# Patient Record
Sex: FEMALE | Race: WHITE | NOT HISPANIC OR LATINO | Employment: OTHER | URBAN - METROPOLITAN AREA
[De-identification: names, ages, dates, MRNs, and addresses within clinical notes are randomized per-mention and may not be internally consistent; named-entity substitution may affect disease eponyms.]

---

## 2017-01-23 ENCOUNTER — APPOINTMENT (OUTPATIENT)
Dept: BONE DENSITY | Facility: HOSPITAL | Age: 82
End: 2017-01-23
Attending: INTERNAL MEDICINE

## 2017-02-22 ENCOUNTER — APPOINTMENT (OUTPATIENT)
Dept: BONE DENSITY | Facility: HOSPITAL | Age: 82
End: 2017-02-22
Attending: INTERNAL MEDICINE

## 2017-03-09 ENCOUNTER — HOSPITAL ENCOUNTER (OUTPATIENT)
Dept: BONE DENSITY | Facility: HOSPITAL | Age: 82
Discharge: HOME | End: 2017-03-09
Attending: INTERNAL MEDICINE | Admitting: INTERNAL MEDICINE

## 2017-03-09 DIAGNOSIS — M81.0 OSTEOPOROSIS: ICD-10-CM

## 2017-03-09 PROCEDURE — 77080 DXA BONE DENSITY AXIAL: CPT | Performed by: RADIOLOGY

## 2017-03-09 PROCEDURE — 77080 DXA BONE DENSITY AXIAL: CPT

## 2017-04-19 ENCOUNTER — TRANSCRIBE ORDERS (OUTPATIENT)
Dept: ADMINISTRATIVE | Facility: HOSPITAL | Age: 82
End: 2017-04-19

## 2017-04-19 DIAGNOSIS — R10.9 ABDOMINAL PAIN, UNSPECIFIED LOCATION: Primary | ICD-10-CM

## 2017-04-20 ENCOUNTER — HOSPITAL ENCOUNTER (OUTPATIENT)
Dept: CT IMAGING | Facility: HOSPITAL | Age: 82
Discharge: HOME | End: 2017-04-20
Admitting: PHYSICIAN ASSISTANT

## 2017-04-20 DIAGNOSIS — R10.9 ABDOMINAL PAIN, UNSPECIFIED LOCATION: ICD-10-CM

## 2017-04-20 PROCEDURE — 74178 CT ABD&PLV WO CNTR FLWD CNTR: CPT

## 2017-04-20 PROCEDURE — 82565 ASSAY OF CREATININE: CPT

## 2017-04-20 PROCEDURE — 0 IOPAMIDOL 61 % SOLUTION: Performed by: PHYSICIAN ASSISTANT

## 2017-04-20 RX ADMIN — IOPAMIDOL 98 ML: 612 INJECTION, SOLUTION INTRAVENOUS at 11:25

## 2017-04-21 LAB — CREAT BLDA-MCNC: 0.7 MG/DL (ref 0.6–1.3)

## 2019-02-28 ENCOUNTER — TELEPHONE (OUTPATIENT)
Dept: INTERNAL MEDICINE | Facility: CLINIC | Age: 84
End: 2019-02-28

## 2019-02-28 DIAGNOSIS — R53.1 GENERAL WEAKNESS: Primary | ICD-10-CM

## 2019-02-28 DIAGNOSIS — R26.89 BALANCE DISORDER: ICD-10-CM

## 2019-03-04 ENCOUNTER — TELEPHONE (OUTPATIENT)
Dept: INTERNAL MEDICINE | Facility: CLINIC | Age: 84
End: 2019-03-04

## 2019-03-08 PROBLEM — I10 BENIGN ESSENTIAL HYPERTENSION: Status: ACTIVE | Noted: 2019-03-08

## 2019-03-08 PROBLEM — E78.2 MIXED HYPERLIPIDEMIA: Status: ACTIVE | Noted: 2019-03-08

## 2019-03-08 PROBLEM — F41.1 GENERALIZED ANXIETY DISORDER: Status: ACTIVE | Noted: 2019-03-08

## 2019-03-08 PROBLEM — M81.0 SENILE OSTEOPOROSIS: Status: ACTIVE | Noted: 2019-03-08

## 2019-03-08 PROBLEM — I38 VALVULAR HEART DISEASE: Status: ACTIVE | Noted: 2019-03-08

## 2019-03-08 PROBLEM — K64.9 BLEEDING HEMORRHOID: Status: ACTIVE | Noted: 2019-03-08

## 2019-03-08 PROBLEM — R73.09 ABNORMAL GLUCOSE: Status: ACTIVE | Noted: 2019-03-08

## 2019-03-08 PROBLEM — Q21.10: Status: ACTIVE | Noted: 2019-03-08

## 2019-03-08 PROBLEM — I49.3 PVC'S (PREMATURE VENTRICULAR CONTRACTIONS): Status: ACTIVE | Noted: 2019-03-08

## 2019-03-08 PROBLEM — I27.20 PULMONARY HYPERTENSION (HCC): Status: ACTIVE | Noted: 2019-03-08

## 2019-03-13 ENCOUNTER — APPOINTMENT (OUTPATIENT)
Dept: PHYSICAL THERAPY | Facility: HOSPITAL | Age: 84
End: 2019-03-13

## 2019-03-14 ENCOUNTER — TELEPHONE (OUTPATIENT)
Dept: INTERNAL MEDICINE | Facility: CLINIC | Age: 84
End: 2019-03-14

## 2019-03-14 NOTE — TELEPHONE ENCOUNTER
PT CALLED TO LET YOU KNOW THAT  DR. ZAMARRIPA PUT HER ON THIS MEDICATION VASCULERA . BUT SHE IS NOT GOING TO TAKE IT UNTIL SOMEONE CALLS HER BACK TO LET HER KNOW WHAT IT IS. HER NUMBER [phone]

## 2019-06-10 ENCOUNTER — LAB (OUTPATIENT)
Dept: LAB | Facility: HOSPITAL | Age: 84
End: 2019-06-10

## 2019-06-10 DIAGNOSIS — I10 BENIGN ESSENTIAL HYPERTENSION: ICD-10-CM

## 2019-06-10 DIAGNOSIS — E78.2 MIXED HYPERLIPIDEMIA: ICD-10-CM

## 2019-06-10 DIAGNOSIS — I10 BENIGN ESSENTIAL HYPERTENSION: Primary | ICD-10-CM

## 2019-06-10 DIAGNOSIS — R73.09 ABNORMAL GLUCOSE: ICD-10-CM

## 2019-06-10 DIAGNOSIS — M81.0 SENILE OSTEOPOROSIS: ICD-10-CM

## 2019-06-10 LAB
25(OH)D3 SERPL-MCNC: 47.1 NG/ML (ref 30–100)
ALBUMIN SERPL-MCNC: 4.2 G/DL (ref 3.5–5.2)
ALBUMIN/GLOB SERPL: 1.5 G/DL
ALP SERPL-CCNC: 115 U/L (ref 39–117)
ALT SERPL W P-5'-P-CCNC: 11 U/L (ref 1–33)
ANION GAP SERPL CALCULATED.3IONS-SCNC: 12.3 MMOL/L
AST SERPL-CCNC: 16 U/L (ref 1–32)
BACTERIA UR QL AUTO: NORMAL /HPF
BASOPHILS # BLD AUTO: 0.05 10*3/MM3 (ref 0–0.2)
BASOPHILS NFR BLD AUTO: 0.8 % (ref 0–1.5)
BILIRUB SERPL-MCNC: 0.4 MG/DL (ref 0.2–1.2)
BILIRUB UR QL STRIP: NEGATIVE
BUN BLD-MCNC: 9 MG/DL (ref 8–23)
BUN/CREAT SERPL: 13.4 (ref 7–25)
CALCIUM SPEC-SCNC: 9.2 MG/DL (ref 8.6–10.5)
CHLORIDE SERPL-SCNC: 94 MMOL/L (ref 98–107)
CHOLEST SERPL-MCNC: 164 MG/DL (ref 0–200)
CLARITY UR: CLEAR
CO2 SERPL-SCNC: 25.7 MMOL/L (ref 22–29)
COLOR UR: YELLOW
CREAT BLD-MCNC: 0.67 MG/DL (ref 0.57–1)
DEPRECATED RDW RBC AUTO: 42.8 FL (ref 37–54)
EOSINOPHIL # BLD AUTO: 0.05 10*3/MM3 (ref 0–0.4)
EOSINOPHIL NFR BLD AUTO: 0.8 % (ref 0.3–6.2)
ERYTHROCYTE [DISTWIDTH] IN BLOOD BY AUTOMATED COUNT: 12.5 % (ref 12.3–15.4)
GFR SERPL CREATININE-BSD FRML MDRD: 84 ML/MIN/1.73
GLOBULIN UR ELPH-MCNC: 2.8 GM/DL
GLUCOSE BLD-MCNC: 100 MG/DL (ref 65–99)
GLUCOSE UR STRIP-MCNC: NEGATIVE MG/DL
HBA1C MFR BLD: 5.5 % (ref 4.8–5.6)
HCT VFR BLD AUTO: 42 % (ref 34–46.6)
HCYS SERPL-MCNC: 12.8 UMOL/L (ref 0–15)
HDLC SERPL-MCNC: 44 MG/DL (ref 40–60)
HGB BLD-MCNC: 13.4 G/DL (ref 12–15.9)
HGB UR QL STRIP.AUTO: NEGATIVE
HYALINE CASTS UR QL AUTO: NORMAL /LPF
IMM GRANULOCYTES # BLD AUTO: 0.05 10*3/MM3 (ref 0–0.05)
IMM GRANULOCYTES NFR BLD AUTO: 0.8 % (ref 0–0.5)
KETONES UR QL STRIP: NEGATIVE
LDLC SERPL CALC-MCNC: 110 MG/DL (ref 0–100)
LDLC/HDLC SERPL: 2.5 {RATIO}
LEUKOCYTE ESTERASE UR QL STRIP.AUTO: NEGATIVE
LYMPHOCYTES # BLD AUTO: 1.34 10*3/MM3 (ref 0.7–3.1)
LYMPHOCYTES NFR BLD AUTO: 21.8 % (ref 19.6–45.3)
MCH RBC QN AUTO: 29.8 PG (ref 26.6–33)
MCHC RBC AUTO-ENTMCNC: 31.9 G/DL (ref 31.5–35.7)
MCV RBC AUTO: 93.3 FL (ref 79–97)
MONOCYTES # BLD AUTO: 0.47 10*3/MM3 (ref 0.1–0.9)
MONOCYTES NFR BLD AUTO: 7.7 % (ref 5–12)
NEUTROPHILS # BLD AUTO: 4.18 10*3/MM3 (ref 1.7–7)
NEUTROPHILS NFR BLD AUTO: 68.1 % (ref 42.7–76)
NITRITE UR QL STRIP: NEGATIVE
NRBC BLD AUTO-RTO: 0 /100 WBC (ref 0–0.2)
PH UR STRIP.AUTO: 7.5 [PH] (ref 5–8)
PLATELET # BLD AUTO: 403 10*3/MM3 (ref 140–450)
PMV BLD AUTO: 9.6 FL (ref 6–12)
POTASSIUM BLD-SCNC: 4.4 MMOL/L (ref 3.5–5.2)
PROT SERPL-MCNC: 7 G/DL (ref 6–8.5)
PROT UR QL STRIP: NEGATIVE
RBC # BLD AUTO: 4.5 10*6/MM3 (ref 3.77–5.28)
RBC # UR: NORMAL /HPF
REF LAB TEST METHOD: NORMAL
SODIUM BLD-SCNC: 132 MMOL/L (ref 136–145)
SP GR UR STRIP: 1.01 (ref 1–1.03)
SQUAMOUS #/AREA URNS HPF: NORMAL /HPF
TRIGL SERPL-MCNC: 50 MG/DL (ref 0–150)
TSH SERPL DL<=0.05 MIU/L-ACNC: 1.45 MIU/ML (ref 0.27–4.2)
UROBILINOGEN UR QL STRIP: NORMAL
VLDLC SERPL-MCNC: 10 MG/DL (ref 5–40)
WBC NRBC COR # BLD: 6.14 10*3/MM3 (ref 3.4–10.8)
WBC UR QL AUTO: NORMAL /HPF

## 2019-06-10 PROCEDURE — 85025 COMPLETE CBC W/AUTO DIFF WBC: CPT

## 2019-06-10 PROCEDURE — 83036 HEMOGLOBIN GLYCOSYLATED A1C: CPT

## 2019-06-10 PROCEDURE — 82306 VITAMIN D 25 HYDROXY: CPT

## 2019-06-10 PROCEDURE — 80061 LIPID PANEL: CPT

## 2019-06-10 PROCEDURE — 84443 ASSAY THYROID STIM HORMONE: CPT

## 2019-06-10 PROCEDURE — 81001 URINALYSIS AUTO W/SCOPE: CPT

## 2019-06-10 PROCEDURE — 83090 ASSAY OF HOMOCYSTEINE: CPT

## 2019-06-10 PROCEDURE — 80053 COMPREHEN METABOLIC PANEL: CPT

## 2019-06-11 ENCOUNTER — OFFICE VISIT (OUTPATIENT)
Dept: INTERNAL MEDICINE | Facility: CLINIC | Age: 84
End: 2019-06-11

## 2019-06-11 VITALS
BODY MASS INDEX: 27 KG/M2 | DIASTOLIC BLOOD PRESSURE: 54 MMHG | HEART RATE: 62 BPM | WEIGHT: 143 LBS | HEIGHT: 61 IN | SYSTOLIC BLOOD PRESSURE: 112 MMHG

## 2019-06-11 DIAGNOSIS — M81.0 SENILE OSTEOPOROSIS: ICD-10-CM

## 2019-06-11 DIAGNOSIS — I27.20 PULMONARY HYPERTENSION (HCC): ICD-10-CM

## 2019-06-11 DIAGNOSIS — R07.9 ACUTE CHEST PAIN: ICD-10-CM

## 2019-06-11 DIAGNOSIS — E78.2 MIXED HYPERLIPIDEMIA: ICD-10-CM

## 2019-06-11 DIAGNOSIS — K59.01 CONSTIPATION, SLOW TRANSIT: ICD-10-CM

## 2019-06-11 DIAGNOSIS — K64.4 EXTERNAL HEMORRHOID: ICD-10-CM

## 2019-06-11 DIAGNOSIS — I49.3 PVC'S (PREMATURE VENTRICULAR CONTRACTIONS): ICD-10-CM

## 2019-06-11 DIAGNOSIS — R73.09 ABNORMAL GLUCOSE: ICD-10-CM

## 2019-06-11 DIAGNOSIS — I10 BENIGN ESSENTIAL HYPERTENSION: Primary | ICD-10-CM

## 2019-06-11 PROBLEM — M62.81 QUADRICEPS WEAKNESS: Status: ACTIVE | Noted: 2019-06-11

## 2019-06-11 PROBLEM — R33.9 URINARY RETENTION: Status: ACTIVE | Noted: 2019-06-11

## 2019-06-11 PROBLEM — M19.90 OSTEOARTHRITIS: Status: ACTIVE | Noted: 2019-06-11

## 2019-06-11 PROBLEM — M25.561 RIGHT KNEE PAIN: Status: ACTIVE | Noted: 2019-06-11

## 2019-06-11 PROBLEM — E66.3 OVERWEIGHT (BMI 25.0-29.9): Status: ACTIVE | Noted: 2019-06-11

## 2019-06-11 PROBLEM — K64.9 BLEEDING HEMORRHOID: Status: RESOLVED | Noted: 2019-03-08 | Resolved: 2019-06-11

## 2019-06-11 PROBLEM — K80.20 CALCULUS OF GALLBLADDER: Status: ACTIVE | Noted: 2019-06-11

## 2019-06-11 PROCEDURE — 99214 OFFICE O/P EST MOD 30 MIN: CPT | Performed by: INTERNAL MEDICINE

## 2019-06-11 RX ORDER — POLYETHYLENE GLYCOL 3350 17 G/17G
POWDER, FOR SOLUTION ORAL
COMMUNITY
Start: 2018-06-26 | End: 2019-12-02

## 2019-06-11 RX ORDER — BISOPROLOL FUMARATE 5 MG/1
5 TABLET, FILM COATED ORAL DAILY
Refills: 3 | Status: ON HOLD | COMMUNITY
Start: 2019-05-07 | End: 2019-11-03 | Stop reason: SDUPTHER

## 2019-06-11 RX ORDER — VITAMIN E 200 UNIT
CAPSULE ORAL
COMMUNITY
Start: 2017-06-28 | End: 2020-12-18 | Stop reason: HOSPADM

## 2019-06-11 RX ORDER — TURMERIC 95 %
750 POWDER (GRAM) MISCELLANEOUS DAILY
COMMUNITY
End: 2019-11-03 | Stop reason: HOSPADM

## 2019-06-11 RX ORDER — GABAPENTIN 100 MG/1
CAPSULE ORAL
Status: ON HOLD | COMMUNITY
Start: 2017-12-15 | End: 2019-11-02

## 2019-06-11 RX ORDER — DIMENHYDRINATE 50 MG
1 TABLET ORAL DAILY
COMMUNITY
Start: 2017-12-07 | End: 2020-12-18 | Stop reason: HOSPADM

## 2019-06-11 RX ORDER — LISINOPRIL 20 MG/1
TABLET ORAL
COMMUNITY
Start: 2018-06-26 | End: 2019-06-24 | Stop reason: SDUPTHER

## 2019-06-11 RX ORDER — LANOLIN ALCOHOL/MO/W.PET/CERES
1 CREAM (GRAM) TOPICAL DAILY PRN
Status: ON HOLD | COMMUNITY
Start: 2017-12-07 | End: 2019-11-02

## 2019-06-11 RX ORDER — DIOSMIN COMPLEX NO.1 630 MG
TABLET ORAL
Refills: 11 | COMMUNITY
Start: 2019-06-03 | End: 2019-12-06 | Stop reason: SDUPTHER

## 2019-06-11 NOTE — PATIENT INSTRUCTIONS
For hypertension, continue taking lisinopril and bisoprolol.  Continue to avoid salt in the diet.  Stay physically active.    For hyperlipidemia, continue to eat a low-fat diet.  Eat less sugars.  Continue getting some exercise or walking every day.    For PVCs and palpitations, continue taking bisoprolol.  Decrease caffeine.  Also decrease chocolate.    Hemoglobin A1c shows that the average sugars is good.  Decreasing sugars in the diet will help.  Stay active.    For osteoporosis, doing some weightbearing exercise every day including walking and using hand weights helps.  Continue taking vitamin D and calcium.    Let us know if you have any further episodes of chest pain.    For hemorrhoids and constipation, drink lots of fluids and take a stool softener every day.  Add MiraLAX as needed.  Continue taking vascularity twice a day.  Exercises To Do While Sitting  Exercises that you do while sitting (chair exercises) can give you many of the same benefits as full exercise. Benefits include strengthening your heart, burning calories, and keeping muscles and joints healthy. Exercise can also improve your mood and help with depression and anxiety.  You may benefit from chair exercises if you are unable to do standing exercises because of:  · Diabetic foot pain.  · Obesity.  · Illness.  · Arthritis.  · Recovery from surgery or injury.  · Breathing problems.  · Balance problems.  · Another type of disability.    Before starting chair exercises, check with your health care provider or a physical therapist to find out how much exercise you can tolerate and which exercises are safe for you. If your health care provider approves:  · Start out slowly and build up over time. Aim to work up to about 10-20 minutes for each exercise session.  · Make exercise part of your daily routine.  · Drink water when you exercise. Do not wait until you are thirsty. Drink every 10-15 minutes.  · Stop exercising right away if you have pain,  "nausea, shortness of breath, or dizziness.  · If you are exercising in a wheelchair, make sure to lock the wheels.  · Ask your health care provider whether you can do last chi or yoga. Many positions in these mind-body exercises can be modified to do while seated.    Warm-up  Before starting other exercises:  1. Sit up as straight as you can. Have your knees bent at 90 degrees, which is the shape of the capital letter \"L.\" Keep your feet flat on the floor.  2. Sit at the front edge of your chair, if you can.  3. Pull in (tighten) the muscles in your abdomen and stretch your spine and neck as straight as you can. Hold this position for a few minutes.  4. Breathe in and out evenly. Try to concentrate on your breathing, and relax your mind.  Stretching  Exercise A: Arm stretch  1. Hold your arms out straight in front of your body.  2. Bend your hands at the wrist with your fingers pointing up, as if signaling someone to stop. Notice the slight tension in your forearms as you hold the position.  3. Keeping your arms out and your hands bent, rotate your hands outward as far as you can and hold this stretch. Aim to have your thumbs pointing up and your pinkie fingers pointing down.  Slowly repeat arm stretches for one minute as tolerated.  Exercise B: Leg stretch  1. If you can move your legs, try to \"draw\" letters on the floor with the toes of your foot. Write your name with one foot.  2. Write your name with the toes of your other foot.  Slowly repeat the movements for one minute as tolerated.  Exercise C: Reach for the sara  1. Reach your hands as far over your head as you can to stretch your spine.  2. Move your hands and arms as if you are climbing a rope.  Slowly repeat the movements for one minute as tolerated.  Range of motion exercises  Exercise A: Shoulder roll  1. Let your arms hang loosely at your sides.  2. Lift just your shoulders up toward your ears, then let them relax back down.  3. When your shoulders feel " loose, rotate your shoulders in backward and forward circles.  Do shoulder rolls slowly for one minute as tolerated.  Exercise B: March in place  1. As if you are marching, pump your arms and lift your legs up and down. Lift your knees as high as you can.  ? If you are unable to lift your knees, just pump your arms and move your ankles and feet up and down.  March in place for one minute as tolerated.  Exercise C: Seated jumping jacks  1. Let your arms hang down straight.  2. Keeping your arms straight, lift them up over your head. Aim to point your fingers to the ceiling.  3. While you lift your arms, straighten your legs and slide your heels along the floor to your sides, as wide as you can.  4. As you bring your arms back down to your sides, slide your legs back together.  ? If you are unable to use your legs, just move your arms.  Slowly repeat seated jumping jacks for one minute as tolerated.  Strengthening exercises  Exercise A: Shoulder squeeze  1. Hold your arms straight out from your body to your sides, with your elbows bent and your fists pointed at the ceiling.  2. Keeping your arms in the bent position, move them forward so your elbows and forearms meet in front of your face.  3. Open your arms back out as wide as you can with your elbows still bent, until you feel your shoulder blades squeezing together. Hold for 5 seconds.  Slowly repeat the movements forward and backward for one minute as tolerated.  Contact a health care provider if you:  · Had to stop exercising due to any of the following:  ? Pain.  ? Nausea.  ? Shortness of breath.  ? Dizziness.  ? Fatigue.  · Have significant pain or soreness after exercising.  Get help right away if you have:  · Chest pain.  · Difficulty breathing.  These symptoms may represent a serious problem that is an emergency. Do not wait to see if the symptoms will go away. Get medical help right away. Call your local emergency services (911 in the [place] Do not drive  yourself to the hospital.  This information is not intended to replace advice given to you by your health care provider. Make sure you discuss any questions you have with your health care provider.  Document Released: 10/31/2018 Document Revised: 10/31/2018 Document Reviewed: 10/31/2018  [employer] Interactive Patient Education © 2019 [employer]    Constipation, Adult  Constipation is when a person has fewer bowel movements in a week than normal, has difficulty having a bowel movement, or has stools that are dry, hard, or larger than normal. Constipation may be caused by an underlying condition. It may become worse with age if a person takes certain medicines and does not take in enough fluids.  Follow these instructions at home:  Eating and drinking    · Eat foods that have a lot of fiber, such as fresh fruits and vegetables, whole grains, and beans.  · Limit foods that are high in fat, low in fiber, or overly processed, such as french fries, hamburgers, cookies, candies, and soda.  · Drink enough fluid to keep your urine clear or pale yellow.  General instructions  · Exercise regularly or as told by your health care provider.  · Go to the restroom when you have the urge to go. Do not hold it in.  · Take over-the-counter and prescription medicines only as told by your health care provider. These include any fiber supplements.  · Practice pelvic floor retraining exercises, such as deep breathing while relaxing the lower abdomen and pelvic floor relaxation during bowel movements.  · Watch your condition for any changes.  · Keep all follow-up visits as told by your health care provider. This is important.  Contact a health care provider if:  · You have pain that gets worse.  · You have a fever.  · You do not have a bowel movement after 4 days.  · You vomit.  · You are not hungry.  · You lose weight.  · You are bleeding from the anus.  · You have thin, pencil-like stools.  Get help right away if:  · You have a fever  and your symptoms suddenly get worse.  · You leak stool or have blood in your stool.  · Your abdomen is bloated.  · You have severe pain in your abdomen.  · You feel dizzy or you faint.  This information is not intended to replace advice given to you by your health care provider. Make sure you discuss any questions you have with your health care provider.  Document Released: 09/15/2005 Document Revised: 07/07/2017 Document Reviewed: 06/07/2017  [employer] Interactive Patient Education © 2019 [employer]    Fall Prevention in the Home, Adult  Falls can cause injuries and can affect people from all age groups. There are many simple things that you can do to make your home safe and to help prevent falls. Ask for help when making these changes, if needed.  What actions can I take to prevent falls?  General instructions  · Use good lighting in all rooms. Replace any light bulbs that burn out.  · Turn on lights if it is dark. Use night-lights.  · Place frequently used items in easy-to-reach places. Lower the shelves around your home if necessary.  · Set up furniture so that there are clear paths around it. Avoid moving your furniture around.  · Remove throw rugs and other tripping hazards from the floor.  · Avoid walking on wet floors.  · Fix any uneven floor surfaces.  · Add color or contrast paint or tape to grab bars and handrails in your home. Place contrasting color strips on the first and last steps of stairways.  · When you use a stepladder, make sure that it is completely opened and that the sides are firmly locked. Have someone hold the ladder while you are using it. Do not climb a closed stepladder.  · Be aware of any and all pets.  What can I do in the bathroom?  · Keep the floor dry. Immediately clean up any water that spills onto the floor.  · Remove soap buildup in the tub or shower on a regular basis.  · Use non-skid mats or decals on the floor of the tub or shower.  · Attach bath mats securely with  double-sided, non-slip rug tape.  · If you need to sit down while you are in the shower, use a plastic, non-slip stool.  · Install grab bars by the toilet and in the tub and shower. Do not use towel bars as grab bars.  What can I do in the bedroom?  · Make sure that a bedside light is easy to reach.  · Do not use oversized bedding that drapes onto the floor.  · Have a firm chair that has side arms to use for getting dressed.  What can I do in the kitchen?  · Clean up any spills right away.  · If you need to reach for something above you, use a sturdy step stool that has a grab bar.  · Keep electrical cables out of the way.  · Do not use floor polish or wax that makes floors slippery. If you must use wax, make sure that it is non-skid floor wax.  What can I do in the stairways?  · Do not leave any items on the stairs.  · Make sure that you have a light switch at the top of the stairs and the bottom of the stairs. Have them installed if you do not have them.  · Make sure that there are handrails on both sides of the stairs. Fix handrails that are broken or loose. Make sure that handrails are as long as the stairways.  · Install non-slip stair treads on all stairs in your home.  · Avoid having throw rugs at the top or bottom of stairways, or secure the rugs with carpet tape to prevent them from moving.  · Choose a carpet design that does not hide the edge of steps on the stairway.  · Check any carpeting to make sure that it is firmly attached to the stairs. Fix any carpet that is loose or worn.  What can I do on the outside of my home?  · Use bright outdoor lighting.  · Regularly repair the edges of walkways and driveways and fix any cracks.  · Remove high doorway thresholds.  · Trim any shrubbery on the main path into your home.  · Regularly check that handrails are securely fastened and in good repair. Both sides of any steps should have handrails.  · Install guardrails along the edges of any raised decks or  porches.  · Clear walkways of debris and clutter, including tools and rocks.  · Have leaves, snow, and ice cleared regularly.  · Use sand or salt on walkways during winter months.  · In the garage, clean up any spills right away, including grease or oil spills.  What other actions can I take?  · Wear closed-toe shoes that fit well and support your feet. Wear shoes that have rubber soles or low heels.  · Use mobility aids as needed, such as canes, walkers, scooters, and crutches.  · Review your medicines with your health care provider. Some medicines can cause dizziness or changes in blood pressure, which increase your risk of falling.  Talk with your health care provider about other ways that you can decrease your risk of falls. This may include working with a physical therapist or  to improve your strength, balance, and endurance.  Where to find more information  · Centers for Disease Control and Prevention, [place]: https://www.cdc.gov  · [employer] [place] [employer]: https://zv9nwcq.yesenia.nih.gov  Contact a health care provider if:  · You are afraid of falling at home.  · You feel weak, drowsy, or dizzy at home.  · You fall at home.  Summary  · There are many simple things that you can do to make your home safe and to help prevent falls.  · Ways to make your home safe include removing tripping hazards and installing grab bars in the bathroom.  · Ask for help when making these changes in your home.  This information is not intended to replace advice given to you by your health care provider. Make sure you discuss any questions you have with your health care provider.  Document Released: 12/08/2003 Document Revised: 08/02/2018 Document Reviewed: 08/02/2018  [employer] Interactive Patient Education © 2019 [employer]

## 2019-06-11 NOTE — PROGRESS NOTES
[facility]     Thuy Clark  1935   [id number]      Patient Care Team:  Isidra Eisenberg MD as PCP - General  Isidra Eisenberg MD as PCP - Family Medicine  Isidra Eisenberg MD as PCP - Claims Attributed    Chief Complaint;:   Chief Complaint   Patient presents with   • Medicine     doesn't know why she was put on Meclizine and she doesn't want is   • ER f/u     went to [facility], we have records            HPI  Patient is a 83 y.o. female presents with went to ER with left lower anterior chest pain that made it difficult to get a good deep breath. It gradually decreased over about 24 hours.  Never recurred. No indigestion or heartburn. She does belch a lot since cholecystectomy.     CHRONIC CONDITIONS  Palpitations, worse at night, are mild and unchanged. Drinks about 4 glasses of caffeine per day. Takes beta blocker.     BP at [facility] one day was 143/56.  She had trouble parking.Takes lisinopril and bisoprolol.     Exercises, walks, rides bike, gardens.     She has an old bottle of meclizine with her that was from 2016. She does not remember what it was for. She will dispose of it.    She  denies any shortness of breath with exercise or gardening.  No cough or wheeze.  Her energy is good and she feels well.    She does have constipation and hemorrhoids that flareup on and off.  She has tried MiraLAX for the constipation and she says that is not the answer.  She has some stool softeners at home but has not tried taking them on a regular basis.  She does use preparation H for the hemorrhoids and it does help some.  She does not want a prescription for the hemorrhoids because she has been given one in the past and it was too expensive.  She does take vascularity twice a day now as prescribed by Dr. Gomez and she feels it has decreased her hemorrhoid flares.    Past Medical History:   Diagnosis Date   • Bleeding hemorrhoid 3/8/2019   • Diverticulosis    • Hx of colonic polyps 2003   •  Hypertension    • Intraductal papilloma     R intraductal patheloma   • Left upper extremity numbness     L upper extremity numbness-ER visit; neuropathy       Past Surgical History:   Procedure Laterality Date   • BREAST LUMPECTOMY  2006   • CHOLECYSTECTOMY     • HYSTERECTOMY  1984   • VAGINECTOMY  2009       Family History   Problem Relation Age of Onset   • Hyperlipidemia Other    • Hypertension Other    • Coronary artery disease Other        Social History     Socioeconomic History   • Marital status:      Spouse name: Not on file   • Number of children: Not on file   • Years of education: Not on file   • Highest education level: Not on file   Tobacco Use   • Smoking status: Never Smoker       Allergies   Allergen Reactions   • Levofloxacin Other (See Comments)     OTHER    • Nitrofuran Derivatives Rash     OTHER    • Nitrofurantoin Rash       Review of Systems:     Review of Systems   Constitutional: Negative for chills, fatigue, fever, unexpected weight gain and unexpected weight loss.   HENT: Negative for congestion, ear pain, sinus pressure, sore throat and trouble swallowing.    Eyes: Negative for visual disturbance.   Respiratory: Negative for cough, chest tightness, shortness of breath and wheezing.    Cardiovascular: Positive for chest pain and palpitations. Negative for leg swelling.   Gastrointestinal: Positive for constipation. Negative for abdominal pain, blood in stool, diarrhea and GERD.        Hemorrhoids   Endocrine: Negative for cold intolerance, heat intolerance and polydipsia.   Genitourinary: Negative for urinary incontinence, dysuria, frequency, hematuria, breast lump and breast pain.   Musculoskeletal: Negative for back pain, gait problem and joint swelling.   Skin: Negative for color change, rash and skin lesions.   Allergic/Immunologic: Negative for environmental allergies, food allergies and immunocompromised state.   Neurological: Negative for dizziness, speech difficulty,  "headache and memory problem.   Hematological: Negative for adenopathy. Does not bruise/bleed easily.   Psychiatric/Behavioral: Negative for decreased concentration, sleep disturbance and depressed mood. The patient is not nervous/anxious.        Vital Signs  Vitals:    06/11/19 1408   BP: 112/54   BP Location: Left arm   Patient Position: Sitting   Cuff Size: Adult   Pulse: 62   Weight: 64.9 kg (143 lb)   Height: 154.3 cm (60.75\")     Body mass index is 27.24 kg/m².      Current Outpatient Medications:   •  bisoprolol (ZEBeta) 5 MG tablet, , Disp: , Rfl: 3  •  Cholecalciferol (VITAMIN D3) 1000 units capsule, Take 1 capsule by mouth Daily., Disp: , Rfl:   •  Coenzyme Q10 (CO Q-10) 100 MG capsule, Take 1 capsule by mouth Daily., Disp: , Rfl:   •  Dietary Management Product (VASCULERA) tablet, TAKE TWO TABLETS BY MOUTH DAILY FOR 14 DAYS, THEN ONE TABLET BY MOUTH DAILY, Disp: , Rfl: 11  •  gabapentin (NEURONTIN) 100 MG capsule, take 1 capsule by oral route in the morning and  3 capsules in the evening if needed, Disp: , Rfl:   •  lisinopril (PRINIVIL,ZESTRIL) 20 MG tablet, Take 1 tablet every morning and 1/2 tablet every evening, Disp: , Rfl:   •  MEGARED OMEGA-3 KRILL OIL PO, 1 daily, Disp: , Rfl:   •  polyethylene glycol (MIRALAX) powder, take 1tsp.  by oral route  every day powder mixed with 8 oz. water, juice, soda, coffee, or tea, Disp: , Rfl:   •  Probiotic Product (PROBIOTIC PEARLS ADVANTAGE PO), 1  PRN, Disp: , Rfl:   •  Turmeric, Curcuma Longa, (CURCUMIN EXTRACT) powder, 750 mg Daily., Disp: , Rfl:   •  vitamin B-12 (CYANOCOBALAMIN) 1000 MCG tablet, Take 1 tablet by mouth Daily As Needed., Disp: , Rfl:   •  vitamin E 100 UNIT capsule, vitamin E, Disp: , Rfl:     Physical Exam:    Physical Exam     ACE III MINI        Results Review:    I reviewed the patient's new clinical results.  We went over her labs in detail.    CMP:  Lab Results   Component Value Date    BUN 9 06/10/2019    CREATININE 0.67 06/10/2019    " EGFRIFNONA 84 06/10/2019    BCR 13.4 06/10/2019     (L) 06/10/2019    K 4.4 06/10/2019    CO2 25.7 06/10/2019    CALCIUM 9.2 06/10/2019    ALBUMIN 4.20 06/10/2019    BILITOT 0.4 06/10/2019    ALKPHOS 115 06/10/2019    AST 16 06/10/2019    ALT 11 06/10/2019     HbA1c:  Lab Results   Component Value Date    HGBA1C 5.50 06/10/2019     Microalbumin:  No results found for: MICROALBUR, POCMALB, POCCREAT  Lipid Panel  Lab Results   Component Value Date    CHOL 164 06/10/2019    TRIG 50 06/10/2019    HDL 44 06/10/2019     (H) 06/10/2019    AST 16 06/10/2019    ALT 11 06/10/2019       Medication Review: Medications reviewed and noted    Problem List Items Addressed This Visit        Cardiovascular and Mediastinum    Pulmonary hypertension (CMS/HCC)    Benign essential hypertension - Primary    Relevant Medications    lisinopril (PRINIVIL,ZESTRIL) 20 MG tablet    bisoprolol (ZEBeta) 5 MG tablet    Mixed hyperlipidemia    PVC's (premature ventricular contractions)    Relevant Medications    bisoprolol (ZEBeta) 5 MG tablet    External hemorrhoid       Digestive    Constipation, slow transit       Musculoskeletal and Integument    Senile osteoporosis       Other    Abnormal glucose      Other Visit Diagnoses     Acute chest pain               Patient Instructions   For hypertension, continue taking lisinopril and bisoprolol.  Continue to avoid salt in the diet.  Stay physically active.    For hyperlipidemia, continue to eat a low-fat diet.  Eat less sugars.  Continue getting some exercise or walking every day.    For PVCs and palpitations, continue taking bisoprolol.  Decrease caffeine.  Also decrease chocolate.    Hemoglobin A1c shows that the average sugars is good.  Decreasing sugars in the diet will help.  Stay active.    For osteoporosis, doing some weightbearing exercise every day including walking and using hand weights helps.  Continue taking vitamin D and calcium.    Let us know if you have any further  "episodes of chest pain.    For hemorrhoids and constipation, drink lots of fluids and take a stool softener every day.  Add MiraLAX as needed.  Continue taking vascularity twice a day.  Exercises To Do While Sitting  Exercises that you do while sitting (chair exercises) can give you many of the same benefits as full exercise. Benefits include strengthening your heart, burning calories, and keeping muscles and joints healthy. Exercise can also improve your mood and help with depression and anxiety.  You may benefit from chair exercises if you are unable to do standing exercises because of:  · Diabetic foot pain.  · Obesity.  · Illness.  · Arthritis.  · Recovery from surgery or injury.  · Breathing problems.  · Balance problems.  · Another type of disability.    Before starting chair exercises, check with your health care provider or a physical therapist to find out how much exercise you can tolerate and which exercises are safe for you. If your health care provider approves:  · Start out slowly and build up over time. Aim to work up to about 10-20 minutes for each exercise session.  · Make exercise part of your daily routine.  · Drink water when you exercise. Do not wait until you are thirsty. Drink every 10-15 minutes.  · Stop exercising right away if you have pain, nausea, shortness of breath, or dizziness.  · If you are exercising in a wheelchair, make sure to lock the wheels.  · Ask your health care provider whether you can do last chi or yoga. Many positions in these mind-body exercises can be modified to do while seated.    Warm-up  Before starting other exercises:  1. Sit up as straight as you can. Have your knees bent at 90 degrees, which is the shape of the capital letter \"L.\" Keep your feet flat on the floor.  2. Sit at the front edge of your chair, if you can.  3. Pull in (tighten) the muscles in your abdomen and stretch your spine and neck as straight as you can. Hold this position for a few " "minutes.  4. Breathe in and out evenly. Try to concentrate on your breathing, and relax your mind.  Stretching  Exercise A: Arm stretch  1. Hold your arms out straight in front of your body.  2. Bend your hands at the wrist with your fingers pointing up, as if signaling someone to stop. Notice the slight tension in your forearms as you hold the position.  3. Keeping your arms out and your hands bent, rotate your hands outward as far as you can and hold this stretch. Aim to have your thumbs pointing up and your pinkie fingers pointing down.  Slowly repeat arm stretches for one minute as tolerated.  Exercise B: Leg stretch  1. If you can move your legs, try to \"draw\" letters on the floor with the toes of your foot. Write your name with one foot.  2. Write your name with the toes of your other foot.  Slowly repeat the movements for one minute as tolerated.  Exercise C: Reach for the sara  1. Reach your hands as far over your head as you can to stretch your spine.  2. Move your hands and arms as if you are climbing a rope.  Slowly repeat the movements for one minute as tolerated.  Range of motion exercises  Exercise A: Shoulder roll  1. Let your arms hang loosely at your sides.  2. Lift just your shoulders up toward your ears, then let them relax back down.  3. When your shoulders feel loose, rotate your shoulders in backward and forward circles.  Do shoulder rolls slowly for one minute as tolerated.  Exercise B: March in place  1. As if you are marching, pump your arms and lift your legs up and down. Lift your knees as high as you can.  ? If you are unable to lift your knees, just pump your arms and move your ankles and feet up and down.  March in place for one minute as tolerated.  Exercise C: Seated jumping jacks  1. Let your arms hang down straight.  2. Keeping your arms straight, lift them up over your head. Aim to point your fingers to the ceiling.  3. While you lift your arms, straighten your legs and slide your " heels along the floor to your sides, as wide as you can.  4. As you bring your arms back down to your sides, slide your legs back together.  ? If you are unable to use your legs, just move your arms.  Slowly repeat seated jumping jacks for one minute as tolerated.  Strengthening exercises  Exercise A: Shoulder squeeze  1. Hold your arms straight out from your body to your sides, with your elbows bent and your fists pointed at the ceiling.  2. Keeping your arms in the bent position, move them forward so your elbows and forearms meet in front of your face.  3. Open your arms back out as wide as you can with your elbows still bent, until you feel your shoulder blades squeezing together. Hold for 5 seconds.  Slowly repeat the movements forward and backward for one minute as tolerated.  Contact a health care provider if you:  · Had to stop exercising due to any of the following:  ? Pain.  ? Nausea.  ? Shortness of breath.  ? Dizziness.  ? Fatigue.  · Have significant pain or soreness after exercising.  Get help right away if you have:  · Chest pain.  · Difficulty breathing.  These symptoms may represent a serious problem that is an emergency. Do not wait to see if the symptoms will go away. Get medical help right away. Call your local emergency services (911 in the [place] Do not drive yourself to the hospital.  This information is not intended to replace advice given to you by your health care provider. Make sure you discuss any questions you have with your health care provider.  Document Released: 10/31/2018 Document Revised: 10/31/2018 Document Reviewed: 10/31/2018  [employer] Interactive Patient Education © 2019 [employer]    Constipation, Adult  Constipation is when a person has fewer bowel movements in a week than normal, has difficulty having a bowel movement, or has stools that are dry, hard, or larger than normal. Constipation may be caused by an underlying condition. It may become worse with age if a person  takes certain medicines and does not take in enough fluids.  Follow these instructions at home:  Eating and drinking    · Eat foods that have a lot of fiber, such as fresh fruits and vegetables, whole grains, and beans.  · Limit foods that are high in fat, low in fiber, or overly processed, such as french fries, hamburgers, cookies, candies, and soda.  · Drink enough fluid to keep your urine clear or pale yellow.  General instructions  · Exercise regularly or as told by your health care provider.  · Go to the restroom when you have the urge to go. Do not hold it in.  · Take over-the-counter and prescription medicines only as told by your health care provider. These include any fiber supplements.  · Practice pelvic floor retraining exercises, such as deep breathing while relaxing the lower abdomen and pelvic floor relaxation during bowel movements.  · Watch your condition for any changes.  · Keep all follow-up visits as told by your health care provider. This is important.  Contact a health care provider if:  · You have pain that gets worse.  · You have a fever.  · You do not have a bowel movement after 4 days.  · You vomit.  · You are not hungry.  · You lose weight.  · You are bleeding from the anus.  · You have thin, pencil-like stools.  Get help right away if:  · You have a fever and your symptoms suddenly get worse.  · You leak stool or have blood in your stool.  · Your abdomen is bloated.  · You have severe pain in your abdomen.  · You feel dizzy or you faint.  This information is not intended to replace advice given to you by your health care provider. Make sure you discuss any questions you have with your health care provider.  Document Released: 09/15/2005 Document Revised: 07/07/2017 Document Reviewed: 06/07/2017  [employer] Interactive Patient Education © 2019 [employer]    Fall Prevention in the Home, Adult  Falls can cause injuries and can affect people from all age groups. There are many simple things  that you can do to make your home safe and to help prevent falls. Ask for help when making these changes, if needed.  What actions can I take to prevent falls?  General instructions  · Use good lighting in all rooms. Replace any light bulbs that burn out.  · Turn on lights if it is dark. Use night-lights.  · Place frequently used items in easy-to-reach places. Lower the shelves around your home if necessary.  · Set up furniture so that there are clear paths around it. Avoid moving your furniture around.  · Remove throw rugs and other tripping hazards from the floor.  · Avoid walking on wet floors.  · Fix any uneven floor surfaces.  · Add color or contrast paint or tape to grab bars and handrails in your home. Place contrasting color strips on the first and last steps of stairways.  · When you use a stepladder, make sure that it is completely opened and that the sides are firmly locked. Have someone hold the ladder while you are using it. Do not climb a closed stepladder.  · Be aware of any and all pets.  What can I do in the bathroom?  · Keep the floor dry. Immediately clean up any water that spills onto the floor.  · Remove soap buildup in the tub or shower on a regular basis.  · Use non-skid mats or decals on the floor of the tub or shower.  · Attach bath mats securely with double-sided, non-slip rug tape.  · If you need to sit down while you are in the shower, use a plastic, non-slip stool.  · Install grab bars by the toilet and in the tub and shower. Do not use towel bars as grab bars.  What can I do in the bedroom?  · Make sure that a bedside light is easy to reach.  · Do not use oversized bedding that drapes onto the floor.  · Have a firm chair that has side arms to use for getting dressed.  What can I do in the kitchen?  · Clean up any spills right away.  · If you need to reach for something above you, use a sturdy step stool that has a grab bar.  · Keep electrical cables out of the way.  · Do not use floor  polish or wax that makes floors slippery. If you must use wax, make sure that it is non-skid floor wax.  What can I do in the stairways?  · Do not leave any items on the stairs.  · Make sure that you have a light switch at the top of the stairs and the bottom of the stairs. Have them installed if you do not have them.  · Make sure that there are handrails on both sides of the stairs. Fix handrails that are broken or loose. Make sure that handrails are as long as the stairways.  · Install non-slip stair treads on all stairs in your home.  · Avoid having throw rugs at the top or bottom of stairways, or secure the rugs with carpet tape to prevent them from moving.  · Choose a carpet design that does not hide the edge of steps on the stairway.  · Check any carpeting to make sure that it is firmly attached to the stairs. Fix any carpet that is loose or worn.  What can I do on the outside of my home?  · Use bright outdoor lighting.  · Regularly repair the edges of walkways and driveways and fix any cracks.  · Remove high doorway thresholds.  · Trim any shrubbery on the main path into your home.  · Regularly check that handrails are securely fastened and in good repair. Both sides of any steps should have handrails.  · Install guardrails along the edges of any raised decks or porches.  · Clear walkways of debris and clutter, including tools and rocks.  · Have leaves, snow, and ice cleared regularly.  · Use sand or salt on walkways during winter months.  · In the garage, clean up any spills right away, including grease or oil spills.  What other actions can I take?  · Wear closed-toe shoes that fit well and support your feet. Wear shoes that have rubber soles or low heels.  · Use mobility aids as needed, such as canes, walkers, scooters, and crutches.  · Review your medicines with your health care provider. Some medicines can cause dizziness or changes in blood pressure, which increase your risk of falling.  Talk with your  health care provider about other ways that you can decrease your risk of falls. This may include working with a physical therapist or  to improve your strength, balance, and endurance.  Where to find more information  · Centers for Disease Control and Prevention, STEAD[employer]: https://www.cdc.gov  · National [place] on Aging: https://vo5lzuc.yesenia.nih.gov  Contact a health care provider if:  · You are afraid of falling at home.  · You feel weak, drowsy, or dizzy at home.  · You fall at home.  Summary  · There are many simple things that you can do to make your home safe and to help prevent falls.  · Ways to make your home safe include removing tripping hazards and installing grab bars in the bathroom.  · Ask for help when making these changes in your home.  This information is not intended to replace advice given to you by your health care provider. Make sure you discuss any questions you have with your health care provider.  Document Released: 12/08/2003 Document Revised: 08/02/2018 Document Reviewed: 08/02/2018  [email or web address] Interactive Patient Education © 2019 [email or web address]        Plan of care reviewed with patient at the conclusion of today's visit. Education was provided regarding diagnosis, management, and any prescribed or recommended OTC medications.Patient verbalizes understanding of and agreement with management plan.         Isidra Eisenberg MD

## 2019-06-25 RX ORDER — LISINOPRIL 20 MG/1
TABLET ORAL
Qty: 135 TABLET | Refills: 4 | Status: SHIPPED | OUTPATIENT
Start: 2019-06-25 | End: 2019-09-24 | Stop reason: SDUPTHER

## 2019-06-25 NOTE — TELEPHONE ENCOUNTER
PHARMACY REQUESTED REFILL ON PT'S LISINOPRIL 20 MG       [facility] HAS IT AS OF 6/26/18  AS 1 EVERY MORNING AND 1/2 EVERY EVENING    [facility] HAS IT AS 2 SEPARATE TABLETS   LISINOPRIL 10 MG 1 EVERY EVENING  LISINOPRIL 20 MG 1 EVERY A.M.   PLEASE ADVISE

## 2019-09-24 RX ORDER — LISINOPRIL 20 MG/1
TABLET ORAL
Qty: 90 TABLET | Refills: 4 | Status: SHIPPED | OUTPATIENT
Start: 2019-09-24 | End: 2019-11-03 | Stop reason: HOSPADM

## 2019-09-24 RX ORDER — LISINOPRIL 10 MG/1
10 TABLET ORAL NIGHTLY
Qty: 90 TABLET | Refills: 4 | Status: SHIPPED | OUTPATIENT
Start: 2019-09-24 | End: 2019-11-03 | Stop reason: HOSPADM

## 2019-09-24 NOTE — TELEPHONE ENCOUNTER
FORD FROM [facility]  CALLED STATING THAT PT WANTS HER LISINOPRIL FILLED THERE AND SHE DOESN'T WANT IT AS IT IS NOW 1 TABLET IN AM AND 1/2 TABLET IN PM  SHE WANTS A 20 MG TABLET AND A 10 MG TABLET     CALLED PT TO VERIFY THIS SHE SAID YES BUT SHE WANTS IT SENT TO [facility] NOT [facility]   IT WAS SENT IN SEPARATELY IN [facility] ON 6/26/18 FOR Q- 90 W/ 4 REFILLS     [facility]  THE OLD LISINOPRIL 20 MG FOR 1 IN AM AND 1/2 IN PM NEEDS TO BE D/C'D

## 2019-09-25 ENCOUNTER — TELEPHONE (OUTPATIENT)
Dept: INTERNAL MEDICINE | Facility: CLINIC | Age: 84
End: 2019-09-25

## 2019-09-25 ENCOUNTER — FLU SHOT (OUTPATIENT)
Dept: INTERNAL MEDICINE | Facility: CLINIC | Age: 84
End: 2019-09-25

## 2019-09-25 DIAGNOSIS — Z23 IMMUNIZATION, PNEUMOCOCCUS AND INFLUENZA: ICD-10-CM

## 2019-09-25 PROCEDURE — 90653 IIV ADJUVANT VACCINE IM: CPT | Performed by: INTERNAL MEDICINE

## 2019-09-25 PROCEDURE — G0008 ADMIN INFLUENZA VIRUS VAC: HCPCS | Performed by: INTERNAL MEDICINE

## 2019-09-25 NOTE — TELEPHONE ENCOUNTER
Spoke with pt pharmacy and they just wanted to make sure that the pt was taking both. I advised she takes the 20mg in the morning and 10mg in the evening. They said they would get it ready for her. LM advising pt that she should be able to  today.

## 2019-09-25 NOTE — TELEPHONE ENCOUNTER
MED REFILL      LISINOPRIL  10 MG AND 20 MG      PT SAID THERE WAS A PROBLEM AT HER PHARMACY ON THE REFILLS. PLEASE CALL PHARMACY.

## 2019-11-02 ENCOUNTER — APPOINTMENT (OUTPATIENT)
Dept: GENERAL RADIOLOGY | Facility: HOSPITAL | Age: 84
End: 2019-11-02

## 2019-11-02 ENCOUNTER — HOSPITAL ENCOUNTER (INPATIENT)
Facility: HOSPITAL | Age: 84
LOS: 1 days | Discharge: HOME | End: 2019-11-03
Attending: EMERGENCY MEDICINE | Admitting: INTERNAL MEDICINE

## 2019-11-02 DIAGNOSIS — R42 DIZZINESS: ICD-10-CM

## 2019-11-02 DIAGNOSIS — I48.92 ATRIAL FLUTTER WITH RAPID VENTRICULAR RESPONSE (HCC): Primary | ICD-10-CM

## 2019-11-02 LAB
ALBUMIN SERPL-MCNC: 4.6 G/DL (ref 3.5–5.2)
ALBUMIN/GLOB SERPL: 1.4 G/DL
ALP SERPL-CCNC: 120 U/L (ref 39–117)
ALT SERPL W P-5'-P-CCNC: 15 U/L (ref 1–33)
ANION GAP SERPL CALCULATED.3IONS-SCNC: 15 MMOL/L (ref 5–15)
AST SERPL-CCNC: 21 U/L (ref 1–32)
BACTERIA UR QL AUTO: NORMAL /HPF
BASOPHILS # BLD AUTO: 0.04 10*3/MM3 (ref 0–0.2)
BASOPHILS NFR BLD AUTO: 0.5 % (ref 0–1.5)
BILIRUB SERPL-MCNC: 0.5 MG/DL (ref 0.2–1.2)
BILIRUB UR QL STRIP: NEGATIVE
BUN BLD-MCNC: 12 MG/DL (ref 8–23)
BUN/CREAT SERPL: 20.7 (ref 7–25)
CALCIUM SPEC-SCNC: 9.4 MG/DL (ref 8.6–10.5)
CHLORIDE SERPL-SCNC: 97 MMOL/L (ref 98–107)
CLARITY UR: CLEAR
CO2 SERPL-SCNC: 22 MMOL/L (ref 22–29)
COLOR UR: YELLOW
CREAT BLD-MCNC: 0.58 MG/DL (ref 0.57–1)
DEPRECATED RDW RBC AUTO: 42 FL (ref 37–54)
EOSINOPHIL # BLD AUTO: 0.04 10*3/MM3 (ref 0–0.4)
EOSINOPHIL NFR BLD AUTO: 0.5 % (ref 0.3–6.2)
ERYTHROCYTE [DISTWIDTH] IN BLOOD BY AUTOMATED COUNT: 12.7 % (ref 12.3–15.4)
GFR SERPL CREATININE-BSD FRML MDRD: 99 ML/MIN/1.73
GLOBULIN UR ELPH-MCNC: 3.3 GM/DL
GLUCOSE BLD-MCNC: 114 MG/DL (ref 65–99)
GLUCOSE UR STRIP-MCNC: NEGATIVE MG/DL
HCT VFR BLD AUTO: 44.4 % (ref 34–46.6)
HGB BLD-MCNC: 14.6 G/DL (ref 12–15.9)
HGB UR QL STRIP.AUTO: NEGATIVE
HOLD SPECIMEN: NORMAL
HOLD SPECIMEN: NORMAL
HYALINE CASTS UR QL AUTO: NORMAL /LPF
IMM GRANULOCYTES # BLD AUTO: 0.04 10*3/MM3 (ref 0–0.05)
IMM GRANULOCYTES NFR BLD AUTO: 0.5 % (ref 0–0.5)
KETONES UR QL STRIP: NEGATIVE
LEUKOCYTE ESTERASE UR QL STRIP.AUTO: NEGATIVE
LYMPHOCYTES # BLD AUTO: 1.46 10*3/MM3 (ref 0.7–3.1)
LYMPHOCYTES NFR BLD AUTO: 19.9 % (ref 19.6–45.3)
MAGNESIUM SERPL-MCNC: 1.9 MG/DL (ref 1.6–2.4)
MCH RBC QN AUTO: 29.5 PG (ref 26.6–33)
MCHC RBC AUTO-ENTMCNC: 32.9 G/DL (ref 31.5–35.7)
MCV RBC AUTO: 89.7 FL (ref 79–97)
MONOCYTES # BLD AUTO: 0.64 10*3/MM3 (ref 0.1–0.9)
MONOCYTES NFR BLD AUTO: 8.7 % (ref 5–12)
NEUTROPHILS # BLD AUTO: 5.1 10*3/MM3 (ref 1.7–7)
NEUTROPHILS NFR BLD AUTO: 69.9 % (ref 42.7–76)
NITRITE UR QL STRIP: NEGATIVE
NRBC BLD AUTO-RTO: 0 /100 WBC (ref 0–0.2)
PH UR STRIP.AUTO: 7 [PH] (ref 5–8)
PLATELET # BLD AUTO: 354 10*3/MM3 (ref 140–450)
PMV BLD AUTO: 8.9 FL (ref 6–12)
POTASSIUM BLD-SCNC: 4.2 MMOL/L (ref 3.5–5.2)
PROT SERPL-MCNC: 7.9 G/DL (ref 6–8.5)
PROT UR QL STRIP: ABNORMAL
RBC # BLD AUTO: 4.95 10*6/MM3 (ref 3.77–5.28)
RBC # UR: NORMAL /HPF
REF LAB TEST METHOD: NORMAL
SODIUM BLD-SCNC: 134 MMOL/L (ref 136–145)
SP GR UR STRIP: 1.01 (ref 1–1.03)
SQUAMOUS #/AREA URNS HPF: NORMAL /HPF
T4 FREE SERPL-MCNC: 1.37 NG/DL (ref 0.93–1.7)
TROPONIN T SERPL-MCNC: <0.01 NG/ML (ref 0–0.03)
TSH SERPL DL<=0.05 MIU/L-ACNC: 1.84 UIU/ML (ref 0.27–4.2)
UROBILINOGEN UR QL STRIP: ABNORMAL
WBC NRBC COR # BLD: 7.32 10*3/MM3 (ref 3.4–10.8)
WBC UR QL AUTO: NORMAL /HPF
WHOLE BLOOD HOLD SPECIMEN: NORMAL
WHOLE BLOOD HOLD SPECIMEN: NORMAL

## 2019-11-02 PROCEDURE — 84484 ASSAY OF TROPONIN QUANT: CPT | Performed by: NURSE PRACTITIONER

## 2019-11-02 PROCEDURE — 83735 ASSAY OF MAGNESIUM: CPT | Performed by: EMERGENCY MEDICINE

## 2019-11-02 PROCEDURE — 84443 ASSAY THYROID STIM HORMONE: CPT | Performed by: EMERGENCY MEDICINE

## 2019-11-02 PROCEDURE — 81001 URINALYSIS AUTO W/SCOPE: CPT | Performed by: EMERGENCY MEDICINE

## 2019-11-02 PROCEDURE — 84439 ASSAY OF FREE THYROXINE: CPT | Performed by: EMERGENCY MEDICINE

## 2019-11-02 PROCEDURE — 93005 ELECTROCARDIOGRAM TRACING: CPT | Performed by: EMERGENCY MEDICINE

## 2019-11-02 PROCEDURE — 71045 X-RAY EXAM CHEST 1 VIEW: CPT

## 2019-11-02 PROCEDURE — 99285 EMERGENCY DEPT VISIT HI MDM: CPT

## 2019-11-02 PROCEDURE — 99222 1ST HOSP IP/OBS MODERATE 55: CPT | Performed by: FAMILY MEDICINE

## 2019-11-02 PROCEDURE — 84484 ASSAY OF TROPONIN QUANT: CPT | Performed by: EMERGENCY MEDICINE

## 2019-11-02 PROCEDURE — 80053 COMPREHEN METABOLIC PANEL: CPT | Performed by: EMERGENCY MEDICINE

## 2019-11-02 PROCEDURE — 85025 COMPLETE CBC W/AUTO DIFF WBC: CPT | Performed by: EMERGENCY MEDICINE

## 2019-11-02 RX ORDER — LISINOPRIL 20 MG/1
20 TABLET ORAL
Status: DISCONTINUED | OUTPATIENT
Start: 2019-11-02 | End: 2019-11-03

## 2019-11-02 RX ORDER — ONDANSETRON 4 MG/1
4 TABLET, FILM COATED ORAL EVERY 6 HOURS PRN
Status: DISCONTINUED | OUTPATIENT
Start: 2019-11-02 | End: 2019-11-03 | Stop reason: HOSPADM

## 2019-11-02 RX ORDER — MAGNESIUM SULFATE HEPTAHYDRATE 40 MG/ML
4 INJECTION, SOLUTION INTRAVENOUS AS NEEDED
Status: DISCONTINUED | OUTPATIENT
Start: 2019-11-02 | End: 2019-11-03 | Stop reason: HOSPADM

## 2019-11-02 RX ORDER — METOPROLOL TARTRATE 5 MG/5ML
5 INJECTION INTRAVENOUS EVERY 6 HOURS PRN
Status: DISCONTINUED | OUTPATIENT
Start: 2019-11-02 | End: 2019-11-03 | Stop reason: HOSPADM

## 2019-11-02 RX ORDER — BISACODYL 10 MG
10 SUPPOSITORY, RECTAL RECTAL DAILY PRN
Status: DISCONTINUED | OUTPATIENT
Start: 2019-11-02 | End: 2019-11-03 | Stop reason: HOSPADM

## 2019-11-02 RX ORDER — BISOPROLOL FUMARATE 5 MG/1
5 TABLET, FILM COATED ORAL
Status: DISCONTINUED | OUTPATIENT
Start: 2019-11-02 | End: 2019-11-03

## 2019-11-02 RX ORDER — METOPROLOL TARTRATE 5 MG/5ML
5 INJECTION INTRAVENOUS ONCE
Status: COMPLETED | OUTPATIENT
Start: 2019-11-02 | End: 2019-11-02

## 2019-11-02 RX ORDER — POTASSIUM CHLORIDE 750 MG/1
40 CAPSULE, EXTENDED RELEASE ORAL AS NEEDED
Status: DISCONTINUED | OUTPATIENT
Start: 2019-11-02 | End: 2019-11-03 | Stop reason: HOSPADM

## 2019-11-02 RX ORDER — ACETAMINOPHEN 650 MG/1
650 SUPPOSITORY RECTAL EVERY 4 HOURS PRN
Status: DISCONTINUED | OUTPATIENT
Start: 2019-11-02 | End: 2019-11-03 | Stop reason: HOSPADM

## 2019-11-02 RX ORDER — POTASSIUM CHLORIDE 1.5 G/1.77G
40 POWDER, FOR SOLUTION ORAL AS NEEDED
Status: DISCONTINUED | OUTPATIENT
Start: 2019-11-02 | End: 2019-11-03 | Stop reason: HOSPADM

## 2019-11-02 RX ORDER — SODIUM CHLORIDE 0.9 % (FLUSH) 0.9 %
10 SYRINGE (ML) INJECTION AS NEEDED
Status: DISCONTINUED | OUTPATIENT
Start: 2019-11-02 | End: 2019-11-03 | Stop reason: HOSPADM

## 2019-11-02 RX ORDER — SODIUM CHLORIDE 0.9 % (FLUSH) 0.9 %
10 SYRINGE (ML) INJECTION AS NEEDED
Status: DISCONTINUED | OUTPATIENT
Start: 2019-11-02 | End: 2019-11-02

## 2019-11-02 RX ORDER — LISINOPRIL 10 MG/1
10 TABLET ORAL NIGHTLY
Status: DISCONTINUED | OUTPATIENT
Start: 2019-11-02 | End: 2019-11-03

## 2019-11-02 RX ORDER — MAGNESIUM SULFATE HEPTAHYDRATE 40 MG/ML
2 INJECTION, SOLUTION INTRAVENOUS AS NEEDED
Status: DISCONTINUED | OUTPATIENT
Start: 2019-11-02 | End: 2019-11-03 | Stop reason: HOSPADM

## 2019-11-02 RX ORDER — ONDANSETRON 2 MG/ML
4 INJECTION INTRAMUSCULAR; INTRAVENOUS EVERY 6 HOURS PRN
Status: DISCONTINUED | OUTPATIENT
Start: 2019-11-02 | End: 2019-11-03 | Stop reason: HOSPADM

## 2019-11-02 RX ORDER — SODIUM CHLORIDE 0.9 % (FLUSH) 0.9 %
10 SYRINGE (ML) INJECTION EVERY 12 HOURS SCHEDULED
Status: DISCONTINUED | OUTPATIENT
Start: 2019-11-02 | End: 2019-11-03 | Stop reason: HOSPADM

## 2019-11-02 RX ORDER — AMOXICILLIN 250 MG
2 CAPSULE ORAL 2 TIMES DAILY
Status: DISCONTINUED | OUTPATIENT
Start: 2019-11-02 | End: 2019-11-03 | Stop reason: HOSPADM

## 2019-11-02 RX ORDER — ACETAMINOPHEN 160 MG/5ML
650 SOLUTION ORAL EVERY 4 HOURS PRN
Status: DISCONTINUED | OUTPATIENT
Start: 2019-11-02 | End: 2019-11-03 | Stop reason: HOSPADM

## 2019-11-02 RX ORDER — ESMOLOL HYDROCHLORIDE 10 MG/ML
50-300 INJECTION, SOLUTION INTRAVENOUS
Status: DISCONTINUED | OUTPATIENT
Start: 2019-11-02 | End: 2019-11-02

## 2019-11-02 RX ORDER — SENNA AND DOCUSATE SODIUM 50; 8.6 MG/1; MG/1
2 TABLET, FILM COATED ORAL 2 TIMES DAILY
Status: DISCONTINUED | OUTPATIENT
Start: 2019-11-02 | End: 2019-11-02

## 2019-11-02 RX ORDER — CALCIUM CARBONATE 200(500)MG
2 TABLET,CHEWABLE ORAL 2 TIMES DAILY PRN
Status: DISCONTINUED | OUTPATIENT
Start: 2019-11-02 | End: 2019-11-03 | Stop reason: HOSPADM

## 2019-11-02 RX ORDER — POTASSIUM CHLORIDE 7.45 MG/ML
10 INJECTION INTRAVENOUS
Status: DISCONTINUED | OUTPATIENT
Start: 2019-11-02 | End: 2019-11-03 | Stop reason: HOSPADM

## 2019-11-02 RX ORDER — ACETAMINOPHEN 325 MG/1
650 TABLET ORAL EVERY 4 HOURS PRN
Status: DISCONTINUED | OUTPATIENT
Start: 2019-11-02 | End: 2019-11-03 | Stop reason: HOSPADM

## 2019-11-02 RX ADMIN — LISINOPRIL 10 MG: 10 TABLET ORAL at 20:34

## 2019-11-02 RX ADMIN — METOPROLOL TARTRATE 5 MG: 5 INJECTION INTRAVENOUS at 16:49

## 2019-11-02 RX ADMIN — APIXABAN 5 MG: 5 TABLET, FILM COATED ORAL at 16:49

## 2019-11-02 RX ADMIN — SODIUM CHLORIDE, PRESERVATIVE FREE 10 ML: 5 INJECTION INTRAVENOUS at 20:34

## 2019-11-02 RX ADMIN — METOPROLOL TARTRATE 5 MG: 5 INJECTION INTRAVENOUS at 14:58

## 2019-11-02 RX ADMIN — ESMOLOL HYDROCHLORIDE 50 MCG/KG/MIN: 10 INJECTION INTRAVENOUS at 12:50

## 2019-11-02 RX ADMIN — METOPROLOL TARTRATE 25 MG: 25 TABLET ORAL at 14:56

## 2019-11-02 NOTE — PLAN OF CARE
Problem: Patient Care Overview  Goal: Plan of Care Review  Outcome: Ongoing (interventions implemented as appropriate)   11/02/19 7176   Coping/Psychosocial   Plan of Care Reviewed With patient;son   Plan of Care Review   Progress no change   OTHER   Outcome Summary Pt came to floor from ED, Afib on tele. RA. VSS. Pleasant woman with son at bedside. Esmolol gtt stopped in ER per MD recs. Cardiology consult to see pt in AM. Pt self caths and has for years, has own supplies at bedside. Will continue to monitor.

## 2019-11-02 NOTE — H&P
"    [facility] Medicine Services  HISTORY AND PHYSICAL    Patient Name: Thuy Clark  : 1935  MRN: [id number]  Primary Care Physician: Kaye Aleman MD  Date of admission: 2019    Subjective   Subjective   Chief Complaint: Lightheadedness and rapid heart rate    HPI:  Thuy Clark is a 83 y.o. female with a history of CAD, COPD, HTN, HLD, and intermittent heart palpitations who presents to [facility] ED today with complaints of rapid heart rate and dizziness. She states that she is on a beta blocker and lisinopril for her abnormal heart rhythm and has been on them for 4 years. She notes that she has been noted to have an \"irregular heart rhythm\" but has never been diagnosed with atrial fibrillation/flutter or has she ever been placed on an anticoagulant. She states her symptoms started yesterday (2019) around 1500 and got worse this AM which prompted her to come to the ED. She will be admitted to hospital medicine due to the irregularity of her heart rhythm not being able to control it with metoprolol. Ed spoke with Dr Chow who will see patien in the morning.  Patient was admitted to Hospital Medicine Service for further evaluation and treatment.    Review of Systems   Gen- No fevers, chills  CV- No chest pain, +palpitations  Resp- No cough, dyspnea  GI- No N/V/D, abd pain  Neuro- +lightheadedness  All other systems reviewed and are negative other than what is stated in the HPI/ROS.   Personal History     Past Medical History:   Diagnosis Date   • Bleeding hemorrhoid 3/8/2019   • Diverticulosis    • Hx of colonic polyps    • Hypertension    • Intraductal papilloma     R intraductal patheloma   • Left upper extremity numbness     L upper extremity numbness-ER visit; neuropathy   • Self-catheterizes urinary bladder      Past Surgical History:   Procedure Laterality Date   • BREAST LUMPECTOMY     • CHOLECYSTECTOMY     • HYSTERECTOMY     • VAGINECTOMY  " 2009     Family History: family history includes Coronary artery disease in an other family member; Hyperlipidemia in an other family member; Hypertension in an other family member. Otherwise pertinent FHx was reviewed and unremarkable.     Social History:  reports that she has never smoked. She does not have any smokeless tobacco history on file. She reports that she does not use drugs.  Social History     Social History Narrative   • Not on file     Medications:  Available home medication information reviewed.  Medications Prior to Admission   Medication Sig Dispense Refill Last Dose   • bisoprolol (ZEBeta) 5 MG tablet   3 Taking   • Cholecalciferol (VITAMIN D3) 1000 units capsule Take 1 capsule by mouth Daily.   Taking   • Coenzyme Q10 (CO Q-10) 100 MG capsule Take 1 capsule by mouth Daily.   Taking   • Dietary Management Product (VASCULERA) tablet TAKE TWO TABLETS BY MOUTH DAILY FOR 14 DAYS, THEN ONE TABLET BY MOUTH DAILY  11 Taking   • gabapentin (NEURONTIN) 100 MG capsule take 1 capsule by oral route in the morning and  3 capsules in the evening if needed   Taking   • lisinopril (PRINIVIL,ZESTRIL) 10 MG tablet Take 1 tablet by mouth Every Night. 90 tablet 4    • lisinopril (PRINIVIL,ZESTRIL) 20 MG tablet TAKE 1 TABLET BY ORAL ROUTE EVERY A.M. 90 tablet 4    • MEGARED OMEGA-3 KRILL OIL PO 1 daily   Taking   • polyethylene glycol (MIRALAX) powder take 1tsp.  by oral route  every day powder mixed with 8 oz. water, juice, soda, coffee, or tea   Taking   • Probiotic Product (PROBIOTIC PEARLS ADVANTAGE PO) 1  PRN   Taking   • Turmeric, Curcuma Longa, (CURCUMIN EXTRACT) powder 750 mg Daily.   Taking   • vitamin E 100 UNIT capsule vitamin E   Taking     Allergies   Allergen Reactions   • Levofloxacin Other (See Comments)     OTHER    • Nitrofuran Derivatives Rash     OTHER    • Nitrofurantoin Rash     Objective   Objective   Vital Signs:   Temp:  [97.5 °F (36.4 °C)-97.8 °F (36.6 °C)] 97.8 °F (36.6 °C)  Heart Rate:   [] 84  Resp:  [8-20] 18  BP: ()/(56-98) 135/81   Physical Exam   Constitutional: Alert, WD, WN female in NAD  Eyes: EOMI, sclerae anicteric, no conjunctival injection  Head: NCAT  ENT: [place], moist mucous membranes   Respiratory: Non-labored, symmetrical chest expansion, CTAB  Cardiovascular: IRR IRR, HR 80, no M/R/G, +DP pulses bilaterally  Gastrointestinal: Soft, NT, ND +BS  Musculoskeletal: JENNINGS; no LE edema bilaterally  Neurologic: Oriented x4, strength symmetric in all extremities, follows all commands, speech clear  Skin: No rashes on exposed skin  Psychiatric: Pleasant and cooperative; normal affect    Results Reviewed:  I have personally reviewed current lab and radiology data.  Results from last 7 days   Lab Units 11/02/19  1157   WBC 10*3/mm3 7.32   HEMOGLOBIN g/dL 14.6   HEMATOCRIT % 44.4   PLATELETS 10*3/mm3 354     Results from last 7 days   Lab Units 11/02/19  1347 11/02/19  1157   SODIUM mmol/L  --  134*   POTASSIUM mmol/L  --  4.2   CHLORIDE mmol/L  --  97*   CO2 mmol/L  --  22.0   BUN mg/dL  --  12   CREATININE mg/dL  --  0.58   GLUCOSE mg/dL  --  114*   CALCIUM mg/dL  --  9.4   ALT (SGPT) U/L  --  15   AST (SGOT) U/L  --  21   TROPONIN T ng/mL <0.010 <0.010     Estimated Creatinine Clearance: 48.5 mL/min (by C-G formula based on SCr of 0.58 mg/dL).  Brief Urine Lab Results  (Last result in the past 365 days)      Color   Clarity   Blood   Leuk Est   Nitrite   Protein   CREAT   Urine HCG        11/02/19 1243 Yellow Clear Negative Negative Negative 30 mg/dL (1+)             Imaging Results (Last 24 Hours)     Procedure Component Value Units Date/Time    XR Chest 1 View [[id number]] Collected:  11/02/19 1253     Updated:  11/02/19 1655    Narrative:          EXAMINATION: XR CHEST 1 VW - 11/02/2019     INDICATION: Weakness, dizziness, altered mental status.     COMPARISON: 03/28/2016     FINDINGS: Left basilar linear scarring appears unchanged. Mild  chronic-appearing interstitial changes are  noted elsewhere, including  focal linear scar associated with granulomatous calcification in the  left apex. No lung consolidation, effusion or pneumothorax is seen.           Impression:       Chronic-appearing lung changes including old granulomatous  disease and left basilar linear scarring. No clearly new chest pathology  is identified.     DICTATED:   2019  EDITED/ls :   2019            Results for orders placed during the hospital encounter of 03/02/15   CONVERTED (HISTORICAL) ECHO    Narrative Patient:      CAMELIA PEREZ Rec#:     [id number]               :          1935            Date:         2015            Age:          79y                   Height:       157.48 cm / 62.0 in  Weight:       64.41 kg / 142.0 lbs  Sex:          F                     BSA:          1.65  Room#:        opt                       Sonographer:  Mercedes Faulkner FASE, RDCS, SHAHNAZT  Referring:    Isidra Eisenberg  Reading:      Chris Chadwick MD  ______________________________________________________________________    Transthoracic Echocardiogram    Indication:  PALPITATION  Rhythm:       PAC    Conclusions  1. Atrial septal defect is demonstrated by color Doppler.   2. There is predominant left-to-right shunting across the interatrial  septum.   3. The right atrium is moderately dilated.   4. The right ventricle is mild to moderately dilated.   5. The left atrium is moderately dilated.  6. There is normal left ventricular systolic function.  7. The estimated ejection fraction is 55-60%.   8. Mild aortic cusp sclerosis is present.  9. There is mild aortic regurgitation.   10. There is no evidence of mitral valve prolapse.  11. There is mild to moderate mitral regurgitation.   12. There is mild to moderate tricuspid regurgitation.  13. There is evidence of pulmonary hypertension.  14. There is no pericardial effusion.  15. There is no dilatation of the aortic root.  16. The inferior vena cava is  dilated.   17. There is a greater than 50% respiratory change in the inferior vena  cava dimension.    Findings       Technical Comments:  The study quality is good.      Left Ventricle:  The left ventricular chamber size is normal. Global left ventricular  wall motion and contractility are within normal limits. There is normal  left ventricular systolic function. The estimated ejection fraction is  55-60%.  Normal left ventricular diastolic filling is observed.     Left Atrium:  The left atrium is moderately dilated.     Right Ventricle:  The right ventricle is mild to moderately dilated.  The right  ventricular global systolic function is normal.     Right Atrium:  The right atrium is moderately dilated.  There is a secundum atrial  septal defect.The defect measures approximately 1 cm with adequate rims.   There is predominant left-to-right shunting across the interatrial  septum.  Atrial septal defect is demonstrated by color Doppler.      Aortic Valve:  The aortic valve is trileaflet. Mild aortic cusp sclerosis is present.  There is mild aortic regurgitation.  There is no evidence of aortic  stenosis.     Mitral Valve:  The mitral valve leaflets are mildly thickened. There is no evidence of  mitral valve prolapse. There is mild to moderate mitral regurgitation.   There is no evidence of mitral stenosis.     Tricuspid Valve:  The tricuspid valve leaflets are normal.  There is mild to moderate  tricuspid regurgitation. The right ventricular systolic pressure is  estimated to be 45-50 mmHg.  There is evidence of pulmonary  hypertension.     Pulmonic Valve:  The pulmonic valve appears normal. There is a trace pulmonic  regurgitation.      Aorta:  There is no dilatation of the aortic root.     Pulmonary Artery:  The main pulmonary artery is mildly dilated.     Venous:  The inferior vena cava is dilated.  There is a greater than 50%  respiratory change in the inferior vena cava dimension.     Measurements    Chambers  Name                    Value           RVIDd (AP) MM           2.7 cm          IVSd (MM)               0.8 cm          LVIDd (MM)              4.6 cm          LVIDs (MM)              3.3 cm          LVPWd (MM)              0.8 cm          Ao root diameter (MM)   2.7 cm          LA dimension (AP) MM    4.8 cm          LA:Ao ratio (MM)        1.7 ratio       EF (2D)                 58 %            LA dimension (AP) 2D    4.8 cm            Volumes  Name                    Value           LA ESV SP 4CH (MOD)     85 ml           LA ESV SP 2CH (MOD)     96 ml           LA ESV BP (MOD)         96 ml           LA ESV BP (MOD) index   58.2 ml/m2      LV EDV SP 4CH (MOD)     53 ml           LV ESV SP 4CH (MOD)     27 ml           EF SP 4CH (MOD)         49 %              Diastolic/Systolic Function  Name                    Value           MV E-wave Vmax          0.77 m/sec      MV deceleration time    211 msec        MV A-wave Vmax          0.63 m/sec      MV E:A ratio            1.2 ratio       LV septal e' Vmax       0.08 m/sec      LV lateral e' Vmax      0.08 m/sec      LV E:e' septal ratio    10.1 ratio      LV E:e' lateral ratio   9.9 ratio         Mitral Valve  Name                    Value           MV annulus diameter     3.3 cm          MR Vmax                 5.71 m/sec      MR VTI                  228 cm          MR volume (PISA)        27 ml           MR flow (PISA)          69.6 ml/sec     MR ERO                  0.12 cm2        MR PISA radius          0.6 cm          MR alias Vmax           30.8 cm/sec       Tricuspid Valve  Name                    Value           TR Vmax                 3.03 m/sec      TR peak gradient        37 mmHg         RAP                     15 mmHg         RVSP                    52 mmHg           Electronically signed by: Chris Chadwick MD on 03/02/2015 16:57:25     Assessment/Plan   Assessment / Plan     Active Hospital Problems    Diagnosis POA   • **Atrial flutter with  rapid ventricular response (CMS/HCC) [I48.92] Yes   • Benign essential hypertension [I10] Yes   • Mixed hyperlipidemia [E78.2] Yes   • Pulmonary hypertension (CMS/HCC) [I27.20] Yes     Thuy Clark is a 83 y.o. female with a history of CAD, COPD, HTN, HLD, and intermittent heart palpitations who presents to [facility] ED today with complaints of rapid heart rate and dizziness.    A-fib/flutter/Pulm HTN  --Dr Chow to see in AM  --IV and PO Metoprolol given PRN ordered  --PRN IV lopressor for HR >120  --Cardiology consulted  --Apixaban started  --Echo  --AM labs  --Falls precautions  --TSH 1.8    HTN  -- Continue Lisinopril, metoprolol  -- Stable    HLD  --AM lipid panel  --currently not on any meds    DVT prophylaxis:  Eliquis    CODE STATUS:    Code Status and Medical Interventions:   Ordered at: 11/02/19 0766     Level Of Support Discussed With:    Patient     Code Status:    CPR     Medical Interventions (Level of Support Prior to Arrest):    Full     INPATIENT status due to new onset atrial fibrillation with RVR requiring further workup as well as specialty care.  I feel patient’s risk for adverse outcomes and need for care warrant INPATIENT evaluation and I predict the patient’s care encounter to likely last beyond 2 midnights.      Electronically signed by MEL Johns, 11/02/19, 5:31 PM.      Brief Attending Admission Attestation     I have seen and examined the patient, performing an independent face-to-face diagnostic evaluation with plan of care reviewed and developed with the advanced practice clinician (APC).      Brief Summary Statement:   Thuy Clark is a 83 y.o. female with PMH significant for CAD, COPD, HTN, HLD.  She has frequrnt palpitations at bedtime.  Yesterday she had more palpitations.  Today, she did not sense palpitations, however she did feel lightheaded.  She drove herself 1 block to the fire station and asked the staff to check her out.  Her HR was 150. Here in the ER, she was  found to be in atrial fib/flutter with rates in the 140's.  She was briefly placed on esmolol then given a dose of IV lopressor, then PO metoprolol.  Presently, her rate is in the 70's on the bedside monitor.  She denies any lightheadedness or palpitations.  She denies chest pain or SOA.        Remainder of detailed HPI is as noted above and has been reviewed and/or edited by me for completeness.      Attending Physical Exam:  Constitutional: Awake, alert  Eyes: PERRLA, sclerae anicteric, no conjunctival injection  HENT: NCAT, mucous membranes moist  Neck: Supple, no thyromegaly, no lymphadenopathy, trachea midline  Respiratory: Clear to auscultation bilaterally, nonlabored respirations   Cardiovascular: irreg/irreg, palpable pedal pulses bilaterally  Gastrointestinal: Positive bowel sounds, soft, nontender, nondistended  Musculoskeletal: No bilateral ankle edema, no clubbing or cyanosis to extremities  Psychiatric: Appropriate affect, cooperative  Neurologic: Oriented x 3, strength symmetric in all extremities, Cranial Nerves grossly intact to confrontation, speech clear  Skin: No rashes        Brief Assessment/Plan :  See above for further detailed assessment and plan developed with APC which I have reviewed and/or edited for completeness.      Electronically signed by Rosalinda Liao MD, 11/02/19, 10:18 PM.

## 2019-11-02 NOTE — ED PROVIDER NOTES
Subjective   Thuy Clark is an 83 y.o. female who presents to the ED with c/o dizziness and rapid heart rate. The patient reports she felt normal up until yesterday at 1500 when she began experiencing dizziness and palpitations of her heart racing. This morning she was ambulating with her cane when the dizziness became more severe, prompting her to present to the ED. The patient denies chest pain, shortness of breath, neck pain, arm pain, shoulder pain, back pain, abdominal pain, nausea, vomiting, fever, chills, cough, congestion, cold in the past 2 weeks, and abnormal urine or stool. He recently on 08/30/2019 saw Hussain HARTLEY and he was discharged with hypoxia on 08/21/2019. The patient has a past medical history of diabetes mellitus, CAD, COPD, self-catheterizes her urinary bladder, hypertension, and unspecified abnormal heart rhythm but has no past history of A-fib or atrial flutter. Her past surgical history includes cholecystectomy and hysterectomy. She currently takes an unspecified beta blocker for her abnormal heart rhythm, Lisinopril for 4 years, Tylenol for her knee pain, and occasionally Turmeric. Her primary care provider is Dr. Aleman. The patient denies usage of alcohol, narcotics, or tobacco. There are no other acute complaints at this time.        History provided by:  Patient  Palpitations   Palpitations quality:  Fast  Onset quality:  Sudden  Duration:  1 day  Timing:  Intermittent  Progression:  Worsening  Chronicity:  Recurrent  Context: not illicit drugs and not nicotine    Relieved by:  None tried  Worsened by:  Nothing  Ineffective treatments:  None tried  Associated symptoms: dizziness    Associated symptoms: no back pain, no chest pain, no chest pressure, no cough, no malaise/fatigue, no nausea, no near-syncope, no shortness of breath and no vomiting    Risk factors: no diabetes mellitus and no hx of atrial fibrillation        Review of Systems   Constitutional: Negative for chills,  fever and malaise/fatigue.        No recent cold.   HENT: Negative for congestion.    Respiratory: Negative for cough and shortness of breath.    Cardiovascular: Positive for palpitations. Negative for chest pain and near-syncope.   Gastrointestinal: Negative for abdominal pain, blood in stool, constipation, diarrhea, nausea and vomiting.   Genitourinary: Negative for decreased urine volume, difficulty urinating, dysuria, frequency, hematuria and urgency.   Musculoskeletal: Negative for back pain and neck pain.        No arm pain or shoulder pain.   Neurological: Positive for dizziness.   All other systems reviewed and are negative.      Past Medical History:   Diagnosis Date   • Bleeding hemorrhoid 3/8/2019   • Diverticulosis    • Hx of colonic polyps 2003   • Hypertension    • Intraductal papilloma     R intraductal patheloma   • Left upper extremity numbness     L upper extremity numbness-ER visit; neuropathy   • Self-catheterizes urinary bladder        Allergies   Allergen Reactions   • Levofloxacin Other (See Comments)     OTHER    • Nitrofuran Derivatives Rash     OTHER    • Nitrofurantoin Rash       Past Surgical History:   Procedure Laterality Date   • BREAST LUMPECTOMY  2006   • CHOLECYSTECTOMY     • HYSTERECTOMY  1984   • VAGINECTOMY  2009       Family History   Problem Relation Age of Onset   • Hyperlipidemia Other    • Hypertension Other    • Coronary artery disease Other        Social History     Socioeconomic History   • Marital status:      Spouse name: Not on file   • Number of children: Not on file   • Years of education: Not on file   • Highest education level: Not on file   Tobacco Use   • Smoking status: Never Smoker   Substance and Sexual Activity   • Drug use: No   • Sexual activity: Defer         Objective   Physical Exam   Constitutional: She is oriented to person, place, and time. She appears well-developed and well-nourished. No distress.   HENT:   Head: Normocephalic and atraumatic.    Eyes: Conjunctivae are normal. No scleral icterus.   Neck: Normal range of motion. Neck supple.   Cardiovascular: Normal heart sounds. An irregularly irregular rhythm present. Tachycardia present.   No murmur heard.  Pulmonary/Chest: Effort normal and breath sounds normal. No respiratory distress.   Lungs are clear to auscultation.   Abdominal: Soft. There is no tenderness.   Musculoskeletal: Normal range of motion. She exhibits no edema.   No stigmata of a deep vein thrombosis.   Neurological: She is alert and oriented to person, place, and time. She has normal strength. No sensory deficit.   Strength and sensation are normal and intact.   Skin: Skin is warm and dry.   Psychiatric: She has a normal mood and affect. Her behavior is normal.   Nursing note and vitals reviewed.      Critical Care  Performed by: Dawit Diego MD  Authorized by: Dawit Diego MD     Critical care provider statement:     Critical care time (minutes):  35    Critical care time was exclusive of:  Separately billable procedures and treating other patients    Critical care was necessary to treat or prevent imminent or life-threatening deterioration of the following conditions:  Cardiac failure    Critical care was time spent personally by me on the following activities:  Development of treatment plan with patient or surrogate, discussions with consultants, evaluation of patient's response to treatment, examination of patient, interpretation of cardiac output measurements, obtaining history from patient or surrogate, ordering and performing treatments and interventions, ordering and review of laboratory studies, ordering and review of radiographic studies, pulse oximetry, re-evaluation of patient's condition and review of old charts    I assumed direction of critical care for this patient from another provider in my specialty: no                   ED Course  ED Course as of Nov 02 1659   Sat Nov 02, 2019   1324 On an esmolol drip.  She  is serially reevaluated.  She reports she is completely asymptomatic now.  Her heart rate is down to 97 on repeat ECG with resolution of her nonspecific changes from previously, but continued A. fib/flutter.  She is able to sit up and move around without significant palpitations or dizziness.  She remains neurologically intact.I discussed ongoing evaluation.  Patient again asserts that she is completely asymptomatic and agrees  [HH]   1351 Dr. Diego has discussed the case with Dr. Chow, cardiology.  [BS]   1410 I discussed findings with cardiology.  Recommendation is for oral Lopressor, with titration off the esmolol.  If the patient is able to tolerate this she can go home.  Recommendation for admission of the patient cannot titrate off the esmolol with maintenance of normal heart rate  [HH]   1623 She was transitioned off her Brevibloc drip.  She is treated with IV Lopressor 5 mg and 25 mg of oral metoprolol.  Her heart rate is variable from the mid 90s to 130 after discontinuation of her drip, with her metoprolol on board for 90 minutes.  [HH]   1627 Despite oral and IV metoprolol the patient after discontinuation of her Brevibloc still has very highly variable heart rates up to 145.  She is treated with another bolus of IV metoprolol.  We will plan on admission for rate control as per Dr. Chow  [HH]   1634 Dr. Diego has discussed the case with Dr. Liao, hospitalist.  [HH]   1658 Dr. Liao will admit for definitive inpatient care.  Patient remains asymptomatic.  She is treated with a dose of Eliquis, 5 mg p.o.  [HH]      ED Course User Index  [BS] Riccardo Pierce  [HH] Dawit Diego MD       Recent Results (from the past 24 hour(s))   Comprehensive Metabolic Panel    Collection Time: 11/02/19 11:57 AM   Result Value Ref Range    Glucose 114 (H) 65 - 99 mg/dL    BUN 12 8 - 23 mg/dL    Creatinine 0.58 0.57 - 1.00 mg/dL    Sodium 134 (L) 136 - 145 mmol/L    Potassium 4.2 3.5 - 5.2 mmol/L    Chloride 97 (L) 98 - 107  mmol/L    CO2 22.0 22.0 - 29.0 mmol/L    Calcium 9.4 8.6 - 10.5 mg/dL    Total Protein 7.9 6.0 - 8.5 g/dL    Albumin 4.60 3.50 - 5.20 g/dL    ALT (SGPT) 15 1 - 33 U/L    AST (SGOT) 21 1 - 32 U/L    Alkaline Phosphatase 120 (H) 39 - 117 U/L    Total Bilirubin 0.5 0.2 - 1.2 mg/dL    eGFR Non African Amer 99 >60 mL/min/1.73    Globulin 3.3 gm/dL    A/G Ratio 1.4 g/dL    BUN/Creatinine Ratio 20.7 7.0 - 25.0    Anion Gap 15.0 5.0 - 15.0 mmol/L   Troponin    Collection Time: 11/02/19 11:57 AM   Result Value Ref Range    Troponin T <0.010 0.000 - 0.030 ng/mL   Magnesium    Collection Time: 11/02/19 11:57 AM   Result Value Ref Range    Magnesium 1.9 1.6 - 2.4 mg/dL   Light Blue Top    Collection Time: 11/02/19 11:57 AM   Result Value Ref Range    Extra Tube hold for add-on    Green Top (Gel)    Collection Time: 11/02/19 11:57 AM   Result Value Ref Range    Extra Tube Hold for add-ons.    Lavender Top    Collection Time: 11/02/19 11:57 AM   Result Value Ref Range    Extra Tube hold for add-on    Gold Top - SST    Collection Time: 11/02/19 11:57 AM   Result Value Ref Range    Extra Tube Hold for add-ons.    CBC Auto Differential    Collection Time: 11/02/19 11:57 AM   Result Value Ref Range    WBC 7.32 3.40 - 10.80 10*3/mm3    RBC 4.95 3.77 - 5.28 10*6/mm3    Hemoglobin 14.6 12.0 - 15.9 g/dL    Hematocrit 44.4 34.0 - 46.6 %    MCV 89.7 79.0 - 97.0 fL    MCH 29.5 26.6 - 33.0 pg    MCHC 32.9 31.5 - 35.7 g/dL    RDW 12.7 12.3 - 15.4 %    RDW-SD 42.0 37.0 - 54.0 fl    MPV 8.9 6.0 - 12.0 fL    Platelets 354 140 - 450 10*3/mm3    Neutrophil % 69.9 42.7 - 76.0 %    Lymphocyte % 19.9 19.6 - 45.3 %    Monocyte % 8.7 5.0 - 12.0 %    Eosinophil % 0.5 0.3 - 6.2 %    Basophil % 0.5 0.0 - 1.5 %    Immature Grans % 0.5 0.0 - 0.5 %    Neutrophils, Absolute 5.10 1.70 - 7.00 10*3/mm3    Lymphocytes, Absolute 1.46 0.70 - 3.10 10*3/mm3    Monocytes, Absolute 0.64 0.10 - 0.90 10*3/mm3    Eosinophils, Absolute 0.04 0.00 - 0.40 10*3/mm3     Basophils, Absolute 0.04 0.00 - 0.20 10*3/mm3    Immature Grans, Absolute 0.04 0.00 - 0.05 10*3/mm3    nRBC 0.0 0.0 - 0.2 /100 WBC   TSH    Collection Time: 11/02/19 11:57 AM   Result Value Ref Range    TSH 1.840 0.270 - 4.200 uIU/mL   T4, Free    Collection Time: 11/02/19 11:57 AM   Result Value Ref Range    Free T4 1.37 0.93 - 1.70 ng/dL   Urinalysis With Microscopic If Indicated (No Culture) - Urine, Clean Catch    Collection Time: 11/02/19 12:43 PM   Result Value Ref Range    Color, UA Yellow Yellow, Straw    Appearance, UA Clear Clear    pH, UA 7.0 5.0 - 8.0    Specific Gravity, UA 1.014 1.001 - 1.030    Glucose, UA Negative Negative    Ketones, UA Negative Negative    Bilirubin, UA Negative Negative    Blood, UA Negative Negative    Protein, UA 30 mg/dL (1+) (A) Negative    Leuk Esterase, UA Negative Negative    Nitrite, UA Negative Negative    Urobilinogen, UA 0.2 E.U./dL 0.2 - 1.0 E.U./dL   Urinalysis, Microscopic Only - Urine, Clean Catch    Collection Time: 11/02/19 12:43 PM   Result Value Ref Range    RBC, UA 0-2 None Seen, 0-2 /HPF    WBC, UA None Seen None Seen, 0-2 /HPF    Bacteria, UA None Seen None Seen, Trace /HPF    Squamous Epithelial Cells, UA 0-2 None Seen, 0-2 /HPF    Hyaline Casts, UA None Seen 0 - 6 /LPF    Methodology Automated Microscopy    Troponin    Collection Time: 11/02/19  1:47 PM   Result Value Ref Range    Troponin T <0.010 0.000 - 0.030 ng/mL     Note: In addition to lab results from this visit, the labs listed above may include labs taken at another facility or during a different encounter within the last 24 hours. Please correlate lab times with ED admission and discharge times for further clarification of the services performed during this visit.    XR Chest 1 View   Preliminary Result   Chronic-appearing lung changes including old granulomatous   disease and left basilar linear scarring. No clearly new chest pathology   is identified.       DICTATED:   11/02/2019   EDITED/ls :    11/02/2019             Vitals:    11/02/19 1615 11/02/19 1630 11/02/19 1645 11/02/19 1649   BP: 123/96 115/85 134/83 134/83   BP Location:       Patient Position:       Pulse: (!) 131 114 99 117   Resp:       Temp:       TempSrc:       SpO2: 96% 97% 98%    Weight:       Height:         Medications   sodium chloride 0.9 % flush 10 mL (not administered)   esmolol (BREVIBLOC) 2500 mg/250 mL (10 mg/mL) infusion (0 mcg/kg/min × 65.3 kg Intravenous Hold 11/2/19 1600)   metoprolol tartrate (LOPRESSOR) tablet 25 mg (25 mg Oral Given 11/2/19 1456)   metoprolol tartrate (LOPRESSOR) injection 5 mg (5 mg Intravenous Given 11/2/19 1458)   metoprolol tartrate (LOPRESSOR) injection 5 mg (5 mg Intravenous Given 11/2/19 1649)   apixaban (ELIQUIS) tablet 5 mg (5 mg Oral Given 11/2/19 1649)     ECG/EMG Results (last 24 hours)     Procedure Component Value Units Date/Time    ECG 12 Lead [[id number]] Collected:  11/02/19 1149     Updated:  11/02/19 1244    Narrative:       Test Reason : Weak/Dizzy/AMS protocol  Blood Pressure : **/** mmHG  Vent. Rate : 151 BPM     Atrial Rate : 302 BPM     P-R Int : 000 ms          QRS Dur : 076 ms      QT Int : 314 ms       P-R-T Axes : 000 045 -22 degrees     QTc Int : 497 ms    Atrial flutter with variable AV block  Nonspecific ST abnormality  Abnormal QRS-T angle, consider primary T wave abnormality  Abnormal ECG  When compared with ECG of 28-NOV-2014 14:12,  Atrial flutter has replaced Sinus rhythm  Vent. rate has increased BY  84 BPM  ST now depressed in Lateral leads  T wave inversion more evident in Inferior leads  Nonspecific T wave abnormality no longer evident in Anterior leads  Confirmed by MARIELOS VITAL MD ([id number]) on 11/2/2019 12:44:29 PM    Referred By:  edmd           Confirmed By:MARIELOS VITAL MD    ECG 12 Lead [[id number]] Collected:  11/02/19 1318     Updated:  11/02/19 1324    Narrative:       Test Reason : 2nd set  Blood Pressure : **/** mmHG  Vent. Rate : 097 BPM     Atrial Rate : 120  BPM     P-R Int : 000 ms          QRS Dur : 080 ms      QT Int : 348 ms       P-R-T Axes : 000 013 026 degrees     QTc Int : 441 ms    ** Poor data quality, interpretation may be adversely affected  Atrial fibrillation  Abnormal ECG  When compared with ECG of 02-NOV-2019 11:49,  Atrial fibrillation has replaced Atrial flutter  Vent. rate has decreased BY  54 BPM  ST no longer depressed in Lateral leads  Nonspecific T wave abnormality, improved in Inferior leads  Confirmed by MARIELOS VITAL MD ([id number]) on 11/2/2019 1:23:59 PM    Referred By:  apurva briscoe           Confirmed By:MARIELOS VITAL MD        ECG 12 Lead   Final Result   Test Reason : 2nd set   Blood Pressure : **/** mmHG   Vent. Rate : 097 BPM     Atrial Rate : 120 BPM      P-R Int : 000 ms          QRS Dur : 080 ms       QT Int : 348 ms       P-R-T Axes : 000 013 026 degrees      QTc Int : 441 ms      ** Poor data quality, interpretation may be adversely affected   Atrial fibrillation   Abnormal ECG   When compared with ECG of 02-NOV-2019 11:49,   Atrial fibrillation has replaced Atrial flutter   Vent. rate has decreased BY  54 BPM   ST no longer depressed in Lateral leads   Nonspecific T wave abnormality, improved in Inferior leads   Confirmed by MARIELOS VITAL MD ([id number]) on 11/2/2019 1:23:59 PM      Referred By:  apurva briscoe           Confirmed By:MARIELOS VITAL MD      ECG 12 Lead   Final Result   Test Reason : Weak/Dizzy/AMS protocol   Blood Pressure : **/** mmHG   Vent. Rate : 151 BPM     Atrial Rate : 302 BPM      P-R Int : 000 ms          QRS Dur : 076 ms       QT Int : 314 ms       P-R-T Axes : 000 045 -22 degrees      QTc Int : 497 ms      Atrial flutter with variable AV block   Nonspecific ST abnormality   Abnormal QRS-T angle, consider primary T wave abnormality   Abnormal ECG   When compared with ECG of 28-NOV-2014 14:12,   Atrial flutter has replaced Sinus rhythm   Vent. rate has increased BY  84 BPM   ST now depressed in Lateral leads   T wave inversion more  evident in Inferior leads   Nonspecific T wave abnormality no longer evident in Anterior leads   Confirmed by MARIELOS DIEGO MD ([id number]) on 11/2/2019 12:44:29 PM      Referred By:  edmd           Confirmed By:MARIELOS DIEGO MD                        [facility]    Final diagnoses:   Atrial flutter with rapid ventricular response (CMS/HCC)   Dizziness       Documentation assistance provided by timur Pierce.  Information recorded by the scribe was done at my direction and has been verified and validated by me.     Riccardo Pierce  11/02/19 1429       Riccardo Pierce  11/02/19 [id number]       Marielos Diego MD  11/02/19 0937

## 2019-11-03 ENCOUNTER — APPOINTMENT (OUTPATIENT)
Dept: CARDIOLOGY | Facility: HOSPITAL | Age: 84
End: 2019-11-03

## 2019-11-03 VITALS
RESPIRATION RATE: 18 BRPM | WEIGHT: 144 LBS | OXYGEN SATURATION: 98 % | HEART RATE: 61 BPM | SYSTOLIC BLOOD PRESSURE: 105 MMHG | HEIGHT: 63 IN | BODY MASS INDEX: 25.52 KG/M2 | TEMPERATURE: 98 F | DIASTOLIC BLOOD PRESSURE: 65 MMHG

## 2019-11-03 LAB
ANION GAP SERPL CALCULATED.3IONS-SCNC: 9 MMOL/L (ref 5–15)
BASOPHILS # BLD AUTO: 0.03 10*3/MM3 (ref 0–0.2)
BASOPHILS NFR BLD AUTO: 0.4 % (ref 0–1.5)
BH CV ECHO MEAS - AO MAX PG (FULL): 0.57 MMHG
BH CV ECHO MEAS - AO MAX PG: 4.6 MMHG
BH CV ECHO MEAS - AO MEAN PG (FULL): 1.2 MMHG
BH CV ECHO MEAS - AO MEAN PG: 2.7 MMHG
BH CV ECHO MEAS - AO ROOT AREA (BSA CORRECTED): 1.5
BH CV ECHO MEAS - AO ROOT AREA: 4.9 CM^2
BH CV ECHO MEAS - AO ROOT DIAM: 2.5 CM
BH CV ECHO MEAS - AO V2 MAX: 107.5 CM/SEC
BH CV ECHO MEAS - AO V2 MEAN: 76 CM/SEC
BH CV ECHO MEAS - AO V2 VTI: 26.5 CM
BH CV ECHO MEAS - ASC AORTA: 2.7 CM
BH CV ECHO MEAS - AVA(I,A): 2.6 CM^2
BH CV ECHO MEAS - AVA(I,D): 2.6 CM^2
BH CV ECHO MEAS - AVA(V,A): 3 CM^2
BH CV ECHO MEAS - AVA(V,D): 3 CM^2
BH CV ECHO MEAS - BSA(HAYCOCK): 1.7 M^2
BH CV ECHO MEAS - BSA: 1.7 M^2
BH CV ECHO MEAS - BZI_BMI: 25.5 KILOGRAMS/M^2
BH CV ECHO MEAS - BZI_METRIC_HEIGHT: 160 CM
BH CV ECHO MEAS - BZI_METRIC_WEIGHT: 65.3 KG
BH CV ECHO MEAS - EDV(CUBED): 75.2 ML
BH CV ECHO MEAS - EDV(MOD-SP2): 49 ML
BH CV ECHO MEAS - EDV(MOD-SP4): 49 ML
BH CV ECHO MEAS - EDV(TEICH): 79.5 ML
BH CV ECHO MEAS - EF(CUBED): 62.4 %
BH CV ECHO MEAS - EF(MOD-SP2): 89.8 %
BH CV ECHO MEAS - EF(MOD-SP4): 77.6 %
BH CV ECHO MEAS - EF(TEICH): 54.3 %
BH CV ECHO MEAS - ESV(CUBED): 28.3 ML
BH CV ECHO MEAS - ESV(MOD-SP2): 5 ML
BH CV ECHO MEAS - ESV(MOD-SP4): 11 ML
BH CV ECHO MEAS - ESV(TEICH): 36.4 ML
BH CV ECHO MEAS - FS: 27.8 %
BH CV ECHO MEAS - IVS/LVPW: 1.1
BH CV ECHO MEAS - IVSD: 1 CM
BH CV ECHO MEAS - LA DIMENSION: 4.8 CM
BH CV ECHO MEAS - LA/AO: 1.9
BH CV ECHO MEAS - LAD MAJOR: 6.8 CM
BH CV ECHO MEAS - LAT PEAK E' VEL: 6.8 CM/SEC
BH CV ECHO MEAS - LATERAL E/E' RATIO: 10
BH CV ECHO MEAS - LV DIASTOLIC VOL/BSA (35-75): 29.1 ML/M^2
BH CV ECHO MEAS - LV MASS(C)D: 129.3 GRAMS
BH CV ECHO MEAS - LV MASS(C)DI: 76.9 GRAMS/M^2
BH CV ECHO MEAS - LV MAX PG: 4.1 MMHG
BH CV ECHO MEAS - LV MEAN PG: 1.5 MMHG
BH CV ECHO MEAS - LV SYSTOLIC VOL/BSA (12-30): 6.5 ML/M^2
BH CV ECHO MEAS - LV V1 MAX: 100.7 CM/SEC
BH CV ECHO MEAS - LV V1 MEAN: 52.9 CM/SEC
BH CV ECHO MEAS - LV V1 VTI: 21.4 CM
BH CV ECHO MEAS - LVIDD: 4.2 CM
BH CV ECHO MEAS - LVIDS: 3 CM
BH CV ECHO MEAS - LVLD AP2: 7.3 CM
BH CV ECHO MEAS - LVLD AP4: 5.8 CM
BH CV ECHO MEAS - LVLS AP2: 4.6 CM
BH CV ECHO MEAS - LVLS AP4: 4.2 CM
BH CV ECHO MEAS - LVOT AREA (M): 3.1 CM^2
BH CV ECHO MEAS - LVOT AREA: 3.2 CM^2
BH CV ECHO MEAS - LVOT DIAM: 2 CM
BH CV ECHO MEAS - LVPWD: 0.89 CM
BH CV ECHO MEAS - MED PEAK E' VEL: 5.8 CM/SEC
BH CV ECHO MEAS - MEDIAL E/E' RATIO: 11.7
BH CV ECHO MEAS - MR MAX PG: 102 MMHG
BH CV ECHO MEAS - MR MAX VEL: 503.9 CM/SEC
BH CV ECHO MEAS - MV A MAX VEL: 54.8 CM/SEC
BH CV ECHO MEAS - MV DEC SLOPE: 265.9 CM/SEC^2
BH CV ECHO MEAS - MV DEC TIME: 0.25 SEC
BH CV ECHO MEAS - MV E MAX VEL: 69.6 CM/SEC
BH CV ECHO MEAS - MV E/A: 1.3
BH CV ECHO MEAS - MV MAX PG: 2.8 MMHG
BH CV ECHO MEAS - MV MEAN PG: 1.1 MMHG
BH CV ECHO MEAS - MV P1/2T MAX VEL: 82.5 CM/SEC
BH CV ECHO MEAS - MV P1/2T: 90.9 MSEC
BH CV ECHO MEAS - MV V2 MAX: 83.6 CM/SEC
BH CV ECHO MEAS - MV V2 MEAN: 48.7 CM/SEC
BH CV ECHO MEAS - MV V2 VTI: 24.2 CM
BH CV ECHO MEAS - MVA P1/2T LCG: 2.7 CM^2
BH CV ECHO MEAS - MVA(P1/2T): 2.4 CM^2
BH CV ECHO MEAS - MVA(VTI): 2.8 CM^2
BH CV ECHO MEAS - PA ACC SLOPE: 642.9 CM/SEC^2
BH CV ECHO MEAS - PA ACC TIME: 0.1 SEC
BH CV ECHO MEAS - PA MAX PG: 3.2 MMHG
BH CV ECHO MEAS - PA PR(ACCEL): 33.1 MMHG
BH CV ECHO MEAS - PA V2 MAX: 89 CM/SEC
BH CV ECHO MEAS - RAP SYSTOLE: 3 MMHG
BH CV ECHO MEAS - RVSP: 36 MMHG
BH CV ECHO MEAS - SI(AO): 77.6 ML/M^2
BH CV ECHO MEAS - SI(CUBED): 27.9 ML/M^2
BH CV ECHO MEAS - SI(LVOT): 40.2 ML/M^2
BH CV ECHO MEAS - SI(MOD-SP2): 26.2 ML/M^2
BH CV ECHO MEAS - SI(MOD-SP4): 22.6 ML/M^2
BH CV ECHO MEAS - SI(TEICH): 25.7 ML/M^2
BH CV ECHO MEAS - SV(AO): 130.5 ML
BH CV ECHO MEAS - SV(CUBED): 47 ML
BH CV ECHO MEAS - SV(LVOT): 67.6 ML
BH CV ECHO MEAS - SV(MOD-SP2): 44 ML
BH CV ECHO MEAS - SV(MOD-SP4): 38 ML
BH CV ECHO MEAS - SV(TEICH): 43.2 ML
BH CV ECHO MEAS - TAPSE (>1.6): 1.9 CM2
BH CV ECHO MEAS - TR MAX PG: 33 MMHG
BH CV ECHO MEAS - TR MAX VEL: 287.2 CM/SEC
BH CV ECHO MEASUREMENTS AVERAGE E/E' RATIO: 11.05
BH CV VAS BP LEFT ARM: NORMAL MMHG
BH CV XLRA - RV BASE: 4.8 CM
BH CV XLRA - RV LENGTH: 5.7 CM
BH CV XLRA - RV MID: 3.5 CM
BH CV XLRA - TDI S': 9.21 CM/SEC
BUN BLD-MCNC: 10 MG/DL (ref 8–23)
BUN/CREAT SERPL: 17.5 (ref 7–25)
CALCIUM SPEC-SCNC: 8.6 MG/DL (ref 8.6–10.5)
CHLORIDE SERPL-SCNC: 95 MMOL/L (ref 98–107)
CHOLEST SERPL-MCNC: 169 MG/DL (ref 0–200)
CO2 SERPL-SCNC: 26 MMOL/L (ref 22–29)
CREAT BLD-MCNC: 0.57 MG/DL (ref 0.57–1)
DEPRECATED RDW RBC AUTO: 42.9 FL (ref 37–54)
EOSINOPHIL # BLD AUTO: 0.03 10*3/MM3 (ref 0–0.4)
EOSINOPHIL NFR BLD AUTO: 0.4 % (ref 0.3–6.2)
ERYTHROCYTE [DISTWIDTH] IN BLOOD BY AUTOMATED COUNT: 12.8 % (ref 12.3–15.4)
GFR SERPL CREATININE-BSD FRML MDRD: 101 ML/MIN/1.73
GLUCOSE BLD-MCNC: 108 MG/DL (ref 65–99)
HCT VFR BLD AUTO: 38.9 % (ref 34–46.6)
HDLC SERPL-MCNC: 50 MG/DL (ref 40–60)
HGB BLD-MCNC: 12.8 G/DL (ref 12–15.9)
IMM GRANULOCYTES # BLD AUTO: 0.03 10*3/MM3 (ref 0–0.05)
IMM GRANULOCYTES NFR BLD AUTO: 0.4 % (ref 0–0.5)
LDLC SERPL CALC-MCNC: 107 MG/DL (ref 0–100)
LDLC/HDLC SERPL: 2.14 {RATIO}
LEFT ATRIUM VOLUME INDEX: 58.9 ML/M^2
LEFT ATRIUM VOLUME: 99 ML
LV EF 2D ECHO EST: 65 %
LYMPHOCYTES # BLD AUTO: 1.51 10*3/MM3 (ref 0.7–3.1)
LYMPHOCYTES NFR BLD AUTO: 20 % (ref 19.6–45.3)
MCH RBC QN AUTO: 30.1 PG (ref 26.6–33)
MCHC RBC AUTO-ENTMCNC: 32.9 G/DL (ref 31.5–35.7)
MCV RBC AUTO: 91.5 FL (ref 79–97)
MONOCYTES # BLD AUTO: 0.5 10*3/MM3 (ref 0.1–0.9)
MONOCYTES NFR BLD AUTO: 6.6 % (ref 5–12)
NEUTROPHILS # BLD AUTO: 5.44 10*3/MM3 (ref 1.7–7)
NEUTROPHILS NFR BLD AUTO: 72.2 % (ref 42.7–76)
NRBC BLD AUTO-RTO: 0 /100 WBC (ref 0–0.2)
PLATELET # BLD AUTO: 321 10*3/MM3 (ref 140–450)
PMV BLD AUTO: 9.1 FL (ref 6–12)
POTASSIUM BLD-SCNC: 4 MMOL/L (ref 3.5–5.2)
RBC # BLD AUTO: 4.25 10*6/MM3 (ref 3.77–5.28)
SODIUM BLD-SCNC: 130 MMOL/L (ref 136–145)
TRIGL SERPL-MCNC: 59 MG/DL (ref 0–150)
TROPONIN T SERPL-MCNC: <0.01 NG/ML (ref 0–0.03)
VLDLC SERPL-MCNC: 11.8 MG/DL
WBC NRBC COR # BLD: 7.54 10*3/MM3 (ref 3.4–10.8)

## 2019-11-03 PROCEDURE — 85025 COMPLETE CBC W/AUTO DIFF WBC: CPT | Performed by: NURSE PRACTITIONER

## 2019-11-03 PROCEDURE — 99222 1ST HOSP IP/OBS MODERATE 55: CPT | Performed by: INTERNAL MEDICINE

## 2019-11-03 PROCEDURE — 93306 TTE W/DOPPLER COMPLETE: CPT | Performed by: INTERNAL MEDICINE

## 2019-11-03 PROCEDURE — 99239 HOSP IP/OBS DSCHRG MGMT >30: CPT | Performed by: INTERNAL MEDICINE

## 2019-11-03 PROCEDURE — 80061 LIPID PANEL: CPT | Performed by: NURSE PRACTITIONER

## 2019-11-03 PROCEDURE — 93005 ELECTROCARDIOGRAM TRACING: CPT | Performed by: INTERNAL MEDICINE

## 2019-11-03 PROCEDURE — 93306 TTE W/DOPPLER COMPLETE: CPT

## 2019-11-03 PROCEDURE — 93010 ELECTROCARDIOGRAM REPORT: CPT | Performed by: INTERNAL MEDICINE

## 2019-11-03 PROCEDURE — 80048 BASIC METABOLIC PNL TOTAL CA: CPT | Performed by: NURSE PRACTITIONER

## 2019-11-03 RX ORDER — BISOPROLOL FUMARATE 5 MG/1
10 TABLET, FILM COATED ORAL DAILY
Qty: 30 TABLET | Refills: 3 | Status: SHIPPED | OUTPATIENT
Start: 2019-11-03 | End: 2021-06-09 | Stop reason: HOSPADM

## 2019-11-03 RX ORDER — BISOPROLOL FUMARATE 5 MG/1
10 TABLET, FILM COATED ORAL
Status: DISCONTINUED | OUTPATIENT
Start: 2019-11-04 | End: 2019-11-03 | Stop reason: HOSPADM

## 2019-11-03 RX ADMIN — SODIUM CHLORIDE, PRESERVATIVE FREE 10 ML: 5 INJECTION INTRAVENOUS at 09:38

## 2019-11-03 RX ADMIN — LISINOPRIL 20 MG: 20 TABLET ORAL at 09:38

## 2019-11-03 RX ADMIN — BISOPROLOL FUMARATE 5 MG: 5 TABLET ORAL at 09:38

## 2019-11-03 NOTE — PLAN OF CARE
Problem: Patient Care Overview  Goal: Plan of Care Review   11/03/19 1012   Coping/Psychosocial   Plan of Care Reviewed With patient   Plan of Care Review   Progress improving

## 2019-11-03 NOTE — CONSULTS
[facility] at [facility]   Consult Note    Referring Provider: Dr. Kuo      Reason for Consultation: medical therapy recommendations for atrial flutter    Patient Care Team:  Kaye Aleman MD as PCP - General (Cardiology)  Isidra Eisenberg MD as PCP - Family Medicine  Isidra Eisenberg MD as PCP - Claims Attributed    Past Medical History:   Diagnosis Date   • Bleeding hemorrhoid 3/8/2019   • Diverticulosis    • Hx of colonic polyps 2003   • Hypertension    • Intraductal papilloma     R intraductal patheloma   • Left upper extremity numbness     L upper extremity numbness-ER visit; neuropathy   • Self-catheterizes urinary bladder        Past Surgical History:   Procedure Laterality Date   • BREAST LUMPECTOMY  2006   • CHOLECYSTECTOMY     • HYSTERECTOMY  1984   • VAGINECTOMY  2009         Allergies   Allergen Reactions   • Levofloxacin Other (See Comments)     OTHER    • Nitrofuran Derivatives Rash     OTHER    • Nitrofurantoin Rash           Current Facility-Administered Medications:   •  acetaminophen (TYLENOL) tablet 650 mg, 650 mg, Oral, Q4H PRN **OR** acetaminophen (TYLENOL) 160 MG/5ML solution 650 mg, 650 mg, Oral, Q4H PRN **OR** acetaminophen (TYLENOL) suppository 650 mg, 650 mg, Rectal, Q4H PRN, Kay Loya, APRN  •  bisacodyl (DULCOLAX) suppository 10 mg, 10 mg, Rectal, Daily PRN, Kay Loya, APRJOHNATHAN  •  [START ON 11/4/2019] bisoprolol (ZEBeta) tablet 10 mg, 10 mg, Oral, Q24H, Leena Kuo MD  •  calcium carbonate (TUMS) chewable tablet 500 mg (200 mg elemental), 2 tablet, Oral, BID PRN, Kay Loya, APRN  •  Magnesium Sulfate 2 gram Bolus, followed by 8 gram infusion (total Mg dose 10 grams)- Mg less than or equal to 1mg/dL, 2 g, Intravenous, PRN **OR** Magnesium Sulfate 2 gram / 50mL Infusion (GIVE X 3 BAGS TO EQUAL 6GM TOTAL DOSE) - Mg 1.1 - 1.5 mg/dl, 2 g, Intravenous, PRN **OR** Magnesium Sulfate 4 gram infusion- Mg 1.6-1.9 mg/dL, 4 g, Intravenous, PRN, Kay Loya,  MEL  •  metoprolol tartrate (LOPRESSOR) injection 5 mg, 5 mg, Intravenous, Q6H PRN, Kay Loya APRN  •  ondansetron (ZOFRAN) tablet 4 mg, 4 mg, Oral, Q6H PRN **OR** ondansetron (ZOFRAN) injection 4 mg, 4 mg, Intravenous, Q6H PRN, Kay Loya APRN  •  polyethylene glycol 3350 powder (packet), 17 g, Oral, Daily, Kay Loya APRN, Stopped at 11/03/19 0939  •  potassium chloride (MICRO-K) CR capsule 40 mEq, 40 mEq, Oral, PRN **OR** potassium chloride (KLOR-CON) packet 40 mEq, 40 mEq, Oral, PRN **OR** potassium chloride 10 mEq in 100 mL IVPB, 10 mEq, Intravenous, Q1H PRN, Kay Loya APRN  •  sennosides-docusate (PERICOLACE) 8.6-50 MG per tablet 2 tablet, 2 tablet, Oral, BID, Rosalinda Liao MD, Stopped at 11/03/19 0938  •  sodium chloride 0.9 % flush 10 mL, 10 mL, Intravenous, Q12H, Kay Loya APRN, 10 mL at 11/03/19 0938  •  sodium chloride 0.9 % flush 10 mL, 10 mL, Intravenous, PRN, Kay Loya APRN         Medications Prior to Admission   Medication Sig Dispense Refill Last Dose   • Cholecalciferol (VITAMIN D3) 1000 units capsule Take 1 capsule by mouth Daily.   11/2/2019 at Unknown time   • Coenzyme Q10 (CO Q-10) 100 MG capsule Take 1 capsule by mouth Daily.   11/2/2019 at Unknown time   • Dietary Management Product (VASCULERA) tablet TAKE TWO TABLETS BY MOUTH DAILY FOR 14 DAYS, THEN ONE TABLET BY MOUTH DAILY  11 11/2/2019 at Unknown time   • lisinopril (PRINIVIL,ZESTRIL) 10 MG tablet Take 1 tablet by mouth Every Night. 90 tablet 4 11/1/2019 at Unknown time   • lisinopril (PRINIVIL,ZESTRIL) 20 MG tablet TAKE 1 TABLET BY ORAL ROUTE EVERY A.M. 90 tablet 4 11/2/2019 at Unknown time   • MEGARED OMEGA-3 KRILL  MG capsule Take 1 capsule by mouth every day   Past Week at Unknown time   • polyethylene glycol (MIRALAX) powder take 1tsp.  by oral route  every day powder mixed with 8 oz. water, juice, soda, coffee, or tea   Past Week at Unknown time   • Probiotic Product (PROBIOTIC PEARLS  ADVANTAGE PO) 1  PRN   Past Week at Unknown time   • Turmeric, Curcuma Longa, (CURCUMIN EXTRACT) powder 750 mg Daily.   Past Week at Unknown time   • vitamin E 100 UNIT capsule vitamin E   Past Week at Unknown time   • [DISCONTINUED] bisoprolol (ZEBeta) 5 MG tablet Take 5 mg by mouth Daily.  3 11/2/2019 at Unknown time         Subjective .   History of present illness:    Patient is an 83-year-old  female who we are asked to see today for further evaluation of medical therapy for atrial flutter/fibrillation.  She has no previous documented history of atrial fibrillation.  She notes that she presented to the hospital yesterday secondary to complaints of dizziness.  At that time she was noted to be in atrial fibrillation with rapid ventricular response heart rate anywhere from 1 30-1 40.  Since being admitted she has since converted back to sinus rhythm.  Her beta-blocker dose was increased to 10 mg and her lisinopril was placed on hold secondary to marginal blood pressure.  Currently patient feels back to normal.  She has not had any recurrence of her atrial fibrillation.  Esmolol therapy has been discontinued.  Again beta-blockade was increased to 10 mg daily.  She was also placed on anticoagulation with Eliquis 5 mg twice daily.  Currently she feels well and wishes to be discharged home and follow-up with her primary cardiologist Dr. Aleman.  There were some concerns regarding bradycardia and possible pauses after increase of beta-blocker.  These appear to be PACs with compensatory pause  mechanism.  At time of our evaluation she is sinus rhythm with a heart rate in the 60s normotensive.  The of palpitations, presyncope, chest pain.  Ejection fraction on echo today was normal      Social History     Socioeconomic History   • Marital status:      Spouse name: Not on file   • Number of children: Not on file   • Years of education: Not on file   • Highest education level: Not on file   Tobacco Use   •  "Smoking status: Never Smoker   Substance and Sexual Activity   • Drug use: No   • Sexual activity: Defer     Family History   Problem Relation Age of Onset   • Hyperlipidemia Other    • Hypertension Other    • Coronary artery disease Other          Review of Systems:  Review of Systems   Constitution: Positive for malaise/fatigue. Negative for fever and weakness.   HENT: Negative for nosebleeds.    Eyes: Negative for redness and visual disturbance.   Cardiovascular: Negative for orthopnea, palpitations and paroxysmal nocturnal dyspnea.   Respiratory: Negative for cough, snoring, sputum production and wheezing.    Hematologic/Lymphatic: Negative for bleeding problem.   Skin: Negative for flushing, itching and rash.   Musculoskeletal: Positive for arthritis, joint pain and muscle weakness. Negative for falls and muscle cramps.   Gastrointestinal: Negative for abdominal pain, diarrhea, heartburn, nausea and vomiting.   Genitourinary: Negative for hematuria.   Neurological: Positive for dizziness. Negative for excessive daytime sleepiness, headaches and tremors.   Psychiatric/Behavioral: Negative for substance abuse. The patient is not nervous/anxious.               Objective   Vitals:  Blood pressure 105/65, pulse 57, temperature 98 °F (36.7 °C), temperature source Oral, resp. rate 18, height 160 cm (63\"), weight 65.3 kg (144 lb), SpO2 98 %, not currently breastfeeding.     Intake/Output Summary (Last 24 hours) at 11/3/2019 1555  Last data filed at 11/3/2019 1300  Gross per 24 hour   Intake 720 ml   Output --   Net 720 ml       Physical Exam   Constitutional: She is oriented to person, place, and time. She appears well-developed and well-nourished. No distress.   Walks with the assistance of a cane   HENT:   Head: Normocephalic and atraumatic.   Eyes: Right eye exhibits no discharge. Left eye exhibits no discharge.   Neck: No JVD present. No tracheal deviation present.   Cardiovascular: Normal rate, regular rhythm, " normal heart sounds and intact distal pulses. Exam reveals no friction rub.   No murmur heard.  Pulmonary/Chest: Effort normal and breath sounds normal. She has no rales.   Abdominal: Soft. Bowel sounds are normal. There is no tenderness.   Musculoskeletal: She exhibits no edema or deformity.   Neurological: She is alert and oriented to person, place, and time.   Skin: Skin is warm and dry.   Psychiatric: She has a normal mood and affect.            Results Review:  I reviewed the patient's new clinical results.  Results from last 7 days   Lab Units 11/03/19  1057   WBC 10*3/mm3 7.54   HEMOGLOBIN g/dL 12.8   HEMATOCRIT % 38.9   PLATELETS 10*3/mm3 321     Results from last 7 days   Lab Units 11/03/19  1057 11/02/19  1157   SODIUM mmol/L 130* 134*   POTASSIUM mmol/L 4.0 4.2   CHLORIDE mmol/L 95* 97*   CO2 mmol/L 26.0 22.0   BUN mg/dL 10 12   CREATININE mg/dL 0.57 0.58   CALCIUM mg/dL 8.6 9.4   BILIRUBIN mg/dL  --  0.5   ALK PHOS U/L  --  120*   ALT (SGPT) U/L  --  15   AST (SGOT) U/L  --  21   GLUCOSE mg/dL 108* 114*     Results from last 7 days   Lab Units 11/03/19  1057   SODIUM mmol/L 130*   POTASSIUM mmol/L 4.0   CHLORIDE mmol/L 95*   CO2 mmol/L 26.0   BUN mg/dL 10   CREATININE mg/dL 0.57   GLUCOSE mg/dL 108*   CALCIUM mg/dL 8.6         No results found for: TROPONINI  Results from last 7 days   Lab Units 11/02/19  1157   TSH uIU/mL 1.840   FREE T4 ng/dL 1.37     Results from last 7 days   Lab Units 11/03/19  1057   CHOLESTEROL mg/dL 169   TRIGLYCERIDES mg/dL 59   HDL CHOL mg/dL 50   LDL CHOL mg/dL 107*               Tele:  SR    EKG: SB with PAC, 1st degree and IRBBB    Echocardiogram:  · Left ventricular systolic function is normal. Estimated EF = 65%.  · Left ventricular wall thickness is consistent with borderline concentric hypertrophy.  · Left ventricular diastolic dysfunction (grade II) consistent with pseudonormalization.  · Right ventricular cavity is dilated.  · Left atrial volume is severely  increased.  · Saline test results are positive for evidence of an intra-atrial shunt.  · Right atrial cavity size is severely dilated.  · Mild aortic valve regurgitation is present.  · Mild tricuspid valve regurgitation is present.  · Estimated right ventricular systolic pressure from tricuspid regurgitation is mildly elevated (35-45 mmHg    Assessment/Plan     1. Paroxysmal atrial flutter  1. Currently in SR.  2. Primary cardiologist at [facility]  3. Preserved LV systolic function  2. Hypertension  1. Stable  2. Tolerating increased dose of BB  3. Dyslipidemia        Plan:    1. Patient stable from cardiac standpoint to discharge home.  Continue increased dose of bisoprolol and agree with anticoagulation with Eliquis  2. Follow-up with primary cardiologist in the next 1-2 weeks.  3. Continue medications as prescribed by attending service.      MEL Barnett obtained past medical, family history, social history, review of systems and functioned as a scribe for the remainder of the dictation for Dr. Bobby.    Electronically signed by MEL Barnett, 11/03/19, 4:15 PM.    IRafat M.D., personally performed the services described in this documentation as scribed by the above named individual in my presence, and it is both accurate and complete.  11/3/2019  9:29 PM     Dictated utilizing Dragon dictation

## 2019-11-03 NOTE — PROGRESS NOTES
"    [facility] Medicine Services  PROGRESS NOTE    Patient Name: Thuy Clark  : 1935  MRN: [id number]    Date of Admission: 2019  Primary Care Physician: Kaye Aleman MD    Subjective   Subjective     CC:  Acute dizziness and HR of 150    HPI:  Feels fine this morning.  No palpitations.  Hasn't been OOB yet.   She has no insurance coverage for meds but says, \"I have money.\"    Review of Systems   No fevers, no chest pain now or ever; no history of CAD.   No dyspnea.   Leg weakness attributed to statin.  Uses a scooter.     Objective   Objective     Vital Signs:   Temp:  [97.5 °F (36.4 °C)-98.2 °F (36.8 °C)] 98.2 °F (36.8 °C)  Heart Rate:  [] 63  Resp:  [8-20] 18  BP: ()/(56-98) 128/60        Physical Exam:    Gen:  WD/WN woman in NAD, cheery and alert.   Neuro: alert and oriented, clear speech, follows commands, grossly nonfocal  HEENT:  NC/AT PERRL, OP benign  Neck:  Supple, no LAD  Heart RRR no murmur, rub, or gallop.  Tele shows SR at 65  Lungs CTA nonlabored  Abd:  Soft, nontender, no rebound or guarding, pos BS  Extrem:  No c/c/e      Results Reviewed:    Results from last 7 days   Lab Units 19  1157   WBC 10*3/mm3 7.32   HEMOGLOBIN g/dL 14.6   HEMATOCRIT % 44.4   PLATELETS 10*3/mm3 354     Results from last 7 days   Lab Units 19  2338 19  1758 19  1347 19  1157   SODIUM mmol/L  --   --   --  134*   POTASSIUM mmol/L  --   --   --  4.2   CHLORIDE mmol/L  --   --   --  97*   CO2 mmol/L  --   --   --  22.0   BUN mg/dL  --   --   --  12   CREATININE mg/dL  --   --   --  0.58   GLUCOSE mg/dL  --   --   --  114*   CALCIUM mg/dL  --   --   --  9.4   ALT (SGPT) U/L  --   --   --  15   AST (SGOT) U/L  --   --   --  21   TROPONIN T ng/mL <0.010 <0.010 <0.010 <0.010     Estimated Creatinine Clearance: 48.5 mL/min (by C-G formula based on SCr of 0.58 mg/dL).    Microbiology Results Abnormal     None          Imaging Results " (Last 24 Hours)     Procedure Component Value Units Date/Time    XR Chest 1 View [[id number]] Collected:  19 1253     Updated:  19 2321    Narrative:          EXAMINATION: XR CHEST 1 VW - 2019     INDICATION: Weakness, dizziness, altered mental status.     COMPARISON: 2016     FINDINGS: Left basilar linear scarring appears unchanged. Mild  chronic-appearing interstitial changes are noted elsewhere, including  focal linear scar associated with granulomatous calcification in the  left apex. No lung consolidation, effusion or pneumothorax is seen.           Impression:       Chronic-appearing lung changes including old granulomatous  disease and left basilar linear scarring. No clearly new chest pathology  is identified.     DICTATED:   2019  EDITED/ls :   2019      This report was finalized on 2019 11:18 PM by DR. Gamaliel Zhou MD.             Results for orders placed during the hospital encounter of 03/02/15   CONVERTED (HISTORICAL) ECHO    Narrative Patient:      CAMELIA PEREZ    [facility] Rec#:     [id number]               :          1935            Date:         2015            Age:          79y                   Height:       157.48 cm / 62.0 in  Weight:       64.41 kg / 142.0 lbs  Sex:          F                     BSA:          1.65  Room#:        opt                       Sonographer:  Mercedes Faulkner, TIGIST MOREIRA, RVT  Referring:    Isidra Eisenberg  Reading:      Chris Chadwick MD  ______________________________________________________________________    Transthoracic Echocardiogram    Indication:  PALPITATION  Rhythm:       PAC    Conclusions  1. Atrial septal defect is demonstrated by color Doppler.   2. There is predominant left-to-right shunting across the interatrial  septum.   3. The right atrium is moderately dilated.   4. The right ventricle is mild to moderately dilated.   5. The left atrium is moderately dilated.  6. There is normal left ventricular  systolic function.  7. The estimated ejection fraction is 55-60%.   8. Mild aortic cusp sclerosis is present.  9. There is mild aortic regurgitation.   10. There is no evidence of mitral valve prolapse.  11. There is mild to moderate mitral regurgitation.   12. There is mild to moderate tricuspid regurgitation.  13. There is evidence of pulmonary hypertension.  14. There is no pericardial effusion.  15. There is no dilatation of the aortic root.  16. The inferior vena cava is dilated.   17. There is a greater than 50% respiratory change in the inferior vena  cava dimension.    Findings       Technical Comments:  The study quality is good.      Left Ventricle:  The left ventricular chamber size is normal. Global left ventricular  wall motion and contractility are within normal limits. There is normal  left ventricular systolic function. The estimated ejection fraction is  55-60%.  Normal left ventricular diastolic filling is observed.     Left Atrium:  The left atrium is moderately dilated.     Right Ventricle:  The right ventricle is mild to moderately dilated.  The right  ventricular global systolic function is normal.     Right Atrium:  The right atrium is moderately dilated.  There is a secundum atrial  septal defect.The defect measures approximately 1 cm with adequate rims.   There is predominant left-to-right shunting across the interatrial  septum.  Atrial septal defect is demonstrated by color Doppler.      Aortic Valve:  The aortic valve is trileaflet. Mild aortic cusp sclerosis is present.  There is mild aortic regurgitation.  There is no evidence of aortic  stenosis.     Mitral Valve:  The mitral valve leaflets are mildly thickened. There is no evidence of  mitral valve prolapse. There is mild to moderate mitral regurgitation.   There is no evidence of mitral stenosis.     Tricuspid Valve:  The tricuspid valve leaflets are normal.  There is mild to moderate  tricuspid regurgitation. The right ventricular  systolic pressure is  estimated to be 45-50 mmHg.  There is evidence of pulmonary  hypertension.     Pulmonic Valve:  The pulmonic valve appears normal. There is a trace pulmonic  regurgitation.      Aorta:  There is no dilatation of the aortic root.     Pulmonary Artery:  The main pulmonary artery is mildly dilated.     Venous:  The inferior vena cava is dilated.  There is a greater than 50%  respiratory change in the inferior vena cava dimension.     Measurements   Chambers  Name                    Value           RVIDd (AP) MM           2.7 cm          IVSd (MM)               0.8 cm          LVIDd (MM)              4.6 cm          LVIDs (MM)              3.3 cm          LVPWd (MM)              0.8 cm          Ao root diameter (MM)   2.7 cm          LA dimension (AP) MM    4.8 cm          LA:Ao ratio (MM)        1.7 ratio       EF (2D)                 58 %            LA dimension (AP) 2D    4.8 cm            Volumes  Name                    Value           LA ESV SP 4CH (MOD)     85 ml           LA ESV SP 2CH (MOD)     96 ml           LA ESV BP (MOD)         96 ml           LA ESV BP (MOD) index   58.2 ml/m2      LV EDV SP 4CH (MOD)     53 ml           LV ESV SP 4CH (MOD)     27 ml           EF SP 4CH (MOD)         49 %              Diastolic/Systolic Function  Name                    Value           MV E-wave Vmax          0.77 m/sec      MV deceleration time    211 msec        MV A-wave Vmax          0.63 m/sec      MV E:A ratio            1.2 ratio       LV septal e' Vmax       0.08 m/sec      LV lateral e' Vmax      0.08 m/sec      LV E:e' septal ratio    10.1 ratio      LV E:e' lateral ratio   9.9 ratio         Mitral Valve  Name                    Value           MV annulus diameter     3.3 cm          MR Vmax                 5.71 m/sec      MR VTI                  228 cm          MR volume (PISA)        27 ml           MR flow (PISA)          69.6 ml/sec     MR ERO                  0.12 cm2        MR PISA  radius          0.6 cm          MR hassan Vmax           30.8 cm/sec       Tricuspid Valve  Name                    Value           TR Vmax                 3.03 m/sec      TR peak gradient        37 mmHg         RAP                     15 mmHg         RVSP                    52 mmHg           Electronically signed by: Chris Chadwick MD on 03/02/2015 16:57:25       I have reviewed the medications:  Scheduled Meds:  bisoprolol 5 mg Oral Q24H   lisinopril 10 mg Oral Nightly   lisinopril 20 mg Oral Q24H   polyethylene glycol 17 g Oral Daily   sennosides-docusate 2 tablet Oral BID   sodium chloride 10 mL Intravenous Q12H     Continuous Infusions:   PRN Meds:.•  acetaminophen **OR** acetaminophen **OR** acetaminophen  •  bisacodyl  •  calcium carbonate  •  magnesium sulfate **OR** magnesium sulfate **OR** magnesium sulfate  •  metoprolol tartrate  •  ondansetron **OR** ondansetron  •  potassium chloride **OR** potassium chloride **OR** potassium chloride  •  sodium chloride      Assessment/Plan   Assessment / Plan     Active Hospital Problems    Diagnosis  POA   • **Atrial flutter with rapid ventricular response (CMS/HCC) [I48.92]  Yes   • Benign essential hypertension [I10]  Yes   • Mixed hyperlipidemia [E78.2]  Yes   • Pulmonary hypertension (CMS/HCC) [I27.20]  Yes      Resolved Hospital Problems   No resolved problems to display.        Brief Hospital Course to date:  Thuy Clark is a 83 y.o. female with PMH significant for CAD, COPD, HTN, HLD.  She has frequrnt palpitations at bedtime. These increased and she became dizzy; HR was 150 when checked [place] fire station.  Afib/flutter noted in ER; rate controlled with IV betablocker.     A-fib/flutter/Pulm HTN  - increase beta blocker, hold lisinopril for now.   --cards to see  --Apixaban started:  Cost is 450 monthly but first month free trial is possible.   --Echo pending  - TSH, trops, Mg normal     HTN  -- stop Lisinopril for now; watch     HLD  -- , hist of  statin myopathy     DVT prophylaxis:  Eliquis    Disposition: I expect the patient to be discharged perhaps today    CODE STATUS:   Code Status and Medical Interventions:   Ordered at: 11/02/19 0679     Level Of Support Discussed With:    Patient     Code Status:    CPR     Medical Interventions (Level of Support Prior to Arrest):    Full         Electronically signed by Leena Kuo MD, 11/03/19, 6:57 AM.

## 2019-11-03 NOTE — PROGRESS NOTES
Continued Stay Note   [place]     Patient Name: Thuy Clark  MRN: [id number]  Today's Date: 11/3/2019    Admit Date: 11/2/2019    Discharge Plan     Row Name 11/03/19 1107       Plan    Plan  Drug coverage    Patient/Family in Agreement with Plan  yes    Plan Comments  Case management consult for Xarelto versus Eliquis for patient without prescription drug coverage.  Confirmed with [facility], [facility] [address]) that patient does not have prescription drug coverage at this time.  Cost of Eliquis and Xarelto at retail will both be $450 for patient per month.  Patient cannot afford this medication at retail price.  Patient has been enrolled in meds to bed.  Pharmacy will use one month discount card for Eliquis, and then patient will follow up with cardiology for samples per Dr. Kuo.      Final Discharge Disposition Code  01 - home or self-care        Discharge Codes    No documentation.             Jennifer Martinez RN

## 2019-11-03 NOTE — PLAN OF CARE
Problem: Patient Care Overview  Goal: Plan of Care Review  Outcome: Ongoing (interventions implemented as appropriate)   11/03/19 0500   Coping/Psychosocial   Plan of Care Reviewed With patient;family   Plan of Care Review   Progress improving   OTHER   Outcome Summary VSS. RA. SR/SB on the monitor. No c/o pain or discomfort. Cards to see pt today. Pt self caths and has own supplies. Will continue to monitor.

## 2019-11-03 NOTE — DISCHARGE SUMMARY
[facility] Medicine Services  DISCHARGE SUMMARY    Patient Name: Thuy lCark  : 1935  MRN: [id number]    Date of Admission: 2019  Date of Discharge:  11/3/2019  Primary Care Physician: Kaye Aleman MD (cardiologist)      Hospital Course     Presenting Problem:   Atrial flutter with rapid ventricular response (CMS/HCC) [I48.92]    Active Hospital Problems    Diagnosis  POA   • **Atrial flutter with rapid ventricular response (CMS/HCC) [I48.92]  Yes   • Benign essential hypertension [I10]  Yes   • Mixed hyperlipidemia [E78.2]  Yes   • Pulmonary hypertension (CMS/HCC) [I27.20]  Yes      Resolved Hospital Problems   No resolved problems to display.          Hospital Course:  Thuy Clark is a 83 y.o. female with HTN and pulmonary HTN, maintained on lisinopril and bisoprolol.  Has a history of occasional palpits.  Over the past couple days she felt increased palpitations and then lightheadedness.  She had her pulse taken at the local fire station and it was 150.  In the ED, she was found to be in atrial flutter.  She has no history of CAD or substernal pain. She states she was formerly on a statin but developed myopathy and has never fully recovered.  She carries a diagnosis of pulmonary HTN but isn't aware of this.     She received several doses of IV metoprolol and converted to sinus rhythm with HR of 60's.  Eliquis was started.  Bisoprolol is doubled to 10mg daily.  She has no prescription drug coverage but went home with a free one-month pack of Eliquis from the pharmacy; may need help applying for reduced-cost meds.  She walks with a walker and is afraid of falls, but has never fallen.  We discussed risks of traumatic and GI bleeds.     Echo was done and prelim (tech) read noted:  Positive bubble study and biatrial enlargement.  She will follow up in the heart-valve center this week to ensure continuity and see her usual cardiologist, Dr. Kaye Aleman,  whenever she can get an appointment.  Since it is Sunday, we cannot make this appointment but she will call tomorrow.  To allow more freedom for medical rhythm management, I stopped lisinopril for the time being.  She has stayed normotensive.     ADDENDUM:  Noted to be mostly in sinus with rate mid 50s, but with occasional long pauses and absent p-waves.  Discussed with cardiologist who will evaluate prior to her discharge.        Discharge Follow Up Recommendations for labs/diagnostics:  * follow BP and HR given change in meds  *cardiol to review echo (final read pending at [facility])    Day of Discharge     HPI:   Feels well.  No palpitaitons, no dizziness.      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       Otherwise ROS is negative except as mentioned in the HPI.    Vital Signs:   Temp:  [97.8 °F (36.6 °C)-98.2 °F (36.8 °C)] 98.1 °F (36.7 °C)  Heart Rate:  [] 61  Resp:  [8-18] 18  BP: ()/(54-96) 123/87     Physical Exam:  Gen:  WD/WN woman in NAD.  Very cheerful and chatty.   Neuro: alert and oriented, clear speech, follows commands, grossly nonfocal  HEENT:  NC/AT PERRL, OP benign  Neck:  Supple, no LAD  Heart RRR no murmur, rub, or gallop, rate about 70  Abd:  Soft, nontender, no rebound or guarding, pos BS  Extrem:  No c/c/e  Psych - good insight.  Nl mood and affect.   Skin warm and dry      Pertinent  and/or Most Recent Results     Results from last 7 days   Lab Units 11/03/19  1057 11/02/19  1157   WBC 10*3/mm3 7.54 7.32   HEMOGLOBIN g/dL 12.8 14.6   HEMATOCRIT % 38.9 44.4   PLATELETS 10*3/mm3 321 354   SODIUM mmol/L 130* 134*   POTASSIUM mmol/L 4.0 4.2   CHLORIDE mmol/L 95* 97*   CO2 mmol/L 26.0 22.0   BUN mg/dL 10 12   CREATININE mg/dL 0.57 0.58   GLUCOSE mg/dL 108* 114*   CALCIUM mg/dL 8.6 9.4     Results from last  7 days   Lab Units 19  1157   BILIRUBIN mg/dL 0.5   ALK PHOS U/L 120*   ALT (SGPT) U/L 15   AST (SGOT) U/L 21     Results from last 7 days   Lab Units 19  1057   CHOLESTEROL mg/dL 169   TRIGLYCERIDES mg/dL 59   HDL CHOL mg/dL 50     Results from last 7 days   Lab Units 19  2338 19  1758 19  1347 19  1157   TSH uIU/mL  --   --   --  1.840   TROPONIN T ng/mL <0.010 <0.010 <0.010 <0.010       Brief Urine Lab Results  (Last result in the past 365 days)      Color   Clarity   Blood   Leuk Est   Nitrite   Protein   CREAT   Urine HCG        19 1243 Yellow Clear Negative Negative Negative 30 mg/dL (1+)               Microbiology Results Abnormal     None          Imaging Results (All)     Procedure Component Value Units Date/Time    XR Chest 1 View [[id number]] Collected:  19 1253     Updated:  19 2321    Narrative:          EXAMINATION: XR CHEST 1 VW - 2019     INDICATION: Weakness, dizziness, altered mental status.     COMPARISON: 2016     FINDINGS: Left basilar linear scarring appears unchanged. Mild  chronic-appearing interstitial changes are noted elsewhere, including  focal linear scar associated with granulomatous calcification in the  left apex. No lung consolidation, effusion or pneumothorax is seen.           Impression:       Chronic-appearing lung changes including old granulomatous  disease and left basilar linear scarring. No clearly new chest pathology  is identified.     DICTATED:   2019  EDITED/ls :   2019      This report was finalized on 2019 11:18 PM by DR. Gamaliel Zhou MD.                       Results for orders placed during the hospital encounter of 03/02/15   CONVERTED (HISTORICAL) ECHO    Narrative Patient:      CAMELIA PEREZ    Med Rec#:     [id number]               :          1935            Date:         2015            Age:          79y                   Height:       157.48 cm / 62.0 in  Weight:        64.41 kg / 142.0 lbs  Sex:          F                     BSA:          1.65  Room#:        opt                       Sonographer:  Mercedes Faulkner FASE, RDCS, RVT  Referring:    Isidra Eisenberg  Reading:      Chris Chadwick MD  ______________________________________________________________________    Transthoracic Echocardiogram    Indication:  PALPITATION  Rhythm:       PAC    Conclusions  1. Atrial septal defect is demonstrated by color Doppler.   2. There is predominant left-to-right shunting across the interatrial  septum.   3. The right atrium is moderately dilated.   4. The right ventricle is mild to moderately dilated.   5. The left atrium is moderately dilated.  6. There is normal left ventricular systolic function.  7. The estimated ejection fraction is 55-60%.   8. Mild aortic cusp sclerosis is present.  9. There is mild aortic regurgitation.   10. There is no evidence of mitral valve prolapse.  11. There is mild to moderate mitral regurgitation.   12. There is mild to moderate tricuspid regurgitation.  13. There is evidence of pulmonary hypertension.  14. There is no pericardial effusion.  15. There is no dilatation of the aortic root.  16. The inferior vena cava is dilated.   17. There is a greater than 50% respiratory change in the inferior vena  cava dimension.    Findings       Technical Comments:  The study quality is good.      Left Ventricle:  The left ventricular chamber size is normal. Global left ventricular  wall motion and contractility are within normal limits. There is normal  left ventricular systolic function. The estimated ejection fraction is  55-60%.  Normal left ventricular diastolic filling is observed.     Left Atrium:  The left atrium is moderately dilated.     Right Ventricle:  The right ventricle is mild to moderately dilated.  The right  ventricular global systolic function is normal.     Right Atrium:  The right atrium is moderately dilated.  There is a secundum  atrial  septal defect.The defect measures approximately 1 cm with adequate rims.   There is predominant left-to-right shunting across the interatrial  septum.  Atrial septal defect is demonstrated by color Doppler.      Aortic Valve:  The aortic valve is trileaflet. Mild aortic cusp sclerosis is present.  There is mild aortic regurgitation.  There is no evidence of aortic  stenosis.     Mitral Valve:  The mitral valve leaflets are mildly thickened. There is no evidence of  mitral valve prolapse. There is mild to moderate mitral regurgitation.   There is no evidence of mitral stenosis.     Tricuspid Valve:  The tricuspid valve leaflets are normal.  There is mild to moderate  tricuspid regurgitation. The right ventricular systolic pressure is  estimated to be 45-50 mmHg.  There is evidence of pulmonary  hypertension.     Pulmonic Valve:  The pulmonic valve appears normal. There is a trace pulmonic  regurgitation.      Aorta:  There is no dilatation of the aortic root.     Pulmonary Artery:  The main pulmonary artery is mildly dilated.     Venous:  The inferior vena cava is dilated.  There is a greater than 50%  respiratory change in the inferior vena cava dimension.     Measurements   Chambers  Name                    Value           RVIDd (AP) MM           2.7 cm          IVSd (MM)               0.8 cm          LVIDd (MM)              4.6 cm          LVIDs (MM)              3.3 cm          LVPWd (MM)              0.8 cm          Ao root diameter (MM)   2.7 cm          LA dimension (AP) MM    4.8 cm          LA:Ao ratio (MM)        1.7 ratio       EF (2D)                 58 %            LA dimension (AP) 2D    4.8 cm            Volumes  Name                    Value           LA ESV SP 4CH (MOD)     85 ml           LA ESV SP 2CH (MOD)     96 ml           LA ESV BP (MOD)         96 ml           LA ESV BP (MOD) index   58.2 ml/m2      LV EDV SP 4CH (MOD)     53 ml           LV ESV SP 4CH (MOD)     27 ml           EF SP  4CH (MOD)         49 %              Diastolic/Systolic Function  Name                    Value           MV E-wave Vmax          0.77 m/sec      MV deceleration time    211 msec        MV A-wave Vmax          0.63 m/sec      MV E:A ratio            1.2 ratio       LV septal e' Vmax       0.08 m/sec      LV lateral e' Vmax      0.08 m/sec      LV E:e' septal ratio    10.1 ratio      LV E:e' lateral ratio   9.9 ratio         Mitral Valve  Name                    Value           MV annulus diameter     3.3 cm          MR Vmax                 5.71 m/sec      MR VTI                  228 cm          MR volume (PISA)        27 ml           MR flow (PISA)          69.6 ml/sec     MR ERO                  0.12 cm2        MR PISA radius          0.6 cm          MR alias Vmax           30.8 cm/sec       Tricuspid Valve  Name                    Value           TR Vmax                 3.03 m/sec      TR peak gradient        37 mmHg         RAP                     15 mmHg         RVSP                    52 mmHg           Electronically signed by: Chris Chadwick MD on 03/02/2015 16:57:25         Discharge Details        Discharge Medications      New Medications      Instructions Start Date   apixaban 5 MG tablet tablet  Commonly known as:  ELIQUIS   5 mg, Oral, Every 12 Hours Scheduled         Changes to Medications      Instructions Start Date   bisoprolol 5 MG tablet  Commonly known as:  ZEBeta  What changed:  how much to take   10 mg, Oral, Daily         Continue These Medications      Instructions Start Date   Co Q-10 100 MG capsule   1 capsule, Oral, Daily      MEGARED OMEGA-3 KRILL  MG capsule   Take 1 capsule by mouth every day      polyethylene glycol powder  Commonly known as:  MIRALAX   take 1tsp.  by oral route  every day powder mixed with 8 oz. water, juice, soda, coffee, or tea      PROBIOTIC PEARLS ADVANTAGE PO   1  PRN      VASCULERA tablet   TAKE TWO TABLETS BY MOUTH DAILY FOR 14 DAYS, THEN ONE TABLET BY MOUTH  DAILY      Vitamin D3 25 MCG (1000 UT) capsule   1 capsule, Oral, Daily      vitamin E 100 UNIT capsule   vitamin E         Stop These Medications    Curcumin Extract powder     lisinopril 10 MG tablet  Commonly known as:  PRINIVIL,ZESTRIL     lisinopril 20 MG tablet  Commonly known as:  PRINIVIL,ZESTRIL            Allergies   Allergen Reactions   • Levofloxacin Other (See Comments)     OTHER    • Nitrofuran Derivatives Rash     OTHER    • Nitrofurantoin Rash   patient 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 - JM       Discharge Disposition:  Home or Self Care    Diet:  Hospital:  Diet Order   Procedures   • Diet Regular; Cardiac     Discharge:     Discharge Activity:  As tolerated.  Take measures to reduce risk of trauma - wear seat belt, use walker, avoid fistfights and drunken tumbles from balconies, and so on.  We discussed this.     CODE STATUS:    Code Status and Medical Interventions:   Ordered at: 11/02/19 1731     Level Of Support Discussed With:    Patient     Code Status:    CPR     Medical Interventions (Level of Support Prior to Arrest):    Full         Future Appointments   Date Time Provider Department Center   12/2/2019  2:00 PM Benita Weiner PA-C [facility]   12/6/2019  2:15 PM Isidra Eisenberg MD [facility]     Follow up with [facility] heart valve center this week.   Follow up with [facility] cardiologist Dr Aleman next available.     Time Spent on Discharge:  40 minutes    Electronically signed by Leena Kuo MD, 11/03/19, 1:08 PM.

## 2019-11-13 ENCOUNTER — HOSPITAL ENCOUNTER (OUTPATIENT)
Dept: CARDIOLOGY | Facility: HOSPITAL | Age: 84
Discharge: HOME | End: 2019-11-13
Admitting: NURSE PRACTITIONER

## 2019-11-13 ENCOUNTER — OFFICE VISIT (OUTPATIENT)
Dept: CARDIOLOGY | Facility: HOSPITAL | Age: 84
End: 2019-11-13

## 2019-11-13 VITALS
HEART RATE: 64 BPM | TEMPERATURE: 97.5 F | WEIGHT: 141.06 LBS | HEIGHT: 63 IN | SYSTOLIC BLOOD PRESSURE: 128 MMHG | RESPIRATION RATE: 18 BRPM | DIASTOLIC BLOOD PRESSURE: 68 MMHG | BODY MASS INDEX: 24.99 KG/M2 | OXYGEN SATURATION: 97 %

## 2019-11-13 DIAGNOSIS — I48.92 ATRIAL FLUTTER WITH RAPID VENTRICULAR RESPONSE (HCC): ICD-10-CM

## 2019-11-13 DIAGNOSIS — I49.3 PVC'S (PREMATURE VENTRICULAR CONTRACTIONS): ICD-10-CM

## 2019-11-13 DIAGNOSIS — I10 BENIGN ESSENTIAL HYPERTENSION: ICD-10-CM

## 2019-11-13 DIAGNOSIS — I48.92 ATRIAL FLUTTER, UNSPECIFIED TYPE (HCC): Primary | ICD-10-CM

## 2019-11-13 PROCEDURE — 93010 ELECTROCARDIOGRAM REPORT: CPT | Performed by: INTERNAL MEDICINE

## 2019-11-13 PROCEDURE — 93005 ELECTROCARDIOGRAM TRACING: CPT | Performed by: NURSE PRACTITIONER

## 2019-11-13 PROCEDURE — 99214 OFFICE O/P EST MOD 30 MIN: CPT | Performed by: NURSE PRACTITIONER

## 2019-11-13 RX ORDER — ZOSTER VACCINE RECOMBINANT, ADJUVANTED 50 MCG/0.5
KIT INTRAMUSCULAR
Refills: 0 | COMMUNITY
Start: 2019-11-05 | End: 2019-12-02

## 2019-11-13 NOTE — PROGRESS NOTES
[facility]      Encounter Date:11/13/2019     Thuy Clark  [address] [place] [address]  [unfilled]    1935    Kaye Aleman MD    Thuy Clark is a 83 y.o. female.      Subjective:     Chief Complaint:  Establish Care (atrial flutter)       HPI 83-year-old female presents the office today for ongoing evaluation of her atrial flutter.  She presented to [facility] on 11/2/2019 with palpitations and lightheadedness.  She received several doses of IV metoprolol and converted to sinus rhythm with heart rate in the 60s.  Eliquis was initiated and bisoprolol was doubled to 10 mg daily.  Lisinopril was stopped during hospital stay due to patient being normotensive.  Echo showed EF 65% with borderline concentric hypertrophy grade 2 diastolic dysfunction with pseudonormalization.  Saline test are positive for evidence of an intra-atrial shunt.  Right atrial cavity is severely dilated, mild AR, mild TR RVSP 35 to 45 mmHg.  Patient has an appointment with her primary cardiologist Dr. Kaye Hebert on December 9.  She notes she is feeling well overall and denies chest pain, dyspnea, dizziness, presyncope, syncope, orthopnea, PND or pedal edema.  She is anticoagulated with Eliquis and denies any signs and symptoms of bleeding.      Patient Active Problem List   Diagnosis   • Pulmonary hypertension (CMS/HCC)   • Benign essential hypertension   • Mixed hyperlipidemia   • Atrial septal defect determined by imaging   • Abnormal glucose   • Generalized anxiety disorder   • Valvular heart disease   • Senile osteoporosis   • PVC's (premature ventricular contractions)   • Calculus of gallbladder   • Urinary retention   • Right knee pain   • Osteoarthritis   • Overweight (BMI 25.0-29.9)   • Quadriceps weakness   • External hemorrhoid   • Constipation, slow transit   • Atrial flutter with rapid ventricular response (CMS/HCC)       Past Medical History:    Diagnosis Date   • Bleeding hemorrhoid 3/8/2019   • Diverticulosis    • Hx of colonic polyps 2003   • Hypertension    • Intraductal papilloma     R intraductal patheloma   • Left upper extremity numbness     L upper extremity numbness-ER visit; neuropathy   • Self-catheterizes urinary bladder        Past Surgical History:   Procedure Laterality Date   • BREAST LUMPECTOMY  2006   • CHOLECYSTECTOMY     • HYSTERECTOMY  1984   • VAGINECTOMY  2009       Family History   Problem Relation Age of Onset   • Hyperlipidemia Other    • Hypertension Other    • Coronary artery disease Other    • No Known Problems Mother    • No Known Problems Father        Social History     Socioeconomic History   • Marital status:      Spouse name: Not on file   • Number of children: Not on file   • Years of education: Not on file   • Highest education level: Not on file   Tobacco Use   • Smoking status: Never Smoker   • Smokeless tobacco: Never Used   Substance and Sexual Activity   • Alcohol use: Yes     Alcohol/week: 0.6 oz     Types: 1 Glasses of wine per week     Comment: elderberry wine   • Drug use: No   • Sexual activity: Defer   Social History Narrative    caffeine use: 1 can of pepsi a day and green tea daily       Allergies   Allergen Reactions   • Levofloxacin Other (See Comments)     OTHER    • Nitrofuran Derivatives Rash     OTHER    • Nitrofurantoin Rash         Current Outpatient Medications:   •  apixaban (ELIQUIS) 5 MG tablet tablet, Take 1 tablet by mouth Every 12 (Twelve) Hours., Disp: 60 tablet, Rfl: 5  •  bisoprolol (ZEBeta) 5 MG tablet, Take 2 tablets by mouth Daily., Disp: 30 tablet, Rfl: 3  •  Cholecalciferol (VITAMIN D3) 1000 units capsule, Take 1 capsule by mouth Daily., Disp: , Rfl:   •  Coenzyme Q10 (CO Q-10) 100 MG capsule, Take 1 capsule by mouth Daily., Disp: , Rfl:   •  Dietary Management Product (VASCULERA) tablet, TAKE TWO TABLETS BY MOUTH DAILY FOR 14 DAYS, THEN ONE TABLET BY MOUTH DAILY, Disp: ,  Rfl: 11  •  MEGARED OMEGA-3 KRILL  MG capsule, Take 1 capsule by mouth every day, Disp: , Rfl:   •  polyethylene glycol (MIRALAX) powder, take 1tsp.  by oral route  every day powder mixed with 8 oz. water, juice, soda, coffee, or tea, Disp: , Rfl:   •  Probiotic Product (PROBIOTIC PEARLS ADVANTAGE PO), 1  PRN, Disp: , Rfl:   •  SHINGRIX 50 MCG/0.5ML reconstituted suspension, ADM 0.5ML IM UTD, Disp: , Rfl: 0  •  vitamin E 100 UNIT capsule, vitamin E, Disp: , Rfl:     The following portions of the patient's history were reviewed today and updated as appropriate: allergies, current medications, past family history, past medical history, past social history, past surgical history and problem list     Review of Systems   Constitution: Negative for chills, decreased appetite, diaphoresis, fever, weakness, malaise/fatigue, night sweats, weight gain and weight loss.   HENT: Negative for congestion, hearing loss, hoarse voice and nosebleeds.    Eyes: Negative for blurred vision, visual disturbance and visual halos.   Cardiovascular: Negative for chest pain, claudication, cyanosis, dyspnea on exertion, irregular heartbeat, leg swelling, near-syncope, orthopnea, palpitations, paroxysmal nocturnal dyspnea and syncope.   Respiratory: Negative for cough, hemoptysis, shortness of breath, sleep disturbances due to breathing, snoring, sputum production and wheezing.    Hematologic/Lymphatic: Negative for bleeding problem. Does not bruise/bleed easily.   Skin: Negative for dry skin, itching and rash.   Musculoskeletal: Positive for joint pain. Negative for arthritis, falls, joint swelling and myalgias.   Gastrointestinal: Negative for bloating, abdominal pain, constipation, diarrhea, flatus, heartburn, hematemesis, hematochezia, melena, nausea and vomiting.   Genitourinary: Positive for nocturia. Negative for dysuria, frequency, hematuria and urgency.   Neurological: Negative for excessive daytime sleepiness, dizziness,  "headaches, light-headedness and loss of balance.   Psychiatric/Behavioral: Negative for depression. The patient does not have insomnia and is not nervous/anxious.        Objective:     Vitals:    11/13/19 1421 11/13/19 1424 11/13/19 1426 11/13/19 1445   BP: 168/72 167/70 151/69 128/68   BP Location: Right arm Left arm Left arm Left arm   Patient Position: Sitting Sitting Standing Sitting   Cuff Size: Adult Adult Adult    Pulse: 59  64    Resp: 18      Temp: 97.5 °F (36.4 °C)      TempSrc: Temporal      SpO2: 98%  97%    Weight: 64 kg (141 lb 1 oz)      Height: 160 cm (63\")        Body mass index is 24.99 kg/m².  Physical Exam   Constitutional: She is oriented to person, place, and time. She appears well-developed and well-nourished. She is active and cooperative. No distress.   HENT:   Head: Normocephalic and atraumatic.   Mouth/Throat: Oropharynx is clear and moist.   Eyes: Conjunctivae and EOM are normal. Pupils are equal, round, and reactive to light.   Neck: Normal range of motion. Neck supple. No JVD present. No tracheal deviation present. No thyromegaly present.   Cardiovascular: Regular rhythm, normal heart sounds and intact distal pulses. Bradycardia present.   Pulmonary/Chest: Effort normal and breath sounds normal.   Abdominal: Soft. Bowel sounds are normal. She exhibits no distension. There is no tenderness.   Musculoskeletal: Normal range of motion.   Neurological: She is alert and oriented to person, place, and time.   Skin: Skin is warm, dry and intact.   Psychiatric: She has a normal mood and affect. Her behavior is normal.   Nursing note and vitals reviewed.      Lab and Diagnostic Review:    Lab Results   Component Value Date    GLUCOSE 108 (H) 11/03/2019    CALCIUM 8.6 11/03/2019     (L) 11/03/2019    K 4.0 11/03/2019    CO2 26.0 11/03/2019    CL 95 (L) 11/03/2019    BUN 10 11/03/2019    CREATININE 0.57 11/03/2019    EGFRIFNONA 101 11/03/2019    BCR 17.5 11/03/2019    ANIONGAP 9.0 11/03/2019 "     Lab Results   Component Value Date    WBC 7.54 11/03/2019    HGB 12.8 11/03/2019    HCT 38.9 11/03/2019    MCV 91.5 11/03/2019     11/03/2019     Echo:  · Left ventricular systolic function is normal. Estimated EF = 65%.  · Left ventricular wall thickness is consistent with borderline concentric hypertrophy.  · Left ventricular diastolic dysfunction (grade II) consistent with pseudonormalization.  · Right ventricular cavity is dilated.  · Left atrial volume is severely increased.  · Saline test results are positive for evidence of an intra-atrial shunt.  · Right atrial cavity size is severely dilated.  · Mild aortic valve regurgitation is present.  · Mild tricuspid valve regurgitation is present.  · Estimated right ventricular systolic pressure from tricuspid regurgitation is mildly elevated (35-45 mmHg     EKG: SB at 53 bpm     Assessment and Plan:   1. Atrial flutter, unspecified type (CMS/HCC)  Maintaining nsr on zebeta  CHADS-VASc Risk Assessment            4       Total Score        1 Hypertension    2 Age >/= 75    1 Sex: Female        Criteria that do not apply:    CHF    DM    PRIOR STROKE/TIA/THROMBO    Vascular Disease    Age 65-74        Anticoagulated with eliquis and denies any s/s of bleeding  AFIB education provided today including: s/s, use of anticoagulation, treatment of atrial fibrillation, Chads Vasc and the role of the afib center. Discussed stroke prevention and causes of afib, triggers of afib and medication management.   - ECG 12 Lead; Future    2. PVC's (premature ventricular contractions)  - ECG 12 Lead; Future    3. Benign essential hypertension  Well controlled  HTN Education provided today including signs and symptoms, medication management, daily blood pressure monitoring. Patient encouraged to call the Heart and Valve center with any abnormal readings.       Intra atrial shunt noted on echo. Patient prefers to follow up with her primary cardiologist, Dr Aleman. Upcoming appt  on 12/9/19    It has been a pleasure to participate in the care of this patient.  Patient was instructed to call the [facility] Val[facility] Center with any questions, concerns, or worsening symptoms.    *Please note that portions of this note were completed with a voice recognition program. Efforts were made to edit the dictations, but occasionally words are mistranscribed.

## 2019-12-02 ENCOUNTER — OFFICE VISIT (OUTPATIENT)
Dept: INTERNAL MEDICINE | Facility: CLINIC | Age: 84
End: 2019-12-02

## 2019-12-02 VITALS
TEMPERATURE: 97.7 F | SYSTOLIC BLOOD PRESSURE: 176 MMHG | HEART RATE: 63 BPM | WEIGHT: 140 LBS | BODY MASS INDEX: 24.8 KG/M2 | HEIGHT: 63 IN | DIASTOLIC BLOOD PRESSURE: 80 MMHG

## 2019-12-02 DIAGNOSIS — Z00.00 WELL ADULT EXAM: Primary | ICD-10-CM

## 2019-12-02 PROCEDURE — G0439 PPPS, SUBSEQ VISIT: HCPCS | Performed by: PHYSICIAN ASSISTANT

## 2019-12-02 RX ORDER — GABAPENTIN 100 MG/1
100 CAPSULE ORAL 2 TIMES DAILY
COMMUNITY
End: 2020-12-31

## 2019-12-02 NOTE — PATIENT INSTRUCTIONS
Medicare Wellness  Personal Prevention Plan of Service     Date of Office Visit:  2019  Encounter Provider:  Benita Weiner PA-C  Place of Service:  [facility] INTERNAL MEDIC[facility]  Patient Name: Thuy Clark  :  1935    As part of the Medicare Wellness portion of your visit today, we are providing you with this personalized preventive plan of services (PPPS). This plan is based upon recommendations of the [place] Preventive Services Task Force (USPSTF) and the Advisory Committee on Immunization Practices (ACIP).    This lists the preventive care services that should be considered, and provides dates of when you are due. Items listed as completed are up-to-date and do not require any further intervention.    Health Maintenance   Topic Date Due   • ZOSTER VACCINE (2 of 3) 2010   • MEDICARE ANNUAL WELLNESS  2019   • DXA SCAN  2020   • LIPID PANEL  2020   • TDAP/TD VACCINES (2 - Td) 2022   • INFLUENZA VACCINE  Completed   • PNEUMOCOCCAL VACCINES (65+ LOW/MEDIUM RISK)  Completed       No orders of the defined types were placed in this encounter.      Return for Next scheduled follow up.

## 2019-12-02 NOTE — PROGRESS NOTES
Subsequent Medicare Wellness Visit   The ABC's of the Annual Wellness Visit    Chief Complaint   Patient presents with   • Annual Exam   • Hyperlipidemia   • Hypertension       HPI:  Thuy Clark, -1935, is a 83 y.o. female who presents for a Subsequent Medicare Wellness Visit.    Recent Hospitalizations:  Recently treated at the following:  [facility].    Current Medical Providers:  Patient Care Team:  Kaye Aleman MD as PCP - General (Cardiology)  Isidra Eisenberg MD as PCP - Family Medicine  Isidra Eisenberg MD as PCP - Claims Attributed    Health Habits and Functional and Cognitive Screening and Depression Screening:  Functional & Cognitive Status 2019   Do you have difficulty preparing food and eating? No   Do you have difficulty bathing yourself, getting dressed or grooming yourself? No   Do you have difficulty using the toilet? No   Do you have difficulty moving around from place to place? No   Do you have trouble with steps or getting out of a bed or a chair? No   Current Diet Well Balanced Diet   Dental Exam Up to date   Eye Exam Up to date   Exercise (times per week) 3 times per week   Current Exercise Activities Include Cardiovasular Workout on Exercise Equipment   Do you need help using the phone?  No   Are you deaf or do you have serious difficulty hearing?  No   Do you need help with transportation? No   Do you need help shopping? No   Do you need help preparing meals?  No   Do you need help with housework?  No   Do you need help with laundry? No   Do you need help taking your medications? No   Do you need help managing money? No   Do you ever drive or ride in a car without wearing a seat belt? No   Have you felt unusual stress, anger or loneliness in the last month? No   Who do you live with? Alone   If you need help, do you have trouble finding someone available to you? No   Have you been bothered in the last four weeks by sexual problems? No   Do you  have difficulty concentrating, remembering or making decisions? No       Compared to one year ago, the patient feels her physical health is the same and her mental health is better.    Depression Screen:  PHQ-2/PHQ-9 Depression Screening 2019   Little interest or pleasure in doing things 0   Feeling down, depressed, or hopeless 0   Total Score 0       EDILBERTOADI Fall Risk Assessment was completed, and patient is at LOW risk for falls.Assessment completed on:2019    ATTENTION  What is the year: correct  What is the month of the year: correct  What is the day of the week?: correct  What is the date?: correct  MEMORY  Repeat address three times, only score third attempt: [address] [place], [place]: 7  HOW MANY ANIMALS DID THE PATIENT NAME  Verbal Fluency -- Animal Names (0-25): 22+  CLOCK DRAWING  Clock Drawing: All Correct  MEMORY RECALL  Tell me what you remember about that name and address we were repeating at the beginnin  ACE TOTAL SCORE  Total ACE Score - <25/30 strongly suggests cognitive impairment; <21/30 almost certainly shows dementia: 29    Past Medical/Family/Social History:  The following portions of the patient's history were reviewed and updated as appropriate: allergies, current medications, past family history, past medical history, past social history and past surgical history.    Allergies   Allergen Reactions   • Levofloxacin Other (See Comments)     OTHER    • Nitrofuran Derivatives Rash     OTHER    • Nitrofurantoin Rash         Current Outpatient Medications:   •  apixaban (ELIQUIS) 5 MG tablet tablet, Take 1 tablet by mouth Every 12 (Twelve) Hours., Disp: 60 tablet, Rfl: 5  •  bisoprolol (ZEBeta) 5 MG tablet, Take 2 tablets by mouth Daily., Disp: 30 tablet, Rfl: 3  •  Cholecalciferol (VITAMIN D3) 1000 units capsule, Take 1 capsule by mouth Daily., Disp: , Rfl:   •  Coenzyme Q10 (CO Q-10) 100 MG capsule, Take 1 capsule by mouth Daily., Disp: , Rfl:   •  Dietary  Management Product (VASCULERA) tablet, TAKE TWO TABLETS BY MOUTH DAILY FOR 14 DAYS, THEN ONE TABLET BY MOUTH DAILY, Disp: , Rfl: 11  •  gabapentin (NEURONTIN) 100 MG capsule, Take 100 mg by mouth 2 (Two) Times a Day. Take one in the morning and 1 in the evening if needed, Disp: , Rfl:   •  MEGARED OMEGA-3 KRILL  MG capsule, Take 1 capsule by mouth every day, Disp: , Rfl:   •  Probiotic Product (PROBIOTIC PEARLS ADVANTAGE PO), 1  PRN, Disp: , Rfl:     Aspirin use counseling: Does not need ASA (and currently is not on it)    Current medication list contains high risk medications. No harmful drug interactions have been identified.  Plan of action bleeding risk    Family History   Problem Relation Age of Onset   • Hyperlipidemia Other    • Hypertension Other    • Coronary artery disease Other    • No Known Problems Mother    • No Known Problems Father        Social History     Tobacco Use   • Smoking status: Never Smoker   • Smokeless tobacco: Never Used   Substance Use Topics   • Alcohol use: Yes     Alcohol/week: 0.6 oz     Types: 1 Glasses of wine per week     Comment: elderberry wine       Past Surgical History:   Procedure Laterality Date   • BREAST LUMPECTOMY  2006   • CHOLECYSTECTOMY     • HYSTERECTOMY  1984   • VAGINECTOMY  2009       Patient Active Problem List   Diagnosis   • Pulmonary hypertension (CMS/HCC)   • Benign essential hypertension   • Mixed hyperlipidemia   • Atrial septal defect determined by imaging   • Abnormal glucose   • Generalized anxiety disorder   • Valvular heart disease   • Senile osteoporosis   • PVC's (premature ventricular contractions)   • Calculus of gallbladder   • Urinary retention   • Right knee pain   • Osteoarthritis   • Overweight (BMI 25.0-29.9)   • Quadriceps weakness   • External hemorrhoid   • Constipation, slow transit   • Atrial flutter with rapid ventricular response (CMS/HCC)       Review of Systems    Objective     Vitals:    12/02/19 1403   BP: 176/80   Pulse:  "63  Comment: irregular   Temp: 97.7 °F (36.5 °C)   TempSrc: Temporal   Weight: 63.5 kg (140 lb)   Height: 160 cm (62.99\")   PainSc: 0-No pain       Patient's Body mass index is 24.81 kg/m². BMI is within normal parameters. No follow-up required..      No exam data present    The patient has no evidence of cognitve impairment.   ATTENTION  What is the year: correct  What is the month of the year: correct  What is the day of the week?: correct  What is the date?: correct  MEMORY  Repeat address three times, only score third attempt: [address] [place], [place]: 7  HOW MANY ANIMALS DID THE PATIENT NAME  Verbal Fluency -- Animal Names (0-25): 22+  CLOCK DRAWING  Clock Drawing: All Correct  MEMORY RECALL  Tell me what you remember about that name and address we were repeating at the beginnin  ACE TOTAL SCORE  Total ACE Score - <25/30 strongly suggests cognitive impairment; <21/30 almost certainly shows dementia: 29    Physical Exam    Recent Lab Results:     Lab Results   Component Value Date    CHOL 169 2019    TRIG 59 2019    HDL 50 2019    VLDL 11.8 2019    LDLHDL 2.14 2019       Assessment/Plan   Age-appropriate Screening Schedule:  Refer to the list below for future screening recommendations based on patient's age, sex and/or medical conditions.      Health Maintenance   Topic Date Due   • ZOSTER VACCINE (2 of 3) 2010   • DXA SCAN  2020   • LIPID PANEL  2020   • TDAP/TD VACCINES (2 - Td) 2022   • INFLUENZA VACCINE  Completed   • PNEUMOCOCCAL VACCINES (65+ LOW/MEDIUM RISK)  Completed       Medicare Risks and Personalized Health Plan:  Cardiovascular risk      CMS-Preventive Services Quick Reference  Medicare Preventive Services Addressed:  Annual Wellness Visit (AWV)    Advance Care Planning:  Patient has an advance directive - a copy has not been provided. Have asked the patient to send this to us to add to record    Diagnoses and all " orders for this visit:    1. Well adult exam (Primary)  Comments:  Not fasting but had recent labs.   Normal cognitive assessment, ACE 29/30.  Vaccines up to date.        An After Visit Summary and PPPS with all of these plans were given to the patient.      Follow Up:  Return for Next scheduled follow up.

## 2019-12-02 NOTE — PROGRESS NOTES
"QUICK REFERENCE INFORMATION:  The ABCs of the Annual Wellness Visit    Subsequent Medicare Wellness Visit    HEALTH RISK ASSESSMENT    1935    Recent Hospitalizations:  {Hospitalization history:3676160515::\"No hospitalization(s) within the last year.\"}.        Current Medical Providers:  Patient Care Team:  Kaye Aleman MD as PCP - General (Cardiology)  Isidra Eisenberg MD as PCP - Family Medicine  Isidra Eisenberg MD as PCP - Claims Attributed        Smoking Status:  Social History     Tobacco Use   Smoking Status Never Smoker   Smokeless Tobacco Never Used       Alcohol Consumption:  Social History     Substance and Sexual Activity   Alcohol Use Yes   • Alcohol/week: 0.6 oz   • Types: 1 Glasses of wine per week    Comment: elderberry wine       Depression Screen:   PHQ-2/PHQ-9 Depression Screening 12/2/2019   Little interest or pleasure in doing things 0   Feeling down, depressed, or hopeless 0   Total Score 0       Health Habits and Functional and Cognitive Screening:  Functional & Cognitive Status 12/2/2019   Do you have difficulty preparing food and eating? No   Do you have difficulty bathing yourself, getting dressed or grooming yourself? No   Do you have difficulty using the toilet? No   Do you have difficulty moving around from place to place? No   Do you have trouble with steps or getting out of a bed or a chair? No   Current Diet Well Balanced Diet   Dental Exam Up to date   Eye Exam Up to date   Exercise (times per week) 3 times per week   Current Exercise Activities Include Cardiovasular Workout on Exercise Equipment   Do you need help using the phone?  No   Are you deaf or do you have serious difficulty hearing?  No   Do you need help with transportation? No   Do you need help shopping? No   Do you need help preparing meals?  No   Do you need help with housework?  No   Do you need help with laundry? No   Do you need help taking your medications? No   Do you need help managing " "money? No   Do you ever drive or ride in a car without wearing a seat belt? No   Have you felt unusual stress, anger or loneliness in the last month? No   Who do you live with? Alone   If you need help, do you have trouble finding someone available to you? No   Have you been bothered in the last four weeks by sexual problems? No   Do you have difficulty concentrating, remembering or making decisions? No       Fall Risk Screen:  MAGDA Fall Risk Assessment was completed, and patient is at LOW risk for falls.Assessment completed on:12/2/2019    ACE III MINI        Does the patient have evidence of cognitive impairment? {Yes/No w/ pre-defaulted No:32335::\"No\"}    Aspirin use counseling: {Aspirin :85772}    Recent Lab Results:  CMP:  Lab Results   Component Value Date    BUN 10 11/03/2019    CREATININE 0.57 11/03/2019    EGFRIFNONA 101 11/03/2019    BCR 17.5 11/03/2019     (L) 11/03/2019    K 4.0 11/03/2019    CO2 26.0 11/03/2019    CALCIUM 8.6 11/03/2019    ALBUMIN 4.60 11/02/2019    BILITOT 0.5 11/02/2019    ALKPHOS 120 (H) 11/02/2019    AST 21 11/02/2019    ALT 15 11/02/2019     HbA1c:  Lab Results   Component Value Date    HGBA1C 5.50 06/10/2019     Microalbumin:  No results found for: MICROALBUR, POCMALB, POCCREAT  Lipid Panel  Lab Results   Component Value Date    CHOL 169 11/03/2019    TRIG 59 11/03/2019    HDL 50 11/03/2019     (H) 11/03/2019    AST 21 11/02/2019    ALT 15 11/02/2019       Visual Acuity:  No exam data present    Age-appropriate Screening Schedule:  Refer to the list below for future screening recommendations based on patient's age, sex and/or medical conditions. Orders for these recommended tests are listed in the plan section. The patient has been provided with a written plan.    Health Maintenance   Topic Date Due   • ZOSTER VACCINE (2 of 3) 07/01/2010   • DXA SCAN  03/09/2020   • LIPID PANEL  11/03/2020   • TDAP/TD VACCINES (2 - Td) 08/28/2022   • INFLUENZA VACCINE  " "Completed   • PNEUMOCOCCAL VACCINES (65+ LOW/MEDIUM RISK)  Completed        Subjective   History of Present Illness    Thuy Clark is a 83 y.o. female who presents for a Subsequent Wellness Visit.    CHRONIC CONDITIONS    The following portions of the patient's history were reviewed and updated as appropriate: {history reviewed:20406::\"allergies\",\"current medications\",\"past family history\",\"past medical history\",\"past social history\",\"past surgical history\",\"problem list\"}.    Outpatient Medications Prior to Visit   Medication Sig Dispense Refill   • apixaban (ELIQUIS) 5 MG tablet tablet Take 1 tablet by mouth Every 12 (Twelve) Hours. 60 tablet 5   • bisoprolol (ZEBeta) 5 MG tablet Take 2 tablets by mouth Daily. 30 tablet 3   • Cholecalciferol (VITAMIN D3) 1000 units capsule Take 1 capsule by mouth Daily.     • Coenzyme Q10 (CO Q-10) 100 MG capsule Take 1 capsule by mouth Daily.     • Dietary Management Product (VASCULERA) tablet TAKE TWO TABLETS BY MOUTH DAILY FOR 14 DAYS, THEN ONE TABLET BY MOUTH DAILY  11   • gabapentin (NEURONTIN) 100 MG capsule Take 100 mg by mouth 2 (Two) Times a Day. Take one in the morning and 1 in the evening if needed     • MEGARED OMEGA-3 KRILL  MG capsule Take 1 capsule by mouth every day     • Probiotic Product (PROBIOTIC PEARLS ADVANTAGE PO) 1  PRN     • polyethylene glycol (MIRALAX) powder take 1tsp.  by oral route  every day powder mixed with 8 oz. water, juice, soda, coffee, or tea     • SHINGRIX 50 MCG/0.5ML reconstituted suspension ADM 0.5ML IM UTD  0   • vitamin E 100 UNIT capsule vitamin E       No facility-administered medications prior to visit.        Patient Active Problem List   Diagnosis   • Pulmonary hypertension (CMS/HCC)   • Benign essential hypertension   • Mixed hyperlipidemia   • Atrial septal defect determined by imaging   • Abnormal glucose   • Generalized anxiety disorder   • Valvular heart disease   • Senile osteoporosis   • PVC's (premature " "ventricular contractions)   • Calculus of gallbladder   • Urinary retention   • Right knee pain   • Osteoarthritis   • Overweight (BMI 25.0-29.9)   • Quadriceps weakness   • External hemorrhoid   • Constipation, slow transit   • Atrial flutter with rapid ventricular response (CMS/[facility])       Advance Care Planning:  {Advance Directive Status:41483}    Identification of Risk Factors:  Risk factors include: {; WELLNESS RISK FACTORS:18472}.    Review of Systems    Compared to one year ago, the patient feels her physical health is {better worse same:58780}.  Compared to one year ago, the patient feels her mental health is {better worse same:54231}.    Objective     Physical Exam     Procedures     Vitals:    12/02/19 1403   BP: 176/80   Pulse: 63  Comment: irregular   Temp: 97.7 °F (36.5 °C)   TempSrc: Temporal   Weight: 63.5 kg (140 lb)   Height: 160 cm (62.99\")   PainSc: 0-No pain       Patient's Body mass index is 24.81 kg/m². BMI is {BMI range:87061}.      Assessment/Plan   Problem List Items Addressed This Visit     None        Patient Self-Management and Personalized Health Advice  The patient has been provided with information about: {; PERSONALIZED HEALTH ADVICE:90356} and preventive services including:   · {plan:29909}.    Outpatient Encounter Medications as of 12/2/2019   Medication Sig Dispense Refill   • apixaban (ELIQUIS) 5 MG tablet tablet Take 1 tablet by mouth Every 12 (Twelve) Hours. 60 tablet 5   • bisoprolol (ZEBeta) 5 MG tablet Take 2 tablets by mouth Daily. 30 tablet 3   • Cholecalciferol (VITAMIN D3) 1000 units capsule Take 1 capsule by mouth Daily.     • Coenzyme Q10 (CO Q-10) 100 MG capsule Take 1 capsule by mouth Daily.     • Dietary Management Product (VASCULERA) tablet TAKE TWO TABLETS BY MOUTH DAILY FOR 14 DAYS, THEN ONE TABLET BY MOUTH DAILY  11   • gabapentin (NEURONTIN) 100 MG capsule Take 100 mg by mouth 2 (Two) Times a Day. Take one in the morning and 1 in the evening if needed     • " MEGARED OMEGA-3 KRILL  MG capsule Take 1 capsule by mouth every day     • Probiotic Product (PROBIOTIC PEARLS ADVANTAGE PO) 1  PRN     • [DISCONTINUED] polyethylene glycol (MIRALAX) powder take 1tsp.  by oral route  every day powder mixed with 8 oz. water, juice, soda, coffee, or tea     • [DISCONTINUED] SHINGRIX 50 MCG/0.5ML reconstituted suspension ADM 0.5ML IM UTD  0   • [DISCONTINUED] vitamin E 100 UNIT capsule vitamin E       No facility-administered encounter medications on file as of 12/2/2019.        Reviewed use of high risk medication in the elderly: {Response; yes/no/na:63}  Reviewed for potential of harmful drug interactions in the elderly: {Response; yes/no/na:63}    Follow Up:  No Follow-up on file.     There are no Patient Instructions on file for this visit.    An After Visit Summary and PPPS with all of these plans were given to the patient.

## 2019-12-06 ENCOUNTER — OFFICE VISIT (OUTPATIENT)
Dept: INTERNAL MEDICINE | Facility: CLINIC | Age: 84
End: 2019-12-06

## 2019-12-06 VITALS
DIASTOLIC BLOOD PRESSURE: 68 MMHG | HEART RATE: 60 BPM | WEIGHT: 141 LBS | SYSTOLIC BLOOD PRESSURE: 144 MMHG | HEIGHT: 63 IN | BODY MASS INDEX: 24.98 KG/M2 | TEMPERATURE: 97.8 F

## 2019-12-06 DIAGNOSIS — R33.9 URINARY RETENTION: ICD-10-CM

## 2019-12-06 DIAGNOSIS — M81.0 SENILE OSTEOPOROSIS: ICD-10-CM

## 2019-12-06 DIAGNOSIS — I48.92 ATRIAL FLUTTER WITH RAPID VENTRICULAR RESPONSE (HCC): ICD-10-CM

## 2019-12-06 DIAGNOSIS — E78.2 MIXED HYPERLIPIDEMIA: ICD-10-CM

## 2019-12-06 DIAGNOSIS — K59.01 CONSTIPATION, SLOW TRANSIT: ICD-10-CM

## 2019-12-06 DIAGNOSIS — I10 BENIGN ESSENTIAL HYPERTENSION: Primary | ICD-10-CM

## 2019-12-06 DIAGNOSIS — I49.3 PVC'S (PREMATURE VENTRICULAR CONTRACTIONS): ICD-10-CM

## 2019-12-06 DIAGNOSIS — K64.4 EXTERNAL HEMORRHOID: ICD-10-CM

## 2019-12-06 DIAGNOSIS — R26.9 GAIT DISORDER: ICD-10-CM

## 2019-12-06 PROCEDURE — 99214 OFFICE O/P EST MOD 30 MIN: CPT | Performed by: INTERNAL MEDICINE

## 2019-12-06 RX ORDER — DIOSMIN COMPLEX NO.1 630 MG
1 TABLET ORAL DAILY
Qty: 90 TABLET | Refills: 3
Start: 2019-12-06 | End: 2020-12-31

## 2019-12-06 NOTE — PROGRESS NOTES
[facility]     Thuy Clark  1935   [id number]      Patient Care Team:  Kaye Aleman MD as PCP - General (Cardiology)  Isidra Eisenberg MD as PCP - Family Medicine  Isidra Eisenberg MD as PCP - Claims Attributed  Azam Marroqiun MD as Consulting Physician (Urology)  Lili Hebert MD as Consulting Physician (Gastroenterology)    Chief Complaint   Patient presents with   • Hypertension   • Hyperlipidemia            HPI  Patient is a 83 y.o. female presents with follow-up hypertension, hyperlipidemia, palpitations, a flutter, osteoporosis.      CHRONIC CONDITIONS   BP's checked at [facility] frequently- 130's/70. Uses bike daily. Walks.No edema.    Taking vit D    Occasionally knee pain bilateral.  Turmeric helps. Mild swelling.  She is using a cane when she goes out to steady herself.  No dizziness, no falls.    Constipation. No hemorrhoid pain or bleeding. Vasculera per Dr. Connie Hebert Twice a day has helped with the hemorrhoid pain and bleeding.    Osteoporosis, she is taking calcium and vitamin D and tries to be active around her house.  She declines doing a DEXA scan because she would not consider taking medications for osteoporosis.  Past Medical History:   Diagnosis Date   • Bleeding hemorrhoid 3/8/2019   • Diverticulosis    • Hx of colonic polyps 2003   • Hypertension    • Intraductal papilloma     R intraductal patheloma   • Left upper extremity numbness     L upper extremity numbness-ER visit; neuropathy   • Self-catheterizes urinary bladder        Past Surgical History:   Procedure Laterality Date   • BREAST LUMPECTOMY  2006   • CHOLECYSTECTOMY     • HYSTERECTOMY  1984   • VAGINECTOMY  2009       Family History   Problem Relation Age of Onset   • Hyperlipidemia Other    • Hypertension Other    • Coronary artery disease Other    • No Known Problems Mother    • No Known Problems Father        Social History     Socioeconomic History   • Marital status:       Spouse name: Not on file   • Number of children: Not on file   • Years of education: Not on file   • Highest education level: Not on file   Tobacco Use   • Smoking status: Never Smoker   • Smokeless tobacco: Never Used   Substance and Sexual Activity   • Alcohol use: Yes     Alcohol/week: 1.0 standard drinks     Types: 1 Glasses of wine per week     Comment: elderberry wine   • Drug use: No   • Sexual activity: Defer   Social History Narrative    caffeine use: 1 can of pepsi a day and green tea daily       Allergies   Allergen Reactions   • Levofloxacin Other (See Comments)     OTHER    • Nitrofuran Derivatives Rash     OTHER    • Nitrofurantoin Rash       Review of Systems:     Review of Systems   Constitutional: Negative for chills, fatigue, fever, unexpected weight gain and unexpected weight loss.   HENT: Negative for congestion, ear pain, sinus pressure, sore throat and trouble swallowing.    Eyes: Negative for visual disturbance.   Respiratory: Negative for cough, chest tightness, shortness of breath and wheezing.    Cardiovascular: Negative for chest pain, palpitations and leg swelling.   Gastrointestinal: Negative for abdominal pain, blood in stool, constipation and diarrhea.   Endocrine: Negative for cold intolerance and heat intolerance.   Genitourinary: Negative for dysuria and frequency.        Urinary retention, depends on self cath   Musculoskeletal: Negative for back pain, gait problem and joint swelling.   Skin: Negative for color change, rash and skin lesions.   Allergic/Immunologic: Negative for environmental allergies.   Neurological: Negative for dizziness, speech difficulty and headache.   Hematological: Negative for adenopathy. Does not bruise/bleed easily.   Psychiatric/Behavioral: Negative for decreased concentration, sleep disturbance, suicidal ideas and depressed mood. The patient is not nervous/anxious.        Vital Signs  Vitals:    12/06/19 1400   BP: 144/68   BP Location:  "Left arm   Patient Position: Sitting   Cuff Size: Adult   Pulse: 60   Temp: 97.8 °F (36.6 °C)   TempSrc: Temporal   Weight: 64 kg (141 lb)   Height: 160 cm (62.99\")   PainSc: 0-No pain     Body mass index is 24.98 kg/m².      Current Outpatient Medications:   •  apixaban (ELIQUIS) 5 MG tablet tablet, Take 1 tablet by mouth Every 12 (Twelve) Hours., Disp: 28 tablet, Rfl: 0  •  bisoprolol (ZEBeta) 5 MG tablet, Take 2 tablets by mouth Daily., Disp: 30 tablet, Rfl: 3  •  Cholecalciferol (VITAMIN D3) 1000 units capsule, Take 1 capsule by mouth Daily., Disp: , Rfl:   •  Coenzyme Q10 (CO Q-10) 100 MG capsule, Take 1 capsule by mouth Daily., Disp: , Rfl:   •  Dietary Management Product (VASCULERA) tablet, Take 1 tablet by mouth Daily., Disp: 90 tablet, Rfl: 3  •  gabapentin (NEURONTIN) 100 MG capsule, Take 100 mg by mouth 2 (Two) Times a Day. Take one in the morning and 1 in the evening if needed, Disp: , Rfl:   •  MEGARED OMEGA-3 KRILL  MG capsule, Take 1 capsule by mouth every day, Disp: , Rfl:   •  Probiotic Product (PROBIOTIC PEARLS ADVANTAGE PO), 1  PRN, Disp: , Rfl:     Physical Exam:    Physical Exam   Constitutional: She is oriented to person, place, and time. She appears well-developed and well-nourished.   Eyes: Pupils are equal, round, and reactive to light. Conjunctivae and EOM are normal.   Neck: Normal range of motion. Neck supple. No thyromegaly present.   Cardiovascular: Normal rate, regular rhythm, normal heart sounds and intact distal pulses.   No murmur heard.  Pulmonary/Chest: Effort normal and breath sounds normal. She has no wheezes. Right breast exhibits no inverted nipple, no mass, no nipple discharge, no skin change and no tenderness. Left breast exhibits no inverted nipple, no mass, no nipple discharge, no skin change and no tenderness.   Abdominal: Soft. Bowel sounds are normal. She exhibits no distension and no mass. There is no tenderness.   Musculoskeletal: She exhibits no edema or " tenderness.        Right knee: She exhibits decreased range of motion. She exhibits no swelling.        Left knee: She exhibits decreased range of motion. She exhibits no swelling.   Lymphadenopathy:     She has no cervical adenopathy.   Neurological: She is alert and oriented to person, place, and time. She has normal strength. No cranial nerve deficit or sensory deficit. Gait abnormal. Coordination normal.   Skin: Skin is warm and dry. No rash noted.   Psychiatric: She has a normal mood and affect. Her speech is normal and behavior is normal. Judgment and thought content normal. Cognition and memory are normal.   Nursing note and vitals reviewed.       ACE III MINI        Results Review:    None    CMP:  Lab Results   Component Value Date    BUN 10 11/03/2019    CREATININE 0.57 11/03/2019    EGFRIFNONA 101 11/03/2019    BCR 17.5 11/03/2019     (L) 11/03/2019    K 4.0 11/03/2019    CO2 26.0 11/03/2019    CALCIUM 8.6 11/03/2019    ALBUMIN 4.60 11/02/2019    BILITOT 0.5 11/02/2019    ALKPHOS 120 (H) 11/02/2019    AST 21 11/02/2019    ALT 15 11/02/2019     HbA1c:  Lab Results   Component Value Date    HGBA1C 5.50 06/10/2019     Microalbumin:  No results found for: MICROALBUR, POCMALB, POCCREAT  Lipid Panel  Lab Results   Component Value Date    CHOL 169 11/03/2019    TRIG 59 11/03/2019    HDL 50 11/03/2019     (H) 11/03/2019    AST 21 11/02/2019    ALT 15 11/02/2019       Medication Review: Medications reviewed and noted  Patient Instructions   Problem List Items Addressed This Visit        Cardiovascular and Mediastinum    Benign essential hypertension - Primary    Overview     12/6/2019 Isidra Eisenberg MD    Continue bisoprolol 10 mg daily.  Continue to avoid salt in the diet.  Avoid sodas.         Relevant Medications    bisoprolol (ZEBeta) 5 MG tablet    Mixed hyperlipidemia    Overview     12/6/2019 Isidra Eisenberg MD    Continue low fat diet and regular physical activity.         PVC's  (premature ventricular contractions)    Overview     Continue bisoprolol.  Continue to avoid caffeine.         Relevant Medications    bisoprolol (ZEBeta) 5 MG tablet    External hemorrhoid    Overview     12/6/2019 Isidra Eisenberg MD     Continue vasculera daily. Drink plenty of fluids.  Avoid constipation.    Follow-up with Dr. Hebert as planned.         Atrial flutter with rapid ventricular response (CMS/HCC)    Overview     12/6/2019 Isidra Eisenberg MD     Patient presented to the ER 11/2/19 with lightheadedness and was found to be in a flutter with rapid ventricular response.  She received IV beta-blocker and converted.  Bisoprolol was increased to 10 mg and she was started on Eliquis 5 mg twice a day.     Echo showed EF 65% with borderline concentric hypertrophy grade 2 diastolic dysfunction with pseudonormalization.  Saline test are positive for evidence of an intra-atrial shunt.  Right atrial cavity is severely dilated, mild AR, mild TR RVSP 35 to 45 mmHg.     Continue eliquis twice a day and bisoprolol 10mg daily.     She is to follow-up with Dr. Kaye Aleman December 9.             Relevant Medications    bisoprolol (ZEBeta) 5 MG tablet       Digestive    Constipation, slow transit    Overview     12/6/2019 Isidra Eisenberg MD     Continue drinking lots of fluids every day.              Musculoskeletal and Integument    Senile osteoporosis    Overview     12/6/2019 Isidra Eisenberg MD    Continue regular weight bearing exercises including handweights and walking.  Continue taking calcium and vitamin D.             Genitourinary    Urinary retention    Overview     12/6/2019 Isidra Eisenberg MD     Continue self catheterization regularly. Continue regular follow up with Dr. Marroquin.            Other    Gait disorder    Overview     12/6/2019 Isidra Eisenberg MD    Continue regular physical activity. Walk several times a week. Use small handweights daily when watching TV.     Continue using cane,  especially when going out.                   Diagnosis Plan   1. Benign essential hypertension     2. Mixed hyperlipidemia     3. PVC's (premature ventricular contractions)     4. Atrial flutter with rapid ventricular response (CMS/HCC)     5. Constipation, slow transit     6. Senile osteoporosis     7. Gait disorder     8. Urinary retention     9. External hemorrhoid         Patient Instructions     Patient Instructions   Problem List Items Addressed This Visit        Cardiovascular and Mediastinum    PVC's (premature ventricular contractions)    Relevant Medications    bisoprolol (ZEBeta) 5 MG tablet    Benign essential hypertension - Primary    Overview     12/6/2019 Isidra Eisenberg MD    Continue bisoprolol 10 mg daily.  Continue to avoid salt in the diet.  Avoid sodas.         Relevant Medications    bisoprolol (ZEBeta) 5 MG tablet    Mixed hyperlipidemia    Overview     12/6/2019 Isidra Eisenberg MD    Continue low fat diet and regular physical activity.         External hemorrhoid    Overview     12/6/2019 Isidra Eisenberg MD     Continue vasculera daily. Drink plenty of fluids.  Avoid constipation.         Atrial flutter with rapid ventricular response (CMS/HCC)    Overview     12/6/2019 Isidra Eisenberg MD    Continue eliquis twice a day and bisoprolol 10mg daily. Follow up with         Relevant Medications    bisoprolol (ZEBeta) 5 MG tablet       Digestive    Constipation, slow transit    Overview     12/6/2019 Isidra Eisenberg MD     Continue drinking lots of fluids every day.              Musculoskeletal and Integument    Senile osteoporosis    Overview     12/6/2019 Isidra Eisenberg MD    Continue regular weight bearing exercises including handweights and walking.  Continue taking calcium and vitamin D.             Genitourinary    Urinary retention    Overview     12/6/2019 Isidra Eisenberg MD     Continue self catheterization regularly. Continue regular follow up with Dr. Marroquin.             Other    Gait disorder    Overview     12/6/2019 Isidra Eisenberg MD    Continue regular physical activity. Walk several times a week. Use small handweights daily when watching TV.     Continue using cane, especially when going out.                       Plan of care reviewed with patient at the conclusion of today's visit. Education was provided regarding diagnosis, management, and any prescribed or recommended OTC medications.Patient verbalizes understanding of and agreement with management plan.         Isidra Eisenberg MD

## 2019-12-08 NOTE — PATIENT INSTRUCTIONS
Patient Instructions   Problem List Items Addressed This Visit        Cardiovascular and Mediastinum    PVC's (premature ventricular contractions)    Relevant Medications    bisoprolol (ZEBeta) 5 MG tablet    Benign essential hypertension - Primary    Overview     12/6/2019 Isidra Eisenberg MD    Continue bisoprolol 10 mg daily.  Continue to avoid salt in the diet.  Avoid sodas.         Relevant Medications    bisoprolol (ZEBeta) 5 MG tablet    Mixed hyperlipidemia    Overview     12/6/2019 Isidra Eisenberg MD    Continue low fat diet and regular physical activity.         External hemorrhoid    Overview     12/6/2019 Isidra Eisenberg MD     Continue vasculera daily. Drink plenty of fluids.  Avoid constipation.         Atrial flutter with rapid ventricular response (CMS/HCC)    Overview     12/6/2019 Isidra Eisenberg MD    Continue eliquis twice a day and bisoprolol 10mg daily. Follow up with         Relevant Medications    bisoprolol (ZEBeta) 5 MG tablet       Digestive    Constipation, slow transit    Overview     12/6/2019 Isidra Eisenberg MD     Continue drinking lots of fluids every day.              Musculoskeletal and Integument    Senile osteoporosis    Overview     12/6/2019 Isidra Eisenberg MD    Continue regular weight bearing exercises including handweights and walking.  Continue taking calcium and vitamin D.             Genitourinary    Urinary retention    Overview     12/6/2019 Isidra Eisenberg MD     Continue self catheterization regularly. Continue regular follow up with Dr. Marroquin.            Other    Gait disorder    Overview     12/6/2019 Isidra Eisenberg MD    Continue regular physical activity. Walk several times a week. Use small handweights daily when watching TV.     Continue using cane, especially when going out.

## 2020-01-16 ENCOUNTER — TELEPHONE (OUTPATIENT)
Dept: INTERNAL MEDICINE | Facility: CLINIC | Age: 85
End: 2020-01-16

## 2020-01-16 NOTE — TELEPHONE ENCOUNTER
Called patient informed her that there will be samples at  she can come by any time and pick them up   She verbalized understanding

## 2020-01-16 NOTE — TELEPHONE ENCOUNTER
Patient is wanting to know if she could recieve more samples of the Eliquis 5mg. She states that she was told to give the office a call back if and when she needed them. Please call the patient at [phone] when she can pick those up.

## 2020-04-28 ENCOUNTER — TELEPHONE (OUTPATIENT)
Dept: INTERNAL MEDICINE | Facility: CLINIC | Age: 85
End: 2020-04-28

## 2020-04-28 NOTE — TELEPHONE ENCOUNTER
Advised patient we are out. She verbalized understanding. Stated she will follow back up with us in 2-3 weeks.

## 2020-05-21 ENCOUNTER — TELEPHONE (OUTPATIENT)
Dept: FAMILY MEDICINE CLINIC | Facility: CLINIC | Age: 85
End: 2020-05-21

## 2020-05-21 DIAGNOSIS — R73.09 ABNORMAL GLUCOSE: ICD-10-CM

## 2020-05-21 DIAGNOSIS — I48.92 ATRIAL FLUTTER WITH RAPID VENTRICULAR RESPONSE (HCC): ICD-10-CM

## 2020-05-21 DIAGNOSIS — I10 BENIGN ESSENTIAL HYPERTENSION: Primary | ICD-10-CM

## 2020-05-21 DIAGNOSIS — E78.2 MIXED HYPERLIPIDEMIA: ICD-10-CM

## 2020-05-21 DIAGNOSIS — M81.0 SENILE OSTEOPOROSIS: ICD-10-CM

## 2020-05-21 NOTE — TELEPHONE ENCOUNTER
PT CALLED STATES SHE IS WANTING TO HAVE LABS ORDERED FOR 6/1 SO SHE IS ABLE TO GET HER BLOOD DRAWN A DAY BEFORE HER SCHEDULED APPT WITH .    PT IS REQUESTING A CALL BACK IF NOT ABLE TO ORDER THE LABS.     CONTACT: [phone]

## 2020-06-02 ENCOUNTER — OFFICE VISIT (OUTPATIENT)
Dept: INTERNAL MEDICINE | Facility: CLINIC | Age: 85
End: 2020-06-02

## 2020-06-02 ENCOUNTER — LAB (OUTPATIENT)
Dept: LAB | Facility: HOSPITAL | Age: 85
End: 2020-06-02

## 2020-06-02 VITALS
HEIGHT: 63 IN | DIASTOLIC BLOOD PRESSURE: 70 MMHG | TEMPERATURE: 98.1 F | BODY MASS INDEX: 25.91 KG/M2 | HEART RATE: 84 BPM | SYSTOLIC BLOOD PRESSURE: 122 MMHG | WEIGHT: 146.2 LBS

## 2020-06-02 DIAGNOSIS — R33.9 URINARY RETENTION: ICD-10-CM

## 2020-06-02 DIAGNOSIS — M17.0 PRIMARY OSTEOARTHRITIS OF BOTH KNEES: Chronic | ICD-10-CM

## 2020-06-02 DIAGNOSIS — K59.01 CONSTIPATION, SLOW TRANSIT: ICD-10-CM

## 2020-06-02 DIAGNOSIS — I10 BENIGN ESSENTIAL HYPERTENSION: ICD-10-CM

## 2020-06-02 DIAGNOSIS — R73.09 ABNORMAL GLUCOSE: ICD-10-CM

## 2020-06-02 DIAGNOSIS — N30.00 ACUTE CYSTITIS WITHOUT HEMATURIA: ICD-10-CM

## 2020-06-02 DIAGNOSIS — M81.0 SENILE OSTEOPOROSIS: Primary | ICD-10-CM

## 2020-06-02 DIAGNOSIS — I49.3 PVC'S (PREMATURE VENTRICULAR CONTRACTIONS): ICD-10-CM

## 2020-06-02 DIAGNOSIS — M81.0 SENILE OSTEOPOROSIS: ICD-10-CM

## 2020-06-02 DIAGNOSIS — E78.2 MIXED HYPERLIPIDEMIA: ICD-10-CM

## 2020-06-02 LAB
25(OH)D3 SERPL-MCNC: 40.1 NG/ML (ref 30–100)
ALBUMIN SERPL-MCNC: 4.5 G/DL (ref 3.5–5.2)
ALBUMIN/GLOB SERPL: 1.7 G/DL
ALP SERPL-CCNC: 94 U/L (ref 39–117)
ALT SERPL W P-5'-P-CCNC: 16 U/L (ref 1–33)
ANION GAP SERPL CALCULATED.3IONS-SCNC: 11.8 MMOL/L (ref 5–15)
AST SERPL-CCNC: 22 U/L (ref 1–32)
BASOPHILS # BLD AUTO: 0.04 10*3/MM3 (ref 0–0.2)
BASOPHILS NFR BLD AUTO: 0.7 % (ref 0–1.5)
BILIRUB SERPL-MCNC: 0.6 MG/DL (ref 0.2–1.2)
BUN BLD-MCNC: 11 MG/DL (ref 8–23)
BUN/CREAT SERPL: 11.5 (ref 7–25)
CALCIUM SPEC-SCNC: 9.1 MG/DL (ref 8.6–10.5)
CHLORIDE SERPL-SCNC: 95 MMOL/L (ref 98–107)
CHOLEST SERPL-MCNC: 141 MG/DL (ref 0–200)
CO2 SERPL-SCNC: 21.2 MMOL/L (ref 22–29)
CREAT BLD-MCNC: 0.96 MG/DL (ref 0.57–1)
DEPRECATED RDW RBC AUTO: 41.5 FL (ref 37–54)
EOSINOPHIL # BLD AUTO: 0.07 10*3/MM3 (ref 0–0.4)
EOSINOPHIL NFR BLD AUTO: 1.1 % (ref 0.3–6.2)
ERYTHROCYTE [DISTWIDTH] IN BLOOD BY AUTOMATED COUNT: 12.7 % (ref 12.3–15.4)
GFR SERPL CREATININE-BSD FRML MDRD: 55 ML/MIN/1.73
GLOBULIN UR ELPH-MCNC: 2.6 GM/DL
GLUCOSE BLD-MCNC: 118 MG/DL (ref 65–99)
HBA1C MFR BLD: 5.82 % (ref 4.8–5.6)
HCT VFR BLD AUTO: 39 % (ref 34–46.6)
HDLC SERPL-MCNC: 49 MG/DL (ref 40–60)
HGB BLD-MCNC: 13.4 G/DL (ref 12–15.9)
IMM GRANULOCYTES # BLD AUTO: 0.03 10*3/MM3 (ref 0–0.05)
IMM GRANULOCYTES NFR BLD AUTO: 0.5 % (ref 0–0.5)
LDLC SERPL CALC-MCNC: 81 MG/DL (ref 0–100)
LDLC/HDLC SERPL: 1.66 {RATIO}
LYMPHOCYTES # BLD AUTO: 1.94 10*3/MM3 (ref 0.7–3.1)
LYMPHOCYTES NFR BLD AUTO: 31.8 % (ref 19.6–45.3)
MCH RBC QN AUTO: 30.8 PG (ref 26.6–33)
MCHC RBC AUTO-ENTMCNC: 34.4 G/DL (ref 31.5–35.7)
MCV RBC AUTO: 89.7 FL (ref 79–97)
MONOCYTES # BLD AUTO: 0.55 10*3/MM3 (ref 0.1–0.9)
MONOCYTES NFR BLD AUTO: 9 % (ref 5–12)
NEUTROPHILS # BLD AUTO: 3.48 10*3/MM3 (ref 1.7–7)
NEUTROPHILS NFR BLD AUTO: 56.9 % (ref 42.7–76)
NRBC BLD AUTO-RTO: 0 /100 WBC (ref 0–0.2)
PLATELET # BLD AUTO: 329 10*3/MM3 (ref 140–450)
PMV BLD AUTO: 9.6 FL (ref 6–12)
POTASSIUM BLD-SCNC: 4.3 MMOL/L (ref 3.5–5.2)
PROT SERPL-MCNC: 7.1 G/DL (ref 6–8.5)
RBC # BLD AUTO: 4.35 10*6/MM3 (ref 3.77–5.28)
SODIUM BLD-SCNC: 128 MMOL/L (ref 136–145)
TRIGL SERPL-MCNC: 53 MG/DL (ref 0–150)
TSH SERPL DL<=0.05 MIU/L-ACNC: 1.59 UIU/ML (ref 0.27–4.2)
VLDLC SERPL-MCNC: 10.6 MG/DL (ref 5–40)
WBC NRBC COR # BLD: 6.11 10*3/MM3 (ref 3.4–10.8)

## 2020-06-02 PROCEDURE — 80053 COMPREHEN METABOLIC PANEL: CPT

## 2020-06-02 PROCEDURE — 85025 COMPLETE CBC W/AUTO DIFF WBC: CPT

## 2020-06-02 PROCEDURE — 82306 VITAMIN D 25 HYDROXY: CPT

## 2020-06-02 PROCEDURE — 80061 LIPID PANEL: CPT

## 2020-06-02 PROCEDURE — 84443 ASSAY THYROID STIM HORMONE: CPT

## 2020-06-02 PROCEDURE — 99214 OFFICE O/P EST MOD 30 MIN: CPT | Performed by: INTERNAL MEDICINE

## 2020-06-02 PROCEDURE — 83036 HEMOGLOBIN GLYCOSYLATED A1C: CPT

## 2020-06-02 RX ORDER — SULFAMETHOXAZOLE AND TRIMETHOPRIM 800; 160 MG/1; MG/1
TABLET ORAL
COMMUNITY
Start: 2020-05-28 | End: 2020-11-06

## 2020-06-02 RX ORDER — POLYETHYLENE GLYCOL 3350 17 G/17G
17 POWDER, FOR SOLUTION ORAL 2 TIMES DAILY
Qty: 100 EACH | Refills: 5 | Status: SHIPPED | OUTPATIENT
Start: 2020-06-02 | End: 2021-06-25 | Stop reason: SDUPTHER

## 2020-06-02 NOTE — PATIENT INSTRUCTIONS
Patient Instructions   Problem List Items Addressed This Visit        Cardiovascular and Mediastinum    Benign essential hypertension    Overview     6/2/2020  Isidra Eisenberg MD    Continue bisoprolol 5 mg twice daily.      Continue to avoid salt in the diet.  Avoid sodas.         Relevant Medications    bisoprolol (ZEBeta) 5 MG tablet    Mixed hyperlipidemia    Overview     6/2/2020 Isidra Eisenberg MD    Continue low fat diet and regular physical activity.         PVC's (premature ventricular contractions)    Overview     6/2/2020 Isidra Eisenberg MD    Continue bisoprolol daily.  Decrease caffeine use.         Relevant Medications    bisoprolol (ZEBeta) 5 MG tablet       Digestive    Constipation, slow transit    Overview     6/2/2020 Isidra Eisenberg MD    Increase MiraLAX to 1 packet twice a day.  Take a probiotic every day.  Take the stool softener every day.  Continue drinking lots of fluids every day.              Musculoskeletal and Integument    Senile osteoporosis - Primary    Overview     6/2/2020 Isidra Eisenberg MD    Continue regular weight bearing exercises including handweights and walking.  Continue taking calcium and vitamin D.             Genitourinary    Urinary retention    Overview     6/2/2020 Isidra Eisenberg MD     Continue self catheterization regularly. Continue regular follow up with Dr. Marroquin.            Other    Primary osteoarthritis of both knees (Chronic)    Overview     6/2/2020 Isidra Eisenberg MD    Continue gabapentin twice a day as needed.    Use a cold pack on the knee daily as needed for pain, inflammation, and swelling.           Other Visit Diagnoses     Acute cystitis without hematuria        Finish all of the cefdinir antibiotic and follow-up with Dr. Marroquin as planned.

## 2020-06-02 NOTE — PROGRESS NOTES
[facility]     Thuy Clark  1935   [id number]      Patient Care Team:  Kaye Aleman MD as PCP - General (Cardiology)  Isidra Eisenberg MD as PCP - Family Medicine  Azam Marroquin MD as Consulting Physician (Urology)  Lili Hebert MD as Consulting Physician (Gastroenterology)    Chief Complaint   Patient presents with   • Hypertension     F/U            HPI  Patient is a 84 y.o. female presents with UTI. Onset of symptoms was abrupt starting 1 week ago.  Chronicity acute. Severity moderate to severe.  Symptoms are associated with dysuria and frequency. Pertinent negatives no fever chills.   Symptoms are aggravated by does in and out caths regularly..   Symptoms improve with taking bactrim.        CHRONIC CONDITIONS  BP WELL CONTROLLED HERE. Takes bisoprolol.    Constipation continues to be a chronic problem.  She takes 1 packet of MiraLAX a day and ask if she might take more.  She has not been taking a probiotic every day.  She has not been using her stool softener every day.  She does try to drink plenty of fluids.  Fruits help some.    Palpitations some better with bisoprolol. She does drink 3 teas and hot chocolate every day.     Bilateral knees bother her, she does find some help from the gabapentin when she needs it.  Using her bicycle also helps.    Takes gabapentin twice a day for knee pain as needed. It does help. Only mild swelling on and off.    Past Medical History:   Diagnosis Date   • Bleeding hemorrhoid 3/8/2019   • Diverticulosis    • Hx of colonic polyps 2003   • Hypertension    • Intraductal papilloma     R intraductal patheloma   • Left upper extremity numbness     L upper extremity numbness-ER visit; neuropathy   • Self-catheterizes urinary bladder        Past Surgical History:   Procedure Laterality Date   • BREAST LUMPECTOMY  2006   • CHOLECYSTECTOMY     • HYSTERECTOMY  1984   • VAGINECTOMY  2009       Family History   Problem Relation Age of  "Onset   • Hyperlipidemia Other    • Hypertension Other    • Coronary artery disease Other    • No Known Problems Mother    • No Known Problems Father        Social History     Socioeconomic History   • Marital status:      Spouse name: Not on file   • Number of children: Not on file   • Years of education: Not on file   • Highest education level: Not on file   Tobacco Use   • Smoking status: Never Smoker   • Smokeless tobacco: Never Used   Substance and Sexual Activity   • Alcohol use: Yes     Alcohol/week: 1.0 standard drinks     Types: 1 Glasses of wine per week     Comment: elderberry wine   • Drug use: No   • Sexual activity: Defer   Social History Narrative    caffeine use: 1 can of pepsi a day and green tea daily       Allergies   Allergen Reactions   • Levofloxacin Other (See Comments)     OTHER    • Nitrofuran Derivatives Rash     OTHER    • Nitrofurantoin Rash       Review of Systems:     Review of Systems   Constitutional: Negative for chills, fatigue and fever.   Respiratory: Negative for cough, shortness of breath and wheezing.    Cardiovascular: Negative for chest pain, palpitations and leg swelling.   Gastrointestinal: Negative for abdominal pain, blood in stool, constipation, diarrhea and GERD.   Genitourinary: Negative for dysuria and frequency.   Musculoskeletal: Positive for arthralgias. Negative for back pain.   Hematological: Negative for adenopathy. Does not bruise/bleed easily.   Psychiatric/Behavioral: Negative for dysphoric mood. The patient is not nervous/anxious.        Vital Signs  Vitals:    06/02/20 1450   BP: 122/70   BP Location: Right arm   Patient Position: Sitting   Cuff Size: Adult   Pulse: 84  Comment: IRREGULAR   Temp: 98.1 °F (36.7 °C)   TempSrc: Temporal   Weight: 66.3 kg (146 lb 3.2 oz)   Height: 160 cm (62.99\")   PainSc: 0-No pain     Body mass index is 25.91 kg/m².      Current Outpatient Medications:   •  apixaban (ELIQUIS) 5 MG tablet tablet, Take 1 tablet by mouth " Every 12 (Twelve) Hours., Disp: 112 tablet, Rfl: 0  •  bisoprolol (ZEBeta) 5 MG tablet, Take 2 tablets by mouth Daily., Disp: 30 tablet, Rfl: 3  •  Cholecalciferol (VITAMIN D3) 1000 units capsule, Take 1 capsule by mouth Daily., Disp: , Rfl:   •  Coenzyme Q10 (CO Q-10) 100 MG capsule, Take 1 capsule by mouth Daily., Disp: , Rfl:   •  Dietary Management Product (VASCULERA) tablet, Take 1 tablet by mouth Daily., Disp: 90 tablet, Rfl: 3  •  gabapentin (NEURONTIN) 100 MG capsule, Take 100 mg by mouth 2 (Two) Times a Day. Take one in the morning and 1 in the evening if needed, Disp: , Rfl:   •  MEGARED OMEGA-3 KRILL  MG capsule, Take 1 capsule by mouth every day, Disp: , Rfl:   •  Probiotic Product (PROBIOTIC PEARLS ADVANTAGE PO), 1  PRN, Disp: , Rfl:   •  sulfamethoxazole-trimethoprim (BACTRIM DS,SEPTRA DS) 800-160 MG per tablet, TAKE 1 TABLET BY MOUTH EVERY 12 HOURS FOR 7 DAYS, Disp: , Rfl:   •  polyethylene glycol (MIRALAX) 17 g packet, Take 17 g by mouth 2 (Two) Times a Day., Disp: 100 each, Rfl: 5    Physical Exam:    Physical Exam   Constitutional: She is oriented to person, place, and time. She appears well-developed and well-nourished.   HENT:   Head: Normocephalic.   Eyes: Pupils are equal, round, and reactive to light. Conjunctivae and EOM are normal.   Neck: Normal range of motion. Neck supple. No thyromegaly present.   Cardiovascular: Normal rate, regular rhythm, normal heart sounds and intact distal pulses.   Pulmonary/Chest: Effort normal and breath sounds normal.   Musculoskeletal: Normal range of motion. She exhibits no edema.        Right knee: She exhibits deformity. She exhibits no swelling. No tenderness found.        Left knee: She exhibits deformity. She exhibits no swelling. No tenderness found.   Lymphadenopathy:     She has no cervical adenopathy.   Neurological: She is alert and oriented to person, place, and time.   Psychiatric: She has a normal mood and affect. Thought content normal.    Nursing note and vitals reviewed.       ACE III MINI        Results Review:    None    CMP:  Lab Results   Component Value Date    BUN 10 11/03/2019    CREATININE 0.57 11/03/2019    EGFRIFNONA 101 11/03/2019    BCR 17.5 11/03/2019     (L) 11/03/2019    K 4.0 11/03/2019    CO2 26.0 11/03/2019    CALCIUM 8.6 11/03/2019    ALBUMIN 4.60 11/02/2019    BILITOT 0.5 11/02/2019    ALKPHOS 120 (H) 11/02/2019    AST 21 11/02/2019    ALT 15 11/02/2019     HbA1c:  Lab Results   Component Value Date    HGBA1C 5.50 06/10/2019     Microalbumin:  No results found for: MICROALBUR, POCMALB, POCCREAT  Lipid Panel  Lab Results   Component Value Date    CHOL 169 11/03/2019    TRIG 59 11/03/2019    HDL 50 11/03/2019     (H) 11/03/2019    AST 21 11/02/2019    ALT 15 11/02/2019       Medication Review: Medications reviewed and noted  Patient Instructions   Problem List Items Addressed This Visit        Cardiovascular and Mediastinum    Benign essential hypertension    Overview     6/2/2020  Isidra Eisenberg MD    Continue bisoprolol 5 mg twice daily.      Continue to avoid salt in the diet.  Avoid sodas.         Relevant Medications    bisoprolol (ZEBeta) 5 MG tablet    Mixed hyperlipidemia    Overview     6/2/2020 Isidra Eisenberg MD    Continue low fat diet and regular physical activity.         PVC's (premature ventricular contractions)    Overview     6/2/2020 Isidra Eisenberg MD    Continue bisoprolol daily.  Decrease caffeine use.         Relevant Medications    bisoprolol (ZEBeta) 5 MG tablet       Digestive    Constipation, slow transit    Overview     6/2/2020 Isidra Eisenberg MD    Increase MiraLAX to 1 packet twice a day.  Take a probiotic every day.  Take the stool softener every day.  Continue drinking lots of fluids every day.              Musculoskeletal and Integument    Senile osteoporosis - Primary    Overview     6/2/2020 Isidra Eisenberg MD    Continue regular weight bearing exercises including  handweights and walking.  Continue taking calcium and vitamin D.             Genitourinary    Urinary retention    Overview     6/2/2020 Isidra Eisenberg MD     Continue self catheterization regularly. Continue regular follow up with Dr. Marroquin.            Other    Primary osteoarthritis of both knees (Chronic)    Overview     6/2/2020 Isidra Eisenberg MD    Continue gabapentin twice a day as needed.    Use a cold pack on the knee daily as needed for pain, inflammation, and swelling.           Other Visit Diagnoses     Acute cystitis without hematuria        Finish all of the cefdinir antibiotic and follow-up with Dr. Marroquin as planned.             Diagnosis Plan   1. Senile osteoporosis     2. Constipation, slow transit     3. PVC's (premature ventricular contractions)     4. Mixed hyperlipidemia     5. Benign essential hypertension     6. Primary osteoarthritis of both knees     7. Urinary retention     8. Acute cystitis without hematuria      Finish all of the cefdinir antibiotic and follow-up with Dr. Marroquin as planned.       Plan of care reviewed with patient at the conclusion of today's visit. Education was provided regarding diagnosis, management, and any prescribed or recommended OTC medications.Patient verbalizes understanding of and agreement with management plan.         Isidra Eisenberg MD

## 2020-06-29 ENCOUNTER — TELEPHONE (OUTPATIENT)
Dept: INTERNAL MEDICINE | Facility: CLINIC | Age: 85
End: 2020-06-29

## 2020-06-29 NOTE — TELEPHONE ENCOUNTER
We don't have any samples. Pt states that she is going to go price some TENS units since she doesn't need a script for one. She states she will let us know if she feels like she needs to come in for it.

## 2020-06-29 NOTE — TELEPHONE ENCOUNTER
Give her samples of Eliquis if we have some.  She will need to be seen to evaluate for a TENS unit.  If she is having foot pain, probably better to refer to podiatrist

## 2020-06-29 NOTE — TELEPHONE ENCOUNTER
Pt called to see if the office had any samples of apixaban (ELIQUIS) 5 MG tablet for her.    Also, pt wants to know if she could get a tens unit for her foot. Please advise [phone]

## 2020-07-23 ENCOUNTER — TELEPHONE (OUTPATIENT)
Dept: INTERNAL MEDICINE | Facility: CLINIC | Age: 85
End: 2020-07-23

## 2020-07-23 NOTE — TELEPHONE ENCOUNTER
PATIENT IS REQUESTING SAMPLES OF ELIQUIS    PLEASE CALL IF THE OFFICE HAS ANY    PATIENT CALL BACK [phone]

## 2020-08-04 ENCOUNTER — TELEPHONE (OUTPATIENT)
Dept: INTERNAL MEDICINE | Facility: CLINIC | Age: 85
End: 2020-08-04

## 2020-08-04 NOTE — TELEPHONE ENCOUNTER
PT CALLED REQUESTING A REFERRAL TO A NEW VASCULAR DOCTOR    PT IS REQUESTING TO BE CALLED BACK AT: [phone]

## 2020-08-05 NOTE — TELEPHONE ENCOUNTER
What did she want the referral for?  Does she want a new cardiologist?  She has seen Dr. Chow who  is excellent.

## 2020-10-09 ENCOUNTER — TELEPHONE (OUTPATIENT)
Dept: INTERNAL MEDICINE | Facility: CLINIC | Age: 85
End: 2020-10-09

## 2020-10-09 NOTE — TELEPHONE ENCOUNTER
PATIENT CALLED ASKING IF SHE NEEDS TO GET A SHINGLE VACCINATION EVERY YEAR BECAUSE SHE HAD ONE LAST YEAR.    PLEASE ADVISE AND CALL PATIENT BACK [phone]

## 2020-11-03 ENCOUNTER — TELEPHONE (OUTPATIENT)
Dept: INTERNAL MEDICINE | Facility: CLINIC | Age: 85
End: 2020-11-03

## 2020-11-03 ENCOUNTER — APPOINTMENT (OUTPATIENT)
Dept: CT IMAGING | Facility: HOSPITAL | Age: 85
End: 2020-11-03

## 2020-11-03 ENCOUNTER — HOSPITAL ENCOUNTER (EMERGENCY)
Facility: HOSPITAL | Age: 85
Discharge: HOME | End: 2020-11-03
Attending: EMERGENCY MEDICINE | Admitting: EMERGENCY MEDICINE

## 2020-11-03 VITALS
HEART RATE: 85 BPM | TEMPERATURE: 97.8 F | HEIGHT: 64 IN | SYSTOLIC BLOOD PRESSURE: 134 MMHG | OXYGEN SATURATION: 97 % | RESPIRATION RATE: 16 BRPM | DIASTOLIC BLOOD PRESSURE: 91 MMHG | WEIGHT: 140 LBS | BODY MASS INDEX: 23.9 KG/M2

## 2020-11-03 DIAGNOSIS — K59.00 CONSTIPATION, UNSPECIFIED CONSTIPATION TYPE: Primary | ICD-10-CM

## 2020-11-03 DIAGNOSIS — E87.1 CHRONIC HYPONATREMIA: ICD-10-CM

## 2020-11-03 LAB
ALBUMIN SERPL-MCNC: 4.4 G/DL (ref 3.5–5.2)
ALBUMIN/GLOB SERPL: 1.8 G/DL
ALP SERPL-CCNC: 105 U/L (ref 39–117)
ALT SERPL W P-5'-P-CCNC: 28 U/L (ref 1–33)
ANION GAP SERPL CALCULATED.3IONS-SCNC: 13 MMOL/L (ref 5–15)
AST SERPL-CCNC: 21 U/L (ref 1–32)
BASOPHILS # BLD AUTO: 0.04 10*3/MM3 (ref 0–0.2)
BASOPHILS NFR BLD AUTO: 0.6 % (ref 0–1.5)
BILIRUB SERPL-MCNC: 0.8 MG/DL (ref 0–1.2)
BUN SERPL-MCNC: 12 MG/DL (ref 8–23)
BUN/CREAT SERPL: 14.1 (ref 7–25)
CALCIUM SPEC-SCNC: 9.3 MG/DL (ref 8.6–10.5)
CHLORIDE SERPL-SCNC: 91 MMOL/L (ref 98–107)
CO2 SERPL-SCNC: 19 MMOL/L (ref 22–29)
CREAT SERPL-MCNC: 0.85 MG/DL (ref 0.57–1)
DEPRECATED RDW RBC AUTO: 41.6 FL (ref 37–54)
EOSINOPHIL # BLD AUTO: 0.05 10*3/MM3 (ref 0–0.4)
EOSINOPHIL NFR BLD AUTO: 0.7 % (ref 0.3–6.2)
ERYTHROCYTE [DISTWIDTH] IN BLOOD BY AUTOMATED COUNT: 12.7 % (ref 12.3–15.4)
GFR SERPL CREATININE-BSD FRML MDRD: 64 ML/MIN/1.73
GLOBULIN UR ELPH-MCNC: 2.4 GM/DL
GLUCOSE SERPL-MCNC: 108 MG/DL (ref 65–99)
HCT VFR BLD AUTO: 38 % (ref 34–46.6)
HGB BLD-MCNC: 12.9 G/DL (ref 12–15.9)
IMM GRANULOCYTES # BLD AUTO: 0.02 10*3/MM3 (ref 0–0.05)
IMM GRANULOCYTES NFR BLD AUTO: 0.3 % (ref 0–0.5)
LYMPHOCYTES # BLD AUTO: 1.92 10*3/MM3 (ref 0.7–3.1)
LYMPHOCYTES NFR BLD AUTO: 28.1 % (ref 19.6–45.3)
MCH RBC QN AUTO: 30.7 PG (ref 26.6–33)
MCHC RBC AUTO-ENTMCNC: 33.9 G/DL (ref 31.5–35.7)
MCV RBC AUTO: 90.5 FL (ref 79–97)
MONOCYTES # BLD AUTO: 0.62 10*3/MM3 (ref 0.1–0.9)
MONOCYTES NFR BLD AUTO: 9.1 % (ref 5–12)
NEUTROPHILS NFR BLD AUTO: 4.19 10*3/MM3 (ref 1.7–7)
NEUTROPHILS NFR BLD AUTO: 61.2 % (ref 42.7–76)
NRBC BLD AUTO-RTO: 0 /100 WBC (ref 0–0.2)
PLATELET # BLD AUTO: 320 10*3/MM3 (ref 140–450)
PMV BLD AUTO: 8.8 FL (ref 6–12)
POTASSIUM SERPL-SCNC: 4.4 MMOL/L (ref 3.5–5.2)
PROT SERPL-MCNC: 6.8 G/DL (ref 6–8.5)
RBC # BLD AUTO: 4.2 10*6/MM3 (ref 3.77–5.28)
SODIUM SERPL-SCNC: 123 MMOL/L (ref 136–145)
WBC # BLD AUTO: 6.84 10*3/MM3 (ref 3.4–10.8)

## 2020-11-03 PROCEDURE — 80053 COMPREHEN METABOLIC PANEL: CPT | Performed by: EMERGENCY MEDICINE

## 2020-11-03 PROCEDURE — 99284 EMERGENCY DEPT VISIT MOD MDM: CPT

## 2020-11-03 PROCEDURE — 85025 COMPLETE CBC W/AUTO DIFF WBC: CPT | Performed by: EMERGENCY MEDICINE

## 2020-11-03 PROCEDURE — 74176 CT ABD & PELVIS W/O CONTRAST: CPT

## 2020-11-03 RX ORDER — DOCUSATE SODIUM 100 MG/1
100 CAPSULE, LIQUID FILLED ORAL ONCE
Status: COMPLETED | OUTPATIENT
Start: 2020-11-03 | End: 2020-11-03

## 2020-11-03 RX ORDER — ONDANSETRON 4 MG/1
4 TABLET, ORALLY DISINTEGRATING ORAL 4 TIMES DAILY PRN
Qty: 15 TABLET | Refills: 0 | Status: SHIPPED | OUTPATIENT
Start: 2020-11-03 | End: 2021-05-13 | Stop reason: SDUPTHER

## 2020-11-03 RX ORDER — SODIUM CHLORIDE 0.9 % (FLUSH) 0.9 %
10 SYRINGE (ML) INJECTION AS NEEDED
Status: DISCONTINUED | OUTPATIENT
Start: 2020-11-03 | End: 2020-11-04 | Stop reason: HOSPADM

## 2020-11-03 RX ORDER — DOCUSATE SODIUM 100 MG/1
100 CAPSULE, LIQUID FILLED ORAL 2 TIMES DAILY
Qty: 20 CAPSULE | Refills: 0 | Status: SHIPPED | OUTPATIENT
Start: 2020-11-03 | End: 2020-11-10 | Stop reason: SDUPTHER

## 2020-11-03 RX ORDER — MINERAL OIL 100 G/100G
1 OIL RECTAL ONCE
Status: COMPLETED | OUTPATIENT
Start: 2020-11-03 | End: 2020-11-03

## 2020-11-03 RX ADMIN — DOCUSATE SODIUM 100 MG: 100 CAPSULE ORAL at 21:54

## 2020-11-03 RX ADMIN — MINERAL OIL 1 ENEMA: 100 ENEMA RECTAL at 22:22

## 2020-11-03 NOTE — TELEPHONE ENCOUNTER
If she already taking MiraLAX 1 capful twice a day.    Also drink plenty of fluids.  Eat a high-fiber bran cereal every day.  Eat plenty of fruits and vegetables.

## 2020-11-04 ENCOUNTER — PATIENT OUTREACH (OUTPATIENT)
Dept: CASE MANAGEMENT | Facility: OTHER | Age: 85
End: 2020-11-04

## 2020-11-04 NOTE — ED PROVIDER NOTES
EMERGENCY DEPARTMENT ENCOUNTER      Pt Name: Thuy Clark  MRN: [id number]  YOB: 1935  Date of evaluation: 11/3/2020  Provider: Ronald Ha DO    CHIEF COMPLAINT       Chief Complaint   Patient presents with   • Constipation         HISTORY OF PRESENT ILLNESS  (Location/Symptom, Timing/Onset, Context/Setting, Quality, Duration, Modifying Factors, Severity.)   Thuy Clark is a 84 y.o. female who presents to the emergency department for evaluation of decreased bowel motor last 4 days.  Notes some generalized lower abdominal fullness.  Eyes tend to do a fleets enema at home with out much success.  Last bowel was about 4 days ago.  She has in the past follow with colorectal specialist, Dr. Gomez, has a history of hemorrhoids.  Patient denies any fever or chills, no chest pain, difficulty breathing, cough or congestion.  She does endorse a history of self catheterizations for about 10 years.  Was recently checked for urinary tract infection states this was normal.  Patient denies any nausea or vomiting, no recent illness.  No fall, injury or trauma.  Does have a history of constipation, does not normally take stool softeners or laxatives.  Patient denies any other acute systemic complaints at this time.      Nursing notes were reviewed.    REVIEW OF SYSTEMS    (2-9 systems for level 4, 10 or more for level 5)   ROS:  General:  No fevers, no chills, no weakness  Cardiovascular:  No chest pain, no palpitations  Respiratory:  No shortness of breath, no cough, no wheezing  Gastrointestinal:  No pain,, positive abdominal fullness no nausea, no vomiting, positive constipation  Musculoskeletal:  No muscle pain, no joint pain  Skin:  No rash, no easy bruising  Neurologic:  No speech problems, no headache, no extremity numbness, no extremity tingling, no extremity weakness      Except as noted above the remainder of the review of systems was reviewed and negative.       PAST MEDICAL HISTORY      Past Medical History:   Diagnosis Date   • Bleeding hemorrhoid 3/8/2019   • Diverticulosis    • Hx of colonic polyps 2003   • Hypertension    • Intraductal papilloma     R intraductal patheloma   • Left upper extremity numbness     L upper extremity numbness-ER visit; neuropathy   • Self-catheterizes urinary bladder          SURGICAL HISTORY       Past Surgical History:   Procedure Laterality Date   • BREAST LUMPECTOMY  2006   • CHOLECYSTECTOMY     • HYSTERECTOMY  1984   • VAGINECTOMY  2009         CURRENT MEDICATIONS       Current Facility-Administered Medications:   •  magnesium hydroxide (MILK OF MAGNESIA) suspension 2400 mg/10mL 10 mL, 10 mL, Oral, Once, Ronald Ha DO, Stopped at 11/03/20 8239  •  [COMPLETED] Insert peripheral IV, , , Once **AND** sodium chloride 0.9 % flush 10 mL, 10 mL, Intravenous, PRN, Ronald Ha DO    Current Outpatient Medications:   •  apixaban (ELIQUIS) 5 MG tablet tablet, Take 1 tablet by mouth Every 12 (Twelve) Hours., Disp: 112 tablet, Rfl: 0  •  bisoprolol (ZEBeta) 5 MG tablet, Take 2 tablets by mouth Daily., Disp: 30 tablet, Rfl: 3  •  Cholecalciferol (VITAMIN D3) 1000 units capsule, Take 1 capsule by mouth Daily., Disp: , Rfl:   •  Coenzyme Q10 (CO Q-10) 100 MG capsule, Take 1 capsule by mouth Daily., Disp: , Rfl:   •  Dietary Management Product (VASCULERA) tablet, Take 1 tablet by mouth Daily., Disp: 90 tablet, Rfl: 3  •  docusate sodium (COLACE) 100 MG capsule, Take 1 capsule by mouth 2 (Two) Times a Day., Disp: 20 capsule, Rfl: 0  •  gabapentin (NEURONTIN) 100 MG capsule, Take 100 mg by mouth 2 (Two) Times a Day. Take one in the morning and 1 in the evening if needed, Disp: , Rfl:   •  MEGARED OMEGA-3 KRILL  MG capsule, Take 1 capsule by mouth every day, Disp: , Rfl:   •  ondansetron ODT (ZOFRAN-ODT) 4 MG disintegrating tablet, Place 1 tablet on the tongue 4 (Four) Times a Day As Needed for Nausea or Vomiting., Disp: 15 tablet, Rfl: 0  •   polyethylene glycol (MIRALAX) 17 g packet, Take 17 g by mouth 2 (Two) Times a Day., Disp: 100 each, Rfl: 5  •  Probiotic Product (PROBIOTIC PEARLS ADVANTAGE PO), 1  PRN, Disp: , Rfl:   •  sulfamethoxazole-trimethoprim (BACTRIM DS,SEPTRA DS) 800-160 MG per tablet, TAKE 1 TABLET BY MOUTH EVERY 12 HOURS FOR 7 DAYS, Disp: , Rfl:     ALLERGIES     Levofloxacin, Nitrofuran derivatives, and Nitrofurantoin    FAMILY HISTORY       Family History   Problem Relation Age of Onset   • Hyperlipidemia Other    • Hypertension Other    • Coronary artery disease Other    • No Known Problems Mother    • No Known Problems Father           SOCIAL HISTORY       Social History     Socioeconomic History   • Marital status:      Spouse name: Not on file   • Number of children: Not on file   • Years of education: Not on file   • Highest education level: Not on file   Tobacco Use   • Smoking status: Never Smoker   • Smokeless tobacco: Never Used   Substance and Sexual Activity   • Alcohol use: Yes     Alcohol/week: 1.0 standard drinks     Types: 1 Glasses of wine per week     Comment: elderberry wine   • Drug use: No   • Sexual activity: Defer   Social History Narrative    caffeine use: 1 can of pepsi a day and green tea daily         PHYSICAL EXAM    (up to 7 for level 4, 8 or more for level 5)     Vitals:    11/03/20 2215 11/03/20 2319 11/03/20 2320 11/03/20 2322   BP:  134/81  134/91   BP Location:    Right arm   Patient Position:    Lying   Pulse:    85   Resp:    16   Temp:       TempSrc:       SpO2: 96%  97% 97%   Weight:       Height:           Physical Exam  General : Patient is awake, alert, oriented, in no acute distress, nontoxic appearing  HEENT: Pupils are equally round and reactive to light, EOMI, conjunctivae clear, sclerae white, there is no injection no icterus.  Oral mucosa is moist, no exudate. Uvula is midline  Neck: Neck is supple, full range of motion, trachea midline  Cardiac: Heart regular rate, rhythm, no  murmurs, rubs, or gallops  Lungs: Lungs are clear to auscultation, there is no wheezing, rhonchi, or rales. There is no use of accessory muscles  Abdomen: Abdomen is soft, nontender, nondistended.  No peritoneal signs examination, bowel sounds are present but hypoactive diffusely throughout.  There are no firm or pulsatile masses, no rebound rigidity or guarding.   Musculoskeletal: 5 out of 5 strength in all 4 extremities.  No focal muscle deficits are appreciated  Neuro: Motor intact, sensory intact, level of consciousness is normal  Dermatology: Skin is warm and dry  Psych: Mentation is grossly normal, cognition is grossly normal. Affect is appropriate.      DIAGNOSTIC RESULTS     EKG: All EKG's are interpreted by the Emergency Department Physician who either signs or Co-signs this chart in the absence of a cardiologist.    No orders to display       RADIOLOGY:   Non-plain film images such as CT, Ultrasound and MRI are read by the radiologist. Plain radiographic images are visualized and preliminarily interpreted by the emergency physician with the below findings:      [] Radiologist's Report Reviewed:  CT Abdomen Pelvis Without Contrast   Final Result      1. Mildly prominent proximal small bowel loops suggesting a mild ileus. There is no bowel obstruction.   2. Colonic diverticulosis without diverticulitis. Normal appendix. Normal stool burden.   3. Hysterectomy and cholecystectomy.   4. No renal or ureteral stones. No hydronephrosis.               Signer Name: Chris Woo MD    Signed: 11/3/2020 10:09 PM    Workstation Name: [email or web address]     [facility]            ED BEDSIDE ULTRASOUND:   Performed by ED Physician - none    LABS:    I have reviewed and interpreted all of the currently available lab results from this visit (if applicable):  Results for orders placed or performed during the hospital encounter of 11/03/20   Comprehensive Metabolic Panel    Specimen: Blood   Result Value  Ref Range    Glucose 108 (H) 65 - 99 mg/dL    BUN 12 8 - 23 mg/dL    Creatinine 0.85 0.57 - 1.00 mg/dL    Sodium 123 (L) 136 - 145 mmol/L    Potassium 4.4 3.5 - 5.2 mmol/L    Chloride 91 (L) 98 - 107 mmol/L    CO2 19.0 (L) 22.0 - 29.0 mmol/L    Calcium 9.3 8.6 - 10.5 mg/dL    Total Protein 6.8 6.0 - 8.5 g/dL    Albumin 4.40 3.50 - 5.20 g/dL    ALT (SGPT) 28 1 - 33 U/L    AST (SGOT) 21 1 - 32 U/L    Alkaline Phosphatase 105 39 - 117 U/L    Total Bilirubin 0.8 0.0 - 1.2 mg/dL    eGFR Non African Amer 64 >60 mL/min/1.73    Globulin 2.4 gm/dL    A/G Ratio 1.8 g/dL    BUN/Creatinine Ratio 14.1 7.0 - 25.0    Anion Gap 13.0 5.0 - 15.0 mmol/L   CBC Auto Differential    Specimen: Blood   Result Value Ref Range    WBC 6.84 3.40 - 10.80 10*3/mm3    RBC 4.20 3.77 - 5.28 10*6/mm3    Hemoglobin 12.9 12.0 - 15.9 g/dL    Hematocrit 38.0 34.0 - 46.6 %    MCV 90.5 79.0 - 97.0 fL    MCH 30.7 26.6 - 33.0 pg    MCHC 33.9 31.5 - 35.7 g/dL    RDW 12.7 12.3 - 15.4 %    RDW-SD 41.6 37.0 - 54.0 fl    MPV 8.8 6.0 - 12.0 fL    Platelets 320 140 - 450 10*3/mm3    Neutrophil % 61.2 42.7 - 76.0 %    Lymphocyte % 28.1 19.6 - 45.3 %    Monocyte % 9.1 5.0 - 12.0 %    Eosinophil % 0.7 0.3 - 6.2 %    Basophil % 0.6 0.0 - 1.5 %    Immature Grans % 0.3 0.0 - 0.5 %    Neutrophils, Absolute 4.19 1.70 - 7.00 10*3/mm3    Lymphocytes, Absolute 1.92 0.70 - 3.10 10*3/mm3    Monocytes, Absolute 0.62 0.10 - 0.90 10*3/mm3    Eosinophils, Absolute 0.05 0.00 - 0.40 10*3/mm3    Basophils, Absolute 0.04 0.00 - 0.20 10*3/mm3    Immature Grans, Absolute 0.02 0.00 - 0.05 10*3/mm3    nRBC 0.0 0.0 - 0.2 /100 WBC        All other labs were within normal range or not returned as of this dictation.      EMERGENCY DEPARTMENT COURSE and DIFFERENTIAL DIAGNOSIS/MDM:   Vitals:    Vitals:    11/03/20 2215 11/03/20 2319 11/03/20 2320 11/03/20 2322   BP:  134/81  134/91   BP Location:    Right arm   Patient Position:    Lying   Pulse:    85   Resp:    16   Temp:       TempSrc:        SpO2: 96%  97% 97%   Weight:       Height:                Patient with decreased bowel movement for last 4 days.  History of recurrent constipation since 2016.  Does not normally take stool softeners or laxatives.  Abdomen is soft and nontender without peritoneal signs on examination.  Her vital signs are stable on arrival, she is nontoxic-appearing.  With her worsening symptoms we discussed obtaining labs, abdominal imaging for further evaluation.  We will attempt stool softener and laxative both oral and per rectum.  Blood reviewed as above.  Patient with a chronic hyponatremia, no neurological deficit.  CT scan with no signs of obstruction, no signs of significant constipation is appreciated.  Very small mild changes consistent with a possible ileus.  I updated patient on these findings, we discussed initiating a bowel regimen, good fluid hydration over the next few days, advancing her diet.  Patient is agreeable to this, will start her on Colace, Zofran, she will take her MiraLAX.  She has an appoint with her GI specialist in a couple days for further evaluation of her recurrent hemorrhoids which are very small and minor nature this evening.  Patient's daughters at the bedside as well.  They understand she has any increasing abdominal pain, any nausea vomiting, unable to tolerate fluids or any further concerns in the meantime she will return back to the emergency department for evaluation. I had a discussion with the patient/family regarding diagnosis, diagnostic results, treatment plan, and medications.  The patient/family indicated understanding of these instructions.  I spent adequate time at the bedside proceeding discharge necessary to personally discuss the aftercare instructions, giving patient education, providing explanations of the results of our evaluations/findings, and my decision making to assure that the patient/family understand the plan of care.  Time was allotted to answer questions at that  time and throughout the ED course.  Emphasis was placed on timely follow-up after discharge.  I also discussed the potential for the development of an acute emergent condition requiring further evaluation, admission, or even surgical intervention. I discussed that we found nothing during the visit today indicating the need for further workup, admission, or the presence of an unstable medical condition.  I encouraged the patient to return to the emergency department immediately for ANY concerns, worsening, new complaints, or if symptoms persist and unable to seek follow-up in a timely fashion.  The patient/family expressed understanding and agreement with this plan.  The patient will follow-up with their PCP in 1-2 days for reevaluation.       MEDICATIONS ADMINISTERED IN ED:  Medications   magnesium hydroxide (MILK OF MAGNESIA) suspension 2400 mg/10mL 10 mL (0 mL Oral Hold 11/3/20 2155)   sodium chloride 0.9 % flush 10 mL (has no administration in time range)   docusate sodium (COLACE) capsule 100 mg (100 mg Oral Given 11/3/20 2154)   mineral oil enema 1 enema (1 enema Rectal Given 11/3/20 2222)       PROCEDURES:  Procedures    CRITICAL CARE TIME    Total Critical Care time was 0 minutes, excluding separately reportable procedures.   There was a high probability of clinically significant/life threatening deterioration in the patient's condition which required my urgent intervention.      FINAL IMPRESSION      1. Constipation, unspecified constipation type    2. Chronic hyponatremia          DISPOSITION/PLAN     ED Disposition     ED Disposition Condition Comment    Discharge Stable           PATIENT REFERRED TO:  Your PCP or one on the list    Schedule an appointment as soon as possible for a visit in 2 days      [facility] Emergency Department  [address] [place] [address] [phone]    If symptoms worsen    Rikki Gomez MD  [address] [place]  [address]  [phone]    Schedule an appointment as soon as possible for a visit   Colorectal surgeon      DISCHARGE MEDICATIONS:     Medication List      START taking these medications    docusate sodium 100 MG capsule  Commonly known as: COLACE  Take 1 capsule by mouth 2 (Two) Times a Day.     ondansetron ODT 4 MG disintegrating tablet  Commonly known as: ZOFRAN-ODT  Place 1 tablet on the tongue 4 (Four) Times a Day As Needed for Nausea or Vomiting.        CONTINUE taking these medications    apixaban 5 MG tablet tablet  Commonly known as: ELIQUIS  Take 1 tablet by mouth Every 12 (Twelve) Hours.     bisoprolol 5 MG tablet  Commonly known as: ZEBeta  Take 2 tablets by mouth Daily.     Co Q-10 100 MG capsule     gabapentin 100 MG capsule  Commonly known as: NEURONTIN     MegaRed Omega-3 Krill Oil 500 MG capsule     polyethylene glycol 17 g packet  Commonly known as: MIRALAX  Take 17 g by mouth 2 (Two) Times a Day.     PROBIOTIC PEARLS ADVANTAGE PO     sulfamethoxazole-trimethoprim 800-160 MG per tablet  Commonly known as: BACTRIM DS,SEPTRA DS     Vasculera tablet  Take 1 tablet by mouth Daily.     Vitamin D3 25 MCG (1000 UT) capsule           Where to Get Your Medications      You can get these medications from any pharmacy    Bring a paper prescription for each of these medications  · docusate sodium 100 MG capsule  · ondansetron ODT 4 MG disintegrating tablet             Comment: Please note this report has been produced using speech recognition software.      Ronald Ha DO  Attending Emergency Physician               Ronald Ha DO  11/04/20 0018

## 2020-11-04 NOTE — OUTREACH NOTE
"Care Plan Note      Responses   Lifestyle Goals  Decrease falls risk [More fiber in diet]   Self Management  Get flu/pneumonia shot, Medication Adherence   Annual Wellness Visit:   -- [Scheduled for Dec 18 2020]   Care Gaps Addressed  Flu Shot, Pneumonia Vaccine   Flu Shot Status  Up to Date   Flu Shot Completion at [facility] or Other  Other   Other Location  [facility]   Pneumonia Vaccine Status  Up to Date   Other Patient Education/Resources   24/7 [facility] Nurse Call Line, Advanced Care Planning   24/7 Nurse Call Line Education Method  Verbal   ACP Education Method  Verbal   Does patient have depression diagnosis?  No   Advanced Directives:  Send Materials   Ed Visits past 12 months:  1   Hospitalizations past 12 months  None   Goal Progress  Making Progress Toward Goal(s)        The main concerns and/or symptoms the patient would like to address are: Pt contacted regarding ED visit 11/3/2020 with chief c/o constipation.  States \"I feel excellent.  I received excellent care.\"  States she does have appt with Dr. Johnson OVIEDO tomorrow and plans on keeping this appt.  Discussed diet and states she get Meals on Wheels and this does not contain much fiber, however her son and daughter both live within a mile and bring her fresh fruits and vegetables.  She lives alone, but has good support system with her children and \"lots of friends that call me and one friend lives 2 houses down.\"  however states she has lots of friends that call her and a friend that is neighbor 2 doors down.\"  She is self sufficient with ADLs, med management, and does still drive, but since pandemic has not been out \"hardly any.\"  She did apply to Wheels earlier in the year, but has not used since pandemic.  She does ambulate using cane, but denies any falls and states is steady on feet, \"mostly do it to make sure.\"   She did obtain her flu vaccine at her local pharmacy.  She is Active on [employer] and does not have Living Will, however did " "request information on this. (Will mail).  She denies any needs at present and voiced her appreciation for the call and \"all the information.\"      Education/instruction provided by Care Coordinator: Role of  explained and contact number given.  [employer] 24/7 Nurse line explained and contact number provided.  Bowel regimen discussed and was able to teach back high fiber diet.      Follow Up Outreach Due:  As needed    Sabina Quinones RN  Ambulatory     11/4/2020, 09:53 EST    "

## 2020-11-04 NOTE — DISCHARGE INSTRUCTIONS
Drink plenty of fluids over the next few days, take your medication as previously prescribed.  If you have any increasing abdominal pain, decreased bowel movements, nausea or vomiting or any further concerns in the meantime, return back to the emergency department for reevaluation.  Also follow-up with your colorectal specialist, Dr. Gomez, for reevaluation and treatment of your hemorrhoids as previously planned.

## 2020-11-04 NOTE — OUTREACH NOTE
Care Coordination Assessment    Documented/Reviewed By: Sabina Quinones RN Date/time: 11/4/2020  9:47 AM   Assessment completed with: patient  Living arrangement: alone  Support system: friends, children  Type of residence: private residence  Equipment used at home: cane  Bed or wheelchair confined: No  Inadequate nutrition: No  Medication adherence problem: No  Experiencing side effects from current medications: No  History of fall(s) in last 6 months: No  Difficulty keeping appointments: No  Family aware of the patient's advance care planning wishes:  (Comment: Does not have Living Will )

## 2020-11-06 DIAGNOSIS — E87.1 HYPONATREMIA: Primary | ICD-10-CM

## 2020-11-09 ENCOUNTER — EPISODE CHANGES (OUTPATIENT)
Dept: CASE MANAGEMENT | Facility: OTHER | Age: 85
End: 2020-11-09

## 2020-11-10 ENCOUNTER — PATIENT MESSAGE (OUTPATIENT)
Dept: INTERNAL MEDICINE | Facility: CLINIC | Age: 85
End: 2020-11-10

## 2020-11-10 RX ORDER — DOCUSATE SODIUM 100 MG/1
100 CAPSULE, LIQUID FILLED ORAL 2 TIMES DAILY
Qty: 60 CAPSULE | Refills: 0 | Status: SHIPPED | OUTPATIENT
Start: 2020-11-10 | End: 2020-12-18 | Stop reason: SDUPTHER

## 2020-11-10 NOTE — TELEPHONE ENCOUNTER
From: Thuy Clark  To: Isidra Eisenberg MD  Sent: 11/10/2020 1:14 PM EST  Subject: Prescription Question    Could Dr. Eisenberg please request a refill from a prescription Dr. Ha ordered for me in the ER? I use [facility], as the record shows. Do you have any Equilis samples? You have shared with me previously.. From [facility], I would like the DOK 100MG Carmelo generic for DOCUSATE sod 100MG cap Mrs. Thuy Clark

## 2020-11-25 ENCOUNTER — PATIENT OUTREACH (OUTPATIENT)
Dept: CASE MANAGEMENT | Facility: OTHER | Age: 85
End: 2020-11-25

## 2020-11-25 NOTE — OUTREACH NOTE
"Patient Outreach Note    Pt called RN-ACM for guidelines regarding Thanksgiving and Covid 19.  Discussed limits, and general precautions.  States she is going to son's home, but there will only be 5 people. Covid 19 hotline number explained and provided. [phone].   States she will call and that she appreciated \"taking time to talk with me.\"     Sabina Quinones RN  Ambulatory     11/25/2020, 10:44 EST      "

## 2020-12-07 ENCOUNTER — PATIENT OUTREACH (OUTPATIENT)
Dept: CASE MANAGEMENT | Facility: OTHER | Age: 85
End: 2020-12-07

## 2020-12-07 NOTE — OUTREACH NOTE
Care Coordination Note    Pt assistance form for Eliquis emailed to her PCP, Dr. Eisenberg's  with instructions.  Have received confirmation from Peg Bruno (office manage) that she has received and will give to Dr. Eisenberg in anticipation of her 12/18/2020 appt.     Sabina Quinones RN  Ambulatory     12/7/2020, 14:04 EST

## 2020-12-07 NOTE — OUTREACH NOTE
"Patient Outreach Note    Incoming call from pt with questions regarding medicine interactions and caffeine use with her a fib.  Instructed to contact her pharmacy with any medicine interaction issues, however when we started talking, she voiced her concern regarding her eliquis and the cost every month.  States it is costing her betw $400-500 monthly.  She states I wish they would put me on warfarin, \"I have heard my friends talk about it \" and also asked about watchman device. Discussed the need for frequent routine monitoring with warfarin, but encouraged her to make a list of questions to take to Dr. Eisenberg at her upcoming appt 12/18/2020, but in meantime discussed Eliquis pt assistance program.  Not sure if she would qualify, but she would like to try.  Will send form to  at her PCP's office, Dr. Eisenberg.  She also had questions regarding caffeine and a fib.  She does not drink any caffeine other than green tea and only drinks approx 2 cups per day.  Encouraged to write down all her questions to discuss with PCP and cardiologist.  She states she sees Dinah Aleman at [facility] for cardiology.  She voiced her appreciation for the call.      Sabina Quinones RN  Ambulatory     12/7/2020, 13:42 EST      "

## 2020-12-16 ENCOUNTER — TELEPHONE (OUTPATIENT)
Dept: INTERNAL MEDICINE | Facility: CLINIC | Age: 85
End: 2020-12-16

## 2020-12-16 NOTE — TELEPHONE ENCOUNTER
PATIENT WANTS TO KNOW IF SHE CAN CAN COME INTO THE OFFICE TOMORROW MORNING TO HAVE BLOOD WORK DONE. SHE WANTS TO BE CONTACTED ABOUT THIS AS SOON AS POSSIBLE.     CONTACT: [phone]

## 2020-12-17 ENCOUNTER — LAB (OUTPATIENT)
Dept: LAB | Facility: HOSPITAL | Age: 85
End: 2020-12-17

## 2020-12-17 DIAGNOSIS — M81.0 SENILE OSTEOPOROSIS: ICD-10-CM

## 2020-12-17 DIAGNOSIS — R73.09 ABNORMAL GLUCOSE: ICD-10-CM

## 2020-12-17 DIAGNOSIS — E87.1 HYPONATREMIA: ICD-10-CM

## 2020-12-17 DIAGNOSIS — E78.2 MIXED HYPERLIPIDEMIA: ICD-10-CM

## 2020-12-17 LAB
25(OH)D3 SERPL-MCNC: 45.5 NG/ML (ref 30–100)
ALBUMIN SERPL-MCNC: 4.3 G/DL (ref 3.5–5.2)
ALBUMIN/GLOB SERPL: 1.7 G/DL
ALP SERPL-CCNC: 118 U/L (ref 39–117)
ALT SERPL W P-5'-P-CCNC: 25 U/L (ref 1–33)
ANION GAP SERPL CALCULATED.3IONS-SCNC: 9.4 MMOL/L (ref 5–15)
AST SERPL-CCNC: 21 U/L (ref 1–32)
BASOPHILS # BLD AUTO: 0.03 10*3/MM3 (ref 0–0.2)
BASOPHILS NFR BLD AUTO: 0.6 % (ref 0–1.5)
BILIRUB SERPL-MCNC: 0.5 MG/DL (ref 0–1.2)
BUN SERPL-MCNC: 10 MG/DL (ref 8–23)
BUN/CREAT SERPL: 12.2 (ref 7–25)
CALCIUM SPEC-SCNC: 9 MG/DL (ref 8.6–10.5)
CHLORIDE SERPL-SCNC: 97 MMOL/L (ref 98–107)
CHOLEST SERPL-MCNC: 126 MG/DL (ref 0–200)
CO2 SERPL-SCNC: 25.6 MMOL/L (ref 22–29)
CREAT SERPL-MCNC: 0.82 MG/DL (ref 0.57–1)
DEPRECATED RDW RBC AUTO: 40.6 FL (ref 37–54)
EOSINOPHIL # BLD AUTO: 0.08 10*3/MM3 (ref 0–0.4)
EOSINOPHIL NFR BLD AUTO: 1.6 % (ref 0.3–6.2)
ERYTHROCYTE [DISTWIDTH] IN BLOOD BY AUTOMATED COUNT: 12.4 % (ref 12.3–15.4)
GFR SERPL CREATININE-BSD FRML MDRD: 66 ML/MIN/1.73
GLOBULIN UR ELPH-MCNC: 2.6 GM/DL
GLUCOSE SERPL-MCNC: 95 MG/DL (ref 65–99)
HBA1C MFR BLD: 5.6 % (ref 4.8–5.6)
HCT VFR BLD AUTO: 37.3 % (ref 34–46.6)
HDLC SERPL-MCNC: 44 MG/DL (ref 40–60)
HGB BLD-MCNC: 12.7 G/DL (ref 12–15.9)
IMM GRANULOCYTES # BLD AUTO: 0.02 10*3/MM3 (ref 0–0.05)
IMM GRANULOCYTES NFR BLD AUTO: 0.4 % (ref 0–0.5)
LDLC SERPL CALC-MCNC: 70 MG/DL (ref 0–100)
LDLC/HDLC SERPL: 1.62 {RATIO}
LYMPHOCYTES # BLD AUTO: 1.08 10*3/MM3 (ref 0.7–3.1)
LYMPHOCYTES NFR BLD AUTO: 21.3 % (ref 19.6–45.3)
MCH RBC QN AUTO: 31.1 PG (ref 26.6–33)
MCHC RBC AUTO-ENTMCNC: 34 G/DL (ref 31.5–35.7)
MCV RBC AUTO: 91.4 FL (ref 79–97)
MONOCYTES # BLD AUTO: 0.55 10*3/MM3 (ref 0.1–0.9)
MONOCYTES NFR BLD AUTO: 10.9 % (ref 5–12)
NEUTROPHILS NFR BLD AUTO: 3.3 10*3/MM3 (ref 1.7–7)
NEUTROPHILS NFR BLD AUTO: 65.2 % (ref 42.7–76)
NRBC BLD AUTO-RTO: 0 /100 WBC (ref 0–0.2)
PLATELET # BLD AUTO: 336 10*3/MM3 (ref 140–450)
PMV BLD AUTO: 9.2 FL (ref 6–12)
POTASSIUM SERPL-SCNC: 4.4 MMOL/L (ref 3.5–5.2)
PROT SERPL-MCNC: 6.9 G/DL (ref 6–8.5)
RBC # BLD AUTO: 4.08 10*6/MM3 (ref 3.77–5.28)
SODIUM SERPL-SCNC: 132 MMOL/L (ref 136–145)
TRIGL SERPL-MCNC: 54 MG/DL (ref 0–150)
TSH SERPL DL<=0.05 MIU/L-ACNC: 1.65 UIU/ML (ref 0.27–4.2)
VLDLC SERPL-MCNC: 12 MG/DL (ref 5–40)
WBC # BLD AUTO: 5.06 10*3/MM3 (ref 3.4–10.8)

## 2020-12-17 PROCEDURE — 84443 ASSAY THYROID STIM HORMONE: CPT

## 2020-12-17 PROCEDURE — 80061 LIPID PANEL: CPT

## 2020-12-17 PROCEDURE — 85025 COMPLETE CBC W/AUTO DIFF WBC: CPT

## 2020-12-17 PROCEDURE — 82306 VITAMIN D 25 HYDROXY: CPT

## 2020-12-17 PROCEDURE — 80053 COMPREHEN METABOLIC PANEL: CPT

## 2020-12-17 PROCEDURE — 83036 HEMOGLOBIN GLYCOSYLATED A1C: CPT

## 2020-12-18 ENCOUNTER — OFFICE VISIT (OUTPATIENT)
Dept: INTERNAL MEDICINE | Facility: CLINIC | Age: 85
End: 2020-12-18

## 2020-12-18 VITALS
HEIGHT: 64 IN | BODY MASS INDEX: 24.07 KG/M2 | HEART RATE: 92 BPM | OXYGEN SATURATION: 98 % | SYSTOLIC BLOOD PRESSURE: 124 MMHG | WEIGHT: 141 LBS | TEMPERATURE: 97.3 F | DIASTOLIC BLOOD PRESSURE: 88 MMHG

## 2020-12-18 DIAGNOSIS — Z23 NEED FOR VACCINATION: ICD-10-CM

## 2020-12-18 DIAGNOSIS — I48.21 PERMANENT ATRIAL FIBRILLATION (HCC): ICD-10-CM

## 2020-12-18 DIAGNOSIS — R26.9 GAIT DISORDER: ICD-10-CM

## 2020-12-18 DIAGNOSIS — Z91.81 AT HIGH RISK FOR FALLS: Chronic | ICD-10-CM

## 2020-12-18 DIAGNOSIS — K59.01 CONSTIPATION, SLOW TRANSIT: ICD-10-CM

## 2020-12-18 DIAGNOSIS — M81.0 SENILE OSTEOPOROSIS: ICD-10-CM

## 2020-12-18 DIAGNOSIS — Z00.00 MEDICARE ANNUAL WELLNESS VISIT, SUBSEQUENT: Primary | ICD-10-CM

## 2020-12-18 DIAGNOSIS — I10 BENIGN ESSENTIAL HYPERTENSION: ICD-10-CM

## 2020-12-18 DIAGNOSIS — R33.9 URINARY RETENTION: ICD-10-CM

## 2020-12-18 DIAGNOSIS — I38 VALVULAR HEART DISEASE: ICD-10-CM

## 2020-12-18 DIAGNOSIS — E78.2 MIXED HYPERLIPIDEMIA: ICD-10-CM

## 2020-12-18 DIAGNOSIS — R73.09 ABNORMAL GLUCOSE: ICD-10-CM

## 2020-12-18 PROBLEM — K64.4 EXTERNAL HEMORRHOID: Status: RESOLVED | Noted: 2019-06-11 | Resolved: 2020-12-18

## 2020-12-18 PROBLEM — E66.3 OVERWEIGHT (BMI 25.0-29.9): Status: RESOLVED | Noted: 2019-06-11 | Resolved: 2020-12-18

## 2020-12-18 PROCEDURE — 93000 ELECTROCARDIOGRAM COMPLETE: CPT | Performed by: INTERNAL MEDICINE

## 2020-12-18 PROCEDURE — 90732 PPSV23 VACC 2 YRS+ SUBQ/IM: CPT | Performed by: INTERNAL MEDICINE

## 2020-12-18 PROCEDURE — G0009 ADMIN PNEUMOCOCCAL VACCINE: HCPCS | Performed by: INTERNAL MEDICINE

## 2020-12-18 PROCEDURE — G0439 PPPS, SUBSEQ VISIT: HCPCS | Performed by: INTERNAL MEDICINE

## 2020-12-18 RX ORDER — DOCUSATE SODIUM 100 MG/1
100 CAPSULE, LIQUID FILLED ORAL 2 TIMES DAILY
Qty: 180 CAPSULE | Refills: 3 | Status: SHIPPED | OUTPATIENT
Start: 2020-12-18 | End: 2021-07-16

## 2020-12-18 NOTE — PATIENT INSTRUCTIONS
Problem List Items Addressed This Visit        Cardiovascular and Mediastinum    Benign essential hypertension    Overview     12/18/2020  Isidra Eisenberg MD    Continue bisoprolol 10mg daily.      Continue to avoid salt in the diet.  Avoid sodas.         Relevant Medications    bisoprolol (ZEBeta) 5 MG tablet    Mixed hyperlipidemia    Overview     12/18/2020 Isidra Eisenberg MD    Recent labs show normal LDL and triglycerides.    Continue low fat diet and regular physical activity.         Relevant Orders    CBC & Differential (Completed)    Comprehensive Metabolic Panel (Completed)    Lipid Panel (Completed)    TSH (Completed)    Valvular heart disease    Overview     12/18/2020 Isidra Eisenberg MD    Continue Eliquis twice a day and bisoprolol once a day.         Relevant Medications    bisoprolol (ZEBeta) 5 MG tablet       Digestive    Constipation, slow transit    Overview     12/18/2020 Isidra Eisenberg MD    Continue MiraLAX and stool softeners and probiotic every day.      Continue drinking lots of fluids every day.              Musculoskeletal and Integument    Senile osteoporosis    Overview     12/18/2020 Isidra Eisenberg MD    Continue regular weight bearing exercises including handweights and walking.  Continue taking calcium and vitamin D.          Relevant Orders    Vitamin D 25 Hydroxy (Completed)       Genitourinary    Urinary retention    Overview     12/18/2020 Isidra Eisenberg MD     Continue self catheterization regularly. Continue regular follow up with Dr. Marroquin.            Other    At high risk for falls (Chronic)    Overview     12/18/2020 Isidra Eisenberg MD    Doing some daily exercises does help prevent falls and prevent injuries from falls.    Continue using the cane at all times.    Fall prevention discussed and education given.         Abnormal glucose    Overview     12/18/2020 Isidra Eisenberg MD    Hemoglobin A1c shows that the average blood sugar is now in the normal  range.    Continue to avoid sugars and snack foods in the diet.         Relevant Orders    Hemoglobin A1c (Completed)    Gait disorder    Overview     12/18/2020 Isidra Eisenberg MD    Continue regular physical activity and exercise in the house. Use small handweights daily when watching TV.     Continue using cane, especially when going out.          Medicare annual wellness visit, subsequent - Primary    Overview     12/18/2020 Isidra Eisenberg MD    She is up-to-date on flu shot, Tdap, and hepatitis A vaccinations.  Pneumovax 23 Valent given today.  She will get the shingles vaccine at her pharmacy.               Other Visit Diagnoses     Permanent atrial fibrillation (CMS/HCC)        Relevant Orders    ECG 12 Lead    Need for vaccination        Relevant Orders    Pneumococcal Polysaccharide Vaccine 23-Valent Greater Than or Equal To 3yo Subcutaneous / IM (Completed)          Fall Prevention in the Home, Adult  Falls can cause injuries and can affect people from all age groups. There are many simple things that you can do to make your home safe and to help prevent falls. Ask for help when making these changes, if needed.  What actions can I take to prevent falls?  General instructions  · Use good lighting in all rooms. Replace any light bulbs that burn out.  · Turn on lights if it is dark. Use night-lights.  · Place frequently used items in easy-to-reach places. Lower the shelves around your home if necessary.  · Set up furniture so that there are clear paths around it. Avoid moving your furniture around.  · Remove throw rugs and other tripping hazards from the floor.  · Avoid walking on wet floors.  · Fix any uneven floor surfaces.  · Add color or contrast paint or tape to grab bars and handrails in your home. Place contrasting color strips on the first and last steps of stairways.  · When you use a stepladder, make sure that it is completely opened and that the sides are firmly locked. Have someone hold the  ladder while you are using it. Do not climb a closed stepladder.  · Be aware of any and all pets.  What can I do in the bathroom?         · Keep the floor dry. Immediately clean up any water that spills onto the floor.  · Remove soap buildup in the tub or shower on a regular basis.  · Use non-skid mats or decals on the floor of the tub or shower.  · Attach bath mats securely with double-sided, non-slip rug tape.  · If you need to sit down while you are in the shower, use a plastic, non-slip stool.  · Install grab bars by the toilet and in the tub and shower. Do not use towel bars as grab bars.  What can I do in the bedroom?  · Make sure that a bedside light is easy to reach.  · Do not use oversized bedding that drapes onto the floor.  · Have a firm chair that has side arms to use for getting dressed.  What can I do in the kitchen?  · Clean up any spills right away.  · If you need to reach for something above you, use a sturdy step stool that has a grab bar.  · Keep electrical cables out of the way.  · Do not use floor polish or wax that makes floors slippery. If you must use wax, make sure that it is non-skid floor wax.  What can I do in the stairways?  · Do not leave any items on the stairs.  · Make sure that you have a light switch at the top of the stairs and the bottom of the stairs. Have them installed if you do not have them.  · Make sure that there are handrails on both sides of the stairs. Fix handrails that are broken or loose. Make sure that handrails are as long as the stairways.  · Install non-slip stair treads on all stairs in your home.  · Avoid having throw rugs at the top or bottom of stairways, or secure the rugs with carpet tape to prevent them from moving.  · Choose a carpet design that does not hide the edge of steps on the stairway.  · Check any carpeting to make sure that it is firmly attached to the stairs. Fix any carpet that is loose or worn.  What can I do on the outside of my home?  · Use  bright outdoor lighting.  · Regularly repair the edges of walkways and driveways and fix any cracks.  · Remove high doorway thresholds.  · Trim any shrubbery on the main path into your home.  · Regularly check that handrails are securely fastened and in good repair. Both sides of any steps should have handrails.  · Install guardrails along the edges of any raised decks or porches.  · Clear walkways of debris and clutter, including tools and rocks.  · Have leaves, snow, and ice cleared regularly.  · Use sand or salt on walkways during winter months.  · In the garage, clean up any spills right away, including grease or oil spills.  What other actions can I take?  · Wear closed-toe shoes that fit well and support your feet. Wear shoes that have rubber soles or low heels.  · Use mobility aids as needed, such as canes, walkers, scooters, and crutches.  · Review your medicines with your health care provider. Some medicines can cause dizziness or changes in blood pressure, which increase your risk of falling.  Talk with your health care provider about other ways that you can decrease your risk of falls. This may include working with a physical therapist or  to improve your strength, balance, and endurance.  Where to find more information  · Centers for Disease Control and Prevention, [place]: https://www.cdc.gov  · National [place] on Aging: https://vr7cvey.yesenia.nih.gov  Contact a health care provider if:  · You are afraid of falling at home.  · You feel weak, drowsy, or dizzy at home.  · You fall at home.  Summary  · There are many simple things that you can do to make your home safe and to help prevent falls.  · Ways to make your home safe include removing tripping hazards and installing grab bars in the bathroom.  · Ask for help when making these changes in your home.  This information is not intended to replace advice given to you by your health care provider. Make sure you discuss any questions you have with  your health care provider.  Document Revised: 11/30/2018 Document Reviewed: 08/02/2018  [employer] Patient Education © 2020 [employer]

## 2020-12-18 NOTE — PROGRESS NOTES
QUICK REFERENCE INFORMATION:  The ABCs of the Annual Wellness Visit    Subsequent Medicare Wellness Visit    HEALTH RISK ASSESSMENT    1935    Recent Hospitalizations:  No hospitalization(s) within the last year..        Current Medical Providers:  Patient Care Team:  Isidra Eisenberg MD as PCP - Internal Medicine  Azam Marroquin MD as Consulting Physician (Urology)  Lili Hebert MD as Consulting Physician (Gastroenterology)  Isidra Eisenberg MD as Consulting Physician (Internal Medicine)  Sabina Quinones RN as Ambulatory  (Population Health)        Smoking Status:  Social History     Tobacco Use   Smoking Status Never Smoker   Smokeless Tobacco Never Used       Alcohol Consumption:  Social History     Substance and Sexual Activity   Alcohol Use Yes   • Alcohol/week: 1.0 standard drinks   • Types: 1 Glasses of wine per week    Comment: elderberry wine  occasionally       Depression Screen:   PHQ-2/PHQ-9 Depression Screening 12/18/2020   Little interest or pleasure in doing things 1   Feeling down, depressed, or hopeless 0   Total Score 1       Health Habits and Functional and Cognitive Screening:  Functional & Cognitive Status 12/18/2020   Do you have difficulty preparing food and eating? No   Do you have difficulty bathing yourself, getting dressed or grooming yourself? No   Do you have difficulty using the toilet? Yes   Do you have difficulty moving around from place to place? No   Do you have trouble with steps or getting out of a bed or a chair? No   Current Diet Well Balanced Diet   Dental Exam Up to date   Eye Exam Up to date   Exercise (times per week) 3 times per week   Current Exercise Activities Include Stationary Bicycling/Spin Class   Do you need help using the phone?  No   Are you deaf or do you have serious difficulty hearing?  No   Do you need help with transportation? No   Do you need help shopping? No   Do you need help preparing meals?  No   Do you need help with  housework?  No   Do you need help with laundry? No   Do you need help taking your medications? No   Do you need help managing money? No   Do you ever drive or ride in a car without wearing a seat belt? No   Have you felt unusual stress, anger or loneliness in the last month? No   Who do you live with? Alone   If you need help, do you have trouble finding someone available to you? No   Have you been bothered in the last four weeks by sexual problems? No   Do you have difficulty concentrating, remembering or making decisions? No       Fall Risk Screen:  STEADI Fall Risk Assessment was completed, and patient is at MODERATE risk for falls. Assessment completed on:12/18/2020    ACE III MINI        Does the patient have evidence of cognitive impairment? No    Aspirin use counseling: Does not need ASA (and currently is not on it)    Recent Lab Results:  CMP:  Lab Results   Component Value Date    BUN 10 12/17/2020    CREATININE 0.82 12/17/2020    EGFRIFNONA 66 12/17/2020    BCR 12.2 12/17/2020     (L) 12/17/2020    K 4.4 12/17/2020    CO2 25.6 12/17/2020    CALCIUM 9.0 12/17/2020    ALBUMIN 4.30 12/17/2020    BILITOT 0.5 12/17/2020    ALKPHOS 118 (H) 12/17/2020    AST 21 12/17/2020    ALT 25 12/17/2020     HbA1c:  Lab Results   Component Value Date    HGBA1C 5.60 12/17/2020    HGBA1C 5.82 (H) 06/02/2020     Microalbumin:  No results found for: MICROALBUR, POCMALB, POCCREAT  Lipid Panel  Lab Results   Component Value Date    CHOL 126 12/17/2020    TRIG 54 12/17/2020    HDL 44 12/17/2020    LDL 70 12/17/2020    AST 21 12/17/2020    ALT 25 12/17/2020       Visual Acuity:  No exam data present    Age-appropriate Screening Schedule:  Refer to the list below for future screening recommendations based on patient's age, sex and/or medical conditions. Orders for these recommended tests are listed in the plan section. The patient has been provided with a written plan.    Age appropriate preventive counseling done regarding age  appropriate vaccines,regular   regular dental visits, mental health, injury prevention such as wearing seat belt and preventing falls, healthy  nutrition, healthy weight, regular physical exercise. Alcohol use is moderate.  Tobacco history-none. Drug use-none.  STD's-not at risk.          Health Maintenance   Topic Date Due   • DXA SCAN  12/18/2020 (Originally 3/9/2020)   • LIPID PANEL  06/02/2021   • TDAP/TD VACCINES (2 - Td) 08/28/2022   • INFLUENZA VACCINE  Completed   • ZOSTER VACCINE  Completed        Subjective   History of Present Illness    Thuy Clark is a 85 y.o. female who presents for a Subsequent Wellness Visit and follow up chronic conditions including afib,hypertension,hyperlipidemia.    HPI  Rectal bleeding now resolved.  Status post hemorrhoidectomy thank you with a colorectal surgeon at [facility].  Doing well.  No further rectal bleeding.  She continues to take Vasculera twice a day.    CHRONIC CONDITIONS    BPs controlled on bisoprolol.    She notices very little in the way of palpitations.  It does not bother her.  No chest pain, or rapid heartbeat, or edema, or increase in dyspnea with exertion.    For hyperlipidemia, she eats a low-fat diet most of the time.  She does get some physical activity at home.    No falls over the last year.  She uses a cane for ambulation.  She denies any joint pains or back pain.    She has chronic urinary retention and does self-catheterization and has for a long time.  She sees Dr. Somers regularly.    For osteoporosis, she takes calcium and vitamin D3.    The following portions of the patient's history were reviewed and updated as appropriate: allergies, current medications, past family history, past medical history, past social history, past surgical history and problem list.    Outpatient Medications Prior to Visit   Medication Sig Dispense Refill   • bisoprolol (ZEBeta) 5 MG tablet Take 2 tablets by mouth Daily. 30 tablet 3   • Cholecalciferol  (VITAMIN D3) 1000 units capsule Take 1 capsule by mouth Daily.     • Dietary Management Product (VASCULERA) tablet Take 1 tablet by mouth Daily. 90 tablet 3   • gabapentin (NEURONTIN) 100 MG capsule Take 100 mg by mouth 2 (Two) Times a Day. Take one in the morning and 1 in the evening if needed     • ondansetron ODT (ZOFRAN-ODT) 4 MG disintegrating tablet Place 1 tablet on the tongue 4 (Four) Times a Day As Needed for Nausea or Vomiting. 15 tablet 0   • polyethylene glycol (MIRALAX) 17 g packet Take 17 g by mouth 2 (Two) Times a Day. 100 each 5   • Probiotic Product (PROBIOTIC PEARLS ADVANTAGE PO) 1  PRN     • apixaban (ELIQUIS) 2.5 MG tablet tablet Take 2 tablets by mouth 2 (two) times a day. 112 tablet 0   • Coenzyme Q10 (CO Q-10) 100 MG capsule Take 1 capsule by mouth Daily.     • docusate sodium (COLACE) 100 MG capsule Take 1 capsule by mouth 2 (Two) Times a Day. 60 capsule 0   • MEGARED OMEGA-3 KRILL  MG capsule Take 1 capsule by mouth every day       No facility-administered medications prior to visit.        Patient Active Problem List   Diagnosis   • Pulmonary hypertension (CMS/HCC)   • Benign essential hypertension   • Mixed hyperlipidemia   • Atrial septal defect determined by imaging   • Abnormal glucose   • Generalized anxiety disorder   • Valvular heart disease   • Senile osteoporosis   • PVC's (premature ventricular contractions)   • Calculus of gallbladder   • Urinary retention   • Right knee pain   • Osteoarthritis   • Quadriceps weakness   • Constipation, slow transit   • Atrial flutter with rapid ventricular response (CMS/HCC)   • Gait disorder   • Primary osteoarthritis of both knees   • At high risk for falls   • Medicare annual wellness visit, subsequent       Advance Care Planning:  ACP discussion was held with the patient during this visit. Patient does not have an advance directive, information provided.    Identification of Risk Factors:  Risk factors include: Cardiovascular  risk  Fall Risk.    Review of Systems   Constitutional: Negative for chills, fatigue and fever.   HENT: Positive for tinnitus. Negative for congestion, ear pain, hearing loss and sinus pressure.    Eyes: Negative for visual disturbance.   Respiratory: Negative for cough, chest tightness, shortness of breath and wheezing.    Cardiovascular: Positive for palpitations. Negative for chest pain and leg swelling.   Gastrointestinal: Positive for constipation. Negative for abdominal pain and blood in stool.   Endocrine: Negative for cold intolerance and heat intolerance.   Genitourinary: Negative for dysuria, frequency and hematuria.   Musculoskeletal: Negative for arthralgias, back pain and gait problem.   Skin: Negative for color change and rash.   Allergic/Immunologic: Negative for environmental allergies.   Neurological: Negative for dizziness and headaches.   Hematological: Negative for adenopathy. Does not bruise/bleed easily.   Psychiatric/Behavioral: Negative for confusion, dysphoric mood, sleep disturbance and suicidal ideas. The patient is not nervous/anxious.        Compared to one year ago, the patient feels her physical health is the same.  Compared to one year ago, the patient feels her mental health is the same.    Objective     Physical Exam  Vitals signs and nursing note reviewed.   Constitutional:       Appearance: She is well-developed.   HENT:      Head: Normocephalic.   Eyes:      Conjunctiva/sclera: Conjunctivae normal.      Pupils: Pupils are equal, round, and reactive to light.   Neck:      Musculoskeletal: Normal range of motion and neck supple.      Thyroid: No thyromegaly.   Cardiovascular:      Rate and Rhythm: Normal rate and regular rhythm.      Heart sounds: Murmur present. Systolic murmur present with a grade of 2/6.   Pulmonary:      Effort: Pulmonary effort is normal.      Breath sounds: Normal breath sounds. No wheezing.   Chest:      Breasts:         Right: No inverted nipple, mass,  nipple discharge, skin change or tenderness.         Left: No inverted nipple, mass, nipple discharge, skin change or tenderness.   Abdominal:      General: Bowel sounds are normal.      Palpations: Abdomen is soft.      Tenderness: There is no abdominal tenderness.   Musculoskeletal: Normal range of motion.         General: No tenderness.      Right knee: She exhibits deformity. She exhibits no swelling. No tenderness found.      Left knee: She exhibits deformity. She exhibits no swelling. No tenderness found.   Lymphadenopathy:      Cervical: No cervical adenopathy.      Upper Body:      Right upper body: No supraclavicular, axillary or pectoral adenopathy.      Left upper body: No supraclavicular, axillary or pectoral adenopathy.   Skin:     General: Skin is warm and dry.      Findings: No rash.   Neurological:      Mental Status: She is alert and oriented to person, place, and time.      Cranial Nerves: No cranial nerve deficit.      Sensory: No sensory deficit.      Motor: Motor function is intact.      Coordination: Coordination normal.      Gait: Gait abnormal.   Psychiatric:         Attention and Perception: Attention normal.         Mood and Affect: Mood and affect normal.         Speech: Speech normal.         Behavior: Behavior normal.         Thought Content: Thought content normal.         Cognition and Memory: Cognition normal.         Judgment: Judgment normal.            ECG 12 Lead    Date/Time: 12/18/2020 4:30 PM  Performed by: Isidra Eisenberg MD  Authorized by: Isidra Eisenberg MD   Comparison: compared with previous ECG   Similar to previous ECG  Rhythm: atrial fibrillation  Rate: normal  BPM: 84  Conduction: conduction normal  ST Segments: ST segments normal  T Waves: T waves normal  QRS axis: normal    Clinical impression: abnormal EKG             Vitals:    12/18/20 1354   BP: 124/88   BP Location: Left arm   Patient Position: Sitting   Cuff Size: Adult   Pulse: 92   Temp: 97.3 °F (36.3  "°C)   TempSrc: Temporal   SpO2: 98%   Weight: 64 kg (141 lb)   Height: 162.6 cm (64.02\")   PainSc: 0-No pain       Patient's Body mass index is 24.19 kg/m². BMI is within normal parameters. No follow-up required..      Assessment/Plan   Problem List Items Addressed This Visit        Cardiovascular and Mediastinum    Benign essential hypertension    Overview     12/18/2020  Isidra Eisenberg MD    Continue bisoprolol 10mg daily.      Continue to avoid salt in the diet.  Avoid sodas.         Relevant Medications    bisoprolol (ZEBeta) 5 MG tablet    Mixed hyperlipidemia    Overview     12/18/2020 Isidra Eisenberg MD    Recent labs show normal LDL and triglycerides.    Continue low fat diet and regular physical activity.         Relevant Orders    CBC & Differential (Completed)    Comprehensive Metabolic Panel (Completed)    Lipid Panel (Completed)    TSH (Completed)    Valvular heart disease    Overview     12/18/2020 Isidra Eisenberg MD    Continue Eliquis twice a day and bisoprolol once a day.         Relevant Medications    bisoprolol (ZEBeta) 5 MG tablet       Digestive    Constipation, slow transit    Overview     12/18/2020 Isidra Eisenberg MD    Continue MiraLAX and stool softeners and probiotic every day.      Continue drinking lots of fluids every day.              Musculoskeletal and Integument    Senile osteoporosis    Overview     12/18/2020 Isidra Eisenberg MD    Continue regular weight bearing exercises including handweights and walking.  Continue taking calcium and vitamin D.          Relevant Orders    Vitamin D 25 Hydroxy (Completed)       Genitourinary    Urinary retention    Overview     12/18/2020 Isidra Eisenberg MD     Continue self catheterization regularly. Continue regular follow up with Dr. Marroquin.            Other    At high risk for falls (Chronic)    Overview     12/18/2020 Isidra Eisenberg MD    Doing some daily exercises does help prevent falls and prevent injuries from " falls.    Continue using the cane at all times.    Fall prevention discussed and education given.         Abnormal glucose    Overview     12/18/2020 Isidra Eisenberg MD    Hemoglobin A1c shows that the average blood sugar is now in the normal range.    Continue to avoid sugars and snack foods in the diet.         Relevant Orders    Hemoglobin A1c (Completed)    Gait disorder    Overview     12/18/2020 Isidra Eisenberg MD    Continue regular physical activity and exercise in the house. Use small handweights daily when watching TV.     Continue using cane, especially when going out.          Medicare annual wellness visit, subsequent - Primary    Overview     12/18/2020 Isidra Eisenberg MD    She is up-to-date on flu shot, Tdap, and hepatitis A vaccinations.  Pneumovax 23 Valent given today.  She will get the shingles vaccine at her pharmacy.               Other Visit Diagnoses     Permanent atrial fibrillation (CMS/HCC)        Relevant Orders    ECG 12 Lead    Need for vaccination        Relevant Orders    Pneumococcal Polysaccharide Vaccine 23-Valent Greater Than or Equal To 3yo Subcutaneous / IM (Completed)        Patient Self-Management and Personalized Health Advice  The patient has been provided with information about: diet, exercise and prevention of cardiac or vascular disease and preventive services including:   · Annual Wellness Visit (AWV).    Outpatient Encounter Medications as of 12/18/2020   Medication Sig Dispense Refill   • apixaban (ELIQUIS) 2.5 MG tablet tablet Take 2 tablets by mouth 2 (two) times a day. 14 tablet 0   • bisoprolol (ZEBeta) 5 MG tablet Take 2 tablets by mouth Daily. 30 tablet 3   • Cholecalciferol (VITAMIN D3) 1000 units capsule Take 1 capsule by mouth Daily.     • Dietary Management Product (VASCULERA) tablet Take 1 tablet by mouth Daily. 90 tablet 3   • gabapentin (NEURONTIN) 100 MG capsule Take 100 mg by mouth 2 (Two) Times a Day. Take one in the morning and 1 in the evening  if needed     • ondansetron ODT (ZOFRAN-ODT) 4 MG disintegrating tablet Place 1 tablet on the tongue 4 (Four) Times a Day As Needed for Nausea or Vomiting. 15 tablet 0   • polyethylene glycol (MIRALAX) 17 g packet Take 17 g by mouth 2 (Two) Times a Day. 100 each 5   • Probiotic Product (PROBIOTIC PEARLS ADVANTAGE PO) 1  PRN     • [DISCONTINUED] apixaban (ELIQUIS) 2.5 MG tablet tablet Take 2 tablets by mouth 2 (two) times a day. 112 tablet 0   • docusate sodium (COLACE) 100 MG capsule Take 1 capsule by mouth 2 (Two) Times a Day. 180 capsule 3   • [DISCONTINUED] Coenzyme Q10 (CO Q-10) 100 MG capsule Take 1 capsule by mouth Daily.     • [DISCONTINUED] docusate sodium (COLACE) 100 MG capsule Take 1 capsule by mouth 2 (Two) Times a Day. 60 capsule 0   • [DISCONTINUED] MEGARED OMEGA-3 KRILL  MG capsule Take 1 capsule by mouth every day       No facility-administered encounter medications on file as of 12/18/2020.        Reviewed use of high risk medication in the elderly: yes  Reviewed for potential of harmful drug interactions in the elderly: yes    Follow Up:  No follow-ups on file.     There are no Patient Instructions on file for this visit.    An After Visit Summary and PPPS with all of these plans were given to the patient.

## 2020-12-30 PROCEDURE — 80053 COMPREHEN METABOLIC PANEL: CPT

## 2020-12-30 PROCEDURE — 86900 BLOOD TYPING SEROLOGIC ABO: CPT

## 2020-12-30 PROCEDURE — 86850 RBC ANTIBODY SCREEN: CPT

## 2020-12-30 PROCEDURE — 86901 BLOOD TYPING SEROLOGIC RH(D): CPT

## 2020-12-30 PROCEDURE — 99284 EMERGENCY DEPT VISIT MOD MDM: CPT

## 2020-12-30 PROCEDURE — 85025 COMPLETE CBC W/AUTO DIFF WBC: CPT

## 2020-12-30 PROCEDURE — 83605 ASSAY OF LACTIC ACID: CPT

## 2020-12-30 RX ORDER — SODIUM CHLORIDE 0.9 % (FLUSH) 0.9 %
10 SYRINGE (ML) INJECTION AS NEEDED
Status: DISCONTINUED | OUTPATIENT
Start: 2020-12-30 | End: 2020-12-31 | Stop reason: HOSPADM

## 2020-12-31 ENCOUNTER — HOSPITAL ENCOUNTER (EMERGENCY)
Facility: HOSPITAL | Age: 85
Discharge: HOME | End: 2020-12-31
Attending: EMERGENCY MEDICINE | Admitting: EMERGENCY MEDICINE

## 2020-12-31 VITALS
HEART RATE: 75 BPM | WEIGHT: 143 LBS | TEMPERATURE: 97.8 F | RESPIRATION RATE: 18 BRPM | SYSTOLIC BLOOD PRESSURE: 133 MMHG | DIASTOLIC BLOOD PRESSURE: 85 MMHG | BODY MASS INDEX: 24.41 KG/M2 | OXYGEN SATURATION: 94 % | HEIGHT: 64 IN

## 2020-12-31 DIAGNOSIS — K64.9 HEMORRHOIDS, UNSPECIFIED HEMORRHOID TYPE: Primary | ICD-10-CM

## 2020-12-31 LAB
ABO GROUP BLD: NORMAL
ALBUMIN SERPL-MCNC: 4 G/DL (ref 3.5–5.2)
ALBUMIN/GLOB SERPL: 1.5 G/DL
ALP SERPL-CCNC: 105 U/L (ref 39–117)
ALT SERPL W P-5'-P-CCNC: 24 U/L (ref 1–33)
ANION GAP SERPL CALCULATED.3IONS-SCNC: 12 MMOL/L (ref 5–15)
AST SERPL-CCNC: 25 U/L (ref 1–32)
BASOPHILS # BLD AUTO: 0.05 10*3/MM3 (ref 0–0.2)
BASOPHILS NFR BLD AUTO: 0.8 % (ref 0–1.5)
BILIRUB SERPL-MCNC: 0.4 MG/DL (ref 0–1.2)
BLD GP AB SCN SERPL QL: NEGATIVE
BUN SERPL-MCNC: 10 MG/DL (ref 8–23)
BUN/CREAT SERPL: 11.9 (ref 7–25)
CALCIUM SPEC-SCNC: 8.8 MG/DL (ref 8.6–10.5)
CHLORIDE SERPL-SCNC: 95 MMOL/L (ref 98–107)
CO2 SERPL-SCNC: 22 MMOL/L (ref 22–29)
CREAT SERPL-MCNC: 0.84 MG/DL (ref 0.57–1)
D-LACTATE SERPL-SCNC: 1.8 MMOL/L (ref 0.5–2)
DEPRECATED RDW RBC AUTO: 43.4 FL (ref 37–54)
EOSINOPHIL # BLD AUTO: 0.09 10*3/MM3 (ref 0–0.4)
EOSINOPHIL NFR BLD AUTO: 1.4 % (ref 0.3–6.2)
ERYTHROCYTE [DISTWIDTH] IN BLOOD BY AUTOMATED COUNT: 12.6 % (ref 12.3–15.4)
GFR SERPL CREATININE-BSD FRML MDRD: 64 ML/MIN/1.73
GLOBULIN UR ELPH-MCNC: 2.6 GM/DL
GLUCOSE SERPL-MCNC: 116 MG/DL (ref 65–99)
HCT VFR BLD AUTO: 37.5 % (ref 34–46.6)
HGB BLD-MCNC: 11.8 G/DL (ref 12–15.9)
HOLD SPECIMEN: NORMAL
HOLD SPECIMEN: NORMAL
IMM GRANULOCYTES # BLD AUTO: 0.04 10*3/MM3 (ref 0–0.05)
IMM GRANULOCYTES NFR BLD AUTO: 0.6 % (ref 0–0.5)
LYMPHOCYTES # BLD AUTO: 1.74 10*3/MM3 (ref 0.7–3.1)
LYMPHOCYTES NFR BLD AUTO: 26.2 % (ref 19.6–45.3)
MCH RBC QN AUTO: 29.2 PG (ref 26.6–33)
MCHC RBC AUTO-ENTMCNC: 31.5 G/DL (ref 31.5–35.7)
MCV RBC AUTO: 92.8 FL (ref 79–97)
MONOCYTES # BLD AUTO: 0.64 10*3/MM3 (ref 0.1–0.9)
MONOCYTES NFR BLD AUTO: 9.6 % (ref 5–12)
NEUTROPHILS NFR BLD AUTO: 4.09 10*3/MM3 (ref 1.7–7)
NEUTROPHILS NFR BLD AUTO: 61.4 % (ref 42.7–76)
NRBC BLD AUTO-RTO: 0 /100 WBC (ref 0–0.2)
PLATELET # BLD AUTO: 276 10*3/MM3 (ref 140–450)
PMV BLD AUTO: 9 FL (ref 6–12)
POTASSIUM SERPL-SCNC: 4.2 MMOL/L (ref 3.5–5.2)
PROT SERPL-MCNC: 6.6 G/DL (ref 6–8.5)
RBC # BLD AUTO: 4.04 10*6/MM3 (ref 3.77–5.28)
RH BLD: POSITIVE
SODIUM SERPL-SCNC: 129 MMOL/L (ref 136–145)
T&S EXPIRATION DATE: NORMAL
WBC # BLD AUTO: 6.65 10*3/MM3 (ref 3.4–10.8)
WHOLE BLOOD HOLD SPECIMEN: NORMAL
WHOLE BLOOD HOLD SPECIMEN: NORMAL

## 2020-12-31 RX ORDER — HYDROCORTISONE ACETATE 25 MG/1
25 SUPPOSITORY RECTAL 2 TIMES DAILY
Qty: 12 SUPPOSITORY | Refills: 0 | Status: SHIPPED | OUTPATIENT
Start: 2020-12-31 | End: 2021-06-25

## 2021-01-04 ENCOUNTER — PATIENT OUTREACH (OUTPATIENT)
Dept: CASE MANAGEMENT | Facility: OTHER | Age: 86
End: 2021-01-04

## 2021-01-04 NOTE — OUTREACH NOTE
"Patient Outreach Note    Pt contacted regarding ED visit 12/31/2020 with chief c/o rectal bleeding. She had also called RN-ACM earlier today, but did not leave message.   Sates she \"had 3 hemorrhoids tied off by Dr. Ornelas and they were starting to dry and flake off, and I got worried b/c I am on Eliquis.  She has since communicated with her PCP, Dr. Eisenberg via [employer].  She thinks she needs colonoscopy b/c \"my stools just are not right.\"  Offered to assist in scheduling appt with Dr. Eisenberg to discuss, however she states she already has gastroenterologist, Dr. Lili Hebert and she will contact her.  She had questions regarding  need for consult visit prior, pillcam, potential surgery.  Encouraged to jot down questions she had to discuss with Dr. Hebert or her office.  She states \"I am making notes right now and I will call.  She voiced her appreciation for the call and knows to call for any future needs.     Sabina Quinones RN  Ambulatory     1/4/2021, 11:19 EST      "

## 2021-01-19 ENCOUNTER — PATIENT OUTREACH (OUTPATIENT)
Dept: CASE MANAGEMENT | Facility: OTHER | Age: 86
End: 2021-01-19

## 2021-01-19 NOTE — OUTREACH NOTE
Patient Outreach Note    Pt called RN-ACM with questions regarding Covid 19 vaccine.  She has signed up with  for vaccine, but worries about logistic of site and if she should schedule with [facility] instead. She wanted to know if  was drive through, if there were steps and if had access to w/c.  Contacted  Covid vaccine assistance line.  The site at [facility] is a drive through.  If different site, they do have one that has 1 big step.  They will be able to obtain w/c, however there is not a number to call for it, they would need to find personnel to ask for one.     Pt called again to let me know that she was able to register for [facility] Covid 19 vaccine, however they will call her back with date and time. Gave her info above. [facility] Vaccine site will be at [facility], which will have easy access.  She states she is just going to go with whichever gets her in the soonest.    Voiced her appreciation for the info.       Sabina Quinones RN  Ambulatory     1/19/2021, 12:28 EST

## 2021-03-02 ENCOUNTER — TELEPHONE (OUTPATIENT)
Dept: INTERNAL MEDICINE | Facility: CLINIC | Age: 86
End: 2021-03-02

## 2021-03-02 NOTE — TELEPHONE ENCOUNTER
Please see previous message. Pt advised that you are out of the office and she is ok to wait until you return.

## 2021-03-02 NOTE — TELEPHONE ENCOUNTER
I would recommend patient gets back in touch with Dr. Eisenberg when she is back next week to discuss all options.  Thanks

## 2021-03-02 NOTE — TELEPHONE ENCOUNTER
Patient called and stated she is still having discomfort with neuropathy in her both of her feet. The arthritis medication did not help. Wants to know if the provider had any ideals on how to help with this,     Can the patient take gabapentin she has the medication at home.    Please advise at [phone]

## 2021-03-03 NOTE — TELEPHONE ENCOUNTER
Pt advised per Dr. Eisenberg, she verbalized understanding. She states she doesn't want to schedule an appt at this time.

## 2021-03-03 NOTE — TELEPHONE ENCOUNTER
She may take the gabapentin she has at home.  Start with 100 mg capsule every evening.  If tolerated fine, she may increase to twice a day after 2 days.  Make her an appointment to see me or APC next week to follow-up and further discuss her symptoms.

## 2021-03-10 DIAGNOSIS — M79.672 FOOT PAIN, BILATERAL: Primary | ICD-10-CM

## 2021-03-10 DIAGNOSIS — M79.671 FOOT PAIN, BILATERAL: Primary | ICD-10-CM

## 2021-03-10 RX ORDER — GABAPENTIN 100 MG/1
100 CAPSULE ORAL NIGHTLY
Qty: 30 CAPSULE | Refills: 2 | Status: SHIPPED | OUTPATIENT
Start: 2021-03-10 | End: 2021-03-29 | Stop reason: SDUPTHER

## 2021-03-22 ENCOUNTER — PATIENT MESSAGE (OUTPATIENT)
Dept: INTERNAL MEDICINE | Facility: CLINIC | Age: 86
End: 2021-03-22

## 2021-03-22 NOTE — TELEPHONE ENCOUNTER
From: Thuy Clark  To: Isidra Eisenberg MD  Sent: 3/22/2021 4:45 PM EDT  Subject: Non-Urgent Medical Question    [employer]

## 2021-03-22 NOTE — TELEPHONE ENCOUNTER
From: Thuy Clark  To: Isidra Eisenberg MD  Sent: 3/22/2021 4:34 PM EDT  Subject: Non-Urgent Medical Question    First shot, Jan 27. Second shot, Feb 23 at [facility]. No reaction occurred.

## 2021-03-23 RX ORDER — DIOSMIN COMPLEX NO.1 630 MG
1 TABLET ORAL 2 TIMES DAILY
Qty: 180 TABLET | Refills: 3 | Status: SHIPPED | OUTPATIENT
Start: 2021-03-23 | End: 2021-08-25 | Stop reason: SDUPTHER

## 2021-03-29 DIAGNOSIS — M79.671 FOOT PAIN, BILATERAL: ICD-10-CM

## 2021-03-29 DIAGNOSIS — M79.672 FOOT PAIN, BILATERAL: ICD-10-CM

## 2021-03-29 RX ORDER — GABAPENTIN 100 MG/1
200 CAPSULE ORAL NIGHTLY
Qty: 60 CAPSULE | Refills: 2 | Status: ON HOLD | OUTPATIENT
Start: 2021-03-29 | End: 2021-06-09 | Stop reason: SDUPTHER

## 2021-04-30 ENCOUNTER — PATIENT OUTREACH (OUTPATIENT)
Dept: CASE MANAGEMENT | Facility: OTHER | Age: 86
End: 2021-04-30

## 2021-04-30 NOTE — OUTREACH NOTE
"Patient Outreach Note    Pt contacted RN-ACM for cardiology phone number.  States she has talked with her niece that sees Dr. Waters and might check with Dr. Eisenberg regarding referral to him.  Also, wanted name of ER MD that saw her in 2019.  (Dr. Sabas Kuo).  Explained that she saw her as hospitalist at that time.  \"Just want her number in case a cardiologist needs to confer with her.\"  Both phone numbers provided.       Sabina Quinones RN  Ambulatory     4/30/2021, 09:45 EDT      "

## 2021-05-13 DIAGNOSIS — I10 BENIGN ESSENTIAL HYPERTENSION: Primary | ICD-10-CM

## 2021-05-13 DIAGNOSIS — E78.2 MIXED HYPERLIPIDEMIA: ICD-10-CM

## 2021-05-13 DIAGNOSIS — R73.09 ABNORMAL GLUCOSE: ICD-10-CM

## 2021-05-13 DIAGNOSIS — M81.0 SENILE OSTEOPOROSIS: ICD-10-CM

## 2021-05-13 RX ORDER — ONDANSETRON 4 MG/1
4 TABLET, ORALLY DISINTEGRATING ORAL 4 TIMES DAILY PRN
Qty: 15 TABLET | Refills: 0 | Status: SHIPPED | OUTPATIENT
Start: 2021-05-13 | End: 2021-05-13 | Stop reason: SDUPTHER

## 2021-05-13 RX ORDER — ONDANSETRON 4 MG/1
4 TABLET, ORALLY DISINTEGRATING ORAL 4 TIMES DAILY PRN
Qty: 15 TABLET | Refills: 0 | Status: SHIPPED | OUTPATIENT
Start: 2021-05-13 | End: 2021-12-20 | Stop reason: SDUPTHER

## 2021-05-24 ENCOUNTER — TELEPHONE (OUTPATIENT)
Dept: INTERNAL MEDICINE | Facility: CLINIC | Age: 86
End: 2021-05-24

## 2021-05-24 NOTE — TELEPHONE ENCOUNTER
We do not have records from her recent visit with Dr. Ornelas.  It would be good if she could get those sent to us.

## 2021-05-24 NOTE — TELEPHONE ENCOUNTER
Pt saw Dr. Ornelas at [facility] for hemorrhoids on 5/18. Pt states that she hasn't had a BM since then. She has been taking dulcolax and Dr. Ornelas suggested mag citrate for her ongoing constipation and she just got that. Dr. Ornelas told her to take as much mag citrate as she wants but she is unsure as to how much to take. She would like to know what you recommend.

## 2021-05-24 NOTE — TELEPHONE ENCOUNTER
Pt verbalized good understanding. She was also wondering about her [place] clinic records and if you needed them.

## 2021-05-24 NOTE — TELEPHONE ENCOUNTER
Caller: Thuy Clark    Relationship: Self    Best call back number: [phone]    What is the best time to reach you: ANYTIME    Who are you requesting to speak with (clinical staff, provider,  specific staff member): CLINICAL STAFF    Do you know the name of the person who called: SELF    What was the call regarding: PATIENT STATES THAT SHE NEEDS HELP WITH SOMETHING.    Do you require a callback: YES

## 2021-05-25 NOTE — TELEPHONE ENCOUNTER
SENT FAX REQUEST TO [facility] (FAX # [phone]) TO OBTAIN RECORDS FROM DR. MOORE'S OFFICE

## 2021-05-27 ENCOUNTER — TELEPHONE (OUTPATIENT)
Dept: INTERNAL MEDICINE | Facility: CLINIC | Age: 86
End: 2021-05-27

## 2021-05-27 ENCOUNTER — HOSPITAL ENCOUNTER (INPATIENT)
Facility: HOSPITAL | Age: 86
LOS: 13 days | Discharge: NURSING, REHAB OR LONG-TERM CARE | End: 2021-06-09
Attending: EMERGENCY MEDICINE | Admitting: INTERNAL MEDICINE

## 2021-05-27 DIAGNOSIS — M79.671 FOOT PAIN, BILATERAL: ICD-10-CM

## 2021-05-27 DIAGNOSIS — E87.1 HYPONATREMIA: ICD-10-CM

## 2021-05-27 DIAGNOSIS — Z74.09 IMPAIRED FUNCTIONAL MOBILITY, BALANCE, GAIT, AND ENDURANCE: ICD-10-CM

## 2021-05-27 DIAGNOSIS — R41.82 ALTERED MENTAL STATUS, UNSPECIFIED ALTERED MENTAL STATUS TYPE: Primary | ICD-10-CM

## 2021-05-27 DIAGNOSIS — M79.672 FOOT PAIN, BILATERAL: ICD-10-CM

## 2021-05-27 PROBLEM — I48.21 PERMANENT ATRIAL FIBRILLATION: Status: ACTIVE | Noted: 2021-05-27

## 2021-05-27 PROBLEM — Z79.01 CHRONIC ANTICOAGULATION: Status: ACTIVE | Noted: 2021-05-27

## 2021-05-27 LAB
ALBUMIN SERPL-MCNC: 3.8 G/DL (ref 3.5–5.2)
ALBUMIN/GLOB SERPL: 1.5 G/DL
ALP SERPL-CCNC: 127 U/L (ref 39–117)
ALT SERPL W P-5'-P-CCNC: 13 U/L (ref 1–33)
ANION GAP SERPL CALCULATED.3IONS-SCNC: 12 MMOL/L (ref 5–15)
AST SERPL-CCNC: 24 U/L (ref 1–32)
BASOPHILS # BLD AUTO: 0.02 10*3/MM3 (ref 0–0.2)
BASOPHILS NFR BLD AUTO: 0.2 % (ref 0–1.5)
BILIRUB SERPL-MCNC: 0.7 MG/DL (ref 0–1.2)
BILIRUB UR QL STRIP: NEGATIVE
BUN SERPL-MCNC: 10 MG/DL (ref 8–23)
BUN/CREAT SERPL: 15.9 (ref 7–25)
CALCIUM SPEC-SCNC: 8.7 MG/DL (ref 8.6–10.5)
CHLORIDE SERPL-SCNC: 79 MMOL/L (ref 98–107)
CLARITY UR: CLEAR
CO2 SERPL-SCNC: 18 MMOL/L (ref 22–29)
COLOR UR: YELLOW
CORTIS SERPL-MCNC: 27.92 MCG/DL
CREAT SERPL-MCNC: 0.63 MG/DL (ref 0.57–1)
DEPRECATED RDW RBC AUTO: 42.2 FL (ref 37–54)
EOSINOPHIL # BLD AUTO: 0.03 10*3/MM3 (ref 0–0.4)
EOSINOPHIL NFR BLD AUTO: 0.3 % (ref 0.3–6.2)
ERYTHROCYTE [DISTWIDTH] IN BLOOD BY AUTOMATED COUNT: 14 % (ref 12.3–15.4)
GFR SERPL CREATININE-BSD FRML MDRD: 90 ML/MIN/1.73
GLOBULIN UR ELPH-MCNC: 2.6 GM/DL
GLUCOSE SERPL-MCNC: 113 MG/DL (ref 65–99)
GLUCOSE UR STRIP-MCNC: NEGATIVE MG/DL
HCT VFR BLD AUTO: 32.1 % (ref 34–46.6)
HGB BLD-MCNC: 11 G/DL (ref 12–15.9)
HGB UR QL STRIP.AUTO: NEGATIVE
HOLD SPECIMEN: NORMAL
IMM GRANULOCYTES # BLD AUTO: 0.05 10*3/MM3 (ref 0–0.05)
IMM GRANULOCYTES NFR BLD AUTO: 0.4 % (ref 0–0.5)
KETONES UR QL STRIP: ABNORMAL
LEUKOCYTE ESTERASE UR QL STRIP.AUTO: NEGATIVE
LYMPHOCYTES # BLD AUTO: 1.11 10*3/MM3 (ref 0.7–3.1)
LYMPHOCYTES NFR BLD AUTO: 10 % (ref 19.6–45.3)
MCH RBC QN AUTO: 28.4 PG (ref 26.6–33)
MCHC RBC AUTO-ENTMCNC: 34.3 G/DL (ref 31.5–35.7)
MCV RBC AUTO: 82.7 FL (ref 79–97)
MONOCYTES # BLD AUTO: 0.92 10*3/MM3 (ref 0.1–0.9)
MONOCYTES NFR BLD AUTO: 8.3 % (ref 5–12)
NEUTROPHILS NFR BLD AUTO: 8.99 10*3/MM3 (ref 1.7–7)
NEUTROPHILS NFR BLD AUTO: 80.8 % (ref 42.7–76)
NITRITE UR QL STRIP: NEGATIVE
NRBC BLD AUTO-RTO: 0 /100 WBC (ref 0–0.2)
OSMOLALITY SERPL: 229 MOSM/KG (ref 275–295)
OSMOLALITY UR: 345 MOSM/KG (ref 300–1100)
PH UR STRIP.AUTO: 6.5 [PH] (ref 5–8)
PLATELET # BLD AUTO: 374 10*3/MM3 (ref 140–450)
PMV BLD AUTO: 8.5 FL (ref 6–12)
POTASSIUM SERPL-SCNC: 4.8 MMOL/L (ref 3.5–5.2)
PROT SERPL-MCNC: 6.4 G/DL (ref 6–8.5)
PROT UR QL STRIP: NEGATIVE
RBC # BLD AUTO: 3.88 10*6/MM3 (ref 3.77–5.28)
SODIUM SERPL-SCNC: 109 MMOL/L (ref 136–145)
SODIUM SERPL-SCNC: 113 MMOL/L (ref 136–145)
SODIUM SERPL-SCNC: 115 MMOL/L (ref 136–145)
SODIUM SERPL-SCNC: 116 MMOL/L (ref 136–145)
SODIUM SERPL-SCNC: 117 MMOL/L (ref 136–145)
SODIUM UR-SCNC: 57 MMOL/L
SP GR UR STRIP: 1.01 (ref 1–1.03)
TSH SERPL DL<=0.05 MIU/L-ACNC: 2.36 UIU/ML (ref 0.27–4.2)
UROBILINOGEN UR QL STRIP: ABNORMAL
WBC # BLD AUTO: 11.12 10*3/MM3 (ref 3.4–10.8)
WHOLE BLOOD HOLD SPECIMEN: NORMAL
WHOLE BLOOD HOLD SPECIMEN: NORMAL

## 2021-05-27 PROCEDURE — 84295 ASSAY OF SERUM SODIUM: CPT | Performed by: INTERNAL MEDICINE

## 2021-05-27 PROCEDURE — 84443 ASSAY THYROID STIM HORMONE: CPT | Performed by: INTERNAL MEDICINE

## 2021-05-27 PROCEDURE — 80053 COMPREHEN METABOLIC PANEL: CPT

## 2021-05-27 PROCEDURE — 84300 ASSAY OF URINE SODIUM: CPT | Performed by: NURSE PRACTITIONER

## 2021-05-27 PROCEDURE — U0004 COV-19 TEST NON-CDC HGH THRU: HCPCS | Performed by: INTERNAL MEDICINE

## 2021-05-27 PROCEDURE — 81003 URINALYSIS AUTO W/O SCOPE: CPT | Performed by: EMERGENCY MEDICINE

## 2021-05-27 PROCEDURE — 83935 ASSAY OF URINE OSMOLALITY: CPT | Performed by: NURSE PRACTITIONER

## 2021-05-27 PROCEDURE — 99284 EMERGENCY DEPT VISIT MOD MDM: CPT

## 2021-05-27 PROCEDURE — 99291 CRITICAL CARE FIRST HOUR: CPT | Performed by: INTERNAL MEDICINE

## 2021-05-27 PROCEDURE — 82533 TOTAL CORTISOL: CPT | Performed by: INTERNAL MEDICINE

## 2021-05-27 PROCEDURE — P9612 CATHETERIZE FOR URINE SPEC: HCPCS

## 2021-05-27 PROCEDURE — U0005 INFEC AGEN DETEC AMPLI PROBE: HCPCS | Performed by: INTERNAL MEDICINE

## 2021-05-27 PROCEDURE — 99232 SBSQ HOSP IP/OBS MODERATE 35: CPT | Performed by: INTERNAL MEDICINE

## 2021-05-27 PROCEDURE — 83930 ASSAY OF BLOOD OSMOLALITY: CPT | Performed by: NURSE PRACTITIONER

## 2021-05-27 PROCEDURE — 85025 COMPLETE CBC W/AUTO DIFF WBC: CPT

## 2021-05-27 RX ORDER — FAMOTIDINE 20 MG/1
20 TABLET, FILM COATED ORAL DAILY
Status: DISCONTINUED | OUTPATIENT
Start: 2021-05-27 | End: 2021-05-29

## 2021-05-27 RX ORDER — LISINOPRIL 10 MG/1
10 TABLET ORAL NIGHTLY
COMMUNITY
End: 2021-06-09 | Stop reason: HOSPADM

## 2021-05-27 RX ORDER — AMOXICILLIN 250 MG
2 CAPSULE ORAL 2 TIMES DAILY
Status: DISCONTINUED | OUTPATIENT
Start: 2021-05-27 | End: 2021-06-03

## 2021-05-27 RX ORDER — BISOPROLOL FUMARATE 5 MG/1
10 TABLET, FILM COATED ORAL DAILY
Status: DISCONTINUED | OUTPATIENT
Start: 2021-05-27 | End: 2021-06-07

## 2021-05-27 RX ORDER — GABAPENTIN 100 MG/1
200 CAPSULE ORAL NIGHTLY
Status: DISCONTINUED | OUTPATIENT
Start: 2021-05-27 | End: 2021-06-09 | Stop reason: HOSPADM

## 2021-05-27 RX ORDER — HYDROCORTISONE ACETATE 25 MG/1
25 SUPPOSITORY RECTAL 2 TIMES DAILY
Status: DISCONTINUED | OUTPATIENT
Start: 2021-05-27 | End: 2021-06-09 | Stop reason: HOSPADM

## 2021-05-27 RX ORDER — SODIUM CHLORIDE 0.9 % (FLUSH) 0.9 %
10 SYRINGE (ML) INJECTION AS NEEDED
Status: DISCONTINUED | OUTPATIENT
Start: 2021-05-27 | End: 2021-05-29

## 2021-05-27 RX ADMIN — APIXABAN 5 MG: 5 TABLET, FILM COATED ORAL at 20:22

## 2021-05-27 RX ADMIN — SODIUM CHLORIDE 500 ML: 9 INJECTION, SOLUTION INTRAVENOUS at 03:41

## 2021-05-27 RX ADMIN — FAMOTIDINE 20 MG: 20 TABLET ORAL at 12:38

## 2021-05-27 RX ADMIN — DOCUSATE SODIUM 50MG AND SENNOSIDES 8.6MG 2 TABLET: 8.6; 5 TABLET, FILM COATED ORAL at 12:38

## 2021-05-27 RX ADMIN — DOCUSATE SODIUM 50MG AND SENNOSIDES 8.6MG 2 TABLET: 8.6; 5 TABLET, FILM COATED ORAL at 20:22

## 2021-05-27 RX ADMIN — HYDROCORTISONE ACETATE 25 MG: 25 SUPPOSITORY RECTAL at 16:13

## 2021-05-27 RX ADMIN — BISOPROLOL FUMARATE 10 MG: 5 TABLET, FILM COATED ORAL at 12:38

## 2021-05-27 RX ADMIN — APIXABAN 5 MG: 5 TABLET, FILM COATED ORAL at 12:38

## 2021-05-27 NOTE — TELEPHONE ENCOUNTER
Caller: HOLLY PEREZ    Relationship: Emergency Contact/PATIENT'S JACK    Best call back number:     [phone]     What is the best time to reach you:     ANY TIME    Who are you requesting to speak with (clinical staff, provider,  specific staff member):     PATIENT'S PCP - DR ORDAZ OR CLINICAL STAFF    Do you know the name of the person who called:     N/A    What was the call regarding:     CALLER STATED HER MOTHER/PATIENT HAS BEEN ADMITTED TO [facility] TODAY, 5/27/21    CALLER STATED THE REASON FOR PATIENT'S ADMITTANCE TO HOSPITAL WAS FOR SODIUM DEFICIENCY    Do you require a callback:     NO    CALLER STATED THE REASON FOR THE CALL WAS TO MAKE DR ORDAZ AWARE THAT HER MOTHER/PATIENT WAS IN THE HOSPITAL    HUB REQUESTED FOR CALLER TO CONTACT PCP WHEN DATE OF DISCHARGE WAS KNOWN IN ORDER TO SET A HOSPITAL FOLLOW UP APPOINTMENT     DR ORLIN MD

## 2021-05-28 LAB
ALBUMIN SERPL-MCNC: 3.1 G/DL (ref 3.5–5.2)
ALBUMIN/GLOB SERPL: 1.2 G/DL
ALP SERPL-CCNC: 114 U/L (ref 39–117)
ALT SERPL W P-5'-P-CCNC: 11 U/L (ref 1–33)
ANION GAP SERPL CALCULATED.3IONS-SCNC: 11 MMOL/L (ref 5–15)
AST SERPL-CCNC: 17 U/L (ref 1–32)
BASOPHILS # BLD AUTO: 0.02 10*3/MM3 (ref 0–0.2)
BASOPHILS NFR BLD AUTO: 0.3 % (ref 0–1.5)
BILIRUB SERPL-MCNC: 0.6 MG/DL (ref 0–1.2)
BUN SERPL-MCNC: 6 MG/DL (ref 8–23)
BUN/CREAT SERPL: 11.3 (ref 7–25)
CALCIUM SPEC-SCNC: 8.3 MG/DL (ref 8.6–10.5)
CHLORIDE SERPL-SCNC: 87 MMOL/L (ref 98–107)
CO2 SERPL-SCNC: 18 MMOL/L (ref 22–29)
CREAT SERPL-MCNC: 0.53 MG/DL (ref 0.57–1)
DEPRECATED RDW RBC AUTO: 45.6 FL (ref 37–54)
EOSINOPHIL # BLD AUTO: 0.06 10*3/MM3 (ref 0–0.4)
EOSINOPHIL NFR BLD AUTO: 0.8 % (ref 0.3–6.2)
ERYTHROCYTE [DISTWIDTH] IN BLOOD BY AUTOMATED COUNT: 14.2 % (ref 12.3–15.4)
GFR SERPL CREATININE-BSD FRML MDRD: 110 ML/MIN/1.73
GLOBULIN UR ELPH-MCNC: 2.6 GM/DL
GLUCOSE SERPL-MCNC: 84 MG/DL (ref 65–99)
HCT VFR BLD AUTO: 36.5 % (ref 34–46.6)
HGB BLD-MCNC: 11.9 G/DL (ref 12–15.9)
IMM GRANULOCYTES # BLD AUTO: 0.04 10*3/MM3 (ref 0–0.05)
IMM GRANULOCYTES NFR BLD AUTO: 0.5 % (ref 0–0.5)
LYMPHOCYTES # BLD AUTO: 1.21 10*3/MM3 (ref 0.7–3.1)
LYMPHOCYTES NFR BLD AUTO: 15.3 % (ref 19.6–45.3)
MAGNESIUM SERPL-MCNC: 1.9 MG/DL (ref 1.6–2.4)
MCH RBC QN AUTO: 28.5 PG (ref 26.6–33)
MCHC RBC AUTO-ENTMCNC: 32.6 G/DL (ref 31.5–35.7)
MCV RBC AUTO: 87.5 FL (ref 79–97)
MONOCYTES # BLD AUTO: 0.66 10*3/MM3 (ref 0.1–0.9)
MONOCYTES NFR BLD AUTO: 8.4 % (ref 5–12)
NEUTROPHILS NFR BLD AUTO: 5.9 10*3/MM3 (ref 1.7–7)
NEUTROPHILS NFR BLD AUTO: 74.7 % (ref 42.7–76)
NRBC BLD AUTO-RTO: 0 /100 WBC (ref 0–0.2)
PHOSPHATE SERPL-MCNC: 3 MG/DL (ref 2.5–4.5)
PLATELET # BLD AUTO: 344 10*3/MM3 (ref 140–450)
PMV BLD AUTO: 8.6 FL (ref 6–12)
POTASSIUM SERPL-SCNC: 4.6 MMOL/L (ref 3.5–5.2)
PROT SERPL-MCNC: 5.7 G/DL (ref 6–8.5)
RBC # BLD AUTO: 4.17 10*6/MM3 (ref 3.77–5.28)
SARS-COV-2 RNA NOSE QL NAA+PROBE: NOT DETECTED
SODIUM SERPL-SCNC: 115 MMOL/L (ref 136–145)
SODIUM SERPL-SCNC: 116 MMOL/L (ref 136–145)
SODIUM SERPL-SCNC: 121 MMOL/L (ref 136–145)
WBC # BLD AUTO: 7.89 10*3/MM3 (ref 3.4–10.8)

## 2021-05-28 PROCEDURE — 99232 SBSQ HOSP IP/OBS MODERATE 35: CPT | Performed by: INTERNAL MEDICINE

## 2021-05-28 PROCEDURE — 84295 ASSAY OF SERUM SODIUM: CPT | Performed by: INTERNAL MEDICINE

## 2021-05-28 PROCEDURE — 84100 ASSAY OF PHOSPHORUS: CPT | Performed by: INTERNAL MEDICINE

## 2021-05-28 PROCEDURE — 85025 COMPLETE CBC W/AUTO DIFF WBC: CPT | Performed by: INTERNAL MEDICINE

## 2021-05-28 PROCEDURE — 83735 ASSAY OF MAGNESIUM: CPT | Performed by: INTERNAL MEDICINE

## 2021-05-28 PROCEDURE — 80053 COMPREHEN METABOLIC PANEL: CPT | Performed by: INTERNAL MEDICINE

## 2021-05-28 RX ORDER — 3% SODIUM CHLORIDE 3 G/100ML
100 INJECTION, SOLUTION INTRAVENOUS ONCE
Status: COMPLETED | OUTPATIENT
Start: 2021-05-28 | End: 2021-05-28

## 2021-05-28 RX ADMIN — FAMOTIDINE 20 MG: 20 TABLET ORAL at 08:50

## 2021-05-28 RX ADMIN — APIXABAN 5 MG: 5 TABLET, FILM COATED ORAL at 08:50

## 2021-05-28 RX ADMIN — HYDROCORTISONE ACETATE 25 MG: 25 SUPPOSITORY RECTAL at 17:31

## 2021-05-28 RX ADMIN — HYDROCORTISONE ACETATE 25 MG: 25 SUPPOSITORY RECTAL at 02:45

## 2021-05-28 RX ADMIN — DOCUSATE SODIUM 50MG AND SENNOSIDES 8.6MG 2 TABLET: 8.6; 5 TABLET, FILM COATED ORAL at 08:50

## 2021-05-28 RX ADMIN — BISOPROLOL FUMARATE 10 MG: 5 TABLET, FILM COATED ORAL at 11:21

## 2021-05-28 RX ADMIN — APIXABAN 5 MG: 5 TABLET, FILM COATED ORAL at 20:40

## 2021-05-28 RX ADMIN — SODIUM CHLORIDE 100 ML: 3 INJECTION, SOLUTION INTRAVENOUS at 08:51

## 2021-05-29 LAB
ANION GAP SERPL CALCULATED.3IONS-SCNC: 7 MMOL/L (ref 5–15)
BASOPHILS # BLD AUTO: 0.03 10*3/MM3 (ref 0–0.2)
BASOPHILS NFR BLD AUTO: 0.5 % (ref 0–1.5)
BUN SERPL-MCNC: 7 MG/DL (ref 8–23)
BUN/CREAT SERPL: 12.3 (ref 7–25)
CALCIUM SPEC-SCNC: 8.6 MG/DL (ref 8.6–10.5)
CHLORIDE SERPL-SCNC: 91 MMOL/L (ref 98–107)
CO2 SERPL-SCNC: 25 MMOL/L (ref 22–29)
CREAT SERPL-MCNC: 0.57 MG/DL (ref 0.57–1)
DEPRECATED RDW RBC AUTO: 44.3 FL (ref 37–54)
EOSINOPHIL # BLD AUTO: 0.11 10*3/MM3 (ref 0–0.4)
EOSINOPHIL NFR BLD AUTO: 1.8 % (ref 0.3–6.2)
ERYTHROCYTE [DISTWIDTH] IN BLOOD BY AUTOMATED COUNT: 14.3 % (ref 12.3–15.4)
GFR SERPL CREATININE-BSD FRML MDRD: 101 ML/MIN/1.73
GLUCOSE SERPL-MCNC: 89 MG/DL (ref 65–99)
HCT VFR BLD AUTO: 34 % (ref 34–46.6)
HGB BLD-MCNC: 11.2 G/DL (ref 12–15.9)
IMM GRANULOCYTES # BLD AUTO: 0.03 10*3/MM3 (ref 0–0.05)
IMM GRANULOCYTES NFR BLD AUTO: 0.5 % (ref 0–0.5)
LYMPHOCYTES # BLD AUTO: 1.07 10*3/MM3 (ref 0.7–3.1)
LYMPHOCYTES NFR BLD AUTO: 17.7 % (ref 19.6–45.3)
MAGNESIUM SERPL-MCNC: 1.9 MG/DL (ref 1.6–2.4)
MCH RBC QN AUTO: 28.1 PG (ref 26.6–33)
MCHC RBC AUTO-ENTMCNC: 32.9 G/DL (ref 31.5–35.7)
MCV RBC AUTO: 85.2 FL (ref 79–97)
MONOCYTES # BLD AUTO: 0.49 10*3/MM3 (ref 0.1–0.9)
MONOCYTES NFR BLD AUTO: 8.1 % (ref 5–12)
NEUTROPHILS NFR BLD AUTO: 4.32 10*3/MM3 (ref 1.7–7)
NEUTROPHILS NFR BLD AUTO: 71.4 % (ref 42.7–76)
NRBC BLD AUTO-RTO: 0 /100 WBC (ref 0–0.2)
PHOSPHATE SERPL-MCNC: 3.4 MG/DL (ref 2.5–4.5)
PLATELET # BLD AUTO: 378 10*3/MM3 (ref 140–450)
PMV BLD AUTO: 8.8 FL (ref 6–12)
POTASSIUM SERPL-SCNC: 4.6 MMOL/L (ref 3.5–5.2)
RBC # BLD AUTO: 3.99 10*6/MM3 (ref 3.77–5.28)
SODIUM SERPL-SCNC: 121 MMOL/L (ref 136–145)
SODIUM SERPL-SCNC: 123 MMOL/L (ref 136–145)
WBC # BLD AUTO: 6.05 10*3/MM3 (ref 3.4–10.8)

## 2021-05-29 PROCEDURE — 85025 COMPLETE CBC W/AUTO DIFF WBC: CPT | Performed by: INTERNAL MEDICINE

## 2021-05-29 PROCEDURE — 80048 BASIC METABOLIC PNL TOTAL CA: CPT | Performed by: INTERNAL MEDICINE

## 2021-05-29 PROCEDURE — 84100 ASSAY OF PHOSPHORUS: CPT | Performed by: INTERNAL MEDICINE

## 2021-05-29 PROCEDURE — 99232 SBSQ HOSP IP/OBS MODERATE 35: CPT | Performed by: INTERNAL MEDICINE

## 2021-05-29 PROCEDURE — 84295 ASSAY OF SERUM SODIUM: CPT | Performed by: INTERNAL MEDICINE

## 2021-05-29 PROCEDURE — 83735 ASSAY OF MAGNESIUM: CPT | Performed by: INTERNAL MEDICINE

## 2021-05-29 RX ADMIN — BISOPROLOL FUMARATE 10 MG: 5 TABLET, FILM COATED ORAL at 08:06

## 2021-05-29 RX ADMIN — APIXABAN 5 MG: 5 TABLET, FILM COATED ORAL at 08:06

## 2021-05-29 RX ADMIN — DOCUSATE SODIUM 50MG AND SENNOSIDES 8.6MG 2 TABLET: 8.6; 5 TABLET, FILM COATED ORAL at 08:06

## 2021-05-29 RX ADMIN — GABAPENTIN 200 MG: 100 CAPSULE ORAL at 20:32

## 2021-05-29 RX ADMIN — FAMOTIDINE 20 MG: 20 TABLET ORAL at 08:06

## 2021-05-29 RX ADMIN — HYDROCORTISONE ACETATE 25 MG: 25 SUPPOSITORY RECTAL at 20:33

## 2021-05-29 RX ADMIN — DOCUSATE SODIUM 50MG AND SENNOSIDES 8.6MG 2 TABLET: 8.6; 5 TABLET, FILM COATED ORAL at 20:33

## 2021-05-29 RX ADMIN — APIXABAN 5 MG: 5 TABLET, FILM COATED ORAL at 20:32

## 2021-05-30 ENCOUNTER — APPOINTMENT (OUTPATIENT)
Dept: CT IMAGING | Facility: HOSPITAL | Age: 86
End: 2021-05-30

## 2021-05-30 LAB
ANION GAP SERPL CALCULATED.3IONS-SCNC: 8 MMOL/L (ref 5–15)
BASOPHILS # BLD AUTO: 0.05 10*3/MM3 (ref 0–0.2)
BASOPHILS NFR BLD AUTO: 0.8 % (ref 0–1.5)
BUN SERPL-MCNC: 7 MG/DL (ref 8–23)
BUN/CREAT SERPL: 12.3 (ref 7–25)
CALCIUM SPEC-SCNC: 8.5 MG/DL (ref 8.6–10.5)
CHLORIDE SERPL-SCNC: 92 MMOL/L (ref 98–107)
CO2 SERPL-SCNC: 25 MMOL/L (ref 22–29)
CREAT SERPL-MCNC: 0.57 MG/DL (ref 0.57–1)
DEPRECATED RDW RBC AUTO: 45.2 FL (ref 37–54)
EOSINOPHIL # BLD AUTO: 0.14 10*3/MM3 (ref 0–0.4)
EOSINOPHIL NFR BLD AUTO: 2.3 % (ref 0.3–6.2)
ERYTHROCYTE [DISTWIDTH] IN BLOOD BY AUTOMATED COUNT: 14.2 % (ref 12.3–15.4)
GFR SERPL CREATININE-BSD FRML MDRD: 101 ML/MIN/1.73
GLUCOSE SERPL-MCNC: 98 MG/DL (ref 65–99)
HCT VFR BLD AUTO: 34 % (ref 34–46.6)
HGB BLD-MCNC: 11.3 G/DL (ref 12–15.9)
IMM GRANULOCYTES # BLD AUTO: 0.04 10*3/MM3 (ref 0–0.05)
IMM GRANULOCYTES NFR BLD AUTO: 0.7 % (ref 0–0.5)
LYMPHOCYTES # BLD AUTO: 1.46 10*3/MM3 (ref 0.7–3.1)
LYMPHOCYTES NFR BLD AUTO: 24.1 % (ref 19.6–45.3)
MCH RBC QN AUTO: 28.8 PG (ref 26.6–33)
MCHC RBC AUTO-ENTMCNC: 33.2 G/DL (ref 31.5–35.7)
MCV RBC AUTO: 86.5 FL (ref 79–97)
MONOCYTES # BLD AUTO: 0.55 10*3/MM3 (ref 0.1–0.9)
MONOCYTES NFR BLD AUTO: 9.1 % (ref 5–12)
NEUTROPHILS NFR BLD AUTO: 3.81 10*3/MM3 (ref 1.7–7)
NEUTROPHILS NFR BLD AUTO: 63 % (ref 42.7–76)
NRBC BLD AUTO-RTO: 0 /100 WBC (ref 0–0.2)
PLATELET # BLD AUTO: 408 10*3/MM3 (ref 140–450)
PMV BLD AUTO: 8.4 FL (ref 6–12)
POTASSIUM SERPL-SCNC: 4.5 MMOL/L (ref 3.5–5.2)
RBC # BLD AUTO: 3.93 10*6/MM3 (ref 3.77–5.28)
SODIUM SERPL-SCNC: 121 MMOL/L (ref 136–145)
SODIUM SERPL-SCNC: 125 MMOL/L (ref 136–145)
WBC # BLD AUTO: 6.05 10*3/MM3 (ref 3.4–10.8)

## 2021-05-30 PROCEDURE — 84295 ASSAY OF SERUM SODIUM: CPT | Performed by: INTERNAL MEDICINE

## 2021-05-30 PROCEDURE — 97166 OT EVAL MOD COMPLEX 45 MIN: CPT

## 2021-05-30 PROCEDURE — 97110 THERAPEUTIC EXERCISES: CPT

## 2021-05-30 PROCEDURE — 99233 SBSQ HOSP IP/OBS HIGH 50: CPT | Performed by: INTERNAL MEDICINE

## 2021-05-30 PROCEDURE — 80048 BASIC METABOLIC PNL TOTAL CA: CPT | Performed by: INTERNAL MEDICINE

## 2021-05-30 PROCEDURE — 85025 COMPLETE CBC W/AUTO DIFF WBC: CPT | Performed by: INTERNAL MEDICINE

## 2021-05-30 PROCEDURE — 71250 CT THORAX DX C-: CPT

## 2021-05-30 PROCEDURE — 97162 PT EVAL MOD COMPLEX 30 MIN: CPT

## 2021-05-30 RX ORDER — METOCLOPRAMIDE 5 MG/1
5 TABLET ORAL
Status: DISCONTINUED | OUTPATIENT
Start: 2021-05-30 | End: 2021-06-09 | Stop reason: HOSPADM

## 2021-05-30 RX ORDER — SODIUM CHLORIDE 1000 MG
1 TABLET, SOLUBLE MISCELLANEOUS 2 TIMES DAILY WITH MEALS
Status: DISCONTINUED | OUTPATIENT
Start: 2021-05-30 | End: 2021-06-01

## 2021-05-30 RX ADMIN — DOCUSATE SODIUM 50MG AND SENNOSIDES 8.6MG 2 TABLET: 8.6; 5 TABLET, FILM COATED ORAL at 21:59

## 2021-05-30 RX ADMIN — DOCUSATE SODIUM 50MG AND SENNOSIDES 8.6MG 2 TABLET: 8.6; 5 TABLET, FILM COATED ORAL at 09:19

## 2021-05-30 RX ADMIN — METOCLOPRAMIDE 5 MG: 5 TABLET ORAL at 13:05

## 2021-05-30 RX ADMIN — HYDROCORTISONE ACETATE 25 MG: 25 SUPPOSITORY RECTAL at 21:59

## 2021-05-30 RX ADMIN — METOCLOPRAMIDE 5 MG: 5 TABLET ORAL at 17:06

## 2021-05-30 RX ADMIN — APIXABAN 5 MG: 5 TABLET, FILM COATED ORAL at 09:19

## 2021-05-30 RX ADMIN — Medication 1 G: at 17:06

## 2021-05-30 RX ADMIN — HYDROCORTISONE ACETATE 25 MG: 25 SUPPOSITORY RECTAL at 10:50

## 2021-05-30 RX ADMIN — APIXABAN 5 MG: 5 TABLET, FILM COATED ORAL at 21:59

## 2021-05-30 RX ADMIN — BISOPROLOL FUMARATE 10 MG: 5 TABLET, FILM COATED ORAL at 09:19

## 2021-05-31 LAB
ANION GAP SERPL CALCULATED.3IONS-SCNC: 9 MMOL/L (ref 5–15)
BUN SERPL-MCNC: 6 MG/DL (ref 8–23)
BUN/CREAT SERPL: 12 (ref 7–25)
CALCIUM SPEC-SCNC: 9.1 MG/DL (ref 8.6–10.5)
CHLORIDE SERPL-SCNC: 92 MMOL/L (ref 98–107)
CO2 SERPL-SCNC: 24 MMOL/L (ref 22–29)
CREAT SERPL-MCNC: 0.5 MG/DL (ref 0.57–1)
GFR SERPL CREATININE-BSD FRML MDRD: 117 ML/MIN/1.73
GLUCOSE SERPL-MCNC: 105 MG/DL (ref 65–99)
POTASSIUM SERPL-SCNC: 4 MMOL/L (ref 3.5–5.2)
SODIUM SERPL-SCNC: 125 MMOL/L (ref 136–145)
SODIUM SERPL-SCNC: 126 MMOL/L (ref 136–145)

## 2021-05-31 PROCEDURE — 80048 BASIC METABOLIC PNL TOTAL CA: CPT | Performed by: INTERNAL MEDICINE

## 2021-05-31 PROCEDURE — 99233 SBSQ HOSP IP/OBS HIGH 50: CPT | Performed by: INTERNAL MEDICINE

## 2021-05-31 PROCEDURE — 84295 ASSAY OF SERUM SODIUM: CPT | Performed by: INTERNAL MEDICINE

## 2021-05-31 RX ADMIN — BISOPROLOL FUMARATE 10 MG: 5 TABLET, FILM COATED ORAL at 08:50

## 2021-05-31 RX ADMIN — DOCUSATE SODIUM 50MG AND SENNOSIDES 8.6MG 2 TABLET: 8.6; 5 TABLET, FILM COATED ORAL at 08:50

## 2021-05-31 RX ADMIN — METOCLOPRAMIDE 5 MG: 5 TABLET ORAL at 11:47

## 2021-05-31 RX ADMIN — APIXABAN 5 MG: 5 TABLET, FILM COATED ORAL at 21:11

## 2021-05-31 RX ADMIN — METOCLOPRAMIDE 5 MG: 5 TABLET ORAL at 08:50

## 2021-05-31 RX ADMIN — Medication 1 G: at 08:50

## 2021-05-31 RX ADMIN — Medication 1 G: at 16:33

## 2021-05-31 RX ADMIN — DOCUSATE SODIUM 50MG AND SENNOSIDES 8.6MG 2 TABLET: 8.6; 5 TABLET, FILM COATED ORAL at 21:11

## 2021-05-31 RX ADMIN — METOCLOPRAMIDE 5 MG: 5 TABLET ORAL at 16:33

## 2021-05-31 RX ADMIN — APIXABAN 5 MG: 5 TABLET, FILM COATED ORAL at 08:50

## 2021-06-01 ENCOUNTER — TELEPHONE (OUTPATIENT)
Dept: INTERNAL MEDICINE | Facility: CLINIC | Age: 86
End: 2021-06-01

## 2021-06-01 LAB
ANION GAP SERPL CALCULATED.3IONS-SCNC: 7 MMOL/L (ref 5–15)
BUN SERPL-MCNC: 8 MG/DL (ref 8–23)
BUN/CREAT SERPL: 14 (ref 7–25)
CALCIUM SPEC-SCNC: 8.8 MG/DL (ref 8.6–10.5)
CHLORIDE SERPL-SCNC: 93 MMOL/L (ref 98–107)
CO2 SERPL-SCNC: 26 MMOL/L (ref 22–29)
CREAT SERPL-MCNC: 0.57 MG/DL (ref 0.57–1)
GFR SERPL CREATININE-BSD FRML MDRD: 101 ML/MIN/1.73
GLUCOSE SERPL-MCNC: 97 MG/DL (ref 65–99)
POTASSIUM SERPL-SCNC: 4.1 MMOL/L (ref 3.5–5.2)
SODIUM SERPL-SCNC: 124 MMOL/L (ref 136–145)
SODIUM SERPL-SCNC: 126 MMOL/L (ref 136–145)

## 2021-06-01 PROCEDURE — 97530 THERAPEUTIC ACTIVITIES: CPT

## 2021-06-01 PROCEDURE — 80048 BASIC METABOLIC PNL TOTAL CA: CPT | Performed by: INTERNAL MEDICINE

## 2021-06-01 PROCEDURE — 97116 GAIT TRAINING THERAPY: CPT | Performed by: PHYSICAL THERAPIST

## 2021-06-01 PROCEDURE — 84295 ASSAY OF SERUM SODIUM: CPT | Performed by: INTERNAL MEDICINE

## 2021-06-01 PROCEDURE — 99233 SBSQ HOSP IP/OBS HIGH 50: CPT | Performed by: INTERNAL MEDICINE

## 2021-06-01 RX ORDER — SODIUM CHLORIDE 1000 MG
1 TABLET, SOLUBLE MISCELLANEOUS
Status: DISCONTINUED | OUTPATIENT
Start: 2021-06-01 | End: 2021-06-02

## 2021-06-01 RX ORDER — FUROSEMIDE 20 MG/1
20 TABLET ORAL ONCE
Status: COMPLETED | OUTPATIENT
Start: 2021-06-01 | End: 2021-06-01

## 2021-06-01 RX ADMIN — DOCUSATE SODIUM 50MG AND SENNOSIDES 8.6MG 2 TABLET: 8.6; 5 TABLET, FILM COATED ORAL at 21:03

## 2021-06-01 RX ADMIN — HYDROCORTISONE ACETATE 25 MG: 25 SUPPOSITORY RECTAL at 21:03

## 2021-06-01 RX ADMIN — Medication 1 G: at 17:25

## 2021-06-01 RX ADMIN — METOCLOPRAMIDE 5 MG: 5 TABLET ORAL at 08:03

## 2021-06-01 RX ADMIN — FUROSEMIDE 20 MG: 20 TABLET ORAL at 10:11

## 2021-06-01 RX ADMIN — METOCLOPRAMIDE 5 MG: 5 TABLET ORAL at 17:25

## 2021-06-01 RX ADMIN — BISOPROLOL FUMARATE 10 MG: 5 TABLET, FILM COATED ORAL at 08:03

## 2021-06-01 RX ADMIN — Medication 1 G: at 11:48

## 2021-06-01 RX ADMIN — APIXABAN 5 MG: 5 TABLET, FILM COATED ORAL at 21:03

## 2021-06-01 RX ADMIN — APIXABAN 5 MG: 5 TABLET, FILM COATED ORAL at 08:03

## 2021-06-01 RX ADMIN — METOCLOPRAMIDE 5 MG: 5 TABLET ORAL at 11:48

## 2021-06-01 RX ADMIN — Medication 1 G: at 08:03

## 2021-06-01 NOTE — PLAN OF CARE
Goal Outcome Evaluation:  Plan of Care Reviewed With: patient  Progress: improving  Outcome Summary: Pt progressing well towards skilled PT goals.  Pt completed sit<-->stand with SBA and ambulated 180 feet using rw with SBAx1 - no unsteadiness or LOB noted.  LE HEP issued - reviewed each exercise.  Continue with skilled PT to improve mobility and safety.

## 2021-06-01 NOTE — THERAPY TREATMENT NOTE
Patient Name: Thuy Clark  : 1935    MRN: [id number]                              Today's Date: 2021       Admit Date: 2021    Visit Dx:     ICD-10-CM ICD-9-CM   1. Altered mental status, unspecified altered mental status type  R41.82 780.97   2. Hyponatremia  E87.1 276.1   3. Impaired functional mobility, balance, gait, and endurance  Z74.09 V49.89     Patient Active Problem List   Diagnosis   • Pulmonary hypertension (CMS/HCC)   • Benign essential hypertension   • Mixed hyperlipidemia   • Atrial septal defect determined by imaging   • Abnormal glucose   • Generalized anxiety disorder   • Valvular heart disease   • Senile osteoporosis   • PVC's (premature ventricular contractions)   • Calculus of gallbladder   • Urinary retention   • Right knee pain   • Osteoarthritis   • Quadriceps weakness   • Constipation, slow transit   • Atrial flutter with rapid ventricular response (CMS/HCC)   • Gait disorder   • Primary osteoarthritis of both knees   • At high risk for falls   • Medicare annual wellness visit, subsequent   • Hyponatremia   • Chronic anticoagulation (Eliquis)   • Chronic atrial fibrillation (CMS/HCC)   • Altered mental status     Past Medical History:   Diagnosis Date   • Bleeding hemorrhoid 3/8/2019   • Diverticulosis    • External hemorrhoid 2019 Isidra Eisenberg MD   Continue vasculera daily. Drink plenty of fluids.  Avoid constipation.  Follow-up with Dr. Hebert as planned.   • Hx of colonic polyps    • Hypertension    • Intraductal papilloma     R intraductal patheloma   • Left upper extremity numbness     L upper extremity numbness-ER visit; neuropathy   • Overweight (BMI 25.0-29.9) 2019   • Self-catheterizes urinary bladder      Past Surgical History:   Procedure Laterality Date   • BREAST LUMPECTOMY     • CHOLECYSTECTOMY     • HYSTERECTOMY     • VAGINECTOMY       General Information     Row Name 21 1536          Physical Therapy  Time and Intention    Document Type  therapy note (daily note)  -LM     Mode of Treatment  individual therapy;physical therapy  -LM     Row Name 06/01/21 1536          General Information    Patient Profile Reviewed  yes  -LM     Existing Precautions/Restrictions  no known precautions/restrictions  -LM     Row Name 06/01/21 1536          Cognition    Orientation Status (Cognition)  oriented x 4  -LM     Row Name 06/01/21 1536          Safety Issues, Functional Mobility    Impairments Affecting Function (Mobility)  endurance/activity tolerance;strength  -LM       User Key  (r) = Recorded By, (t) = Taken By, (c) = Cosigned By    Initials Name Provider Type    LM Debora Mart PT Physical Therapist        Mobility     Row Name 06/01/21 1536          Bed Mobility    Comment (Bed Mobility)  Pt sitting up in chair at initial and end of tx.  -LM     Row Name 06/01/21 1536          Sit-Stand Transfer    Sit-Stand [place] (Transfers)  standby assist  -LM     Assistive Device (Sit-Stand Transfers)  walker, front-wheeled  -LM     Row Name 06/01/21 1536          Gait/Stairs (Locomotion)    [place] Level (Gait)  standby assist;1 person assist;verbal cues  -LM     Assistive Device (Gait)  walker, front-wheeled  -LM     Distance in Feet (Gait)  180 feet  -LM     Deviations/Abnormal Patterns (Gait)  base of support, narrow  -LM     Bilateral Gait Deviations  forward flexed posture  -LM     Comment (Gait/Stairs)  Vc's for upright posture.  No unsteadiness noted.  -LM       User Key  (r) = Recorded By, (t) = Taken By, (c) = Cosigned By    Initials Name Provider Type    LM Debora Mart PT Physical Therapist        Obj/Interventions     Row Name 06/01/21 1536          Motor Skills    Therapeutic Exercise  -- Issued HEP and thoroughly reviewed exercises which include: Supine - AP, HS, QS, Bridges, Hip Abd/Add, SLR; Sitting - LAQ; Standing - Mini Squats, Hip Ext, Heel Raises  -LM       User Key  (r) = Recorded By, (t) =  Taken By, (c) = Cosigned By    Initials Name Provider Type    Debora Acuna, PT Physical Therapist        Goals/Plan    No documentation.       Clinical Impression     Row Name 06/01/21 1536          Pain    Additional Documentation  Pain Scale: Numbers Pre/Post-Treatment (Group)  -LM     Row Name 06/01/21 1536          Pain Scale: Numbers Pre/Post-Treatment    Pretreatment Pain Rating  0/10 - no pain  -LM     Posttreatment Pain Rating  0/10 - no pain  -LM     Row Name 06/01/21 1536          Plan of Care Review    Plan of Care Reviewed With  patient  -LM     Progress  improving  -LM     Outcome Summary  Pt progressing well towards skilled PT goals.  Pt completed sit<-->stand with SBA and ambulated 180 feet using rw with SBAx1 - no unsteadiness or LOB noted.  LE HEP issued - reviewed each exercise.  Continue with skilled PT to improve mobility and safety.  -LM     Row Name 06/01/21 1536          Positioning and Restraints    Pre-Treatment Position  sitting in chair/recliner  -LM     Post Treatment Position  chair  -LM     In Chair  sitting;call light within reach;encouraged to call for assist;with family/caregiver;notified nsg  -LM       User Key  (r) = Recorded By, (t) = Taken By, (c) = Cosigned By    Initials Name Provider Type    Debora Acuna, PT Physical Therapist        Outcome Measures     Row Name 06/01/21 1536          How much help from another person do you currently need...    Turning from your back to your side while in flat bed without using bedrails?  4  -LM     Moving from lying on back to sitting on the side of a flat bed without bedrails?  4  -LM     Moving to and from a bed to a chair (including a wheelchair)?  3  -LM     Standing up from a chair using your arms (e.g., wheelchair, bedside chair)?  3  -LM     Climbing 3-5 steps with a railing?  3  -LM     To walk in hospital room?  3  -LM     AM-PAC 6 Clicks Score (PT)  20  -LM     Row Name 06/01/21 1536          Functional Assessment     Outcome Measure Options  AM-PAC 6 Clicks Basic Mobility (PT)  -LM       User Key  (r) = Recorded By, (t) = Taken By, (c) = Cosigned By    Initials Name Provider Type    Debora Acuna PT Physical Therapist        Physical Therapy Education                 Title: PT OT SLP Therapies (Done)     Topic: Physical Therapy (Done)     Point: Mobility training (Done)     Learning Progress Summary           Patient Eager, E,H, VU,DU by  at 5/30/2021 1115                   Point: Home exercise program (Done)     Learning Progress Summary           Patient Eager, E,H, VU,DU by  at 5/30/2021 1115                   Point: Body mechanics (Done)     Learning Progress Summary           Patient Eager, E,H, VU,DU by  at 5/30/2021 1115                               User Key     Initials Effective Dates Name Provider Type [facility] 06/19/15 -  Dinah Peoples PT Physical Therapist PT              PT Recommendation and Plan     Plan of Care Reviewed With: patient  Progress: improving  Outcome Summary: Pt progressing well towards skilled PT goals.  Pt completed sit<-->stand with SBA and ambulated 180 feet using rw with SBAx1 - no unsteadiness or LOB noted.  LE HEP issued - reviewed each exercise.  Continue with skilled PT to improve mobility and safety.     Time Calculation:   PT Charges     Row Name 06/01/21 1536             Time Calculation    Start Time  1536  -LM      PT Received On  06/01/21  -LM      PT Goal Re-Cert Due Date  06/09/21  -LM         Timed Charges    27870 - PT Therapeutic Exercise Minutes  5  -LM      07068 - Gait Training Minutes   12  -LM         Total Minutes    Timed Charges Total Minutes  17  -LM       Total Minutes  17  -LM        User Key  (r) = Recorded By, (t) = Taken By, (c) = Cosigned By    Initials Name Provider Type    Debora Acuna PT Physical Therapist        Therapy Charges for Today     Code Description Service Date Service Provider Modifiers Qty    32409633795 HC GAIT TRAINING  EA 15 MIN 6/1/2021 Debora Mart, PT GP 1          PT G-Codes  Outcome Measure Options: AM-PAC 6 Clicks Basic Mobility (PT)  AM-PAC 6 Clicks Score (PT): 20  AM-PAC 6 Clicks Score (OT): 21    Debora Mart PT  6/1/2021

## 2021-06-01 NOTE — THERAPY TREATMENT NOTE
Patient Name: Thuy Clark  : 1935    MRN: [id number]                              Today's Date: 2021       Admit Date: 2021    Visit Dx:     ICD-10-CM ICD-9-CM   1. Altered mental status, unspecified altered mental status type  R41.82 780.97   2. Hyponatremia  E87.1 276.1   3. Impaired functional mobility, balance, gait, and endurance  Z74.09 V49.89     Patient Active Problem List   Diagnosis   • Pulmonary hypertension (CMS/HCC)   • Benign essential hypertension   • Mixed hyperlipidemia   • Atrial septal defect determined by imaging   • Abnormal glucose   • Generalized anxiety disorder   • Valvular heart disease   • Senile osteoporosis   • PVC's (premature ventricular contractions)   • Calculus of gallbladder   • Urinary retention   • Right knee pain   • Osteoarthritis   • Quadriceps weakness   • Constipation, slow transit   • Atrial flutter with rapid ventricular response (CMS/HCC)   • Gait disorder   • Primary osteoarthritis of both knees   • At high risk for falls   • Medicare annual wellness visit, subsequent   • Hyponatremia   • Chronic anticoagulation (Eliquis)   • Chronic atrial fibrillation (CMS/HCC)   • Altered mental status     Past Medical History:   Diagnosis Date   • Bleeding hemorrhoid 3/8/2019   • Diverticulosis    • External hemorrhoid 2019 Isidra Eisenberg MD   Continue vasculera daily. Drink plenty of fluids.  Avoid constipation.  Follow-up with Dr. Hebert as planned.   • Hx of colonic polyps    • Hypertension    • Intraductal papilloma     R intraductal patheloma   • Left upper extremity numbness     L upper extremity numbness-ER visit; neuropathy   • Overweight (BMI 25.0-29.9) 2019   • Self-catheterizes urinary bladder      Past Surgical History:   Procedure Laterality Date   • BREAST LUMPECTOMY     • CHOLECYSTECTOMY     • HYSTERECTOMY     • VAGINECTOMY       General Information     Row Name 21 1130          OT Time and  Intention    Document Type  therapy note (daily note)  -     Mode of Treatment  occupational therapy  -     Row Name 06/01/21 1130          General Information    Patient Profile Reviewed  yes  -     Existing Precautions/Restrictions  no known precautions/restrictions  -     Row Name 06/01/21 1130          Cognition    Orientation Status (Cognition)  oriented x 4  -     Row Name 06/01/21 1130          Safety Issues, Functional Mobility    Impairments Affecting Function (Mobility)  endurance/activity tolerance;strength  -       User Key  (r) = Recorded By, (t) = Taken By, (c) = Cosigned By    Initials Name Provider Type     Amy Chavez OT Occupational Therapist          Mobility/ADL's     Row Name 06/01/21 1130          Bed Mobility    Bed Mobility  supine-sit  -     Supine-Sit Ida (Bed Mobility)  modified independence  -     Assistive Device (Bed Mobility)  head of bed elevated  -     Row Name 06/01/21 1130          Transfers    Transfers  sit-stand transfer  -     Sit-Stand [place] (Transfers)  standby assist  -     Row Name 06/01/21 1130          Sit-Stand Transfer    Assistive Device (Sit-Stand Transfers)  walker, front-wheeled  -     Row Name 06/01/21 1130          Functional Mobility    Functional Mobility- Ind. Level  supervision required  -     Functional Mobility- Device  rolling walker  -     Functional Mobility-Distance (Feet)  140  -     Row Name 06/01/21 1130          Activities of Daily Living    BADL Assessment/Intervention  lower body dressing;grooming  -     Row Name 06/01/21 1130          Lower Body Dressing Assessment/Training    Ida Level (Lower Body Dressing)  doff;don;socks;independent  -     Position (Lower Body Dressing)  unsupported sitting  -     Row Name 06/01/21 1130          Grooming Assessment/Training    [place] Level (Grooming)  oral care regimen;standby assist  -     Position (Grooming)  unsupported standing  -        User Key  (r) = Recorded By, (t) = Taken By, (c) = Cosigned By    Initials Name Provider Type    Amy Hoffmann, JESÚS Occupational Therapist        Obj/Interventions     Row Name 06/01/21 1130          Shoulder (Therapeutic Exercise)    Shoulder (Therapeutic Exercise)  AROM (active range of motion)  -     Shoulder AROM (Therapeutic Exercise)  bilateral;flexion;extension;horizontal aBduction/aDduction 15 reps  -     Row Name 06/01/21 1130          Elbow/Forearm (Therapeutic Exercise)    Elbow/Forearm (Therapeutic Exercise)  AROM (active range of motion)  -     Elbow/Forearm AROM (Therapeutic Exercise)  bilateral;flexion;extension 15 reps  -     Row Name 06/01/21 1130          Therapeutic Exercise    Therapeutic Exercise  shoulder;elbow/forearm  -       User Key  (r) = Recorded By, (t) = Taken By, (c) = Cosigned By    Initials Name Provider Type    Amy Hoffmann, OT Occupational Therapist        Goals/Plan    No documentation.       Clinical Impression     Row Name 06/01/21 1130          Pain Assessment    Additional Documentation  Pain Scale: Numbers Pre/Post-Treatment (Group)  -[facility] Name 06/01/21 1130          Pain Scale: Numbers Pre/Post-Treatment    Pretreatment Pain Rating  0/10 - no pain  -     Posttreatment Pain Rating  0/10 - no pain  -     Row Name 06/01/21 1130          Plan of Care Review    Plan of Care Reviewed With  patient  -     Progress  improving  -     Outcome Summary  Pt independent to don socks, SBA sinkside grooming.  Pt completed 15 reps BUE TE without complaint.  Pt  supervision to ambulate 140 ft with RW.  Continue with OT POC.  Recommend home with HHOT upon d/c.  -     Row Name 06/01/21 1130          Therapy Plan Review/Discharge Plan (OT)    Anticipated Discharge Disposition (OT)  home with home health  -     Row Name 06/01/21 1130          Vital Signs    Pre Systolic BP Rehab  124  -AC     Pre Treatment Diastolic BP  60  -AC     Post Systolic BP Rehab   133  -AC     Post Treatment Diastolic BP  65  -AC     Pretreatment Heart Rate (beats/min)  80  -AC     Posttreatment Heart Rate (beats/min)  89  -AC     O2 Delivery Pre Treatment  room air  -AC     O2 Delivery Intra Treatment  room air  -AC     O2 Delivery Post Treatment  room air  -AC     Pre Patient Position  Supine  -AC     Intra Patient Position  Standing  -AC     Post Patient Position  Sitting  -AC     Row Name 06/01/21 1130          Positioning and Restraints    Pre-Treatment Position  in bed  -AC     Post Treatment Position  chair  -AC     In Chair  reclined;call light within reach;encouraged to call for assist;with family/caregiver;notified nsg family deferred exit alarm; RN aware  -AC       User Key  (r) = Recorded By, (t) = Taken By, (c) = Cosigned By    Initials Name Provider Type    Amy Hoffmann OT Occupational Therapist        Outcome Measures     Row Name 06/01/21 1130          How much help from another is currently needed...    Putting on and taking off regular lower body clothing?  3  -AC     Bathing (including washing, rinsing, and drying)  3  -AC     Toileting (which includes using toilet bed pan or urinal)  3  -AC     Putting on and taking off regular upper body clothing  4  -AC     Taking care of personal grooming (such as brushing teeth)  4  -AC     Eating meals  4  -AC     AM-PAC 6 Clicks Score (OT)  21  -AC     Row Name 06/01/21 1130          Functional Assessment    Outcome Measure Options  AM-PAC 6 Clicks Daily Activity (OT)  -AC       User Key  (r) = Recorded By, (t) = Taken By, (c) = Cosigned By    Initials Name Provider Type    Amy Hoffmann OT Occupational Therapist        Occupational Therapy Education                 Title: PT OT SLP Therapies (Done)     Topic: Occupational Therapy (Done)     Point: ADL training (Done)     Description:   Instruct learner(s) on proper safety adaptation and remediation techniques during self care or transfers.   Instruct in proper use of  assistive devices.              Learning Progress Summary           Patient Acceptance, E, VU by AC at 6/1/2021 1130    Eager, E,H, VU,DU by  at 5/30/2021 1115    Acceptance, E, VU,NR by TA at 5/30/2021 1100   Family Acceptance, E, VU by AC at 6/1/2021 1130                   Point: Home exercise program (Done)     Description:   Instruct learner(s) on appropriate technique for monitoring, assisting and/or progressing therapeutic exercises/activities.              Learning Progress Summary           Patient Eager, E,H, VU,DU by  at 5/30/2021 1115    Acceptance, E, VU,NR by TA at 5/30/2021 1100                   Point: Precautions (Done)     Description:   Instruct learner(s) on prescribed precautions during self-care and functional transfers.              Learning Progress Summary           Patient Eager, E,H, VU,DU by  at 5/30/2021 1115    Acceptance, E, VU,NR by TA at 5/30/2021 1100                   Point: Body mechanics (Done)     Description:   Instruct learner(s) on proper positioning and spine alignment during self-care, functional mobility activities and/or exercises.              Learning Progress Summary           Patient Eager, E,H, VU,DU by  at 5/30/2021 1115    Acceptance, E, VU,NR by TA at 5/30/2021 1100                               User Key     Initials Effective Dates Name Provider Type Discipline     06/23/15 -  Amy Chavez, OT Occupational Therapist OT     06/19/15 -  Dinah Peoples, PT Physical Therapist PT    TA 03/14/16 -  Arslan Coley, OT Occupational Therapist OT              OT Recommendation and Plan     Plan of Care Review  Plan of Care Reviewed With: patient  Progress: improving  Outcome Summary: Pt independent to don socks, SBA sinkside grooming.  Pt completed 15 reps BUE TE without complaint.  Pt  supervision to ambulate 140 ft with RW.  Continue with OT POC.  Recommend home with HHOT upon d/c.     Time Calculation:   Time Calculation- OT     Row Name 06/01/21  1130             Time Calculation- OT    OT Start Time  1130  -AC      OT Received On  06/01/21  -AC      OT Goal Re-Cert Due Date  06/09/21  -AC         Timed Charges    44425 - OT Therapeutic Exercise Minutes  5  -AC      06402 - OT Therapeutic Activity Minutes  5  -AC      46487 - OT Self Care/Mgmt Minutes  5  -AC         Total Minutes    Timed Charges Total Minutes  15  -AC       Total Minutes  15  -AC        User Key  (r) = Recorded By, (t) = Taken By, (c) = Cosigned By    Initials Name Provider Type    AC Amy Chavez, OT Occupational Therapist        Therapy Charges for Today     Code Description Service Date Service Provider Modifiers Qty    57902640797 HC OT THERAPEUTIC ACT EA 15 MIN 6/1/2021 Amy Chavez OT GO 1               Amy Chavez OT  6/1/2021

## 2021-06-01 NOTE — PROGRESS NOTES
[facility] Medicine Services  PROGRESS NOTE    Patient Name: Thuy Clark  : 1935  MRN: [id number]    Date of Admission: 2021  Primary Care Physician: Isidra Eisenberg MD    Subjective   Subjective     CC:  Low Na    HPI:  Feeling ok, bowels moving on reglan. Sodium slowly improving    ROS:  Gen- No fevers, chills  CV- No chest pain, palpitations  Resp- No cough, dyspnea  GI- No N/V/D, abd pain        Objective   Objective     Vital Signs:   Temp:  [98.3 °F (36.8 °C)-98.6 °F (37 °C)] 98.6 °F (37 °C)  Heart Rate:  [] 103  Resp:  [16-18] 16  BP: (124-134)/(60-83) 124/60        Physical Exam:  Constitutional: No acute distress, awake, alert  HENT: NCAT, mucous membranes moist  Respiratory: Clear to auscultation bilaterally, respiratory effort normal   Cardiovascular: RRR, no murmurs, rubs, or gallops  Gastrointestinal: Positive bowel sounds, soft, nontender, nondistended  Musculoskeletal: No bilateral ankle edema  Psychiatric: Appropriate affect, cooperative  Neurologic: Oriented x 3,  speech clear  Skin: No rashes    Results Reviewed:  Results from last 7 days   Lab Units 21  0511 21  0337 21  0543   WBC 10*3/mm3 6.05 6.05 7.89   HEMOGLOBIN g/dL 11.3* 11.2* 11.9*   HEMATOCRIT % 34.0 34.0 36.5   PLATELETS 10*3/mm3 408 378 344     Results from last 7 days   Lab Units 21  0417 21  1810 21  0456 21  0511 21  1204 21  0543 21  0014   SODIUM mmol/L 126* 126* 125* 125*  --  116* 109*   POTASSIUM mmol/L 4.1  --  4.0 4.5   < > 4.6 4.8   CHLORIDE mmol/L 93*  --  92* 92*   < > 87* 79*   CO2 mmol/L 26.0  --  24.0 25.0   < > 18.0* 18.0*   BUN mg/dL 8  --  6* 7*   < > 6* 10   CREATININE mg/dL 0.57  --  0.50* 0.57   < > 0.53* 0.63   GLUCOSE mg/dL 97  --  105* 98   < > 84 113*   CALCIUM mg/dL 8.8  --  9.1 8.5*   < > 8.3* 8.7   ALT (SGPT) U/L  --   --   --   --   --  11 13   AST (SGOT) U/L  --   --   --   --   --  17 24     < > = values in this interval not displayed.     Estimated Creatinine Clearance: 50.8 mL/min (by C-G formula based on SCr of 0.57 mg/dL).    Microbiology Results Abnormal     Procedure Component Value - Date/Time    COVID PRE-OP / PRE-PROCEDURE SCREENING ORDER (NO ISOLATION) - Swab, Nasopharynx [[id number]] Collected: 05/27/21 1448    Lab Status: Final result Specimen: Swab from Nasopharynx Updated: 05/28/21 1430    Narrative:      The following orders were created for panel order COVID PRE-OP / PRE-PROCEDURE SCREENING ORDER (NO ISOLATION) - Swab, Nasopharynx.  Procedure                               Abnormality         Status                     ---------                               -----------         ------                     COVID-19 PCR, [facility]...[[id number]]                      Final result                 Please view results for these tests on the individual orders.    COVID-19 PCR, [facility], NP SWAB IN [facility] VIRAL TRANSPORT MEDIA 24-30 HR TAT - Swab, Nasopharynx [[id number]] Collected: 05/27/21 1448    Lab Status: Final result Specimen: Swab from Nasopharynx Updated: 05/28/21 1430     SARS-CoV-2 GATO Not Detected          Imaging Results (Last 24 Hours)     ** No results found for the last 24 hours. **          Results for orders placed during the hospital encounter of 11/02/19    Adult Transthoracic Echo Complete With Contrast if Necessary Per Protocol    Interpretation Summary  · Left ventricular systolic function is normal. Estimated EF = 65%.  · Left ventricular wall thickness is consistent with borderline concentric hypertrophy.  · Left ventricular diastolic dysfunction (grade II) consistent with pseudonormalization.  · Right ventricular cavity is dilated.  · Left atrial volume is severely increased.  · Saline test results are positive for evidence of an intra-atrial shunt.  · Right atrial cavity size is severely dilated.  · Mild aortic valve regurgitation is present.  · Mild tricuspid valve  regurgitation is present.  · Estimated right ventricular systolic pressure from tricuspid regurgitation is mildly elevated (35-45 mmHg      I have reviewed the medications:  Scheduled Meds:apixaban, 5 mg, Oral, Q12H  bisoprolol, 10 mg, Oral, Daily  gabapentin, 200 mg, Oral, Nightly  hydrocortisone, 25 mg, Rectal, BID  metoclopramide, 5 mg, Oral, TID AC  sennosides-docusate, 2 tablet, Oral, BID  sodium chloride, 1 g, Oral, TID With Meals      Continuous Infusions:   PRN Meds:.    Assessment/Plan   Assessment & Plan     Active Hospital Problems    Diagnosis  POA   • **Hyponatremia [E87.1]  Yes   • Chronic anticoagulation (Eliquis) [Z79.01]  Not Applicable   • Chronic atrial fibrillation (CMS/HCC) [I48.21]  Yes   • Altered mental status [R41.82]  Yes   • Atrial flutter with rapid ventricular response (CMS/HCC) [I48.92]  Yes   • Constipation, slow transit [K59.01]  Yes   • Atrial septal defect determined by imaging [Q21.1]  Not Applicable      Resolved Hospital Problems   No resolved problems to display.        Brief Hospital Course to date:  Thuy Clark is a 85 y.o. female with past medical history history of A. fib, on Eliquis, hypertension, chronic urinary retention who does not intermittent self cath admitted on 5/27 for acute hyponatremia, symptomatic with AMS and transferred initially to the ICU for closer monitoring.  Urine studies consistent with possible ADH mechanism however she was not on any medications known to cause low sodium.  Sodium in the ER was 108.  She does have a history of chronic mild hyponatremia, the lowest previously being 123.       Only new medications prior to admission were Bactrim for a UTI started by her PCP at the [facility].  She was transferred up to general medical floor from ICU on 5/29.     Acute symptomatic hyponatremia  -Likely multifactorial including decreased solute intake, polydipsia as well as ADH mechanism  -Sodium improved, but not yet within normal range,  recheck in am  -CT chest negative for malignancy  -Continue 1.2 L fluid restriction  -increase salt tabs TID, give lasix 20 mg PO times one  - recheck in am     Hypertension  - stable     A. fib, rate controlled  -Continue bisoprolol  -On Eliquis     Constipation  - chronic, trial of reglan  - had one bm with first dose  - requested outpatient referral to [facility] at discharge    DVT Prophylaxis:  eliquis      Disposition: I expect the patient to be discharged once sodium at least up to 130, discussed with patient    CODE STATUS:   Code Status and Medical Interventions:   Ordered at: 05/27/21 0541     Code Status:    CPR     Medical Interventions (Level of Support Prior to Arrest):    Full       Gwendolyn Smith, DO  06/01/21

## 2021-06-01 NOTE — PLAN OF CARE
Goal Outcome Evaluation:  Plan of Care Reviewed With: patient  Progress: improving  Outcome Summary: Pt independent to don socks, SBA sinkside grooming.  Pt completed 15 reps BUE TE without complaint.  Pt  supervision to ambulate 140 ft with RW.  Continue with OT POC.  Recommend home with HHOT upon d/c.

## 2021-06-01 NOTE — PLAN OF CARE
Goal Outcome Evaluation:  Plan of Care Reviewed With: patient  Progress: improving  Outcome Summary: A-Fib on tele, vss, I and O cath several times today with deminishing amounts of urine, post lasix. tolerated well, up in chiar most of the day. denies pain

## 2021-06-01 NOTE — TELEPHONE ENCOUNTER
Yes but it has been 10 years I did already do the Tdap thank you please reassure the patient that I am seeing the labs and testing done.  Remind her she will need to see us within a week of going home from the hospital

## 2021-06-01 NOTE — TELEPHONE ENCOUNTER
Caller: Thuy Clark    Relationship to patient: Self    Best call back number: [phone]    Patient is needing: PATIENT WANTED TO LET DR. ORDAZ KNOW SHE IS CURRENTLY ADMITTED IN [facility] FOR LOW SODIUM. PATIENT WOULD LIKE TO MAKE SURE DR. ORDAZ KNOWS SHE HAS HAD LOTS OF LAB WORK DONE WHILE SHE HAS BEEN IN THE HOSPITAL.

## 2021-06-01 NOTE — CASE MANAGEMENT/SOCIAL WORK
Continued Stay Note  [facility]     Patient Name: Thuy Clark  MRN: [id number]  Today's Date: 6/1/2021    Admit Date: 5/27/2021    Discharge Plan     Row Name 06/01/21 1349       Plan    Plan Comments  Current plan is one pts NA is above 130 she will return home. CM will arrange HH if pt is agreeable.        Discharge Codes    No documentation.       Expected Discharge Date and Time     Expected Discharge Date Expected Discharge Time    May 31, 2021             Alexia Knapp RN

## 2021-06-02 LAB
ALBUMIN SERPL-MCNC: 3.7 G/DL (ref 3.5–5.2)
ANION GAP SERPL CALCULATED.3IONS-SCNC: 9 MMOL/L (ref 5–15)
BUN SERPL-MCNC: 8 MG/DL (ref 8–23)
BUN/CREAT SERPL: 12.1 (ref 7–25)
CALCIUM SPEC-SCNC: 9.2 MG/DL (ref 8.6–10.5)
CHLORIDE SERPL-SCNC: 91 MMOL/L (ref 98–107)
CO2 SERPL-SCNC: 26 MMOL/L (ref 22–29)
CREAT SERPL-MCNC: 0.66 MG/DL (ref 0.57–1)
DEPRECATED RDW RBC AUTO: 45.3 FL (ref 37–54)
ERYTHROCYTE [DISTWIDTH] IN BLOOD BY AUTOMATED COUNT: 14.4 % (ref 12.3–15.4)
GFR SERPL CREATININE-BSD FRML MDRD: 85 ML/MIN/1.73
GLUCOSE SERPL-MCNC: 109 MG/DL (ref 65–99)
HCT VFR BLD AUTO: 35.7 % (ref 34–46.6)
HGB BLD-MCNC: 11.7 G/DL (ref 12–15.9)
MCH RBC QN AUTO: 28.3 PG (ref 26.6–33)
MCHC RBC AUTO-ENTMCNC: 32.8 G/DL (ref 31.5–35.7)
MCV RBC AUTO: 86.2 FL (ref 79–97)
PHOSPHATE SERPL-MCNC: 4 MG/DL (ref 2.5–4.5)
PLATELET # BLD AUTO: 370 10*3/MM3 (ref 140–450)
PMV BLD AUTO: 8.2 FL (ref 6–12)
POTASSIUM SERPL-SCNC: 3.8 MMOL/L (ref 3.5–5.2)
RBC # BLD AUTO: 4.14 10*6/MM3 (ref 3.77–5.28)
SODIUM SERPL-SCNC: 122 MMOL/L (ref 136–145)
SODIUM SERPL-SCNC: 126 MMOL/L (ref 136–145)
WBC # BLD AUTO: 6.08 10*3/MM3 (ref 3.4–10.8)

## 2021-06-02 PROCEDURE — 85027 COMPLETE CBC AUTOMATED: CPT | Performed by: INTERNAL MEDICINE

## 2021-06-02 PROCEDURE — 80069 RENAL FUNCTION PANEL: CPT | Performed by: INTERNAL MEDICINE

## 2021-06-02 PROCEDURE — 99233 SBSQ HOSP IP/OBS HIGH 50: CPT | Performed by: NURSE PRACTITIONER

## 2021-06-02 PROCEDURE — 84295 ASSAY OF SERUM SODIUM: CPT | Performed by: INTERNAL MEDICINE

## 2021-06-02 RX ORDER — SODIUM CHLORIDE 1000 MG
2 TABLET, SOLUBLE MISCELLANEOUS
Status: DISCONTINUED | OUTPATIENT
Start: 2021-06-02 | End: 2021-06-09 | Stop reason: HOSPADM

## 2021-06-02 RX ADMIN — HYDROCORTISONE ACETATE 25 MG: 25 SUPPOSITORY RECTAL at 21:32

## 2021-06-02 RX ADMIN — DOCUSATE SODIUM 50MG AND SENNOSIDES 8.6MG 2 TABLET: 8.6; 5 TABLET, FILM COATED ORAL at 08:25

## 2021-06-02 RX ADMIN — METOCLOPRAMIDE 5 MG: 5 TABLET ORAL at 08:25

## 2021-06-02 RX ADMIN — Medication 1 G: at 08:25

## 2021-06-02 RX ADMIN — APIXABAN 5 MG: 5 TABLET, FILM COATED ORAL at 21:32

## 2021-06-02 RX ADMIN — Medication 2 G: at 17:51

## 2021-06-02 RX ADMIN — BISOPROLOL FUMARATE 10 MG: 5 TABLET, FILM COATED ORAL at 08:25

## 2021-06-02 RX ADMIN — METOCLOPRAMIDE 5 MG: 5 TABLET ORAL at 11:33

## 2021-06-02 RX ADMIN — Medication 1 G: at 11:33

## 2021-06-02 RX ADMIN — DOCUSATE SODIUM 50MG AND SENNOSIDES 8.6MG 2 TABLET: 8.6; 5 TABLET, FILM COATED ORAL at 21:31

## 2021-06-02 RX ADMIN — HYDROCORTISONE ACETATE 25 MG: 25 SUPPOSITORY RECTAL at 08:26

## 2021-06-02 RX ADMIN — METOCLOPRAMIDE 5 MG: 5 TABLET ORAL at 17:51

## 2021-06-02 RX ADMIN — APIXABAN 5 MG: 5 TABLET, FILM COATED ORAL at 08:25

## 2021-06-02 NOTE — PLAN OF CARE
Pt VSS on room air. Pt afib on tele. In and out cath as well as bladder scan performed multiple times this shift. 475 mL was drained. Pt tolerated well. Pt had no complaints of pain or discomfort.     Problem: Adult Inpatient Plan of Care  Goal: Plan of Care Review  Outcome: Ongoing, Progressing  Flowsheets (Taken 6/2/2021 0455)  Progress: improving  Plan of Care Reviewed With: patient  Goal: Patient-Specific Goal (Individualized)  Outcome: Ongoing, Progressing  Goal: Absence of Hospital-Acquired Illness or Injury  Outcome: Ongoing, Progressing  Intervention: Identify and Manage Fall Risk  Recent Flowsheet Documentation  Taken 6/2/2021 0415 by Shanique Shell RN  Safety Promotion/Fall Prevention:   activity supervised   assistive device/personal items within reach   clutter free environment maintained   fall prevention program maintained   lighting adjusted   nonskid shoes/slippers when out of bed   room organization consistent   safety round/check completed  Taken 6/2/2021 0201 by Shanique Shell RN  Safety Promotion/Fall Prevention:   activity supervised   assistive device/personal items within reach   clutter free environment maintained   fall prevention program maintained   lighting adjusted   nonskid shoes/slippers when out of bed   room organization consistent   safety round/check completed  Taken 6/1/2021 2356 by Shanique Shell RN  Safety Promotion/Fall Prevention:   activity supervised   assistive device/personal items within reach   clutter free environment maintained   fall prevention program maintained   lighting adjusted   nonskid shoes/slippers when out of bed   room organization consistent   safety round/check completed  Taken 6/1/2021 2237 by Shanique Shell RN  Safety Promotion/Fall Prevention:   activity supervised   assistive device/personal items within reach   clutter free environment maintained   fall prevention program maintained   lighting adjusted   nonskid shoes/slippers when  out of bed   room organization consistent   safety round/check completed  Taken 6/1/2021 2013 by Shanique Shell RN  Safety Promotion/Fall Prevention:   activity supervised   assistive device/personal items within reach   clutter free environment maintained   fall prevention program maintained   lighting adjusted   nonskid shoes/slippers when out of bed   room organization consistent   safety round/check completed  Intervention: Prevent Skin Injury  Recent Flowsheet Documentation  Taken 6/2/2021 0415 by Shanique Shell RN  Body Position: position changed independently  Taken 6/2/2021 0201 by Shanique Shell RN  Body Position: position changed independently  Skin Protection:   adhesive use limited   tubing/devices free from skin contact   skin-to-skin areas padded   skin-to-device areas padded  Taken 6/1/2021 2356 by Shanique Shell RN  Body Position: position changed independently  Skin Protection:   adhesive use limited   tubing/devices free from skin contact   skin-to-skin areas padded   skin-to-device areas padded  Taken 6/1/2021 2237 by Shanique Shell RN  Body Position: position changed independently  Skin Protection:   adhesive use limited   tubing/devices free from skin contact   skin-to-skin areas padded   skin-to-device areas padded  Taken 6/1/2021 2013 by Shanique Shell RN  Body Position: position changed independently  Skin Protection:   adhesive use limited   tubing/devices free from skin contact   skin-to-skin areas padded   skin-to-device areas padded  Intervention: Prevent and Manage VTE (venous thromboembolism) Risk  Recent Flowsheet Documentation  Taken 6/1/2021 2013 by Shanique Shell RN  VTE Prevention/Management:   bilateral   dorsiflexion/plantar flexion performed  Intervention: Prevent Infection  Recent Flowsheet Documentation  Taken 6/2/2021 0415 by Shanique Shell RN  Infection Prevention:   visitors restricted/screened   single patient room provided   rest/sleep  promoted   environmental surveillance performed   hand hygiene promoted  Taken 6/2/2021 0201 by Shanique Shell RN  Infection Prevention:   visitors restricted/screened   single patient room provided   rest/sleep promoted   environmental surveillance performed   hand hygiene promoted  Taken 6/1/2021 2356 by Shanique Shell RN  Infection Prevention:   visitors restricted/screened   single patient room provided   rest/sleep promoted   environmental surveillance performed   equipment surfaces disinfected  Taken 6/1/2021 2237 by Shanique Shell RN  Infection Prevention:   visitors restricted/screened   single patient room provided   rest/sleep promoted   environmental surveillance performed   hand hygiene promoted  Taken 6/1/2021 2013 by Shanique Shell RN  Infection Prevention:   visitors restricted/screened   single patient room provided   rest/sleep promoted   environmental surveillance performed   hand hygiene promoted  Goal: Optimal Comfort and Wellbeing  Outcome: Ongoing, Progressing  Intervention: Provide Person-Centered Care  Recent Flowsheet Documentation  Taken 6/1/2021 2013 by Shanique Shell RN  Trust Relationship/Rapport:   care explained   choices provided   questions answered   questions encouraged  Goal: Readiness for Transition of Care  Outcome: Ongoing, Progressing     Problem: Fall Injury Risk  Goal: Absence of Fall and Fall-Related Injury  Outcome: Ongoing, Progressing  Intervention: Identify and Manage Contributors to Fall Injury Risk  Recent Flowsheet Documentation  Taken 6/2/2021 0415 by Shanique Shell RN  Medication Review/Management: medications reviewed  Taken 6/2/2021 0201 by Shanique Shell RN  Medication Review/Management: medications reviewed  Taken 6/1/2021 2356 by Shanique Shell RN  Medication Review/Management: medications reviewed  Taken 6/1/2021 2237 by Shanique Shell RN  Medication Review/Management: medications reviewed  Taken 6/1/2021 2013 by Sumaya  Shanique ROGERS RN  Medication Review/Management: medications reviewed  Intervention: Promote Injury-Free Environment  Recent Flowsheet Documentation  Taken 6/2/2021 0415 by Shanique Shell RN  Safety Promotion/Fall Prevention:   activity supervised   assistive device/personal items within reach   clutter free environment maintained   fall prevention program maintained   lighting adjusted   nonskid shoes/slippers when out of bed   room organization consistent   safety round/check completed  Taken 6/2/2021 0201 by Shanique Shell RN  Safety Promotion/Fall Prevention:   activity supervised   assistive device/personal items within reach   clutter free environment maintained   fall prevention program maintained   lighting adjusted   nonskid shoes/slippers when out of bed   room organization consistent   safety round/check completed  Taken 6/1/2021 2356 by Shanique Shell RN  Safety Promotion/Fall Prevention:   activity supervised   assistive device/personal items within reach   clutter free environment maintained   fall prevention program maintained   lighting adjusted   nonskid shoes/slippers when out of bed   room organization consistent   safety round/check completed  Taken 6/1/2021 2237 by Shanique Shell RN  Safety Promotion/Fall Prevention:   activity supervised   assistive device/personal items within reach   clutter free environment maintained   fall prevention program maintained   lighting adjusted   nonskid shoes/slippers when out of bed   room organization consistent   safety round/check completed  Taken 6/1/2021 2013 by Shanique Shell RN  Safety Promotion/Fall Prevention:   activity supervised   assistive device/personal items within reach   clutter free environment maintained   fall prevention program maintained   lighting adjusted   nonskid shoes/slippers when out of bed   room organization consistent   safety round/check completed     Problem: Skin Injury Risk Increased  Goal: Skin Health and  Integrity  Outcome: Ongoing, Progressing  Intervention: Optimize Skin Protection  Recent Flowsheet Documentation  Taken 6/2/2021 0415 by Shanique Shell RN  Head of Bed (HOB): HOB elevated  Taken 6/2/2021 0201 by Shanique Shell RN  Pressure Reduction Techniques:   frequent weight shift encouraged   weight shift assistance provided   pressure points protected  Head of Bed (HOB): HOB elevated  Pressure Reduction Devices:   pressure-redistributing mattress utilized   positioning supports utilized   heel offloading device utilized  Skin Protection:   adhesive use limited   tubing/devices free from skin contact   skin-to-skin areas padded   skin-to-device areas padded  Taken 6/1/2021 2356 by Shanique Shell RN  Pressure Reduction Techniques:   frequent weight shift encouraged   weight shift assistance provided   pressure points protected  Head of Bed (HOB): HOB elevated  Pressure Reduction Devices:   pressure-redistributing mattress utilized   positioning supports utilized   heel offloading device utilized  Skin Protection:   adhesive use limited   tubing/devices free from skin contact   skin-to-skin areas padded   skin-to-device areas padded  Taken 6/1/2021 2237 by Shanique Shell RN  Pressure Reduction Techniques:   frequent weight shift encouraged   weight shift assistance provided   pressure points protected  Head of Bed (HOB): HOB elevated  Pressure Reduction Devices:   pressure-redistributing mattress utilized   positioning supports utilized   heel offloading device utilized  Skin Protection:   adhesive use limited   tubing/devices free from skin contact   skin-to-skin areas padded   skin-to-device areas padded  Taken 6/1/2021 2013 by Shanique Shell RN  Pressure Reduction Techniques:   frequent weight shift encouraged   weight shift assistance provided   pressure points protected  Head of Bed (HOB): HOB elevated  Pressure Reduction Devices:   pressure-redistributing mattress utilized   positioning  supports utilized   heel offloading device utilized  Skin Protection:   adhesive use limited   tubing/devices free from skin contact   skin-to-skin areas padded   skin-to-device areas padded   Goal Outcome Evaluation:  Plan of Care Reviewed With: patient  Progress: improving

## 2021-06-02 NOTE — PROGRESS NOTES
[facility] Medicine Services  PROGRESS NOTE    Patient Name: Thuy Clark  : 1935  MRN: [id number]    Date of Admission: 2021  Primary Care Physician: Isidra Eisenberg MD    Subjective   Subjective   CC:  Low Na    HPI:  Patient sitting up on the side of the bed and states she wants to be discharged home.  Son is in the room but was moving out due to patient needing to use the restroom.  Patient is demanding to go home with hospice since her sodium is low, but explained it will come up over time.  Patient is having BMs.  Son states that his mom usually does not act like this even though she is coherent.    ROS:  Gen- No fevers, chills  CV- No chest pain, palpitations  Resp- No cough, dyspnea  GI- No N/V/D, abd pain  All other systems have been reviewed and the pertinent positives and negatives are listed above in the HPI or ROS    Objective   Objective   Vital Signs:   Temp:  [97.5 °F (36.4 °C)-98 °F (36.7 °C)] 97.6 °F (36.4 °C)  Heart Rate:  [] 121  Resp:  [17-18] 17  BP: (129-149)/(73-84) 129/73  Physical Exam:  Constitutional: Alert, WD, WN female in NAD  Eyes: EOMI, sclerae anicteric, no conjunctival injection  Head: NCAT  ENT: [place], moist mucous membranes   Respiratory: Nonlabored, symmetrical chest expansion, CTAB  Cardiovascular: IRR, HR 85, no M/R/G, +DP pulses bilaterally  Gastrointestinal: Soft, NT, ND +BS  Musculoskeletal: JENNINGS; no LE edema bilaterally  Neurologic: Oriented x4, strength symmetric in all extremities, follows all commands, speech clear  Skin: No rashes on exposed skin  Psychiatric: Pleasant and cooperative; normal affect    Results Reviewed:  Results from last 7 days   Lab Units 21  0448 21  0511 21  0337   WBC 10*3/mm3 6.08 6.05 6.05   HEMOGLOBIN g/dL 11.7* 11.3* 11.2*   HEMATOCRIT % 35.7 34.0 34.0   PLATELETS 10*3/mm3 370 408 378     Results from last 7 days   Lab Units 21  0448 21  1753 21  0417  05/31/21  0456 05/28/21  1204 05/28/21  0543 05/27/21  0014   SODIUM mmol/L 126* 124* 126* 125*  --  116* 109*   POTASSIUM mmol/L 3.8  --  4.1 4.0   < > 4.6 4.8   CHLORIDE mmol/L 91*  --  93* 92*   < > 87* 79*   CO2 mmol/L 26.0  --  26.0 24.0   < > 18.0* 18.0*   BUN mg/dL 8  --  8 6*   < > 6* 10   CREATININE mg/dL 0.66  --  0.57 0.50*   < > 0.53* 0.63   GLUCOSE mg/dL 109*  --  97 105*   < > 84 113*   CALCIUM mg/dL 9.2  --  8.8 9.1   < > 8.3* 8.7   ALT (SGPT) U/L  --   --   --   --   --  11 13   AST (SGOT) U/L  --   --   --   --   --  17 24    < > = values in this interval not displayed.     Estimated Creatinine Clearance: 50.8 mL/min (by C-G formula based on SCr of 0.66 mg/dL).    Microbiology Results Abnormal     Procedure Component Value - Date/Time    COVID PRE-OP / PRE-PROCEDURE SCREENING ORDER (NO ISOLATION) - Swab, Nasopharynx [[id number]] Collected: 05/27/21 1448    Lab Status: Final result Specimen: Swab from Nasopharynx Updated: 05/28/21 1430    Narrative:      The following orders were created for panel order COVID PRE-OP / PRE-PROCEDURE SCREENING ORDER (NO ISOLATION) - Swab, Nasopharynx.  Procedure                               Abnormality         Status                     ---------                               -----------         ------                     COVID-19 PCR, [facility]...[[id number]]                      Final result                 Please view results for these tests on the individual orders.    COVID-19 PCR, [facility], NP SWAB IN LEXAR VIRAL TRANSPORT MEDIA 24-30 HR TAT - Swab, Nasopharynx [[id number]] Collected: 05/27/21 1448    Lab Status: Final result Specimen: Swab from Nasopharynx Updated: 05/28/21 1430     SARS-CoV-2 GATO Not Detected          Imaging Results (Last 24 Hours)     ** No results found for the last 24 hours. **        Results for orders placed during the hospital encounter of 11/02/19    Adult Transthoracic Echo Complete With Contrast if Necessary Per  Protocol    Interpretation Summary  · Left ventricular systolic function is normal. Estimated EF = 65%.  · Left ventricular wall thickness is consistent with borderline concentric hypertrophy.  · Left ventricular diastolic dysfunction (grade II) consistent with pseudonormalization.  · Right ventricular cavity is dilated.  · Left atrial volume is severely increased.  · Saline test results are positive for evidence of an intra-atrial shunt.  · Right atrial cavity size is severely dilated.  · Mild aortic valve regurgitation is present.  · Mild tricuspid valve regurgitation is present.  · Estimated right ventricular systolic pressure from tricuspid regurgitation is mildly elevated (35-45 mmHg    I have reviewed the medications:  Scheduled Meds:apixaban, 5 mg, Oral, Q12H  bisoprolol, 10 mg, Oral, Daily  gabapentin, 200 mg, Oral, Nightly  hydrocortisone, 25 mg, Rectal, BID  metoclopramide, 5 mg, Oral, TID AC  sennosides-docusate, 2 tablet, Oral, BID  sodium chloride, 1 g, Oral, TID With Meals    Continuous Infusions:   PRN Meds:.    Assessment/Plan   Assessment & Plan     Active Hospital Problems    Diagnosis  POA   • **Hyponatremia [E87.1]  Yes   • Chronic anticoagulation (Eliquis) [Z79.01]  Not Applicable   • Chronic atrial fibrillation (CMS/HCC) [I48.21]  Yes   • Altered mental status [R41.82]  Yes   • Atrial flutter with rapid ventricular response (CMS/HCC) [I48.92]  Yes   • Constipation, slow transit [K59.01]  Yes   • Atrial septal defect determined by imaging [Q21.1]  Not Applicable      Resolved Hospital Problems   No resolved problems to display.     Brief Hospital Course to date:  Thuy Clark is a 85 y.o. female with past medical history history of A. fib, on Eliquis, hypertension, chronic urinary retention who does not intermittent self cath admitted on 5/27 for acute hyponatremia, symptomatic with AMS and transferred initially to the ICU for closer monitoring.  Urine studies consistent with possible ADH  mechanism however she was not on any medications known to cause low sodium.  Sodium in the ER was 108.  She does have a history of chronic mild hyponatremia, the lowest previously being 123.       Only new medications prior to admission were Bactrim for a UTI started by her PCP at the [facility].  She was transferred up to general medical floor from ICU on 5/29.    These problems are new to me today    This patient's problems and plans were partially entered by my partner and updated as appropriate by me 06/02/21.    Acute symptomatic hyponatremia  --Na 126  --Likely multifactorial including decreased solute intake, polydipsia as well as ADH mechanism  -Sodium improved, but not yet within normal range, recheck in am  -CT chest negative for malignancy  -Continue 1.2 L fluid restriction  -increase salt tabs 2 tabs TID, give lasix 20 mg PO times one  - recheck in am     Hypertension  - stable     A. fib, rate controlled  -Continue bisoprolol  -On Eliquis     Constipation-resolved  --BM x2 in last 24H  --chronic, trial of reglan  --Requested outpatient referral to [facility] GI at discharge    DVT Prophylaxis:  eliquis    Disposition: I expect the patient to be discharged once sodium at least up to 130, discussed with patient and her son, bt she is not happy.    CODE STATUS:   Code Status and Medical Interventions:   Ordered at: 05/27/21 0541     Code Status:    CPR     Medical Interventions (Level of Support Prior to Arrest):    Full       MEL Johns  06/02/21

## 2021-06-02 NOTE — PLAN OF CARE
"Goal Outcome Evaluation:      Patients sodium was 126 this morning.  Pt VSS no complaints of pain or nausea.  Patient was discouraged about sodium not being normal yet and states \"this is not satisfactory\".  Explained that it might take a few more days to follow fluid restriction.  VSS will continue to monitor.     "

## 2021-06-03 LAB
ANION GAP SERPL CALCULATED.3IONS-SCNC: 6 MMOL/L (ref 5–15)
BUN SERPL-MCNC: 7 MG/DL (ref 8–23)
BUN/CREAT SERPL: 11.3 (ref 7–25)
CALCIUM SPEC-SCNC: 9.2 MG/DL (ref 8.6–10.5)
CHLORIDE SERPL-SCNC: 94 MMOL/L (ref 98–107)
CO2 SERPL-SCNC: 25 MMOL/L (ref 22–29)
CREAT SERPL-MCNC: 0.62 MG/DL (ref 0.57–1)
GFR SERPL CREATININE-BSD FRML MDRD: 91 ML/MIN/1.73
GLUCOSE SERPL-MCNC: 92 MG/DL (ref 65–99)
POTASSIUM SERPL-SCNC: 4.3 MMOL/L (ref 3.5–5.2)
SODIUM SERPL-SCNC: 125 MMOL/L (ref 136–145)
SODIUM SERPL-SCNC: 128 MMOL/L (ref 136–145)

## 2021-06-03 PROCEDURE — 84295 ASSAY OF SERUM SODIUM: CPT | Performed by: INTERNAL MEDICINE

## 2021-06-03 PROCEDURE — 63710000001 ONDANSETRON PER 8 MG: Performed by: NURSE PRACTITIONER

## 2021-06-03 PROCEDURE — 97535 SELF CARE MNGMENT TRAINING: CPT

## 2021-06-03 PROCEDURE — 80048 BASIC METABOLIC PNL TOTAL CA: CPT | Performed by: INTERNAL MEDICINE

## 2021-06-03 PROCEDURE — 99232 SBSQ HOSP IP/OBS MODERATE 35: CPT | Performed by: NURSE PRACTITIONER

## 2021-06-03 RX ORDER — ONDANSETRON 4 MG/1
4 TABLET, FILM COATED ORAL EVERY 6 HOURS PRN
Status: DISCONTINUED | OUTPATIENT
Start: 2021-06-03 | End: 2021-06-09 | Stop reason: HOSPADM

## 2021-06-03 RX ORDER — AMOXICILLIN 250 MG
2 CAPSULE ORAL 2 TIMES DAILY PRN
Status: DISCONTINUED | OUTPATIENT
Start: 2021-06-03 | End: 2021-06-09 | Stop reason: HOSPADM

## 2021-06-03 RX ADMIN — APIXABAN 5 MG: 5 TABLET, FILM COATED ORAL at 09:12

## 2021-06-03 RX ADMIN — DOCUSATE SODIUM 50MG AND SENNOSIDES 8.6MG 2 TABLET: 8.6; 5 TABLET, FILM COATED ORAL at 09:12

## 2021-06-03 RX ADMIN — Medication 2 G: at 09:12

## 2021-06-03 RX ADMIN — BISOPROLOL FUMARATE 10 MG: 5 TABLET, FILM COATED ORAL at 09:11

## 2021-06-03 RX ADMIN — Medication 2 G: at 11:43

## 2021-06-03 RX ADMIN — APIXABAN 5 MG: 5 TABLET, FILM COATED ORAL at 22:14

## 2021-06-03 RX ADMIN — METOCLOPRAMIDE 5 MG: 5 TABLET ORAL at 09:12

## 2021-06-03 RX ADMIN — METOCLOPRAMIDE 5 MG: 5 TABLET ORAL at 17:18

## 2021-06-03 RX ADMIN — Medication 2 G: at 18:41

## 2021-06-03 RX ADMIN — METOCLOPRAMIDE 5 MG: 5 TABLET ORAL at 11:41

## 2021-06-03 RX ADMIN — HYDROCORTISONE ACETATE 25 MG: 25 SUPPOSITORY RECTAL at 09:12

## 2021-06-03 RX ADMIN — ONDANSETRON HYDROCHLORIDE 4 MG: 4 TABLET, FILM COATED ORAL at 18:12

## 2021-06-03 NOTE — PLAN OF CARE
Problem: Adult Inpatient Plan of Care  Goal: Plan of Care Review  Outcome: Ongoing, Progressing  Flowsheets (Taken 6/2/2021 0455 by Shanique Shell RN)  Plan of Care Reviewed With: patient  Goal: Patient-Specific Goal (Individualized)  Outcome: Ongoing, Progressing  Goal: Absence of Hospital-Acquired Illness or Injury  Outcome: Ongoing, Progressing  Intervention: Identify and Manage Fall Risk  Recent Flowsheet Documentation  Taken 6/3/2021 0000 by Adam, Sheree E, RN  Safety Promotion/Fall Prevention:   activity supervised   assistive device/personal items within reach   clutter free environment maintained   fall prevention program maintained   lighting adjusted   nonskid shoes/slippers when out of bed   room organization consistent   safety round/check completed   toileting scheduled  Taken 6/2/2021 2036 by Sheree Adam RN  Safety Promotion/Fall Prevention:   activity supervised   clutter free environment maintained   assistive device/personal items within reach   fall prevention program maintained   lighting adjusted   nonskid shoes/slippers when out of bed   room organization consistent   toileting scheduled   safety round/check completed  Intervention: Prevent Skin Injury  Recent Flowsheet Documentation  Taken 6/3/2021 0000 by Sheree Adam RN  Body Position: position changed independently  Skin Protection: adhesive use limited  Taken 6/2/2021 2036 by Sheree Adam RN  Body Position: position changed independently  Skin Protection: adhesive use limited  Intervention: Prevent and Manage VTE (venous thromboembolism) Risk  Recent Flowsheet Documentation  Taken 6/2/2021 2036 by Sheree Adam RN  VTE Prevention/Management: (eliquis)   bilateral   sequential compression devices on  Goal: Optimal Comfort and Wellbeing  Outcome: Ongoing, Progressing  Intervention: Provide Person-Centered Care  Recent Flowsheet Documentation  Taken 6/2/2021 2036 by Sheree Adam RN  Trust  Relationship/Rapport:   care explained   choices provided   emotional support provided   empathic listening provided   questions answered   questions encouraged   reassurance provided   thoughts/feelings acknowledged  Goal: Readiness for Transition of Care  Outcome: Ongoing, Progressing   Goal Outcome Evaluation:

## 2021-06-03 NOTE — PLAN OF CARE
Goal Outcome Evaluation:  VSS, room air, a.fib on tele, pt denies pain. Pt vomiting with Na tablets, provider aware, ondansetron ordered. Bladder scans throughout the day. Straight cathed at 1850, 310 mL out. Pt having multiple Bms, provider notified, see orders. Will continue to monitor.

## 2021-06-03 NOTE — PROGRESS NOTES
Clinical Nutrition       Patient Name: Thuy Clark  YOB: 1935  MRN: [id number]  Date of Encounter: 21 13:17 EDT  Admission date: 2021      Reason for Visit   LOS      EMR  Reviewed   Yes       Current Nutrition Prescription     Diet Regular; Daily Fluid Restriction; 1000 mL Fluid Per Day   Boost Plus 1x daily    Average Intake from [place]% / 3 meals      Actions     Follow treatment progress, Care plan reviewed    Monitor Per Protocol    Brianna Elias RD,  Time Spent: 15 min

## 2021-06-03 NOTE — PROGRESS NOTES
[facility] Medicine Services  PROGRESS NOTE    Patient Name: Thuy Clark  : 1935  MRN: [id number]    Date of Admission: 2021  Primary Care Physician: Isidra Eisenberg MD    Subjective   Subjective     CC:  Low NA     HPI:  Patient is sitting up in bed in NAD sleeping. Did not awaken. Per nurse patient had some diarrhea today. Some nausea with salt tabs     ROS:  Gen- No fevers, chills  CV- No chest pain, palpitations  Resp- No cough, dyspnea  GI- No N/V, abd pain         Objective   Objective     Vital Signs:   Temp:  [97.2 °F (36.2 °C)] 97.2 °F (36.2 °C)  Heart Rate:  [80-99] 84  Resp:  [18] 18  BP: (118-126)/(68-71) 126/71        Physical Exam:  Constitutional: No acute distress, awake, alert  HENT: NCAT, mucous membranes moist  Respiratory: Clear to auscultation bilaterally, respiratory effort normal room air 96%  Cardiovascular: irregular , no murmurs, rubs, or gallops  Gastrointestinal: Positive bowel sounds, soft, nontender, nondistended  Musculoskeletal: No bilateral ankle edema  Psychiatric: Appropriate affect, cooperative  Neurologic: Oriented x 3, strength symmetric in all extremities, Cranial Nerves grossly intact to confrontation, speech clear  Skin: No rashes      Results Reviewed:  Results from last 7 days   Lab Units 21  0448 21  0511 21  0337   WBC 10*3/mm3 6.08 6.05 6.05   HEMOGLOBIN g/dL 11.7* 11.3* 11.2*   HEMATOCRIT % 35.7 34.0 34.0   PLATELETS 10*3/mm3 370 408 378     Results from last 7 days   Lab Units 21  0444 21  1753 21  0448 21  0417 21  1204 21  0543   SODIUM mmol/L 125* 122* 126* 126*  --  116*   POTASSIUM mmol/L 4.3  --  3.8 4.1   < > 4.6   CHLORIDE mmol/L 94*  --  91* 93*   < > 87*   CO2 mmol/L 25.0  --  26.0 26.0   < > 18.0*   BUN mg/dL 7*  --  8 8   < > 6*   CREATININE mg/dL 0.62  --  0.66 0.57   < > 0.53*   GLUCOSE mg/dL 92  --  109* 97   < > 84   CALCIUM mg/dL 9.2  --  9.2 8.8   <  > 8.3*   ALT (SGPT) U/L  --   --   --   --   --  11   AST (SGOT) U/L  --   --   --   --   --  17    < > = values in this interval not displayed.     Estimated Creatinine Clearance: 50.8 mL/min (by C-G formula based on SCr of 0.62 mg/dL).    Microbiology Results Abnormal     Procedure Component Value - Date/Time    COVID PRE-OP / PRE-PROCEDURE SCREENING ORDER (NO ISOLATION) - Swab, Nasopharynx [[id number]] Collected: 05/27/21 1448    Lab Status: Final result Specimen: Swab from Nasopharynx Updated: 05/28/21 1430    Narrative:      The following orders were created for panel order COVID PRE-OP / PRE-PROCEDURE SCREENING ORDER (NO ISOLATION) - Swab, Nasopharynx.  Procedure                               Abnormality         Status                     ---------                               -----------         ------                     COVID-19 PCR, [facility]...[[id number]]                      Final result                 Please view results for these tests on the individual orders.    COVID-19 PCR, [facility], NP SWAB IN LEXAR VIRAL TRANSPORT MEDIA 24-30 HR TAT - Swab, Nasopharynx [[id number]] Collected: 05/27/21 1448    Lab Status: Final result Specimen: Swab from Nasopharynx Updated: 05/28/21 1430     SARS-CoV-2 GATO Not Detected          Imaging Results (Last 24 Hours)     ** No results found for the last 24 hours. **          Results for orders placed during the hospital encounter of 11/02/19    Adult Transthoracic Echo Complete With Contrast if Necessary Per Protocol    Interpretation Summary  · Left ventricular systolic function is normal. Estimated EF = 65%.  · Left ventricular wall thickness is consistent with borderline concentric hypertrophy.  · Left ventricular diastolic dysfunction (grade II) consistent with pseudonormalization.  · Right ventricular cavity is dilated.  · Left atrial volume is severely increased.  · Saline test results are positive for evidence of an intra-atrial shunt.  · Right atrial cavity  size is severely dilated.  · Mild aortic valve regurgitation is present.  · Mild tricuspid valve regurgitation is present.  · Estimated right ventricular systolic pressure from tricuspid regurgitation is mildly elevated (35-45 mmHg      I have reviewed the medications:  Scheduled Meds:apixaban, 5 mg, Oral, Q12H  bisoprolol, 10 mg, Oral, Daily  gabapentin, 200 mg, Oral, Nightly  hydrocortisone, 25 mg, Rectal, BID  metoclopramide, 5 mg, Oral, TID AC  sennosides-docusate, 2 tablet, Oral, BID  sodium chloride, 2 g, Oral, TID With Meals      Continuous Infusions:   PRN Meds:.    Assessment/Plan   Assessment & Plan     Active Hospital Problems    Diagnosis  POA   • **Hyponatremia [E87.1]  Yes   • Chronic anticoagulation (Eliquis) [Z79.01]  Not Applicable   • Chronic atrial fibrillation (CMS/HCC) [I48.21]  Yes   • Altered mental status [R41.82]  Yes   • Atrial flutter with rapid ventricular response (CMS/HCC) [I48.92]  Yes   • Constipation, slow transit [K59.01]  Yes   • Atrial septal defect determined by imaging [Q21.1]  Not Applicable      Resolved Hospital Problems   No resolved problems to display.        Brief Hospital Course to date:  Thuy Clark is a 85 y.o. female with past medical history history of A. fib, on Eliquis, hypertension, chronic urinary retention who does not intermittent self cath admitted on 5/27 for acute hyponatremia, symptomatic with AMS and transferred initially to the ICU for closer monitoring.  Urine studies consistent with possible ADH mechanism however she was not on any medications known to cause low sodium.  Sodium in the ER was 108.  She does have a history of chronic mild hyponatremia, the lowest previously being 123.       Only new medications prior to admission were Bactrim for a UTI started by her PCP at the [facility].  She was transferred up to general medical floor from ICU on 5/29.     These problems are new to me today     This patient's problems and plans were  partially entered by my partner and updated as appropriate by me 06/03/21.     Acute symptomatic hyponatremia  --Na 125  --Likely multifactorial including decreased solute intake, polydipsia as well as ADH mechanism  -- TSH wnl, cortisol normal   -Sodium improved, but not yet within normal range, recheck in am  -CT chest negative for malignancy  -decrease to 1 L fluid restriction  -increase salt tabs 2 tabs TID, give lasix 20 mg PO times one     Hypertension  - stable     A. fib, rate controlled  -Continue bisoprolol  -On Eliquis     Constipation-resolved  --BM x2 in last 24H  --chronic, trial of reglan  --Requested outpatient referral to [facility] GI at discharge       DVT Prophylaxis:  eliquis       Disposition: I expect the patient to be discharged once sodium at least up to 130, prior provider discussed with patient and her son,        CODE STATUS:   Code Status and Medical Interventions:   Ordered at: 05/27/21 0541     Code Status:    CPR     Medical Interventions (Level of Support Prior to Arrest):    Full       Alexia Cook, APRN  06/03/21

## 2021-06-03 NOTE — CASE MANAGEMENT/SOCIAL WORK
Continued Stay Note  [facility]     Patient Name: Thuy Clark  MRN: [id number]  Today's Date: 6/3/2021    Admit Date: 5/27/2021    Discharge Plan     Row Name 06/03/21 1539       Plan    Plan  discharge plan    Plan Comments  Spoke with pt and pt's son in room regarding discharge plan.  Plan is home and pt has family that can assist. Pt unsure about wanting HH at dischrge. CM left number/card with CM's number to call if she decides she wants HH. Pt wants to think about it. Pt not medically ready for discharge as Na remains low, 125.  CM will cont to follow.    Final Discharge Disposition Code  30 - still a patient        Discharge Codes    No documentation.       Expected Discharge Date and Time     Expected Discharge Date Expected Discharge Time    Jun 4, 2021             Renetta Trivedi RN

## 2021-06-03 NOTE — THERAPY TREATMENT NOTE
Patient Name: Thuy Clark  : 1935    MRN: [id number]                              Today's Date: 6/3/2021       Admit Date: 2021    Visit Dx:     ICD-10-CM ICD-9-CM   1. Altered mental status, unspecified altered mental status type  R41.82 780.97   2. Hyponatremia  E87.1 276.1   3. Impaired functional mobility, balance, gait, and endurance  Z74.09 V49.89     Patient Active Problem List   Diagnosis   • Pulmonary hypertension (CMS/HCC)   • Benign essential hypertension   • Mixed hyperlipidemia   • Atrial septal defect determined by imaging   • Abnormal glucose   • Generalized anxiety disorder   • Valvular heart disease   • Senile osteoporosis   • PVC's (premature ventricular contractions)   • Calculus of gallbladder   • Urinary retention   • Right knee pain   • Osteoarthritis   • Quadriceps weakness   • Constipation, slow transit   • Atrial flutter with rapid ventricular response (CMS/HCC)   • Gait disorder   • Primary osteoarthritis of both knees   • At high risk for falls   • Medicare annual wellness visit, subsequent   • Hyponatremia   • Chronic anticoagulation (Eliquis)   • Chronic atrial fibrillation (CMS/HCC)   • Altered mental status     Past Medical History:   Diagnosis Date   • Bleeding hemorrhoid 3/8/2019   • Diverticulosis    • External hemorrhoid 2019 Isidra Eisenberg MD   Continue vasculera daily. Drink plenty of fluids.  Avoid constipation.  Follow-up with Dr. Hebert as planned.   • Hx of colonic polyps    • Hypertension    • Intraductal papilloma     R intraductal patheloma   • Left upper extremity numbness     L upper extremity numbness-ER visit; neuropathy   • Overweight (BMI 25.0-29.9) 2019   • Self-catheterizes urinary bladder      Past Surgical History:   Procedure Laterality Date   • BREAST LUMPECTOMY     • CHOLECYSTECTOMY     • HYSTERECTOMY     • VAGINECTOMY       General Information     Row Name 21 0804          OT Time and  Intention    Document Type  therapy note (daily note)  (Pended)   -CF     Mode of Treatment  occupational therapy  (Pended)   -     Row Name 06/03/21 0804          General Information    Patient Profile Reviewed  yes  (Pended)   -CF     Existing Precautions/Restrictions  fall  (Pended)   -CF     Barriers to Rehab  none identified  (Pended)   -CF     Row Name 06/03/21 0804          Cognition    Orientation Status (Cognition)  oriented x 4  (Pended)   -     Row Name 06/03/21 0804          Safety Issues, Functional Mobility    Safety Issues Affecting Function (Mobility)  impulsivity;positioning of assistive device  (Pended)   -CF     Impairments Affecting Function (Mobility)  endurance/activity tolerance  (Pended)   -       User Key  (r) = Recorded By, (t) = Taken By, (c) = Cosigned By    Initials Name Provider Type    CF Gamal Zhou, OT Student OT Student          Mobility/ADL's     Row Name 06/03/21 0836          Bed Mobility    Bed Mobility  rolling left;scooting/bridging;supine-sit  (Pended)   -     Rolling Left [place] (Bed Mobility)  modified independence  (Pended)   -     Scooting/Bridging [place] (Bed Mobility)  modified independence  (Pended)   -     Supine-Sit [place] (Bed Mobility)  modified independence  (Pended)   -     Assistive Device (Bed Mobility)  bed rails;head of bed elevated  (Pended)   -     Row Name 06/03/21 0836          Transfers    Transfers  sit-stand transfer  (Pended)   -     Sit-Stand [place] (Transfers)  contact guard  (Pended)   -     Row Name 06/03/21 0836          Sit-Stand Transfer    Assistive Device (Sit-Stand Transfers)  walker, front-wheeled  (Pended)   -     Row Name 06/03/21 0836          Functional Mobility    Functional Mobility- Ind. Level  contact guard assist  (Pended)   -     Functional Mobility- Device  rolling walker  (Pended)   -     Functional Mobility-Distance (Feet)  18  (Pended)   -     Row Name 06/03/21 0836           Activities of Daily Living    BADL Assessment/Intervention  upper body dressing;lower body dressing;grooming;feeding  (Pended)   -[facility] Name 06/03/21 0836          Lower Body Dressing Assessment/Training    [place] Level (Lower Body Dressing)  doff;don;socks;independent  (Pended)   -     Position (Lower Body Dressing)  supported sitting  (Pended)  in chair  -[facility] Name 06/03/21 08          Grooming Assessment/Training    [place] Level (Grooming)  hair care, combing/brushing;oral care regimen;wash face, hands;set up  (Pended)   -     Position (Grooming)  supported standing  (Pended)  with RW at sink  -[facility] Name 06/03/21 08          Upper Body Dressing Assessment/Training    [place] Level (Upper Body Dressing)  don;pajama/robe;moderate assist (50% patient effort)  (Pended)  to don hospital gown  -     Position (Upper Body Dressing)  edge of bed sitting  (Pended)   -[facility] Name 06/03/21 08          Self-Feeding Assessment/Training    [place] Level (Feeding)  liquids to mouth;knife use;independent;prepare tray/open items;set up  (Pended)   -     Position (Self-Feeding)  supported sitting  (Pended)  in chair  -       User Key  (r) = Recorded By, (t) = Taken By, (c) = Cosigned By    Initials Name Provider Type     Gamal Zhou, OT Student OT Student        Obj/Interventions     [facility] Name 06/03/21 0839          Vision Assessment/Intervention    Visual Impairment/Limitations  WFL;corrective lenses full-time  (Pended)   -[facility] Name 06/03/21 0839          Motor Skills    Motor Skills  functional endurance  (Pended)   -CF     Row Name 06/03/21 0839          Balance    Balance Assessment  sitting dynamic balance;sit to stand dynamic balance;standing dynamic balance  (Pended)   -     Dynamic Sitting Balance  WFL  (Pended)   -CF     Sit to Stand Dynamic Balance  WFL  (Pended)   -CF     Dynamic Standing Balance  WFL  (Pended)   -     Balance Interventions   sitting;standing;sit to stand;dynamic;occupation based/functional task;weight shifting activity;UE activity with balance activity  (Pended)   -       User Key  (r) = Recorded By, (t) = Taken By, (c) = Cosigned By    Initials Name Provider Type     Gamal Zhou, OT Student OT Student        Goals/Plan    No documentation.       Clinical Impression     Row Name 06/03/21 0840          Pain Assessment    Additional Documentation  Pain Scale: Numbers Pre/Post-Treatment (Group)  (Pended)   -     Row Name 06/03/21 0840          Pain Scale: Numbers Pre/Post-Treatment    Pretreatment Pain Rating  0/10 - no pain  (Pended)   -CF     Posttreatment Pain Rating  0/10 - no pain  (Pended)   -     Row Name 06/03/21 0840          Plan of Care Review    Plan of Care Reviewed With  patient  (Pended)   -     Progress  improving  (Pended)   -     Outcome Summary  Pt is pleasant, A&Ox4, and has no c/o pain. Completes bed mobility with Mod Indp, CGA STS & to ambulate ~18ft with RW in room, Mod A to don hospital gown EOB, Indp for LBD seated in chair, DIXON for grooming tasks, DIXON-to-Indp for feeding tasks. Pt is making good progress toward OT goals. OT will follow pt to DC; rec home with HHOT post DC.  (Pended)   -     Row Name 06/03/21 0840          Therapy Assessment/Plan (OT)    Therapy Frequency (OT)  daily  (Pended)   -     Row Name 06/03/21 0840          Therapy Plan Review/Discharge Plan (OT)    Anticipated Discharge Disposition (OT)  home with outpatient therapy services  (Pended)   -     Row Name 06/03/21 0840          Vital Signs    O2 Delivery Pre Treatment  room air  (Pended)   -CF     O2 Delivery Intra Treatment  room air  (Pended)   -CF     O2 Delivery Post Treatment  room air  (Pended)   -CF     Pre Patient Position  Supine  (Pended)   -CF     Intra Patient Position  Standing  (Pended)   -CF     Post Patient Position  Sitting  (Pended)   -     Row Name 06/03/21 0840          Positioning and Restraints     Pre-Treatment Position  in bed  (Pended)   -CF     Post Treatment Position  chair  (Pended)   -CF     In Chair  sitting;call light within reach;encouraged to call for assist;exit alarm on;legs elevated  (Pended)   -CF       User Key  (r) = Recorded By, (t) = Taken By, (c) = Cosigned By    Initials Name Provider Type    Gamal Jones, OT Student OT Student        Outcome Measures     Row Name 06/03/21 0849          How much help from another is currently needed...    Putting on and taking off regular lower body clothing?  4  (Pended)   -CF     Bathing (including washing, rinsing, and drying)  3  (Pended)   -CF     Toileting (which includes using toilet bed pan or urinal)  3  (Pended)   -CF     Putting on and taking off regular upper body clothing  3  (Pended)   -CF     Taking care of personal grooming (such as brushing teeth)  3  (Pended)   -CF     Eating meals  4  (Pended)   -CF     AM-PAC 6 Clicks Score (OT)  20  (Pended)   -CF     Row Name 06/03/21 0849          Functional Assessment    Outcome Measure Options  AM-PAC 6 Clicks Daily Activity (OT)  (Pended)   -CF       User Key  (r) = Recorded By, (t) = Taken By, (c) = Cosigned By    Initials Name Provider Type    Gamal Jones, OT Student OT Student        Occupational Therapy Education                 Title: PT OT SLP Therapies (In Progress)     Topic: Occupational Therapy (In Progress)     Point: ADL training (Done)     Description:   Instruct learner(s) on proper safety adaptation and remediation techniques during self care or transfers.   Instruct in proper use of assistive devices.              Learning Progress Summary           Patient Acceptance, E, VU by CF at 6/3/2021 0851    Comment: Pt instructed on hand/DME placement at sink level and at chair for safe BADL completion and t/fs    Acceptance, TB, NR by PS at 6/2/2021 1013    Acceptance, E, VU by AC at 6/1/2021 1130    Eager, E,H, VU,DU by SJ at 5/30/2021 1115    Acceptance, E, VU,NR by TA at  5/30/2021 1100   Family Acceptance, E, VU by AC at 6/1/2021 1130                   Point: Home exercise program (In Progress)     Description:   Instruct learner(s) on appropriate technique for monitoring, assisting and/or progressing therapeutic exercises/activities.              Learning Progress Summary           Patient Acceptance, TB, NR by PS at 6/2/2021 1013    Eager, E,H, VU,DU by  at 5/30/2021 1115    Acceptance, E, VU,NR by TA at 5/30/2021 1100                   Point: Precautions (In Progress)     Description:   Instruct learner(s) on prescribed precautions during self-care and functional transfers.              Learning Progress Summary           Patient Acceptance, TB, NR by PS at 6/2/2021 1013    Eager, E,H, VU,DU by  at 5/30/2021 1115    Acceptance, E, VU,NR by TA at 5/30/2021 1100                   Point: Body mechanics (Done)     Description:   Instruct learner(s) on proper positioning and spine alignment during self-care, functional mobility activities and/or exercises.              Learning Progress Summary           Patient Acceptance, E, VU by CF at 6/3/2021 0851    Comment: Pt instructed on hand/DME placement at sink level and at chair for safe BADL completion and t/fs    Acceptance, TB, NR by PS at 6/2/2021 1013    Eager, E,H, VU,DU by  at 5/30/2021 1115    Acceptance, E, VU,NR by TA at 5/30/2021 1100                               User Key     Initials Effective Dates Name Provider Type Discipline    AC 06/23/15 -  Amy Chavez, OT Occupational Therapist OT     06/19/15 -  Dinah Peoples PT Physical Therapist PT    TA 03/14/16 -  Arslan Coley, OT Occupational Therapist OT    PS 01/16/19 -  Mariana Avila, RN Registered Nurse Nurse    CF 03/30/21 -  Gamal Zhou, OT Student OT Student OT              OT Recommendation and Plan  Therapy Frequency (OT): (P) daily  Plan of Care Review  Plan of Care Reviewed With: (P) patient  Progress: (P) improving  Outcome Summary: (P)  Pt is pleasant, A&Ox4, and has no c/o pain. Completes bed mobility with Mod Indp, CGA STS & to ambulate ~18ft with RW in room, Mod A to don hospital gown EOB, Indp for LBD seated in chair, DIXON for grooming tasks, DIXON-to-Indp for feeding tasks. Pt is making good progress toward OT goals. OT will follow pt to DC; rec home with HHOT post DC.     Time Calculation:   Time Calculation- OT     Row Name 06/03/21 0804             Time Calculation- OT    OT Start Time  0804  (Pended)   -CF      OT Received On  06/03/21  (Pended)   -CF      OT Goal Re-Cert Due Date  06/09/21  (Pended)   -CF         Timed Charges    24820 - OT Self Care/Mgmt Minutes  26  (Pended)   -CF         Total Minutes    Timed Charges Total Minutes  26  (Pended)   -CF       Total Minutes  26  (Pended)   -CF        User Key  (r) = Recorded By, (t) = Taken By, (c) = Cosigned By    Initials Name Provider Type    CF Gamal Zhou OT Student OT Student        Therapy Charges for Today     Code Description Service Date Service Provider Modifiers Qty    [id number] HC OT SELF CARE/MGMT/TRAIN EA 15 MIN 6/3/2021 Gamal Zhou OT Student GO 2               JESÚS Grullon  6/3/2021

## 2021-06-03 NOTE — PLAN OF CARE
Goal Outcome Evaluation:  Plan of Care Reviewed With: (P) patient  Progress: (P) improving  Outcome Summary: (P) Pt is pleasant, A&Ox4, and has no c/o pain. Completes bed mobility with Mod Indp, CGA STS & to ambulate ~18ft with RW in room, Mod A to don hospital gown EOB, Indp for LBD seated in chair, DIXON for grooming tasks, DIXON-to-Indp for feeding tasks. Pt is making good progress toward OT goals. OT will follow pt to DC; rec home with HHOT post DC.

## 2021-06-04 LAB
ANION GAP SERPL CALCULATED.3IONS-SCNC: 9 MMOL/L (ref 5–15)
BUN SERPL-MCNC: 10 MG/DL (ref 8–23)
BUN/CREAT SERPL: 16.4 (ref 7–25)
CALCIUM SPEC-SCNC: 9 MG/DL (ref 8.6–10.5)
CHLORIDE SERPL-SCNC: 95 MMOL/L (ref 98–107)
CO2 SERPL-SCNC: 24 MMOL/L (ref 22–29)
CREAT SERPL-MCNC: 0.61 MG/DL (ref 0.57–1)
GFR SERPL CREATININE-BSD FRML MDRD: 93 ML/MIN/1.73
GLUCOSE SERPL-MCNC: 99 MG/DL (ref 65–99)
POTASSIUM SERPL-SCNC: 4.2 MMOL/L (ref 3.5–5.2)
SODIUM SERPL-SCNC: 128 MMOL/L (ref 136–145)

## 2021-06-04 PROCEDURE — 97110 THERAPEUTIC EXERCISES: CPT

## 2021-06-04 PROCEDURE — 80048 BASIC METABOLIC PNL TOTAL CA: CPT | Performed by: INTERNAL MEDICINE

## 2021-06-04 PROCEDURE — 63710000001 ONDANSETRON PER 8 MG: Performed by: NURSE PRACTITIONER

## 2021-06-04 PROCEDURE — 99232 SBSQ HOSP IP/OBS MODERATE 35: CPT | Performed by: FAMILY MEDICINE

## 2021-06-04 PROCEDURE — 97530 THERAPEUTIC ACTIVITIES: CPT

## 2021-06-04 RX ADMIN — METOCLOPRAMIDE 5 MG: 5 TABLET ORAL at 17:55

## 2021-06-04 RX ADMIN — Medication 2 G: at 11:57

## 2021-06-04 RX ADMIN — Medication 2 G: at 17:55

## 2021-06-04 RX ADMIN — APIXABAN 5 MG: 5 TABLET, FILM COATED ORAL at 20:58

## 2021-06-04 RX ADMIN — APIXABAN 5 MG: 5 TABLET, FILM COATED ORAL at 09:38

## 2021-06-04 RX ADMIN — METOCLOPRAMIDE 5 MG: 5 TABLET ORAL at 11:57

## 2021-06-04 RX ADMIN — METOCLOPRAMIDE 5 MG: 5 TABLET ORAL at 09:38

## 2021-06-04 RX ADMIN — Medication 2 G: at 09:38

## 2021-06-04 RX ADMIN — BISOPROLOL FUMARATE 10 MG: 5 TABLET, FILM COATED ORAL at 09:38

## 2021-06-04 RX ADMIN — ONDANSETRON HYDROCHLORIDE 4 MG: 4 TABLET, FILM COATED ORAL at 09:39

## 2021-06-04 NOTE — CASE MANAGEMENT/SOCIAL WORK
Continued Stay Note  [facility]     Patient Name: Thuy Clark  MRN: [id number]  Today's Date: 6/4/2021    Admit Date: 5/27/2021    Discharge Plan     Row Name 06/04/21 1520       Plan    Plan  SNF Rehab    Patient/Family in Agreement with Plan  yes    Plan Comments  Met with patient and her son, Jack, in the room to discuss the discharge plan. Per discussion in [facility], patient would like rehab. PT and OT have recommended home with HH, but patient lives alone, self caths, and feels she needs a short stay of skilled rehab. Patient and son would like referrals to [facility] and [facility], in that order of preference. They are also open to referrals at [facility] and [facility] [place]. Patient does not want [facility]. Referral called to Drew for [facility] and Carolyn for [facility] [place]. Left  referral for Christine with [facility]. Patient's son will transport her by car when she is ready for transfer. Of note. patient normally in-and-out caths herself q6h at home. CM will continue to follow.    Final Discharge Disposition Code  03 - skilled nursing facility (SNF)        Discharge Codes    No documentation.       Expected Discharge Date and Time     Expected Discharge Date Expected Discharge Time    Jun 4, 2021             Eladia Tyler RN

## 2021-06-04 NOTE — PLAN OF CARE
Problem: Adult Inpatient Plan of Care  Goal: Plan of Care Review  Outcome: Ongoing, Progressing  Flowsheets (Taken 6/4/2021 0337)  Plan of Care Reviewed With: patient  Goal: Patient-Specific Goal (Individualized)  Outcome: Ongoing, Progressing  Goal: Absence of Hospital-Acquired Illness or Injury  Outcome: Ongoing, Progressing  Intervention: Identify and Manage Fall Risk  Recent Flowsheet Documentation  Taken 6/4/2021 0012 by Sheree Adam RN  Safety Promotion/Fall Prevention:   activity supervised   assistive device/personal items within reach   clutter free environment maintained   fall prevention program maintained   lighting adjusted   nonskid shoes/slippers when out of bed   room organization consistent   safety round/check completed   toileting scheduled  Taken 6/3/2021 2015 by Sheree Adam RN  Safety Promotion/Fall Prevention:   activity supervised   assistive device/personal items within reach   clutter free environment maintained   fall prevention program maintained   lighting adjusted   nonskid shoes/slippers when out of bed   room organization consistent   safety round/check completed   toileting scheduled  Intervention: Prevent Skin Injury  Recent Flowsheet Documentation  Taken 6/4/2021 0012 by Sheree Adam RN  Body Position: position changed independently  Skin Protection: adhesive use limited  Taken 6/3/2021 2015 by Sheree Adam RN  Body Position: position changed independently  Skin Protection: adhesive use limited  Intervention: Prevent and Manage VTE (venous thromboembolism) Risk  Recent Flowsheet Documentation  Taken 6/3/2021 2015 by Sheere Adam RN  VTE Prevention/Management: (eliquis)   dorsiflexion/plantar flexion performed   bilateral   other (see comments)   bleeding risk factor(s) identified  Intervention: Prevent Infection  Recent Flowsheet Documentation  Taken 6/4/2021 0012 by Sheree Adam RN  Infection Prevention: rest/sleep promoted  Taken  6/3/2021 2015 by Sheree Adam RN  Infection Prevention: rest/sleep promoted  Goal: Optimal Comfort and Wellbeing  Outcome: Ongoing, Progressing  Intervention: Provide Person-Centered Care  Recent Flowsheet Documentation  Taken 6/3/2021 2015 by Sheree Adam RN  Trust Relationship/Rapport:   care explained   choices provided   empathic listening provided   emotional support provided   questions answered   questions encouraged   reassurance provided   thoughts/feelings acknowledged  Goal: Readiness for Transition of Care  Outcome: Ongoing, Progressing   Goal Outcome Evaluation:  Plan of Care Reviewed With: patient

## 2021-06-04 NOTE — PROGRESS NOTES
[facility] Medicine Services  PROGRESS NOTE    Patient Name: Thuy Clark  : 1935  MRN: [id number]    Date of Admission: 2021  Primary Care Physician: Isidra Eisenberg MD    Subjective   Subjective     CC:  Hyponatremia    HPI:  Patient is an 85-year-old who was admitted with significant hyponatremia that has gradually improved with IV fluids initially, fluid restriction and salt tabs.   2021-patient sodium has improved and is now around 128.  Patient reports she lives alone but her children live nearby.  She has began recently concerned about her ability to care for self.  She would be agreeable to rehab if indicated and I think.  She states I feel like my situation is dire she seems to have difficulty remembering things I am telling her.    ROS:  General: No fever, chills, positive fatigue  ENT: No sore throat, trouble swallowing or changes in vision  Respiratory: No shortness of breath, cough, wheezing or fast breathing  Cardiovascular: No chest pain, palpitations, dyspnea with exertion  Gastrointestinal: No nausea vomiting abdominal pain  Musculoskeletal: Positive difficulty walking, positive weakness  Vascular: No cyanosis or clubbing  Lymphatic: No peripheral edema, no lymphadenopathy  Neurologic: No headache, positive confusion, dizziness  Psychiatric: Positive worry and anxiety.       Objective   Objective     Vital Signs:   Temp:  [97.5 °F (36.4 °C)-98 °F (36.7 °C)] 98 °F (36.7 °C)  Heart Rate:  [] 79  Resp:  [16-18] 16  BP: (107-115)/(54-70) 107/54        Physical Exam:  Constitutional: No acute distress, awake, alert, nontoxic, elderly and frail body habitus  Eyes: Pupils equal, sclerae anicteric, no conjunctival injection  HENT: NCAT, mucous membranes moist  Neck: Supple, no thyromegaly, no lymphadenopathy  Respiratory: Clear to auscultation bilaterally, good effort, nonlabored respirations   Cardiovascular: RRR  Gastrointestinal: Positive bowel  sounds, soft, nontender, nondistended  Musculoskeletal: No peripheral edema, normal muscle tone for age  Psychiatric: Appropriate affect, good insight and judgement, cooperative  Neurologic: Oriented x 3, movements symmetric BUE and BLE, Cranial Nerves grossly intact, speech clear and fluent  Skin: No rashes, no jaundice, no petechiae, no mottling      Results Reviewed:  Results from last 7 days   Lab Units 06/02/21  0448 05/30/21  0511 05/29/21  0337   WBC 10*3/mm3 6.08 6.05 6.05   HEMOGLOBIN g/dL 11.7* 11.3* 11.2*   HEMATOCRIT % 35.7 34.0 34.0   PLATELETS 10*3/mm3 370 408 378     Results from last 7 days   Lab Units 06/04/21  0519 06/03/21  1740 06/03/21  0444 06/02/21  0448   SODIUM mmol/L 128* 128* 125* 126*   POTASSIUM mmol/L 4.2  --  4.3 3.8   CHLORIDE mmol/L 95*  --  94* 91*   CO2 mmol/L 24.0  --  25.0 26.0   BUN mg/dL 10  --  7* 8   CREATININE mg/dL 0.61  --  0.62 0.66   GLUCOSE mg/dL 99  --  92 109*   CALCIUM mg/dL 9.0  --  9.2 9.2     Estimated Creatinine Clearance: 50.8 mL/min (by C-G formula based on SCr of 0.61 mg/dL).    Microbiology Results Abnormal     Procedure Component Value - Date/Time    COVID PRE-OP / PRE-PROCEDURE SCREENING ORDER (NO ISOLATION) - Swab, Nasopharynx [[id number]] Collected: 05/27/21 1448    Lab Status: Final result Specimen: Swab from Nasopharynx Updated: 05/28/21 1430    Narrative:      The following orders were created for panel order COVID PRE-OP / PRE-PROCEDURE SCREENING ORDER (NO ISOLATION) - Swab, Nasopharynx.  Procedure                               Abnormality         Status                     ---------                               -----------         ------                     COVID-19 PCR, [facility]...[[id number]]                      Final result                 Please view results for these tests on the individual orders.    COVID-19 PCR, [facility], NP SWAB IN LEXAR VIRAL TRANSPORT MEDIA 24-30 HR TAT - Swab, Nasopharynx [[id number]] Collected: 05/27/21 1448    Lab  Status: Final result Specimen: Swab from Nasopharynx Updated: 05/28/21 1430     SARS-CoV-2 GATO Not Detected          Imaging Results (Last 24 Hours)     ** No results found for the last 24 hours. **          Results for orders placed during the hospital encounter of 11/02/19    Adult Transthoracic Echo Complete With Contrast if Necessary Per Protocol    Interpretation Summary  · Left ventricular systolic function is normal. Estimated EF = 65%.  · Left ventricular wall thickness is consistent with borderline concentric hypertrophy.  · Left ventricular diastolic dysfunction (grade II) consistent with pseudonormalization.  · Right ventricular cavity is dilated.  · Left atrial volume is severely increased.  · Saline test results are positive for evidence of an intra-atrial shunt.  · Right atrial cavity size is severely dilated.  · Mild aortic valve regurgitation is present.  · Mild tricuspid valve regurgitation is present.  · Estimated right ventricular systolic pressure from tricuspid regurgitation is mildly elevated (35-45 mmHg      I have reviewed the medications:  Scheduled Meds:apixaban, 5 mg, Oral, Q12H  bisoprolol, 10 mg, Oral, Daily  gabapentin, 200 mg, Oral, Nightly  hydrocortisone, 25 mg, Rectal, BID  metoclopramide, 5 mg, Oral, TID AC  sodium chloride, 2 g, Oral, TID With Meals      Continuous Infusions:   PRN Meds:.ondansetron  •  sennosides-docusate    Assessment/Plan   Assessment & Plan     Active Hospital Problems    Diagnosis  POA   • **Hyponatremia [E87.1]  Yes   • Chronic anticoagulation (Eliquis) [Z79.01]  Not Applicable   • Chronic atrial fibrillation (CMS/HCC) [I48.21]  Yes   • Altered mental status [R41.82]  Yes   • Atrial flutter with rapid ventricular response (CMS/HCC) [I48.92]  Yes   • Constipation, slow transit [K59.01]  Yes   • Atrial septal defect determined by imaging [Q21.1]  Not Applicable      Resolved Hospital Problems   No resolved problems to display.        Brief Hospital Course to  date:  Thuy Clark is a 85 y.o. female with past medical history history of A. fib, on Eliquis, hypertension, chronic urinary retention who does not intermittent self cath admitted on 5/27 for acute hyponatremia, symptomatic with AMS and transferred initially to the ICU for closer monitoring.  Urine studies consistent with possible ADH mechanism however she was not on any medications known to cause low sodium.  Sodium in the ER was 108.  She does have a history of chronic mild hyponatremia, the lowest previously being 123.       Only new medications prior to admission were Bactrim for a UTI started by her PCP at the [facility].  She was transferred up to general medical floor from ICU on 5/29.     This patient's problems and plans were partially entered by my partner and updated as appropriate by me 06/04/21.     Acute symptomatic hyponatremia  --Na 128  --Likely multifactorial including decreased solute intake, polydipsia as well as ADH mechanism  -- TSH wnl, cortisol normal   -Sodium improved, but not yet within normal range, recheck daily while admitted  -CT chest negative for malignancy  -decrease to 1 L fluid restriction  -increase salt tabs 2 tabs TID, give lasix 20 mg PO times one  -concern for pt to care for herself independently, discussed with CM and may offer referrals for assisted living, rehab, etc     Hypertension  - stable     A. fib, rate controlled  -Continue bisoprolol  -On Eliquis     Constipation-resolved  --BM x2 in last 24H  --chronic, trial of reglan  --Requested outpatient referral to [facility] at discharge    DVT Prophylaxis:  eliquis      Disposition: I expect the patient to be discharged pending discussion for referrals for assisted living, rehab, etc.    CODE STATUS:   Code Status and Medical Interventions:   Ordered at: 05/27/21 0541     Code Status:    CPR     Medical Interventions (Level of Support Prior to Arrest):    Full       Aylin Kimble MD  06/04/21

## 2021-06-04 NOTE — PLAN OF CARE
Goal Outcome Evaluation:  Plan of Care Reviewed With: patient  Progress: no change  Outcome Summary: Pt amb 150ft with RWx and SBA demonstrating good stability throughout. BLE exercises performed. Pt limited by decreased functional endurance and mild balance deficits. Continue to progress as able. D/c rec for HWA and HHPT/OT.

## 2021-06-04 NOTE — THERAPY TREATMENT NOTE
Patient Name: Thuy Clark  : 1935    MRN: [id number]                              Today's Date: 2021       Admit Date: 2021    Visit Dx:     ICD-10-CM ICD-9-CM   1. Altered mental status, unspecified altered mental status type  R41.82 780.97   2. Hyponatremia  E87.1 276.1   3. Impaired functional mobility, balance, gait, and endurance  Z74.09 V49.89     Patient Active Problem List   Diagnosis   • Pulmonary hypertension (CMS/HCC)   • Benign essential hypertension   • Mixed hyperlipidemia   • Atrial septal defect determined by imaging   • Abnormal glucose   • Generalized anxiety disorder   • Valvular heart disease   • Senile osteoporosis   • PVC's (premature ventricular contractions)   • Calculus of gallbladder   • Urinary retention   • Right knee pain   • Osteoarthritis   • Quadriceps weakness   • Constipation, slow transit   • Atrial flutter with rapid ventricular response (CMS/HCC)   • Gait disorder   • Primary osteoarthritis of both knees   • At high risk for falls   • Medicare annual wellness visit, subsequent   • Hyponatremia   • Chronic anticoagulation (Eliquis)   • Chronic atrial fibrillation (CMS/HCC)   • Altered mental status     Past Medical History:   Diagnosis Date   • Bleeding hemorrhoid 3/8/2019   • Diverticulosis    • External hemorrhoid 2019 Isidra Eisenberg MD   Continue vasculera daily. Drink plenty of fluids.  Avoid constipation.  Follow-up with Dr. Hebert as planned.   • Hx of colonic polyps    • Hypertension    • Intraductal papilloma     R intraductal patheloma   • Left upper extremity numbness     L upper extremity numbness-ER visit; neuropathy   • Overweight (BMI 25.0-29.9) 2019   • Self-catheterizes urinary bladder      Past Surgical History:   Procedure Laterality Date   • BREAST LUMPECTOMY     • CHOLECYSTECTOMY     • HYSTERECTOMY     • VAGINECTOMY       General Information     Row Name 21 1518          Physical Therapy  Time and Intention    Document Type  therapy note (daily note)  -AY     Mode of Treatment  physical therapy  -AY     Row Name 06/04/21 1518          General Information    Patient Profile Reviewed  yes  -AY     Existing Precautions/Restrictions  fall  -AY     Barriers to Rehab  none identified  -AY     Row Name 06/04/21 1518          Cognition    Orientation Status (Cognition)  oriented x 4  -AY     Row Name 06/04/21 1518          Safety Issues, Functional Mobility    Impairments Affecting Function (Mobility)  endurance/activity tolerance  -AY       User Key  (r) = Recorded By, (t) = Taken By, (c) = Cosigned By    Initials Name Provider Type    AY Lesly Lilly PT Physical Therapist        Mobility     Row Name 06/04/21 1519          Bed Mobility    Comment (Bed Mobility)  pt recieved and left sitting up in chair  -AY     Row Name 06/04/21 1519          Transfers    Comment (Transfers)  VC for walker positioning.  -AY     Row Name 06/04/21 1519          Sit-Stand Transfer    Sit-Stand [place] (Transfers)  contact guard  -AY     Assistive Device (Sit-Stand Transfers)  walker, front-wheeled  -AY     Row Name 06/04/21 1519          Gait/Stairs (Locomotion)    [place] Level (Gait)  standby assist;1 person assist  -AY     Assistive Device (Gait)  walker, front-wheeled  -AY     Distance in Feet (Gait)  150  -AY     Deviations/Abnormal Patterns (Gait)  base of support, narrow  -AY     Bilateral Gait Deviations  forward flexed posture  -AY     Comment (Gait/Stairs)  Pt demonstrated step through gait pattern with decreased aryan and flexed posture. VC for sequencing and posture. One brief standing rest break required. Gait distance limited by fatigue.  -AY       User Key  (r) = Recorded By, (t) = Taken By, (c) = Cosigned By    Initials Name Provider Type    AY Lesly iLlly PT Physical Therapist        Obj/Interventions     Row Name 06/04/21 1520          Motor Skills    Therapeutic Exercise  hip;knee;ankle   -AY     Row Name 06/04/21 1520          Hip (Therapeutic Exercise)    Hip (Therapeutic Exercise)  strengthening exercise;isometric exercises  -AY     Hip Isometrics (Therapeutic Exercise)  bilateral;gluteal sets;10 repetitions;sitting  -AY     Hip Strengthening (Therapeutic Exercise)  bilateral;marching while seated;aBduction;aDduction;sitting;10 repetitions  -AY     Row Name 06/04/21 1520          Knee (Therapeutic Exercise)    Knee (Therapeutic Exercise)  strengthening exercise;isometric exercises  -AY     Knee Isometrics (Therapeutic Exercise)  bilateral;quad sets;10 repetitions;sitting  -AY     Knee Strengthening (Therapeutic Exercise)  bilateral;SLR (straight leg raise);LAQ (long arc quad);heel slides;sitting;10 repetitions  -AY     Row Name 06/04/21 1520          Ankle (Therapeutic Exercise)    Ankle (Therapeutic Exercise)  AROM (active range of motion)  -AY     Ankle AROM (Therapeutic Exercise)  bilateral;dorsiflexion;plantarflexion;10 repetitions;sitting  -AY     Row Name 06/04/21 1520          Balance    Balance Assessment  sitting static balance;sitting dynamic balance;standing static balance;standing dynamic balance  -AY     Static Sitting Balance  WFL;sitting in chair  -AY     Dynamic Sitting Balance  WFL;unsupported;sitting in chair  -AY     Static Standing Balance  WFL;supported;standing  -AY     Dynamic Standing Balance  mild impairment;unsupported;standing  -AY       User Key  (r) = Recorded By, (t) = Taken By, (c) = Cosigned By    Initials Name Provider Type    AY Lesly Lilly PT Physical Therapist        Goals/Plan    No documentation.       Clinical Impression     Row Name 06/04/21 1521          Pain    Additional Documentation  Pain Scale: Numbers Pre/Post-Treatment (Group)  -AY     Row Name 06/04/21 1521          Pain Scale: Numbers Pre/Post-Treatment    Pretreatment Pain Rating  0/10 - no pain  -AY     Posttreatment Pain Rating  0/10 - no pain  -AY     Pre/Posttreatment Pain Comment  denied  pain  -AY     Pain Intervention(s)  Ambulation/increased activity;Repositioned  -AY     Row Name 06/04/21 1521          Plan of Care Review    Plan of Care Reviewed With  patient  -AY     Progress  no change  -AY     Outcome Summary  Pt amb 150ft with RWx and SBA demonstrating good stability throughout. BLE exercises performed. Pt limited by decreased functional endurance and mild balance deficits. Continue to progress as able. D/c rec for HWA and HHPT/OT.  -AY     Row Name 06/04/21 1521          Vital Signs    Pre Systolic BP Rehab  -- VSS  -AY     Pre SpO2 (%)  98  -AY     O2 Delivery Pre Treatment  room air  -AY     Intra SpO2 (%)  96  -AY     O2 Delivery Intra Treatment  room air  -AY     Post SpO2 (%)  98  -AY     O2 Delivery Post Treatment  room air  -AY     Pre Patient Position  Sitting  -AY     Intra Patient Position  Standing  -AY     Post Patient Position  Sitting  -AY     Row Name 06/04/21 1521          Positioning and Restraints    Pre-Treatment Position  sitting in chair/recliner  -AY     Post Treatment Position  chair  -AY     In Chair  notified nsg;reclined;sitting;call light within reach;encouraged to call for assist;exit alarm on;waffle cushion;legs elevated  -AY       User Key  (r) = Recorded By, (t) = Taken By, (c) = Cosigned By    Initials Name Provider Type    AY Lesly Lilly, PT Physical Therapist        Outcome Measures     Row Name 06/04/21 1524          How much help from another person do you currently need...    Turning from your back to your side while in flat bed without using bedrails?  4  -AY     Moving from lying on back to sitting on the side of a flat bed without bedrails?  4  -AY     Moving to and from a bed to a chair (including a wheelchair)?  4  -AY     Standing up from a chair using your arms (e.g., wheelchair, bedside chair)?  4  -AY     Climbing 3-5 steps with a railing?  3  -AY     To walk in hospital room?  3  -AY     AM-PAC 6 Clicks Score (PT)  22  -AY     Row Name  06/04/21 1524          Functional Assessment    Outcome Measure Options  AM-PAC 6 Clicks Basic Mobility (PT)  -AY       User Key  (r) = Recorded By, (t) = Taken By, (c) = Cosigned By    Initials Name Provider Type    AY Lesly Lilly, PT Physical Therapist        Physical Therapy Education                 Title: PT OT SLP Therapies (In Progress)     Topic: Physical Therapy (Done)     Point: Mobility training (Done)     Learning Progress Summary           Patient Acceptance, E,TB, VU,NR by AY at 6/4/2021 1524    Acceptance, TB, NR by PS at 6/2/2021 1013    Eager, E,H, VU,DU by  at 5/30/2021 1115                   Point: Home exercise program (Done)     Learning Progress Summary           Patient Acceptance, E,TB, VU,NR by AY at 6/4/2021 1524    Acceptance, TB, NR by PS at 6/2/2021 1013    Eager, E,H, VU,DU by  at 5/30/2021 1115                   Point: Body mechanics (Done)     Learning Progress Summary           Patient Acceptance, E,TB, VU,NR by AY at 6/4/2021 1524    Acceptance, TB, NR by PS at 6/2/2021 1013    Eager, E,H, VU,DU by  at 5/30/2021 1115                   Point: Precautions (Done)     Learning Progress Summary           Patient Acceptance, E,TB, VU,NR by  at 6/4/2021 1524                               User Key     Initials Effective Dates Name Provider Type Discipline     06/19/15 -  Dinah Peoples, PT Physical Therapist PT    PS 01/16/19 -  Mariana Avila, RN Registered Nurse Nurse     11/10/20 -  Lesly Lilly, NELLI Physical Therapist PT              PT Recommendation and Plan     Plan of Care Reviewed With: patient  Progress: no change  Outcome Summary: Pt amb 150ft with RWx and SBA demonstrating good stability throughout. BLE exercises performed. Pt limited by decreased functional endurance and mild balance deficits. Continue to progress as able. D/c rec for HWA and HHPT/OT.     Time Calculation:   PT Charges     Row Name 06/04/21 1524             Time Calculation    Start Time  1414   -AY      PT Received On  06/04/21  -AY         Time Calculation- PT    Total Timed Code Minutes- PT  23 minute(s)  -AY         Timed Charges    50806 - PT Therapeutic Exercise Minutes  10  -AY      65230 - PT Therapeutic Activity Minutes  13  -AY         Total Minutes    Timed Charges Total Minutes  23  -AY       Total Minutes  23  -AY        User Key  (r) = Recorded By, (t) = Taken By, (c) = Cosigned By    Initials Name Provider Type    AY Lesly Lilly, NELLI Physical Therapist        Therapy Charges for Today     Code Description Service Date Service Provider Modifiers Qty    72810912387 HC PT THER PROC EA 15 MIN 6/4/2021 Lesly Lilly, PT GP 1    06740393929 HC PT THERAPEUTIC ACT EA 15 MIN 6/4/2021 Lesly Lilly, PT GP 1          PT G-Codes  Outcome Measure Options: AM-PAC 6 Clicks Basic Mobility (PT)  AM-PAC 6 Clicks Score (PT): 22  AM-PAC 6 Clicks Score (OT): 20    Lesly Lilly PT  6/4/2021

## 2021-06-05 LAB
ANION GAP SERPL CALCULATED.3IONS-SCNC: 9 MMOL/L (ref 5–15)
BUN SERPL-MCNC: 8 MG/DL (ref 8–23)
BUN/CREAT SERPL: 12.3 (ref 7–25)
CALCIUM SPEC-SCNC: 9 MG/DL (ref 8.6–10.5)
CHLORIDE SERPL-SCNC: 95 MMOL/L (ref 98–107)
CO2 SERPL-SCNC: 25 MMOL/L (ref 22–29)
CREAT SERPL-MCNC: 0.65 MG/DL (ref 0.57–1)
GFR SERPL CREATININE-BSD FRML MDRD: 87 ML/MIN/1.73
GLUCOSE SERPL-MCNC: 90 MG/DL (ref 65–99)
POTASSIUM SERPL-SCNC: 4.3 MMOL/L (ref 3.5–5.2)
SODIUM SERPL-SCNC: 129 MMOL/L (ref 136–145)

## 2021-06-05 PROCEDURE — 63710000001 ONDANSETRON PER 8 MG: Performed by: NURSE PRACTITIONER

## 2021-06-05 PROCEDURE — 99232 SBSQ HOSP IP/OBS MODERATE 35: CPT | Performed by: FAMILY MEDICINE

## 2021-06-05 PROCEDURE — 80048 BASIC METABOLIC PNL TOTAL CA: CPT | Performed by: INTERNAL MEDICINE

## 2021-06-05 RX ADMIN — BISOPROLOL FUMARATE 10 MG: 5 TABLET, FILM COATED ORAL at 09:23

## 2021-06-05 RX ADMIN — METOCLOPRAMIDE 5 MG: 5 TABLET ORAL at 17:20

## 2021-06-05 RX ADMIN — APIXABAN 5 MG: 5 TABLET, FILM COATED ORAL at 20:18

## 2021-06-05 RX ADMIN — ONDANSETRON HYDROCHLORIDE 4 MG: 4 TABLET, FILM COATED ORAL at 09:30

## 2021-06-05 RX ADMIN — Medication 2 G: at 09:26

## 2021-06-05 RX ADMIN — METOCLOPRAMIDE 5 MG: 5 TABLET ORAL at 12:08

## 2021-06-05 RX ADMIN — APIXABAN 5 MG: 5 TABLET, FILM COATED ORAL at 09:23

## 2021-06-05 RX ADMIN — Medication 2 G: at 12:08

## 2021-06-05 RX ADMIN — METOCLOPRAMIDE 5 MG: 5 TABLET ORAL at 09:23

## 2021-06-05 RX ADMIN — Medication 2 G: at 17:20

## 2021-06-05 NOTE — PROGRESS NOTES
[facility] Medicine Services  PROGRESS NOTE    Patient Name: Thuy Clark  : 1935  MRN: [id number]    Date of Admission: 2021  Primary Care Physician: Isidra Eisenberg MD    Subjective   Subjective     CC:  Hyponatremia    HPI:  Patient is an 85-year-old who was admitted with significant hyponatremia that has gradually improved with IV fluids initially, fluid restriction and salt tabs.     2021-patient sodium has improved and is now around 128.  Patient reports she lives alone but her children live nearby.  She has began recently concerned about her ability to care for self.  She would be agreeable to rehab if indicated and I think.  She states I feel like my situation is dire she seems to have difficulty remembering things I am telling her.    2021-patient reports her memory is poor and she will have difficulty remembering and measuring restricting her fluids.  Referrals have been made for rehab.  Her sodium has improved again today to 129.    ROS:  General: No fever, chills, positive fatigue  ENT: No sore throat, trouble swallowing or changes in vision  Respiratory: No shortness of breath, cough, wheezing or fast breathing  Cardiovascular: No chest pain, palpitations, dyspnea with exertion  Gastrointestinal: No nausea vomiting abdominal pain  Musculoskeletal: Positive difficulty walking, positive weakness  Vascular: No cyanosis or clubbing  Lymphatic: No peripheral edema, no lymphadenopathy  Neurologic: No headache, positive confusion, dizziness  Psychiatric: Positive worry and anxiety.       Objective   Objective     Vital Signs:   Temp:  [97.3 °F (36.3 °C)-98.3 °F (36.8 °C)] 97.3 °F (36.3 °C)  Heart Rate:  [78-97] 78  Resp:  [14-16] 16  BP: (102-119)/(50-63) 107/60        Physical Exam:  Constitutional: No acute distress, awake, alert, nontoxic, elderly and frail body habitus  Eyes: Pupils equal, sclerae anicteric, no conjunctival injection  HENT: NCAT,  mucous membranes moist  Neck: Supple, no thyromegaly, no lymphadenopathy  Respiratory: Clear to auscultation bilaterally, good effort, nonlabored respirations   Cardiovascular: RRR  Gastrointestinal: Positive bowel sounds, soft, nontender, nondistended  Musculoskeletal: No peripheral edema, normal muscle tone for age  Psychiatric: Appropriate affect, good insight and judgement, cooperative  Neurologic: Oriented x 3, movements symmetric BUE and BLE, Cranial Nerves grossly intact, speech clear and fluent  Skin: No rashes, no jaundice, no petechiae, no mottling      Results Reviewed:  Results from last 7 days   Lab Units 06/02/21  0448 05/30/21  0511   WBC 10*3/mm3 6.08 6.05   HEMOGLOBIN g/dL 11.7* 11.3*   HEMATOCRIT % 35.7 34.0   PLATELETS 10*3/mm3 370 408     Results from last 7 days   Lab Units 06/05/21  0612 06/04/21  0519 06/03/21  1740 06/03/21  0444   SODIUM mmol/L 129* 128* 128* 125*   POTASSIUM mmol/L 4.3 4.2  --  4.3   CHLORIDE mmol/L 95* 95*  --  94*   CO2 mmol/L 25.0 24.0  --  25.0   BUN mg/dL 8 10  --  7*   CREATININE mg/dL 0.65 0.61  --  0.62   GLUCOSE mg/dL 90 99  --  92   CALCIUM mg/dL 9.0 9.0  --  9.2     Estimated Creatinine Clearance: 50.8 mL/min (by C-G formula based on SCr of 0.65 mg/dL).    Microbiology Results Abnormal     Procedure Component Value - Date/Time    COVID PRE-OP / PRE-PROCEDURE SCREENING ORDER (NO ISOLATION) - Swab, Nasopharynx [[id number]] Collected: 05/27/21 1448    Lab Status: Final result Specimen: Swab from Nasopharynx Updated: 05/28/21 1430    Narrative:      The following orders were created for panel order COVID PRE-OP / PRE-PROCEDURE SCREENING ORDER (NO ISOLATION) - Swab, Nasopharynx.  Procedure                               Abnormality         Status                     ---------                               -----------         ------                     COVID-19 PCR, [facility]...[[id number]]                      Final result                 Please view results for these  tests on the individual orders.    COVID-19 PCR, [facility], NP SWAB IN LEXAR VIRAL TRANSPORT MEDIA 24-30 HR TAT - Swab, Nasopharynx [[id number]] Collected: 05/27/21 1448    Lab Status: Final result Specimen: Swab from Nasopharynx Updated: 05/28/21 1430     SARS-CoV-2 GATO Not Detected          Imaging Results (Last 24 Hours)     ** No results found for the last 24 hours. **          Results for orders placed during the hospital encounter of 11/02/19    Adult Transthoracic Echo Complete With Contrast if Necessary Per Protocol    Interpretation Summary  · Left ventricular systolic function is normal. Estimated EF = 65%.  · Left ventricular wall thickness is consistent with borderline concentric hypertrophy.  · Left ventricular diastolic dysfunction (grade II) consistent with pseudonormalization.  · Right ventricular cavity is dilated.  · Left atrial volume is severely increased.  · Saline test results are positive for evidence of an intra-atrial shunt.  · Right atrial cavity size is severely dilated.  · Mild aortic valve regurgitation is present.  · Mild tricuspid valve regurgitation is present.  · Estimated right ventricular systolic pressure from tricuspid regurgitation is mildly elevated (35-45 mmHg      I have reviewed the medications:  Scheduled Meds:apixaban, 5 mg, Oral, Q12H  bisoprolol, 10 mg, Oral, Daily  gabapentin, 200 mg, Oral, Nightly  hydrocortisone, 25 mg, Rectal, BID  metoclopramide, 5 mg, Oral, TID AC  sodium chloride, 2 g, Oral, TID With Meals      Continuous Infusions:   PRN Meds:.ondansetron  •  sennosides-docusate    Assessment/Plan   Assessment & Plan     Active Hospital Problems    Diagnosis  POA   • **Hyponatremia [E87.1]  Yes   • Chronic anticoagulation (Eliquis) [Z79.01]  Not Applicable   • Chronic atrial fibrillation (CMS/HCC) [I48.21]  Yes   • Altered mental status [R41.82]  Yes   • Atrial flutter with rapid ventricular response (CMS/HCC) [I48.92]  Yes   • Constipation, slow transit [K59.01]   Yes   • Atrial septal defect determined by imaging [Q21.1]  Not Applicable      Resolved Hospital Problems   No resolved problems to display.        Brief Hospital Course to date:  Thuy Clark is a 85 y.o. female with past medical history history of A. fib, on Eliquis, hypertension, chronic urinary retention who does not intermittent self cath admitted on 5/27 for acute hyponatremia, symptomatic with AMS and transferred initially to the ICU for closer monitoring.  Urine studies consistent with possible ADH mechanism however she was not on any medications known to cause low sodium.  Sodium in the ER was 108.  She does have a history of chronic mild hyponatremia, the lowest previously being 123.       Only new medications prior to admission were Bactrim for a UTI started by her PCP at the [facility].  She was transferred up to general medical floor from ICU on 5/29.     This patient's problems and plans were partially entered by my partner and updated as appropriate by me 06/05/21.     Acute symptomatic hyponatremia  Impaired memory  --Na levels: [phone]   --Likely multifactorial including decreased solute intake, polydipsia as well as ADH mechanism  -- TSH wnl, cortisol normal   -Sodium improving with fluid restriction, salt tablets  -CT chest negative for malignancy  -Continue 1 L fluid restriction  -Continue salt tabs 2 tabs TID, gave lasix 20 mg PO times one  -concern for pt to care for herself independently, discussed with CM and working on referrals for assisted living, rehab, etc  -Patient's impaired memory makes it challenging for her to remember measures and adhere to fluid restriction     Hypertension  - stable     A. fib, rate controlled  -Continue bisoprolol  -On Eliquis     Constipation-resolved  --Continue bowel regimen  --chronic, trial of reglan  --Requested outpatient referral to [facility] at discharge    DVT  Prophylaxis:  eliquis      Disposition: I expect the patient to be discharged pending referrals for assisted living, rehab, etc.    CODE STATUS:   Code Status and Medical Interventions:   Ordered at: 05/27/21 0541     Code Status:    CPR     Medical Interventions (Level of Support Prior to Arrest):    Full       Aylin Kimble MD  06/05/21

## 2021-06-05 NOTE — PLAN OF CARE
Goal Outcome Evaluation:        Outcome Summary: A&O, VSS.  >1 liter UOP, no bm this shift. No complaints of pain. No acute events overnight, will continue to monitor.

## 2021-06-06 LAB
ANION GAP SERPL CALCULATED.3IONS-SCNC: 8 MMOL/L (ref 5–15)
BACTERIA UR QL AUTO: ABNORMAL /HPF
BILIRUB UR QL STRIP: NEGATIVE
BUN SERPL-MCNC: 9 MG/DL (ref 8–23)
BUN/CREAT SERPL: 13.6 (ref 7–25)
CALCIUM SPEC-SCNC: 8.9 MG/DL (ref 8.6–10.5)
CHLORIDE SERPL-SCNC: 97 MMOL/L (ref 98–107)
CLARITY UR: ABNORMAL
CO2 SERPL-SCNC: 27 MMOL/L (ref 22–29)
COLOR UR: YELLOW
CREAT SERPL-MCNC: 0.66 MG/DL (ref 0.57–1)
GFR SERPL CREATININE-BSD FRML MDRD: 85 ML/MIN/1.73
GLUCOSE SERPL-MCNC: 92 MG/DL (ref 65–99)
GLUCOSE UR STRIP-MCNC: NEGATIVE MG/DL
HGB UR QL STRIP.AUTO: ABNORMAL
HYALINE CASTS UR QL AUTO: ABNORMAL /LPF
KETONES UR QL STRIP: NEGATIVE
LEUKOCYTE ESTERASE UR QL STRIP.AUTO: ABNORMAL
NITRITE UR QL STRIP: POSITIVE
PH UR STRIP.AUTO: 6 [PH] (ref 5–8)
POTASSIUM SERPL-SCNC: 4.3 MMOL/L (ref 3.5–5.2)
PROT UR QL STRIP: ABNORMAL
RBC # UR: ABNORMAL /HPF
REF LAB TEST METHOD: ABNORMAL
SODIUM SERPL-SCNC: 132 MMOL/L (ref 136–145)
SP GR UR STRIP: 1.01 (ref 1–1.03)
SQUAMOUS #/AREA URNS HPF: ABNORMAL /HPF
UROBILINOGEN UR QL STRIP: ABNORMAL
WBC UR QL AUTO: ABNORMAL /HPF
YEAST URNS QL MICRO: ABNORMAL /HPF

## 2021-06-06 PROCEDURE — 87186 SC STD MICRODIL/AGAR DIL: CPT | Performed by: PHYSICIAN ASSISTANT

## 2021-06-06 PROCEDURE — 80048 BASIC METABOLIC PNL TOTAL CA: CPT | Performed by: INTERNAL MEDICINE

## 2021-06-06 PROCEDURE — 81001 URINALYSIS AUTO W/SCOPE: CPT | Performed by: FAMILY MEDICINE

## 2021-06-06 PROCEDURE — 99232 SBSQ HOSP IP/OBS MODERATE 35: CPT | Performed by: FAMILY MEDICINE

## 2021-06-06 PROCEDURE — 87077 CULTURE AEROBIC IDENTIFY: CPT | Performed by: PHYSICIAN ASSISTANT

## 2021-06-06 PROCEDURE — 87086 URINE CULTURE/COLONY COUNT: CPT | Performed by: PHYSICIAN ASSISTANT

## 2021-06-06 RX ADMIN — BISOPROLOL FUMARATE 10 MG: 5 TABLET, FILM COATED ORAL at 08:50

## 2021-06-06 RX ADMIN — HYDROCORTISONE ACETATE 25 MG: 25 SUPPOSITORY RECTAL at 20:10

## 2021-06-06 RX ADMIN — APIXABAN 5 MG: 5 TABLET, FILM COATED ORAL at 20:09

## 2021-06-06 RX ADMIN — DOCUSATE SODIUM 50MG AND SENNOSIDES 8.6MG 2 TABLET: 8.6; 5 TABLET, FILM COATED ORAL at 08:49

## 2021-06-06 RX ADMIN — Medication 2 G: at 17:17

## 2021-06-06 RX ADMIN — Medication 2 G: at 11:07

## 2021-06-06 RX ADMIN — DOCUSATE SODIUM 50MG AND SENNOSIDES 8.6MG 2 TABLET: 8.6; 5 TABLET, FILM COATED ORAL at 20:10

## 2021-06-06 RX ADMIN — Medication 2 G: at 08:49

## 2021-06-06 RX ADMIN — APIXABAN 5 MG: 5 TABLET, FILM COATED ORAL at 08:50

## 2021-06-06 RX ADMIN — METOCLOPRAMIDE 5 MG: 5 TABLET ORAL at 17:17

## 2021-06-06 RX ADMIN — METOCLOPRAMIDE 5 MG: 5 TABLET ORAL at 11:07

## 2021-06-06 RX ADMIN — METOCLOPRAMIDE 5 MG: 5 TABLET ORAL at 08:50

## 2021-06-06 NOTE — PROGRESS NOTES
[facility] Medicine Services  PROGRESS NOTE    Patient Name: Thuy Clark  : 1935  MRN: [id number]    Date of Admission: 2021  Primary Care Physician: Isidra Eisenberg MD    Subjective   Subjective     CC:  Hyponatremia    HPI:  Patient is an 85-year-old who was admitted with significant hyponatremia that has gradually improved with IV fluids initially, fluid restriction and salt tabs.     2021-patient sodium has improved and is now around 128.  Patient reports she lives alone but her children live nearby.  She has began recently concerned about her ability to care for self.  She would be agreeable to rehab if indicated and I think.  She states I feel like my situation is dire she seems to have difficulty remembering things I am telling her.    2021-patient reports her memory is poor and she will have difficulty remembering and measuring restricting her fluids.  Referrals have been made for rehab.  Her sodium has improved again today to 129.    2021-patient sodium improved to 132 today.  No new concerns.  She is tolerating p.o. well.  She is working with PT and ambulated with a walker.    ROS:  General: No fever, chills, positive fatigue  ENT: No sore throat, trouble swallowing or changes in vision  Respiratory: No shortness of breath, cough, wheezing or fast breathing  Cardiovascular: No chest pain, palpitations, dyspnea with exertion  Gastrointestinal: No nausea vomiting abdominal pain  Musculoskeletal: Positive difficulty walking, positive weakness  Vascular: No cyanosis or clubbing  Lymphatic: No peripheral edema, no lymphadenopathy  Neurologic: No headache, positive confusion, dizziness  Psychiatric: Positive worry and anxiety.       Objective   Objective     Vital Signs:   Temp:  [98.3 °F (36.8 °C)-98.7 °F (37.1 °C)] 98.5 °F (36.9 °C)  Heart Rate:  [79-90] 90  Resp:  [18] 18  BP: (104-127)/(63-91) 120/63        Physical Exam:  Constitutional: No acute  distress, awake, alert, nontoxic, elderly and frail body habitus  Eyes: Pupils equal, sclerae anicteric, no conjunctival injection  HENT: NCAT, mucous membranes moist  Neck: Supple, no thyromegaly, no lymphadenopathy  Respiratory: Clear to auscultation bilaterally, good effort, nonlabored respirations   Cardiovascular: RRR  Gastrointestinal: Positive bowel sounds, soft, nontender, nondistended  Musculoskeletal: No peripheral edema, normal muscle tone for age  Psychiatric: Appropriate affect, good insight and judgement, cooperative  Neurologic: Oriented x 3, movements symmetric BUE and BLE, Cranial Nerves grossly intact, speech clear and fluent  Skin: No rashes, no jaundice, no petechiae, no mottling      Results Reviewed:  Results from last 7 days   Lab Units 06/02/21  0448   WBC 10*3/mm3 6.08   HEMOGLOBIN g/dL 11.7*   HEMATOCRIT % 35.7   PLATELETS 10*3/mm3 370     Results from last 7 days   Lab Units 06/06/21  0357 06/05/21  0612 06/04/21  0519   SODIUM mmol/L 132* 129* 128*   POTASSIUM mmol/L 4.3 4.3 4.2   CHLORIDE mmol/L 97* 95* 95*   CO2 mmol/L 27.0 25.0 24.0   BUN mg/dL 9 8 10   CREATININE mg/dL 0.66 0.65 0.61   GLUCOSE mg/dL 92 90 99   CALCIUM mg/dL 8.9 9.0 9.0     Estimated Creatinine Clearance: 50.8 mL/min (by C-G formula based on SCr of 0.66 mg/dL).    Microbiology Results Abnormal     Procedure Component Value - Date/Time    COVID PRE-OP / PRE-PROCEDURE SCREENING ORDER (NO ISOLATION) - Swab, Nasopharynx [[id number]] Collected: 05/27/21 1448    Lab Status: Final result Specimen: Swab from Nasopharynx Updated: 05/28/21 1430    Narrative:      The following orders were created for panel order COVID PRE-OP / PRE-PROCEDURE SCREENING ORDER (NO ISOLATION) - Swab, Nasopharynx.  Procedure                               Abnormality         Status                     ---------                               -----------         ------                     COVID-19 PCR, [facility]...[[id number]]                      Final  result                 Please view results for these tests on the individual orders.    COVID-19 PCR, [facility], NP SWAB IN LEXAR VIRAL TRANSPORT MEDIA 24-30 HR TAT - Swab, Nasopharynx [[id number]] Collected: 05/27/21 1448    Lab Status: Final result Specimen: Swab from Nasopharynx Updated: 05/28/21 1430     SARS-CoV-2 GATO Not Detected          Imaging Results (Last 24 Hours)     ** No results found for the last 24 hours. **          Results for orders placed during the hospital encounter of 11/02/19    Adult Transthoracic Echo Complete With Contrast if Necessary Per Protocol    Interpretation Summary  · Left ventricular systolic function is normal. Estimated EF = 65%.  · Left ventricular wall thickness is consistent with borderline concentric hypertrophy.  · Left ventricular diastolic dysfunction (grade II) consistent with pseudonormalization.  · Right ventricular cavity is dilated.  · Left atrial volume is severely increased.  · Saline test results are positive for evidence of an intra-atrial shunt.  · Right atrial cavity size is severely dilated.  · Mild aortic valve regurgitation is present.  · Mild tricuspid valve regurgitation is present.  · Estimated right ventricular systolic pressure from tricuspid regurgitation is mildly elevated (35-45 mmHg      I have reviewed the medications:  Scheduled Meds:apixaban, 5 mg, Oral, Q12H  bisoprolol, 10 mg, Oral, Daily  gabapentin, 200 mg, Oral, Nightly  hydrocortisone, 25 mg, Rectal, BID  metoclopramide, 5 mg, Oral, TID AC  sodium chloride, 2 g, Oral, TID With Meals      Continuous Infusions:   PRN Meds:.ondansetron  •  sennosides-docusate    Assessment/Plan   Assessment & Plan     Active Hospital Problems    Diagnosis  POA   • **Hyponatremia [E87.1]  Yes   • Chronic anticoagulation (Eliquis) [Z79.01]  Not Applicable   • Chronic atrial fibrillation (CMS/HCC) [I48.21]  Yes   • Altered mental status [R41.82]  Yes   • Atrial flutter with rapid ventricular response (CMS/HCC)  [I48.92]  Yes   • Constipation, slow transit [K59.01]  Yes   • Atrial septal defect determined by imaging [Q21.1]  Not Applicable      Resolved Hospital Problems   No resolved problems to display.        Brief Hospital Course to date:  Thuy Clark is a 85 y.o. female with past medical history history of A. fib, on Eliquis, hypertension, chronic urinary retention who does not intermittent self cath admitted on 5/27 for acute hyponatremia, symptomatic with AMS and transferred initially to the ICU for closer monitoring.  Urine studies consistent with possible ADH mechanism however she was not on any medications known to cause low sodium.  Sodium in the ER was 108.  She does have a history of chronic mild hyponatremia, the lowest previously being 123.       Only new medications prior to admission were Bactrim for a UTI started by her PCP at the [facility].  She was transferred up to general medical floor from ICU on 5/29.     This patient's problems and plans were partially entered by my partner and updated as appropriate by me 06/06/21.     Acute symptomatic hyponatremia  Impaired memory  --Na levels: [phone]  --Likely multifactorial including decreased solute intake, polydipsia as well as ADH mechanism  -- TSH wnl, cortisol normal   -Sodium improving with fluid restriction, salt tablets  -CT chest negative for malignancy  -Continue 1 L fluid restriction  -Continue salt tabs 2 tabs TID, gave lasix 20 mg PO times one  -concern for pt to care for herself independently, discussed with CM and working on referrals for assisted living, rehab, etc  -Patient's impaired memory makes it challenging for her to remember measures and adhere to fluid restriction     Hypertension  - stable     A. fib, rate controlled  -Continue bisoprolol  -On Eliquis     Constipation-resolved  --Continue bowel regimen  --chronic, trial of reglan  --Requested  outpatient referral to [facility] at discharge    DVT Prophylaxis:  eliquis      Disposition: I expect the patient to be discharged pending referrals for assisted living, rehab, etc.    CODE STATUS:   Code Status and Medical Interventions:   Ordered at: 05/27/21 0541     Code Status:    CPR     Medical Interventions (Level of Support Prior to Arrest):    Full       Aylin Kimble MD  06/06/21

## 2021-06-06 NOTE — PLAN OF CARE
Goal Outcome Evaluation:  Plan of Care Reviewed With: patient  Progress: improving  Outcome Summary: PT c/o bladder pain today, u/a sent after scheduled I+O cath. VSS. Na stable. Up to chair most of day. Awaiting rehab placement.

## 2021-06-07 ENCOUNTER — TELEPHONE (OUTPATIENT)
Dept: INTERNAL MEDICINE | Facility: CLINIC | Age: 86
End: 2021-06-07

## 2021-06-07 LAB
ANION GAP SERPL CALCULATED.3IONS-SCNC: 8 MMOL/L (ref 5–15)
BUN SERPL-MCNC: 9 MG/DL (ref 8–23)
BUN/CREAT SERPL: 17 (ref 7–25)
CALCIUM SPEC-SCNC: 8.6 MG/DL (ref 8.6–10.5)
CHLORIDE SERPL-SCNC: 95 MMOL/L (ref 98–107)
CO2 SERPL-SCNC: 26 MMOL/L (ref 22–29)
CREAT SERPL-MCNC: 0.53 MG/DL (ref 0.57–1)
GFR SERPL CREATININE-BSD FRML MDRD: 110 ML/MIN/1.73
GLUCOSE SERPL-MCNC: 100 MG/DL (ref 65–99)
POTASSIUM SERPL-SCNC: 4.1 MMOL/L (ref 3.5–5.2)
SARS-COV-2 RDRP RESP QL NAA+PROBE: NORMAL
SODIUM SERPL-SCNC: 129 MMOL/L (ref 136–145)

## 2021-06-07 PROCEDURE — 25010000002 CEFTRIAXONE PER 250 MG: Performed by: PHYSICIAN ASSISTANT

## 2021-06-07 PROCEDURE — 97110 THERAPEUTIC EXERCISES: CPT | Performed by: PHYSICAL THERAPIST

## 2021-06-07 PROCEDURE — 87635 SARS-COV-2 COVID-19 AMP PRB: CPT | Performed by: INTERNAL MEDICINE

## 2021-06-07 PROCEDURE — 80048 BASIC METABOLIC PNL TOTAL CA: CPT | Performed by: INTERNAL MEDICINE

## 2021-06-07 PROCEDURE — 99233 SBSQ HOSP IP/OBS HIGH 50: CPT | Performed by: INTERNAL MEDICINE

## 2021-06-07 PROCEDURE — 97116 GAIT TRAINING THERAPY: CPT | Performed by: PHYSICAL THERAPIST

## 2021-06-07 PROCEDURE — 97110 THERAPEUTIC EXERCISES: CPT

## 2021-06-07 RX ORDER — BISACODYL 5 MG/1
5 TABLET, DELAYED RELEASE ORAL DAILY PRN
Status: DISCONTINUED | OUTPATIENT
Start: 2021-06-07 | End: 2021-06-09 | Stop reason: HOSPADM

## 2021-06-07 RX ORDER — BISOPROLOL FUMARATE 5 MG/1
15 TABLET, FILM COATED ORAL DAILY
Status: DISCONTINUED | OUTPATIENT
Start: 2021-06-08 | End: 2021-06-09 | Stop reason: HOSPADM

## 2021-06-07 RX ORDER — PHENAZOPYRIDINE HYDROCHLORIDE 100 MG/1
100 TABLET, FILM COATED ORAL
Status: DISCONTINUED | OUTPATIENT
Start: 2021-06-07 | End: 2021-06-09 | Stop reason: HOSPADM

## 2021-06-07 RX ORDER — BISACODYL 10 MG
10 SUPPOSITORY, RECTAL RECTAL DAILY PRN
Status: DISCONTINUED | OUTPATIENT
Start: 2021-06-07 | End: 2021-06-09 | Stop reason: HOSPADM

## 2021-06-07 RX ORDER — AMOXICILLIN 250 MG
2 CAPSULE ORAL 2 TIMES DAILY
Status: DISCONTINUED | OUTPATIENT
Start: 2021-06-07 | End: 2021-06-09 | Stop reason: HOSPADM

## 2021-06-07 RX ORDER — POLYETHYLENE GLYCOL 3350 17 G/17G
17 POWDER, FOR SOLUTION ORAL DAILY PRN
Status: DISCONTINUED | OUTPATIENT
Start: 2021-06-07 | End: 2021-06-09 | Stop reason: HOSPADM

## 2021-06-07 RX ORDER — PHENAZOPYRIDINE HYDROCHLORIDE 100 MG/1
100 TABLET, FILM COATED ORAL ONCE
Status: COMPLETED | OUTPATIENT
Start: 2021-06-07 | End: 2021-06-07

## 2021-06-07 RX ADMIN — PHENAZOPYRIDINE 100 MG: 100 TABLET ORAL at 09:00

## 2021-06-07 RX ADMIN — PSYLLIUM HUSK 1 PACKET: 3.4 POWDER ORAL at 09:02

## 2021-06-07 RX ADMIN — Medication 2 G: at 17:16

## 2021-06-07 RX ADMIN — PHENAZOPYRIDINE 100 MG: 100 TABLET ORAL at 11:49

## 2021-06-07 RX ADMIN — BISOPROLOL FUMARATE 10 MG: 5 TABLET, FILM COATED ORAL at 09:00

## 2021-06-07 RX ADMIN — METOCLOPRAMIDE 5 MG: 5 TABLET ORAL at 17:16

## 2021-06-07 RX ADMIN — PHENAZOPYRIDINE HYDROCHLORIDE 100 MG: 100 TABLET ORAL at 03:55

## 2021-06-07 RX ADMIN — METOCLOPRAMIDE 5 MG: 5 TABLET ORAL at 11:49

## 2021-06-07 RX ADMIN — Medication 2 G: at 11:49

## 2021-06-07 RX ADMIN — APIXABAN 5 MG: 5 TABLET, FILM COATED ORAL at 21:01

## 2021-06-07 RX ADMIN — DOCUSATE SODIUM 50MG AND SENNOSIDES 8.6MG 2 TABLET: 8.6; 5 TABLET, FILM COATED ORAL at 21:01

## 2021-06-07 RX ADMIN — PHENAZOPYRIDINE 100 MG: 100 TABLET ORAL at 17:16

## 2021-06-07 RX ADMIN — Medication 2 G: at 09:00

## 2021-06-07 RX ADMIN — HYDROCORTISONE ACETATE 25 MG: 25 SUPPOSITORY RECTAL at 21:01

## 2021-06-07 RX ADMIN — METOCLOPRAMIDE 5 MG: 5 TABLET ORAL at 09:00

## 2021-06-07 RX ADMIN — APIXABAN 5 MG: 5 TABLET, FILM COATED ORAL at 09:00

## 2021-06-07 RX ADMIN — DOCUSATE SODIUM 50MG AND SENNOSIDES 8.6MG 2 TABLET: 8.6; 5 TABLET, FILM COATED ORAL at 09:01

## 2021-06-07 RX ADMIN — SODIUM CHLORIDE 1 G: 900 INJECTION INTRAVENOUS at 01:48

## 2021-06-07 NOTE — PLAN OF CARE
Goal Outcome Evaluation:  Plan of Care Reviewed With: patient        Progress: no change  Outcome Summary: Pt progressing well towards skilled PT goals and gave good effort.  Pt stood with SBA and ambulated 200 feet using rw with SBAx1.  BLE ther ex completed without complaint.  Continue with skilled PT to improve mobility and safety.

## 2021-06-07 NOTE — THERAPY TREATMENT NOTE
Patient Name: Thuy Clark  : 1935    MRN: [id number]                              Today's Date: 2021       Admit Date: 2021    Visit Dx:     ICD-10-CM ICD-9-CM   1. Altered mental status, unspecified altered mental status type  R41.82 780.97   2. Hyponatremia  E87.1 276.1   3. Impaired functional mobility, balance, gait, and endurance  Z74.09 V49.89     Patient Active Problem List   Diagnosis   • Pulmonary hypertension (CMS/HCC)   • Benign essential hypertension   • Mixed hyperlipidemia   • Atrial septal defect determined by imaging   • Abnormal glucose   • Generalized anxiety disorder   • Valvular heart disease   • Senile osteoporosis   • PVC's (premature ventricular contractions)   • Calculus of gallbladder   • Urinary retention   • Right knee pain   • Osteoarthritis   • Quadriceps weakness   • Constipation, slow transit   • Atrial flutter with rapid ventricular response (CMS/HCC)   • Gait disorder   • Primary osteoarthritis of both knees   • At high risk for falls   • Medicare annual wellness visit, subsequent   • Hyponatremia   • Chronic anticoagulation (Eliquis)   • Chronic atrial fibrillation (CMS/HCC)   • Altered mental status     Past Medical History:   Diagnosis Date   • Bleeding hemorrhoid 3/8/2019   • Diverticulosis    • External hemorrhoid 2019 Isidra Eisenberg MD   Continue vasculera daily. Drink plenty of fluids.  Avoid constipation.  Follow-up with Dr. Hebert as planned.   • Hx of colonic polyps    • Hypertension    • Intraductal papilloma     R intraductal patheloma   • Left upper extremity numbness     L upper extremity numbness-ER visit; neuropathy   • Overweight (BMI 25.0-29.9) 2019   • Self-catheterizes urinary bladder      Past Surgical History:   Procedure Laterality Date   • BREAST LUMPECTOMY     • CHOLECYSTECTOMY     • HYSTERECTOMY     • VAGINECTOMY       General Information     Row Name 21 1053          OT Time and  Intention    Document Type  therapy note (daily note)  -     Mode of Treatment  occupational therapy  -     Row Name 06/07/21 1055          General Information    Patient Profile Reviewed  yes  -     Existing Precautions/Restrictions  fall  -     Row Name 06/07/21 1055          Cognition    Orientation Status (Cognition)  oriented x 4  -     Row Name 06/07/21 1055          Safety Issues, Functional Mobility    Safety Issues Affecting Function (Mobility)  safety precaution awareness;safety precautions follow-through/compliance  -     Impairments Affecting Function (Mobility)  endurance/activity tolerance  -       User Key  (r) = Recorded By, (t) = Taken By, (c) = Cosigned By    Initials Name Provider Type     Amy Chavez OT Occupational Therapist          Mobility/ADL's     Row Name 06/07/21 1055          Bed Mobility    Bed Mobility  supine-sit  -     Supine-Sit [place] (Bed Mobility)  modified independence  -     Assistive Device (Bed Mobility)  head of bed elevated  -     Row Name 06/07/21 1055          Transfers    Transfers  sit-stand transfer  -     Sit-Stand [place] (Transfers)  supervision  -     Row Name 06/07/21 1055          Sit-Stand Transfer    Assistive Device (Sit-Stand Transfers)  walker, front-wheeled  -     Row Name 06/07/21 1055          Functional Mobility    Functional Mobility- Ind. Level  contact guard assist  -     Functional Mobility- Device  rolling walker  -     Functional Mobility-Distance (Feet)  160  -     Functional Mobility- Comment  pt reporting R knee weakness with ambulation  -     Row Name 06/07/21 1055          Activities of Daily Living    BADL Assessment/Intervention  upper body dressing;lower body dressing  -     Row Name 06/07/21 1055          Lower Body Dressing Assessment/Training    [place] Level (Lower Body Dressing)  doff;don;socks;independent  -     Position (Lower Body Dressing)  unsupported sitting  -     Row  Name 06/07/21 1055          Upper Body Dressing Assessment/Training    Boyd Level (Upper Body Dressing)  don;pajama/robe;minimum assist (75% patient effort)  -     Position (Upper Body Dressing)  edge of bed sitting  -       User Key  (r) = Recorded By, (t) = Taken By, (c) = Cosigned By    Initials Name Provider Type     Amy Chavez, OT Occupational Therapist        Obj/Interventions     Row Name 06/07/21 1055          Shoulder (Therapeutic Exercise)    Shoulder AROM (Therapeutic Exercise)  bilateral;flexion;extension;horizontal aBduction/aDduction;10 repetitions;2 sets  -     Row Name 06/07/21 1055          Elbow/Forearm (Therapeutic Exercise)    Elbow/Forearm AROM (Therapeutic Exercise)  bilateral;flexion;extension;10 repetitions;2 sets  -       User Key  (r) = Recorded By, (t) = Taken By, (c) = Cosigned By    Initials Name Provider Type    AC Amy Chavez, OT Occupational Therapist        Goals/Plan    No documentation.       Clinical Impression     Row Name 06/07/21 1055          Pain Scale: Numbers Pre/Post-Treatment    Pretreatment Pain Rating  0/10 - no pain  -     Posttreatment Pain Rating  0/10 - no pain  -     Row Name 06/07/21 1055          Plan of Care Review    Plan of Care Reviewed With  patient  -     Progress  no change  -     Outcome Summary  Pt initialy reluctant, and pt's  family encouraged pt to participate in therapy.  Pt mod ind supine to sit, independent to don socks,  min A to don robe,  supervision STS, CGA to ambulate 160 ft with RW.  Pt continues with good progress toward OT goals.  Recommend home with assist and HHOT.  -     Row Name 06/07/21 1055          Therapy Plan Review/Discharge Plan (OT)    Anticipated Discharge Disposition (OT)  home with assist;home with home health  -     Row Name 06/07/21 1057          Vital Signs    Post Systolic BP Rehab  134  -AC     Post Treatment Diastolic BP  73  -AC     Posttreatment Heart Rate (beats/min)  103  -AC      O2 Delivery Pre Treatment  room air  -AC     O2 Delivery Intra Treatment  room air  -AC     Post SpO2 (%)  94  -AC     O2 Delivery Post Treatment  room air  -AC     Pre Patient Position  Supine  -AC     Intra Patient Position  Standing  -AC     Post Patient Position  Sitting  -AC     Row Name 06/07/21 1055          Positioning and Restraints    Pre-Treatment Position  in bed  -AC     Post Treatment Position  chair  -AC     In Chair  reclined;call light within reach;encouraged to call for assist;exit alarm on;with family/caregiver  -       User Key  (r) = Recorded By, (t) = Taken By, (c) = Cosigned By    Initials Name Provider Type    Amy Hoffmann, OT Occupational Therapist        Outcome Measures     Row Name 06/07/21 1129          How much help from another is currently needed...    Putting on and taking off regular lower body clothing?  4  -AC     Bathing (including washing, rinsing, and drying)  3  -AC     Toileting (which includes using toilet bed pan or urinal)  3  -AC     Putting on and taking off regular upper body clothing  3  -AC     Taking care of personal grooming (such as brushing teeth)  4  -AC     Eating meals  4  -AC     AM-PAC 6 Clicks Score (OT)  21  -AC     Row Name 06/07/21 1129          Functional Assessment    Outcome Measure Options  AM-PAC 6 Clicks Daily Activity (OT)  -       User Key  (r) = Recorded By, (t) = Taken By, (c) = Cosigned By    Initials Name Provider Type    Amy Hoffmann, OT Occupational Therapist        Occupational Therapy Education                 Title: PT OT SLP Therapies (In Progress)     Topic: Occupational Therapy (In Progress)     Point: ADL training (Done)     Description:   Instruct learner(s) on proper safety adaptation and remediation techniques during self care or transfers.   Instruct in proper use of assistive devices.              Learning Progress Summary           Patient Acceptance, E, VU by FORD at 6/7/2021 1055    Acceptance, E, VU by CF at  6/3/2021 0851    Comment: Pt instructed on hand/DME placement at sink level and at chair for safe BADL completion and t/fs    Acceptance, TB, NR by PS at 6/2/2021 1013    Acceptance, E, VU by AC at 6/1/2021 1130    Eager, E,H, VU,DU by SJ at 5/30/2021 1115    Acceptance, E, VU,NR by TA at 5/30/2021 1100   Family Acceptance, E, VU by AC at 6/1/2021 1130                   Point: Home exercise program (In Progress)     Description:   Instruct learner(s) on appropriate technique for monitoring, assisting and/or progressing therapeutic exercises/activities.              Learning Progress Summary           Patient Acceptance, TB, NR by PS at 6/2/2021 1013    Eager, E,H, VU,DU by SJ at 5/30/2021 1115    Acceptance, E, VU,NR by TA at 5/30/2021 1100                   Point: Precautions (In Progress)     Description:   Instruct learner(s) on prescribed precautions during self-care and functional transfers.              Learning Progress Summary           Patient Acceptance, TB, NR by PS at 6/2/2021 1013    Eager, E,H, VU,DU by SJ at 5/30/2021 1115    Acceptance, E, VU,NR by TA at 5/30/2021 1100                   Point: Body mechanics (Done)     Description:   Instruct learner(s) on proper positioning and spine alignment during self-care, functional mobility activities and/or exercises.              Learning Progress Summary           Patient Acceptance, E, VU by CF at 6/3/2021 0851    Comment: Pt instructed on hand/DME placement at sink level and at chair for safe BADL completion and t/fs    Acceptance, TB, NR by PS at 6/2/2021 1013    Eager, E,H, VU,DU by SJ at 5/30/2021 1115    Acceptance, E, VU,NR by TA at 5/30/2021 1100                               User Key     Initials Effective Dates Name Provider Type Discipline    AC 06/23/15 -  Amy Chavez, OT Occupational Therapist OT    SJ 06/19/15 -  Dinah Peoples, PT Physical Therapist PT    TA 03/14/16 -  Arslan Coley, OT Occupational Therapist OT    PS 01/16/19 -   Mariana Avila, RN Registered Nurse Nurse    CF 03/30/21 -  Gamal Zhou, OT Student OT Student OT              OT Recommendation and Plan     Plan of Care Review  Plan of Care Reviewed With: patient  Progress: no change  Outcome Summary: Pt initialy reluctant, and pt's  family encouraged pt to participate in therapy.  Pt mod ind supine to sit, independent to don socks,  min A to don robe,  supervision STS, CGA to ambulate 160 ft with RW.  Pt continues with good progress toward OT goals.  Recommend home with assist and HHOT.     Time Calculation:   Time Calculation- OT     Row Name 06/07/21 1055             Time Calculation- OT    OT Start Time  1055  -AC      OT Received On  06/07/21  -AC      OT Goal Re-Cert Due Date  06/09/21  -AC         Timed Charges    45358 - OT Therapeutic Exercise Minutes  8  -AC      38019 - OT Therapeutic Activity Minutes  8  -AC      33201 - OT Self Care/Mgmt Minutes  3  -AC         Total Minutes    Timed Charges Total Minutes  19  -AC       Total Minutes  19  -AC        User Key  (r) = Recorded By, (t) = Taken By, (c) = Cosigned By    Initials Name Provider Type    AC Amy Chavez OT Occupational Therapist        Therapy Charges for Today     Code Description Service Date Service Provider Modifiers Qty    03991752298 HC OT THER PROC EA 15 MIN 6/7/2021 Amy Chavez OT GO 1               Amy Chavez OT  6/7/2021

## 2021-06-07 NOTE — TELEPHONE ENCOUNTER
PATIENT STATED THAT SHE WILL BE NEEDING ASSISTANCE, CURRENTLY ADMITTED AT [facility] ON 5TH FLOOR. PATIENT STATED THAT HER DAUGHTER HOLLY PEREZ (NOT A PATIENT OF OUR OFFICE) WILL HELP TAKE CARE OF PATIENT WHEN DISCHARGED. PATIENT STATED THAT HER DAUGHTER WILL NEED LA PAPERWORK FILLED OUT AND WOULD LIKE TO KNOW IF DR ORDAZ CAN SIGN FOR IT.     (WOULD LIKE TO KNOW, IF THE ANSWER IS YES CAN SHE BRING THE PAPERWORK OVER DROP IT OFF AND GET A CALL WHEN READY TO COME AND PICK IT UP DUE TO BEING RIGHT DOWN THE STREET AT THE HOSPITAL)        CALL BACK PATIENT:[phone]      HOLLY PEREZ: [phone]

## 2021-06-07 NOTE — PLAN OF CARE
Goal Outcome Evaluation:  Plan of Care Reviewed With: patient        Progress: no change  Outcome Summary: Pt initialy reluctant, and pt's  family encouraged pt to participate in therapy.  Pt mod ind supine to sit, independent to don socks,  min A to don robe,  supervision STS, CGA to ambulate 160 ft with RW.  Pt continues with good progress toward OT goals.  Recommend home with assist and HHOT.

## 2021-06-07 NOTE — PLAN OF CARE
SAROJ. APOLONIA. Pt had a urinalysis on the evening of 6/6/21 that came back positive for UTI. IV abx administered. No further complaints or concerns at this time.    Problem: Adult Inpatient Plan of Care  Goal: Plan of Care Review  Outcome: Ongoing, Progressing  Goal: Patient-Specific Goal (Individualized)  Outcome: Ongoing, Progressing  Goal: Absence of Hospital-Acquired Illness or Injury  Outcome: Ongoing, Progressing  Intervention: Identify and Manage Fall Risk  Recent Flowsheet Documentation  Taken 6/7/2021 0200 by Radha Merchant RNA  Safety Promotion/Fall Prevention:   activity supervised   assistive device/personal items within reach   clutter free environment maintained   fall prevention program maintained   nonskid shoes/slippers when out of bed   safety round/check completed  Taken 6/7/2021 0028 by Radha Merchant RNA  Safety Promotion/Fall Prevention:   activity supervised   assistive device/personal items within reach   clutter free environment maintained   fall prevention program maintained   safety round/check completed  Taken 6/6/2021 2200 by Radha Merchant RNA  Safety Promotion/Fall Prevention:   activity supervised   assistive device/personal items within reach   clutter free environment maintained   fall prevention program maintained   nonskid shoes/slippers when out of bed   safety round/check completed  Taken 6/6/2021 2000 by Radha Merchant RNA  Safety Promotion/Fall Prevention:   activity supervised   assistive device/personal items within reach   clutter free environment maintained   fall prevention program maintained   nonskid shoes/slippers when out of bed   safety round/check completed  Intervention: Prevent Skin Injury  Recent Flowsheet Documentation  Taken 6/7/2021 0200 by Radha Merchant RNA  Body Position: position changed independently  Taken 6/7/2021 0028 by Radha Merchant RNA  Body Position: position changed independently  Skin Protection:   adhesive use limited   incontinence pads utilized    tubing/devices free from skin contact  Taken 6/6/2021 2200 by Radha Merchant RNA  Body Position: position changed independently  Skin Protection:   adhesive use limited   incontinence pads utilized   tubing/devices free from skin contact  Taken 6/6/2021 2000 by Radha Merchant RNA  Body Position: position changed independently  Skin Protection:   adhesive use limited   incontinence pads utilized   tubing/devices free from skin contact   skin sealant/moisture barrier applied  Intervention: Prevent Infection  Recent Flowsheet Documentation  Taken 6/7/2021 0200 by Radha Merchant RNA  Infection Prevention:   personal protective equipment utilized   rest/sleep promoted   single patient room provided  Taken 6/7/2021 0028 by Radha Merchant RNA  Infection Prevention:   personal protective equipment utilized   rest/sleep promoted   single patient room provided  Taken 6/6/2021 2200 by Radha Merchant RNA  Infection Prevention:   personal protective equipment utilized   rest/sleep promoted   single patient room provided  Taken 6/6/2021 2000 by Radha Merchant RNA  Infection Prevention:   personal protective equipment utilized   rest/sleep promoted   single patient room provided  Goal: Optimal Comfort and Wellbeing  Outcome: Ongoing, Progressing  Intervention: Provide Person-Centered Care  Recent Flowsheet Documentation  Taken 6/6/2021 2000 by Radha Merchant RNA  Trust Relationship/Rapport:   care explained   emotional support provided   questions answered   reassurance provided   thoughts/feelings acknowledged  Goal: Readiness for Transition of Care  Outcome: Ongoing, Progressing     Problem: Skin Injury Risk Increased  Goal: Skin Health and Integrity  Outcome: Ongoing, Progressing  Intervention: Optimize Skin Protection  Recent Flowsheet Documentation  Taken 6/7/2021 0200 by Radha Merchant RNA  Head of Bed (HOB): HOB elevated  Taken 6/7/2021 0028 by Radha Merchant RNA  Pressure Reduction Techniques:   frequent weight shift  encouraged   weight shift assistance provided  Head of Bed ([facility]): [facility] elevated  Pressure Reduction Devices: pressure-redistributing mattress utilized  Skin Protection:   adhesive use limited   incontinence pads utilized   tubing/devices free from skin contact  Taken 6/6/2021 2200 by Radha Merchant RNA  Pressure Reduction Techniques: frequent weight shift encouraged  Head of Bed (HOB): [facility] elevated  Pressure Reduction Devices:   pressure-redistributing mattress utilized   positioning supports utilized  Skin Protection:   adhesive use limited   incontinence pads utilized   tubing/devices free from skin contact  Taken 6/6/2021 2000 by Radha Merchant RNA  Pressure Reduction Techniques:   frequent weight shift encouraged   positioned off wounds   weight shift assistance provided  Head of Bed ([facility]): [facility] elevated  Pressure Reduction Devices:   pressure-redistributing mattress utilized   positioning supports utilized  Skin Protection:   adhesive use limited   incontinence pads utilized   tubing/devices free from skin contact   skin sealant/moisture barrier applied     Problem: Fall Injury Risk  Goal: Absence of Fall and Fall-Related Injury  Outcome: Ongoing, Progressing  Intervention: Identify and Manage Contributors to Fall Injury Risk  Recent Flowsheet Documentation  Taken 6/6/2021 2000 by Radha Merchant RNA  Self-Care Promotion: independence encouraged  Intervention: Promote Injury-Free Environment  Recent Flowsheet Documentation  Taken 6/7/2021 0200 by Radha Merchant RNA  Safety Promotion/Fall Prevention:   activity supervised   assistive device/personal items within reach   clutter free environment maintained   fall prevention program maintained   nonskid shoes/slippers when out of bed   safety round/check completed  Taken 6/7/2021 0028 by Radha Merchant RNA  Safety Promotion/Fall Prevention:   activity supervised   assistive device/personal items within reach   clutter free environment maintained   fall prevention  program maintained   safety round/check completed  Taken 6/6/2021 2200 by Radha Merchant RNA  Safety Promotion/Fall Prevention:   activity supervised   assistive device/personal items within reach   clutter free environment maintained   fall prevention program maintained   nonskid shoes/slippers when out of bed   safety round/check completed  Taken 6/6/2021 2000 by Radha Merchant RNA  Safety Promotion/Fall Prevention:   activity supervised   assistive device/personal items within reach   clutter free environment maintained   fall prevention program maintained   nonskid shoes/slippers when out of bed   safety round/check completed   Goal Outcome Evaluation:

## 2021-06-07 NOTE — THERAPY TREATMENT NOTE
Patient Name: Thuy Clark  : 1935    MRN: [id number]                              Today's Date: 2021       Admit Date: 2021    Visit Dx:     ICD-10-CM ICD-9-CM   1. Altered mental status, unspecified altered mental status type  R41.82 780.97   2. Hyponatremia  E87.1 276.1   3. Impaired functional mobility, balance, gait, and endurance  Z74.09 V49.89     Patient Active Problem List   Diagnosis   • Pulmonary hypertension (CMS/HCC)   • Benign essential hypertension   • Mixed hyperlipidemia   • Atrial septal defect determined by imaging   • Abnormal glucose   • Generalized anxiety disorder   • Valvular heart disease   • Senile osteoporosis   • PVC's (premature ventricular contractions)   • Calculus of gallbladder   • Urinary retention   • Right knee pain   • Osteoarthritis   • Quadriceps weakness   • Constipation, slow transit   • Atrial flutter with rapid ventricular response (CMS/HCC)   • Gait disorder   • Primary osteoarthritis of both knees   • At high risk for falls   • Medicare annual wellness visit, subsequent   • Hyponatremia   • Chronic anticoagulation (Eliquis)   • Chronic atrial fibrillation (CMS/HCC)   • Altered mental status     Past Medical History:   Diagnosis Date   • Bleeding hemorrhoid 3/8/2019   • Diverticulosis    • External hemorrhoid 2019 Isidra Eisenberg MD   Continue vasculera daily. Drink plenty of fluids.  Avoid constipation.  Follow-up with Dr. Hebert as planned.   • Hx of colonic polyps    • Hypertension    • Intraductal papilloma     R intraductal patheloma   • Left upper extremity numbness     L upper extremity numbness-ER visit; neuropathy   • Overweight (BMI 25.0-29.9) 2019   • Self-catheterizes urinary bladder      Past Surgical History:   Procedure Laterality Date   • BREAST LUMPECTOMY     • CHOLECYSTECTOMY     • HYSTERECTOMY     • VAGINECTOMY       General Information     Row Name 21 2563          Physical Therapy  Time and Intention    Document Type  therapy note (daily note)  -LM     Mode of Treatment  individual therapy;physical therapy  -LM     Row Name 06/07/21 1358          General Information    Patient Profile Reviewed  yes  -LM     Existing Precautions/Restrictions  fall  -LM     Row Name 06/07/21 1358          Cognition    Orientation Status (Cognition)  oriented x 4  -LM     Row Name 06/07/21 1358          Safety Issues, Functional Mobility    Safety Issues Affecting Function (Mobility)  safety precaution awareness;safety precautions follow-through/compliance  -LM     Impairments Affecting Function (Mobility)  endurance/activity tolerance;balance  -LM       User Key  (r) = Recorded By, (t) = Taken By, (c) = Cosigned By    Initials Name Provider Type    LM Debora Matr, PT Physical Therapist        Mobility     Row Name 06/07/21 135          Bed Mobility    Comment (Bed Mobility)  Pt sitting up in chair at initial and end of tx.  -LM     Row Name 06/07/21 1358          Transfers    Comment (Transfers)  Vc's for hand placement.  -LM     Row Name 06/07/21 1358          Sit-Stand Transfer    Sit-Stand Cooper (Transfers)  standby assist  -LM     Assistive Device (Sit-Stand Transfers)  walker, front-wheeled  -LM     Row Name 06/07/21 1352          Gait/Stairs (Locomotion)    Cooper Level (Gait)  standby assist  -LM     Assistive Device (Gait)  walker, front-wheeled  -LM     Distance in Feet (Gait)  200 feet  -LM     Deviations/Abnormal Patterns (Gait)  base of support, narrow  -LM     Bilateral Gait Deviations  forward flexed posture  -LM     Comment (Gait/Stairs)  Vc's for upright posture.  Pt ambulated at a good pace.  No unsteadiness noted.  Asked pt if she would like to ambulate with QC since that is what she uses at home.  Pt prefers the walker at this time saying she feels more steady with it.  -LM       User Key  (r) = Recorded By, (t) = Taken By, (c) = Cosigned By    Initials Name Provider Type    LM  Debora Mart, NELLI Physical Therapist        Obj/Interventions     Row Name 06/07/21 [facility]          Hip (Therapeutic Exercise)    Hip AROM (Therapeutic Exercise)  bilateral;aBduction;aDduction;sitting 20 reps  -LM     Hip Isometrics (Therapeutic Exercise)  bilateral;gluteal sets;sitting 20 reps  -LM     [facility] Name 06/07/21 [facility]8          Knee (Therapeutic Exercise)    Knee AROM (Therapeutic Exercise)  bilateral;SLR (straight leg raise);heel slides;sitting 20 reps  -LM     [facility] Name 06/07/21 [facility]          Ankle (Therapeutic Exercise)    Ankle AROM (Therapeutic Exercise)  bilateral;dorsiflexion;plantarflexion;sitting 20 reps  -LM       User Key  (r) = Recorded By, (t) = Taken By, (c) = Cosigned By    Initials Name Provider Type    LM Debroa Mart PT Physical Therapist        Goals/Plan    No documentation.       Clinical Impression     [facility] Name 06/07/21 [facility]          Pain    Additional Documentation  Pain Scale: Numbers Pre/Post-Treatment (Group)  -LM     Row Name 06/07/21 [facility]          Pain Scale: Numbers Pre/Post-Treatment    Pretreatment Pain Rating  0/10 - no pain  -LM     Posttreatment Pain Rating  0/10 - no pain  -LM     Row Name 06/07/21 [facility]          Plan of Care Review    Plan of Care Reviewed With  patient  -     Progress  no change  -     Outcome Summary  Pt progressing well towards skilled PT goals and gave good effort.  Pt stood with SBA and ambulated 200 feet using rw with SBAx1.  BLE ther ex completed without complaint.  Continue with skilled PT to improve mobility and safety.  -     Row Name 06/07/21 [facility]          Positioning and Restraints    Pre-Treatment Position  sitting in chair/recliner  -     Post Treatment Position  chair  -LM     In Chair  reclined;call light within reach;encouraged to call for assist;with family/caregiver;notified nsg  -       User Key  (r) = Recorded By, (t) = Taken By, (c) = Cosigned By    Initials Name Provider Type    Debora Acuna PT Physical Therapist         Outcome Measures     Row Name 06/07/21 1358          How much help from another person do you currently need...    Turning from your back to your side while in flat bed without using bedrails?  4  -LM     Moving from lying on back to sitting on the side of a flat bed without bedrails?  3  -LM     Moving to and from a bed to a chair (including a wheelchair)?  3  -LM     Standing up from a chair using your arms (e.g., wheelchair, bedside chair)?  3  -LM     Climbing 3-5 steps with a railing?  3  -LM     To walk in hospital room?  3  -LM     AM-PAC 6 Clicks Score (PT)  19  -LM     Row Name 06/07/21 1358 06/07/21 1129       Functional Assessment    Outcome Measure Options  AM-PAC 6 Clicks Basic Mobility (PT)  -LM  AM-PAC 6 Clicks Daily Activity (OT)  -AC      User Key  (r) = Recorded By, (t) = Taken By, (c) = Cosigned By    Initials Name Provider Type    AC Amy Chavez, OT Occupational Therapist    LM Debora Mart, NELLI Physical Therapist        Physical Therapy Education                 Title: PT OT SLP Therapies (In Progress)     Topic: Physical Therapy (Done)     Point: Mobility training (Done)     Learning Progress Summary           Patient Acceptance, E,TB, VU,NR by AY at 6/4/2021 1524    Acceptance, TB, NR by PS at 6/2/2021 1013    Eager, E,H, VU,DU by  at 5/30/2021 1115                   Point: Home exercise program (Done)     Learning Progress Summary           Patient Acceptance, E,TB, VU,NR by AY at 6/4/2021 1524    Acceptance, TB, NR by PS at 6/2/2021 1013    Eager, E,H, VU,DU by  at 5/30/2021 1115                   Point: Body mechanics (Done)     Learning Progress Summary           Patient Acceptance, E,TB, VU,NR by AY at 6/4/2021 1524    Acceptance, TB, NR by PS at 6/2/2021 1013    Eager, E,H, VU,DU by  at 5/30/2021 1115                   Point: Precautions (Done)     Learning Progress Summary           Patient Acceptance, E,TB, VU,NR by AY at 6/4/2021 1524                               User  Key     Initials Effective Dates Name Provider Type Discipline    SJ 06/19/15 -  Dinah Peoples, PT Physical Therapist PT    PS 01/16/19 -  Mariana Avila, RN Registered Nurse Nurse    AY 11/10/20 -  Lesly Lilly PT Physical Therapist PT              PT Recommendation and Plan     Plan of Care Reviewed With: patient  Progress: no change  Outcome Summary: Pt progressing well towards skilled PT goals and gave good effort.  Pt stood with SBA and ambulated 200 feet using rw with SBAx1.  BLE ther ex completed without complaint.  Continue with skilled PT to improve mobility and safety.     Time Calculation:   PT Charges     Row Name 06/07/21 1358             Time Calculation    Start Time  1358  -LM      PT Received On  06/07/21  -LM      PT Goal Re-Cert Due Date  06/09/21  -LM         Timed Charges    14109 - PT Therapeutic Exercise Minutes  8  -LM      28649 - Gait Training Minutes   15  -LM         Total Minutes    Timed Charges Total Minutes  23  -LM       Total Minutes  23  -LM        User Key  (r) = Recorded By, (t) = Taken By, (c) = Cosigned By    Initials Name Provider Type    LM Debora Mart, PT Physical Therapist        Therapy Charges for Today     Code Description Service Date Service Provider Modifiers Qty    99711009986 HC GAIT TRAINING EA 15 MIN 6/7/2021 Debora Mart, PT GP 1    44873433787 HC PT THER PROC EA 15 MIN 6/7/2021 Debora Mart, PT GP 1          PT G-Codes  Outcome Measure Options: AM-PAC 6 Clicks Basic Mobility (PT)  AM-PAC 6 Clicks Score (PT): 19  AM-PAC 6 Clicks Score (OT): 21    Debora Mart PT  6/7/2021

## 2021-06-07 NOTE — NURSING NOTE
WOC follow up for stage 2 PI to left gluteal-have been managing with thera honey.     Wound has resurfaced/closed and site is all blanching-will d/c thera honey and use Z guard.     Perirectal area is red but feel this is MASD and recommend Z guard for moisture barrier.     Patient turns well without assist-preparing for discharge to rehab.     No other concerns noted-WOC will sign off-please notify WOC for any questions or new concerns. Thank you.

## 2021-06-07 NOTE — PROGRESS NOTES
[facility] Medicine Services  PROGRESS NOTE    Patient Name: Thuy Clark  : 1935  MRN: [id number]    Date of Admission: 2021  Primary Care Physician: Isidra Eisenberg MD    Subjective   Subjective     CC: f/u hyponatremia    HPI: Up in bed eating breakfast. Had some dysuria yesterday and found to have UTI. Started on IV rocephin and feeling some better. Still quite weak.     ROS:  Gen- No fevers, chills  CV- No chest pain, palpitations  Resp- No cough, dyspnea  GI- No N/V/D, abd pain    Objective   Objective     Vital Signs:   Temp:  [98.2 °F (36.8 °C)-98.7 °F (37.1 °C)] 98.2 °F (36.8 °C)  Heart Rate:  [] 92  Resp:  [14-18] 18  BP: (107-135)/(56-73) 127/70        Physical Exam:  Constitutional: No acute distress, awake, alert  HENT: NCAT, mucous membranes moist  Respiratory: Clear to auscultation bilaterally, respiratory effort normal   Cardiovascular: RRR, no murmurs, rubs, or gallops  Gastrointestinal: Positive bowel sounds, soft, nontender, nondistended  Musculoskeletal: No bilateral ankle edema  Psychiatric: Appropriate affect, cooperative  Neurologic: Oriented x 3, strength symmetric in all extremities, Cranial Nerves grossly intact to confrontation, speech clear  Skin: No rashes    Results Reviewed:  Results from last 7 days   Lab Units 21  0448   WBC 10*3/mm3 6.08   HEMOGLOBIN g/dL 11.7*   HEMATOCRIT % 35.7   PLATELETS 10*3/mm3 370     Results from last 7 days   Lab Units 21  0344 21  0357 21  0612   SODIUM mmol/L 129* 132* 129*   POTASSIUM mmol/L 4.1 4.3 4.3   CHLORIDE mmol/L 95* 97* 95*   CO2 mmol/L 26.0 27.0 25.0   BUN mg/dL 9 9 8   CREATININE mg/dL 0.53* 0.66 0.65   GLUCOSE mg/dL 100* 92 90   CALCIUM mg/dL 8.6 8.9 9.0     Estimated Creatinine Clearance: 50.8 mL/min (A) (by C-G formula based on SCr of 0.53 mg/dL (L)).    Microbiology Results Abnormal     Procedure Component Value - Date/Time    COVID PRE-OP / PRE-PROCEDURE  SCREENING ORDER (NO ISOLATION) - Swab, Nasopharynx [[id number]] Collected: 05/27/21 1448    Lab Status: Final result Specimen: Swab from Nasopharynx Updated: 05/28/21 1430    Narrative:      The following orders were created for panel order COVID PRE-OP / PRE-PROCEDURE SCREENING ORDER (NO ISOLATION) - Swab, Nasopharynx.  Procedure                               Abnormality         Status                     ---------                               -----------         ------                     COVID-19 PCR, [facility]...[[id number]]                      Final result                 Please view results for these tests on the individual orders.    COVID-19 PCR, [facility], NP SWAB IN [facility] VIRAL TRANSPORT MEDIA 24-30 HR TAT - Swab, Nasopharynx [[id number]] Collected: 05/27/21 1448    Lab Status: Final result Specimen: Swab from Nasopharynx Updated: 05/28/21 1430     SARS-CoV-2 GATO Not Detected        CT chest personally reviewed showing small pleural effusion. Agree with interpretation.    Results for orders placed during the hospital encounter of 11/02/19    Adult Transthoracic Echo Complete With Contrast if Necessary Per Protocol    Interpretation Summary  · Left ventricular systolic function is normal. Estimated EF = 65%.  · Left ventricular wall thickness is consistent with borderline concentric hypertrophy.  · Left ventricular diastolic dysfunction (grade II) consistent with pseudonormalization.  · Right ventricular cavity is dilated.  · Left atrial volume is severely increased.  · Saline test results are positive for evidence of an intra-atrial shunt.  · Right atrial cavity size is severely dilated.  · Mild aortic valve regurgitation is present.  · Mild tricuspid valve regurgitation is present.  · Estimated right ventricular systolic pressure from tricuspid regurgitation is mildly elevated (35-45 mmHg      I have reviewed the medications:  Scheduled Meds:apixaban, 5 mg, Oral, Q12H  bisoprolol, 10 mg, Oral,  Daily  cefTRIAXone, 1 g, Intravenous, Q24H  gabapentin, 200 mg, Oral, Nightly  hydrocortisone, 25 mg, Rectal, BID  metoclopramide, 5 mg, Oral, TID AC  phenazopyridine, 100 mg, Oral, TID With Meals  psyllium, 1 packet, Oral, Daily  senna-docusate sodium, 2 tablet, Oral, BID  sodium chloride, 2 g, Oral, TID With Meals      Continuous Infusions:   PRN Meds:.senna-docusate sodium **AND** polyethylene glycol **AND** bisacodyl **AND** bisacodyl  •  ondansetron  •  sennosides-docusate    Assessment/Plan   Assessment & Plan     Active Hospital Problems    Diagnosis  POA   • **Hyponatremia [E87.1]  Yes   • Chronic anticoagulation (Eliquis) [Z79.01]  Not Applicable   • Chronic atrial fibrillation (CMS/HCC) [I48.21]  Yes   • Altered mental status [R41.82]  Yes   • Atrial flutter with rapid ventricular response (CMS/HCC) [I48.92]  Yes   • Constipation, slow transit [K59.01]  Yes   • Atrial septal defect determined by imaging [Q21.1]  Not Applicable      Resolved Hospital Problems   No resolved problems to display.        Brief Hospital Course to date:  Thuy Clark is a 85 y.o. female with past medical history history of A. fib, on Eliquis, hypertension, chronic urinary retention who does not intermittent self cath admitted on 5/27 for acute hyponatremia, symptomatic with AMS and transferred initially to the ICU for closer monitoring.  Urine studies consistent with possible ADH mechanism however she was not on any medications known to cause low sodium.  Sodium in the ER was 108.  She does have a history of chronic mild hyponatremia, the lowest previously being 123.       Only new medications prior to admission were Bactrim for a UTI started by her PCP at the [facility].  She was transferred up to general medical floor from ICU on 5/29. This is my first day assessing patient's active medical issues.     Acute symptomatic hyponatremia, improving  Impaired memory  --Likely multifactorial including decreased solute  intake, polydipsia as well as ADH mechanism.  --Her sodium has been largely stable for 72 hours with fluid restriction, salt tablets. Continue though her impaired memory makes it challenging for her to remember measures and adhere to fluid restriction  --PT/OT recs SNF. Patient agreeable.     Complicated UTI related to chronic in and out catheterization  --Hx of self cathing for > 10 years. Follows with [facility] urology regularly.  --CX pending. Continue IV rocephin for now. Plan to treat x 7 days.    Hypertension  - stable     A. fib, rate controlled  -Slightly uncontrolled this am. Increase bisoprolol  -AC on Eliquis     Constipation-resolved  --Continue bowel regimen. Added fiber.  --Requested outpatient referral to [facility] at discharge    This patient's problems and plans were partially entered by my partner and updated as appropriate by me 06/07/21.    DVT Prophylaxis:  Eliquis       CODE STATUS:   Code Status and Medical Interventions:   Ordered at: 05/27/21 0541     Code Status:    CPR     Medical Interventions (Level of Support Prior to Arrest):    Full       Ava Calloway II, DO  06/07/21

## 2021-06-07 NOTE — PLAN OF CARE
Goal Outcome Evaluation:  Plan of Care Reviewed With: patient        Progress: improving  Outcome Summary: Pt with no new complaints. VSS. Up to chair, worked with PT. UTI symptoms improving. Covid re-sent, negative. Hopes to go to [place] soon.

## 2021-06-07 NOTE — TELEPHONE ENCOUNTER
Spoke with pt daughter and advised per Dr. Eisenberg, she verbalized understanding. Stated she has asked the attending to do it but they said no. I told her to ask again and let us know.

## 2021-06-07 NOTE — CASE MANAGEMENT/SOCIAL WORK
Continued Stay Note  [facility]     Patient Name: Thuy Clark  MRN: [id number]  Today's Date: 6/7/2021    Admit Date: 5/27/2021    Discharge Plan     Row Name 06/07/21 1356       Plan    Plan  [facility]    Patient/Family in Agreement with Plan  yes    Plan Comments  Patient's plan is a skilled bed at [facility] when medically ready for discharge.  Son will transport.   will continue to follow.  Mariana Esteban RN [phone]    Final Discharge Disposition Code  03 - skilled nursing facility (SNF)        Discharge Codes    No documentation.       Expected Discharge Date and Time     Expected Discharge Date Expected Discharge Time    Jun 9, 2021             Mariana Esteban RN

## 2021-06-08 LAB
ANION GAP SERPL CALCULATED.3IONS-SCNC: 7 MMOL/L (ref 5–15)
BUN SERPL-MCNC: 7 MG/DL (ref 8–23)
BUN/CREAT SERPL: 12.3 (ref 7–25)
CALCIUM SPEC-SCNC: 9 MG/DL (ref 8.6–10.5)
CHLORIDE SERPL-SCNC: 100 MMOL/L (ref 98–107)
CO2 SERPL-SCNC: 27 MMOL/L (ref 22–29)
CREAT SERPL-MCNC: 0.57 MG/DL (ref 0.57–1)
GFR SERPL CREATININE-BSD FRML MDRD: 101 ML/MIN/1.73
GLUCOSE SERPL-MCNC: 93 MG/DL (ref 65–99)
POTASSIUM SERPL-SCNC: 4.2 MMOL/L (ref 3.5–5.2)
SODIUM SERPL-SCNC: 134 MMOL/L (ref 136–145)

## 2021-06-08 PROCEDURE — 80048 BASIC METABOLIC PNL TOTAL CA: CPT | Performed by: INTERNAL MEDICINE

## 2021-06-08 PROCEDURE — 25010000002 CEFTRIAXONE PER 250 MG: Performed by: PHYSICIAN ASSISTANT

## 2021-06-08 PROCEDURE — 99233 SBSQ HOSP IP/OBS HIGH 50: CPT | Performed by: INTERNAL MEDICINE

## 2021-06-08 RX ADMIN — DOCUSATE SODIUM 50MG AND SENNOSIDES 8.6MG 2 TABLET: 8.6; 5 TABLET, FILM COATED ORAL at 08:49

## 2021-06-08 RX ADMIN — Medication 2 G: at 17:39

## 2021-06-08 RX ADMIN — DOCUSATE SODIUM 50MG AND SENNOSIDES 8.6MG 2 TABLET: 8.6; 5 TABLET, FILM COATED ORAL at 20:21

## 2021-06-08 RX ADMIN — PHENAZOPYRIDINE 100 MG: 100 TABLET ORAL at 08:48

## 2021-06-08 RX ADMIN — PSYLLIUM HUSK 1 PACKET: 3.4 POWDER ORAL at 08:51

## 2021-06-08 RX ADMIN — PHENAZOPYRIDINE 100 MG: 100 TABLET ORAL at 11:32

## 2021-06-08 RX ADMIN — BISOPROLOL FUMARATE 15 MG: 5 TABLET, FILM COATED ORAL at 08:47

## 2021-06-08 RX ADMIN — METOCLOPRAMIDE 5 MG: 5 TABLET ORAL at 17:39

## 2021-06-08 RX ADMIN — Medication 2 G: at 08:48

## 2021-06-08 RX ADMIN — Medication 2 G: at 11:32

## 2021-06-08 RX ADMIN — APIXABAN 5 MG: 5 TABLET, FILM COATED ORAL at 20:21

## 2021-06-08 RX ADMIN — METOCLOPRAMIDE 5 MG: 5 TABLET ORAL at 08:49

## 2021-06-08 RX ADMIN — SODIUM CHLORIDE 1 G: 900 INJECTION INTRAVENOUS at 06:20

## 2021-06-08 RX ADMIN — HYDROCORTISONE ACETATE 25 MG: 25 SUPPOSITORY RECTAL at 08:48

## 2021-06-08 RX ADMIN — PHENAZOPYRIDINE 100 MG: 100 TABLET ORAL at 17:39

## 2021-06-08 RX ADMIN — APIXABAN 5 MG: 5 TABLET, FILM COATED ORAL at 08:47

## 2021-06-08 RX ADMIN — METOCLOPRAMIDE 5 MG: 5 TABLET ORAL at 11:32

## 2021-06-08 RX ADMIN — HYDROCORTISONE ACETATE 25 MG: 25 SUPPOSITORY RECTAL at 20:20

## 2021-06-08 NOTE — TELEPHONE ENCOUNTER
PATIENT'S DAUGHTER HOLLY STATES SHE HAS ASKED 3 TIMES THE ATTENDING PHYSICIAN AT THE HOSPITAL THE DAY SHIFT AND NIGHT SHIFT  AND THEY HAVE SAID NO      PLEASE CALL AND ADVISE [phone]

## 2021-06-08 NOTE — PROGRESS NOTES
[facility] Medicine Services  PROGRESS NOTE    Patient Name: Thuy Clark  : 1935  MRN: [id number]    Date of Admission: 2021  Primary Care Physician: Isidra Eisenberg MD    Subjective   Subjective     CC: f/u hyponatremia    HPI:   Resting in bed. Reports she feels good. Has no complaints. Dysuria improved    ROS:  Gen- No fevers, chills  CV- No chest pain, palpitations  Resp- No cough, dyspnea  GI- No N/V/D, abd pain    Objective   Objective     Vital Signs:   Temp:  [97.7 °F (36.5 °C)-98.4 °F (36.9 °C)] 98.1 °F (36.7 °C)  Heart Rate:  [] 84  Resp:  [13-18] 16  BP: (113-123)/(66-87) 117/73        Physical Exam:  Constitutional: No acute distress, awake, alert  HENT: NCAT, mucous membranes moist  Respiratory: Clear to auscultation bilaterally, respiratory effort normal   Cardiovascular: RRR, no murmurs, rubs, or gallops  Gastrointestinal: Positive bowel sounds, soft, nontender, nondistended  Musculoskeletal: No bilateral ankle edema  Psychiatric: Appropriate affect, cooperative  Neurologic: Oriented x 3, strength symmetric in all extremities  Skin: No rashes    Results Reviewed:  Results from last 7 days   Lab Units 21  0448   WBC 10*3/mm3 6.08   HEMOGLOBIN g/dL 11.7*   HEMATOCRIT % 35.7   PLATELETS 10*3/mm3 370     Results from last 7 days   Lab Units 21  0527 21  0344 21  0357   SODIUM mmol/L 134* 129* 132*   POTASSIUM mmol/L 4.2 4.1 4.3   CHLORIDE mmol/L 100 95* 97*   CO2 mmol/L 27.0 26.0 27.0   BUN mg/dL 7* 9 9   CREATININE mg/dL 0.57 0.53* 0.66   GLUCOSE mg/dL 93 100* 92   CALCIUM mg/dL 9.0 8.6 8.9     Estimated Creatinine Clearance: 50.8 mL/min (by C-G formula based on SCr of 0.57 mg/dL).    Microbiology Results Abnormal     Procedure Component Value - Date/Time    COVID PRE-OP / PRE-PROCEDURE SCREENING ORDER (NO ISOLATION) - Swab, Nasopharynx [[id number]]  (Normal) Collected: 21 1500    Lab Status: Final result Specimen: Swab  from Nasopharynx Updated: 06/07/21 1531    Narrative:      The following orders were created for panel order COVID PRE-OP / PRE-PROCEDURE SCREENING ORDER (NO ISOLATION) - Swab, Nasopharynx.  Procedure                               Abnormality         Status                     ---------                               -----------         ------                     COVID-19, [facility] IN-HOUS...[[id number]]  Normal              Final result                 Please view results for these tests on the individual orders.    COVID-19, [facility]BBOTT IN-HOUSE,NASAL Swab (NO TRANSPORT MEDIA) 2 HR TAT - Swab, Nasopharynx [[id number]]  (Normal) Collected: 06/07/21 1500    Lab Status: Final result Specimen: Swab from Nasopharynx Updated: 06/07/21 1531     COVID19 Presumptive Negative    Narrative:      Fact sheet for providers: https://www.fda.gov/media/034649/download     Fact sheet for patients: https://www.fda.gov/media/952353/download    Test performed by PCR.  If inconsistent with clinical signs and symptoms patient should be tested with different authorized molecular test.    COVID PRE-OP / PRE-PROCEDURE SCREENING ORDER (NO ISOLATION) - Swab, Nasopharynx [[id number]] Collected: 05/27/21 1448    Lab Status: Final result Specimen: Swab from Nasopharynx Updated: 05/28/21 1430    Narrative:      The following orders were created for panel order COVID PRE-OP / PRE-PROCEDURE SCREENING ORDER (NO ISOLATION) - Swab, Nasopharynx.  Procedure                               Abnormality         Status                     ---------                               -----------         ------                     COVID-19 PCR, [facility] [facility] [employer] [facility] [facility]...[[id number]]                      Final result                 Please view results for these tests on the individual orders.    COVID-19 PCR, [facility], NP SWAB IN [facility] VIRAL TRANSPORT MEDIA 24-30 HR TAT - Swab, Nasopharynx [[id number]] Collected: 05/27/21 1448    Lab Status: Final result Specimen: Swab from Nasopharynx  Updated: 05/28/21 1430     SARS-CoV-2 GATO Not Detected        CT chest personally reviewed showing small pleural effusion. Agree with interpretation.    Results for orders placed during the hospital encounter of 11/02/19    Adult Transthoracic Echo Complete With Contrast if Necessary Per Protocol    Interpretation Summary  · Left ventricular systolic function is normal. Estimated EF = 65%.  · Left ventricular wall thickness is consistent with borderline concentric hypertrophy.  · Left ventricular diastolic dysfunction (grade II) consistent with pseudonormalization.  · Right ventricular cavity is dilated.  · Left atrial volume is severely increased.  · Saline test results are positive for evidence of an intra-atrial shunt.  · Right atrial cavity size is severely dilated.  · Mild aortic valve regurgitation is present.  · Mild tricuspid valve regurgitation is present.  · Estimated right ventricular systolic pressure from tricuspid regurgitation is mildly elevated (35-45 mmHg      I have reviewed the medications:  Scheduled Meds:apixaban, 5 mg, Oral, Q12H  bisoprolol, 15 mg, Oral, Daily  cefTRIAXone, 1 g, Intravenous, Q24H  gabapentin, 200 mg, Oral, Nightly  hydrocortisone, 25 mg, Rectal, BID  metoclopramide, 5 mg, Oral, TID AC  phenazopyridine, 100 mg, Oral, TID With Meals  psyllium, 1 packet, Oral, Daily  senna-docusate sodium, 2 tablet, Oral, BID  sodium chloride, 2 g, Oral, TID With Meals      Continuous Infusions:   PRN Meds:.senna-docusate sodium **AND** polyethylene glycol **AND** bisacodyl **AND** bisacodyl  •  ondansetron  •  sennosides-docusate    Assessment/Plan   Assessment & Plan     Active Hospital Problems    Diagnosis  POA   • **Hyponatremia [E87.1]  Yes   • Chronic anticoagulation (Eliquis) [Z79.01]  Not Applicable   • Chronic atrial fibrillation (CMS/HCC) [I48.21]  Yes   • Altered mental status [R41.82]  Yes   • Atrial flutter with rapid ventricular response (CMS/HCC) [I48.92]  Yes   • Constipation,  slow transit [K59.01]  Yes   • Atrial septal defect determined by imaging [Q21.1]  Not Applicable      Resolved Hospital Problems   No resolved problems to display.        Brief Hospital Course to date:  Thuy Clark is a 85 y.o. female with past medical history history of A. fib, on Eliquis, hypertension, chronic urinary retention who does not intermittent self cath admitted on 5/27 for acute hyponatremia, symptomatic with AMS and transferred initially to the ICU for closer monitoring.  Urine studies consistent with possible ADH mechanism however she was not on any medications known to cause low sodium.  Sodium in the ER was 108.  She does have a history of chronic mild hyponatremia, the lowest previously being 123.       Only new medications prior to admission were Bactrim for a UTI started by her PCP at the [facility].  She was transferred up to general medical floor from ICU on 5/29.     This patient's problems and plans were partially entered by my partner and updated as appropriate by me 06/08/21.         Acute symptomatic hyponatremia, improving  Impaired memory  --Likely multifactorial including decreased solute intake, polydipsia as well as ADH mechanism.  --Her sodium has been largely stable for last several days with fluid restriction, salt tablets. Continue though her impaired memory makes it challenging for her to remember measures and adhere to fluid restriction  --PT/OT recs SNF. Patient agreeable. Has bed tomorrow pending culture results     Complicated UTI related to chronic in and out catheterization  --Hx of self cathing for > 10 years. [facility].  --CX pending. Continue IV rocephin for now. Plan to treat x 7 days.    Hypertension  - stable     A. fib, rate controlled  -Slightly uncontrolled this am. Increase bisoprolol  -AC on Eliquis     Constipation-resolved  --Continue bowel regimen. Added fiber.  --Requested outpatient referral to [facility] GI at  discharge    Patient likely to be discharged tomorrow to rehab pending urine cx results and transition to oral     DVT Prophylaxis:  Eliquis       CODE STATUS:   Code Status and Medical Interventions:   Ordered at: 05/27/21 0541     Code Status:    CPR     Medical Interventions (Level of Support Prior to Arrest):    Full       Sofia Wick MD  06/08/21

## 2021-06-08 NOTE — PLAN OF CARE
VSS, controlled afib, RA. Pt has rested well tonight. Has reported having no bladder pain.     Problem: Adult Inpatient Plan of Care  Goal: Plan of Care Review  Outcome: Ongoing, Progressing  Goal: Patient-Specific Goal (Individualized)  Outcome: Ongoing, Progressing  Goal: Absence of Hospital-Acquired Illness or Injury  Outcome: Ongoing, Progressing  Intervention: Identify and Manage Fall Risk  Recent Flowsheet Documentation  Taken 6/8/2021 0400 by Radha Merchant RN  Safety Promotion/Fall Prevention:   activity supervised   assistive device/personal items within reach   clutter free environment maintained   fall prevention program maintained   nonskid shoes/slippers when out of bed   safety round/check completed  Taken 6/8/2021 0200 by Radha Merchant, POPEYE  Safety Promotion/Fall Prevention:   activity supervised   assistive device/personal items within reach   clutter free environment maintained   fall prevention program maintained   nonskid shoes/slippers when out of bed   safety round/check completed  Taken 6/8/2021 0000 by Radha Merchant, POPEYE  Safety Promotion/Fall Prevention:   activity supervised   assistive device/personal items within reach   clutter free environment maintained   fall prevention program maintained   nonskid shoes/slippers when out of bed   safety round/check completed  Taken 6/7/2021 2200 by Radha Merchant, POPEYE  Safety Promotion/Fall Prevention:   activity supervised   assistive device/personal items within reach   clutter free environment maintained   fall prevention program maintained   nonskid shoes/slippers when out of bed   safety round/check completed  Taken 6/7/2021 2000 by Radha Merchant, POPEYE  Safety Promotion/Fall Prevention:   activity supervised   assistive device/personal items within reach   clutter free environment maintained   fall prevention program maintained   nonskid shoes/slippers when out of bed   safety round/check completed  Intervention: Prevent Skin Injury  Recent Flowsheet  Documentation  Taken 6/8/2021 0400 by Radha Merchant RN  Body Position: position changed independently  Taken 6/8/2021 0200 by Radha Merchant RN  Body Position: position changed independently  Taken 6/8/2021 0000 by Radha Merchant RN  Body Position: position changed independently  Taken 6/7/2021 2200 by Radha Merchant RN  Body Position: position changed independently  Skin Protection:   adhesive use limited   incontinence pads utilized   tubing/devices free from skin contact  Taken 6/7/2021 2000 by Radha Merchant RN  Body Position: position changed independently  Skin Protection:   adhesive use limited   incontinence pads utilized   tubing/devices free from skin contact  Intervention: Prevent Infection  Recent Flowsheet Documentation  Taken 6/8/2021 0400 by Radha Merchant RN  Infection Prevention:   single patient room provided   rest/sleep promoted   personal protective equipment utilized  Taken 6/8/2021 0200 by Radha Merchant RN  Infection Prevention:   single patient room provided   rest/sleep promoted   personal protective equipment utilized  Taken 6/8/2021 0000 by Radha Merchant RN  Infection Prevention:   single patient room provided   rest/sleep promoted   personal protective equipment utilized  Taken 6/7/2021 2200 by Radha Merchant RN  Infection Prevention:   single patient room provided   rest/sleep promoted   personal protective equipment utilized  Taken 6/7/2021 2000 by Radha Merchant RN  Infection Prevention:   single patient room provided   rest/sleep promoted   personal protective equipment utilized  Goal: Optimal Comfort and Wellbeing  Outcome: Ongoing, Progressing  Goal: Readiness for Transition of Care  Outcome: Ongoing, Progressing     Problem: Skin Injury Risk Increased  Goal: Skin Health and Integrity  Outcome: Ongoing, Progressing  Intervention: Optimize Skin Protection  Recent Flowsheet Documentation  Taken 6/8/2021 0400 by Radha Merchant RN  Head of Bed (HOB): HOB elevated  Taken 6/8/2021 0200  by Radha Merchant RN  Head of Bed (HOB): HOB elevated  Taken 6/8/2021 0000 by Radha Merchant RN  Head of Bed (HOB): HOB elevated  Taken 6/7/2021 2200 by Radha Merchant RN  Pressure Reduction Techniques:   frequent weight shift encouraged   weight shift assistance provided  Head of Bed (HOB): HOB elevated  Pressure Reduction Devices:   pressure-redistributing mattress utilized   positioning supports utilized  Skin Protection:   adhesive use limited   incontinence pads utilized   tubing/devices free from skin contact  Taken 6/7/2021 2000 by Radha Merchant RN  Pressure Reduction Techniques:   frequent weight shift encouraged   weight shift assistance provided  Head of Bed (HOB): HOB elevated  Pressure Reduction Devices: pressure-redistributing mattress utilized  Skin Protection:   adhesive use limited   incontinence pads utilized   tubing/devices free from skin contact     Problem: Fall Injury Risk  Goal: Absence of Fall and Fall-Related Injury  Outcome: Ongoing, Progressing  Intervention: Identify and Manage Contributors to Fall Injury Risk  Recent Flowsheet Documentation  Taken 6/7/2021 2000 by Radha Merchant RN  Self-Care Promotion: independence encouraged  Intervention: Promote Injury-Free Environment  Recent Flowsheet Documentation  Taken 6/8/2021 0400 by Radha Merchant RN  Safety Promotion/Fall Prevention:   activity supervised   assistive device/personal items within reach   clutter free environment maintained   fall prevention program maintained   nonskid shoes/slippers when out of bed   safety round/check completed  Taken 6/8/2021 0200 by Radha Merchant RN  Safety Promotion/Fall Prevention:   activity supervised   assistive device/personal items within reach   clutter free environment maintained   fall prevention program maintained   nonskid shoes/slippers when out of bed   safety round/check completed  Taken 6/8/2021 0000 by Radha Merchant RN  Safety Promotion/Fall Prevention:   activity supervised    assistive device/personal items within reach   clutter free environment maintained   fall prevention program maintained   nonskid shoes/slippers when out of bed   safety round/check completed  Taken 6/7/2021 2200 by Radha Merchant, RN  Safety Promotion/Fall Prevention:   activity supervised   assistive device/personal items within reach   clutter free environment maintained   fall prevention program maintained   nonskid shoes/slippers when out of bed   safety round/check completed  Taken 6/7/2021 2000 by Radha Merchant, RN  Safety Promotion/Fall Prevention:   activity supervised   assistive device/personal items within reach   clutter free environment maintained   fall prevention program maintained   nonskid shoes/slippers when out of bed   safety round/check completed   Goal Outcome Evaluation:

## 2021-06-09 VITALS
OXYGEN SATURATION: 93 % | HEART RATE: 100 BPM | BODY MASS INDEX: 23.56 KG/M2 | TEMPERATURE: 97.8 F | DIASTOLIC BLOOD PRESSURE: 93 MMHG | RESPIRATION RATE: 17 BRPM | HEIGHT: 64 IN | WEIGHT: 138 LBS | SYSTOLIC BLOOD PRESSURE: 129 MMHG

## 2021-06-09 LAB
ANION GAP SERPL CALCULATED.3IONS-SCNC: 6 MMOL/L (ref 5–15)
BACTERIA SPEC AEROBE CULT: ABNORMAL
BASOPHILS # BLD AUTO: 0.06 10*3/MM3 (ref 0–0.2)
BASOPHILS NFR BLD AUTO: 1.2 % (ref 0–1.5)
BUN SERPL-MCNC: 9 MG/DL (ref 8–23)
BUN/CREAT SERPL: 15 (ref 7–25)
CALCIUM SPEC-SCNC: 9.1 MG/DL (ref 8.6–10.5)
CHLORIDE SERPL-SCNC: 98 MMOL/L (ref 98–107)
CO2 SERPL-SCNC: 28 MMOL/L (ref 22–29)
CREAT SERPL-MCNC: 0.6 MG/DL (ref 0.57–1)
DEPRECATED RDW RBC AUTO: 47.2 FL (ref 37–54)
EOSINOPHIL # BLD AUTO: 0.2 10*3/MM3 (ref 0–0.4)
EOSINOPHIL NFR BLD AUTO: 3.8 % (ref 0.3–6.2)
ERYTHROCYTE [DISTWIDTH] IN BLOOD BY AUTOMATED COUNT: 14.6 % (ref 12.3–15.4)
GFR SERPL CREATININE-BSD FRML MDRD: 95 ML/MIN/1.73
GLUCOSE SERPL-MCNC: 96 MG/DL (ref 65–99)
HCT VFR BLD AUTO: 35 % (ref 34–46.6)
HGB BLD-MCNC: 11.3 G/DL (ref 12–15.9)
IMM GRANULOCYTES # BLD AUTO: 0.03 10*3/MM3 (ref 0–0.05)
IMM GRANULOCYTES NFR BLD AUTO: 0.6 % (ref 0–0.5)
LYMPHOCYTES # BLD AUTO: 1.2 10*3/MM3 (ref 0.7–3.1)
LYMPHOCYTES NFR BLD AUTO: 23 % (ref 19.6–45.3)
MCH RBC QN AUTO: 28.8 PG (ref 26.6–33)
MCHC RBC AUTO-ENTMCNC: 32.3 G/DL (ref 31.5–35.7)
MCV RBC AUTO: 89.1 FL (ref 79–97)
MONOCYTES # BLD AUTO: 0.48 10*3/MM3 (ref 0.1–0.9)
MONOCYTES NFR BLD AUTO: 9.2 % (ref 5–12)
NEUTROPHILS NFR BLD AUTO: 3.24 10*3/MM3 (ref 1.7–7)
NEUTROPHILS NFR BLD AUTO: 62.2 % (ref 42.7–76)
NRBC BLD AUTO-RTO: 0 /100 WBC (ref 0–0.2)
PLATELET # BLD AUTO: 300 10*3/MM3 (ref 140–450)
PMV BLD AUTO: 8.8 FL (ref 6–12)
POTASSIUM SERPL-SCNC: 4.2 MMOL/L (ref 3.5–5.2)
RBC # BLD AUTO: 3.93 10*6/MM3 (ref 3.77–5.28)
SODIUM SERPL-SCNC: 132 MMOL/L (ref 136–145)
WBC # BLD AUTO: 5.21 10*3/MM3 (ref 3.4–10.8)

## 2021-06-09 PROCEDURE — 25010000002 CEFTRIAXONE PER 250 MG: Performed by: PHYSICIAN ASSISTANT

## 2021-06-09 PROCEDURE — 80048 BASIC METABOLIC PNL TOTAL CA: CPT | Performed by: INTERNAL MEDICINE

## 2021-06-09 PROCEDURE — 99239 HOSP IP/OBS DSCHRG MGMT >30: CPT | Performed by: NURSE PRACTITIONER

## 2021-06-09 PROCEDURE — 85025 COMPLETE CBC W/AUTO DIFF WBC: CPT | Performed by: INTERNAL MEDICINE

## 2021-06-09 RX ORDER — SODIUM CHLORIDE 1000 MG
2 TABLET, SOLUBLE MISCELLANEOUS
Start: 2021-06-09 | End: 2021-07-29 | Stop reason: SDUPTHER

## 2021-06-09 RX ORDER — BISOPROLOL FUMARATE 5 MG/1
15 TABLET, FILM COATED ORAL DAILY
Start: 2021-06-10 | End: 2021-06-25 | Stop reason: SDUPTHER

## 2021-06-09 RX ORDER — CEFUROXIME AXETIL 500 MG/1
500 TABLET ORAL 2 TIMES DAILY
Qty: 8 TABLET | Refills: 0
Start: 2021-06-09 | End: 2021-06-13

## 2021-06-09 RX ORDER — GABAPENTIN 100 MG/1
200 CAPSULE ORAL NIGHTLY
Qty: 6 CAPSULE | Refills: 0 | Status: SHIPPED | OUTPATIENT
Start: 2021-06-09 | End: 2021-06-25 | Stop reason: SDUPTHER

## 2021-06-09 RX ADMIN — BISOPROLOL FUMARATE 15 MG: 5 TABLET, FILM COATED ORAL at 09:00

## 2021-06-09 RX ADMIN — METOCLOPRAMIDE 5 MG: 5 TABLET ORAL at 12:55

## 2021-06-09 RX ADMIN — PSYLLIUM HUSK 1 PACKET: 3.4 POWDER ORAL at 09:02

## 2021-06-09 RX ADMIN — APIXABAN 5 MG: 5 TABLET, FILM COATED ORAL at 09:01

## 2021-06-09 RX ADMIN — METOCLOPRAMIDE 5 MG: 5 TABLET ORAL at 09:01

## 2021-06-09 RX ADMIN — Medication 2 G: at 09:01

## 2021-06-09 RX ADMIN — Medication 2 G: at 12:55

## 2021-06-09 RX ADMIN — HYDROCORTISONE ACETATE 25 MG: 25 SUPPOSITORY RECTAL at 09:01

## 2021-06-09 RX ADMIN — DOCUSATE SODIUM 50MG AND SENNOSIDES 8.6MG 2 TABLET: 8.6; 5 TABLET, FILM COATED ORAL at 09:01

## 2021-06-09 RX ADMIN — PHENAZOPYRIDINE 100 MG: 100 TABLET ORAL at 12:55

## 2021-06-09 RX ADMIN — SODIUM CHLORIDE 1 G: 900 INJECTION INTRAVENOUS at 05:11

## 2021-06-09 RX ADMIN — PHENAZOPYRIDINE 100 MG: 100 TABLET ORAL at 09:01

## 2021-06-09 RX ADMIN — POLYETHYLENE GLYCOL 3350 17 G: 17 POWDER, FOR SOLUTION ORAL at 09:01

## 2021-06-09 RX ADMIN — BISACODYL 10 MG: 10 SUPPOSITORY RECTAL at 09:44

## 2021-06-09 NOTE — PLAN OF CARE
Goal Outcome Evaluation:  Plan of Care Reviewed With: patient   VSS. Controlled A fib on telemetry monitor. She has maintained adequate oxygen saturations on room air. She has remained alert and oriented. Bladder scanning ever 4 hours per order- I have had to I/O cath her once this shift thus far. She has not reported any bladder fullness or discomfort. She has remained calm and pleasant and has rested peacefully throughout the night. She is hopeful that she will be discharged today. No other problems identified. Will continue to monitor.

## 2021-06-09 NOTE — PLAN OF CARE
Goal Outcome Evaluation:  Plan of Care Reviewed With: patient        Progress: improving  Outcome Summary: VSS. Pt to be discharged to the [facility]. Report called to Jennifer [facility]. Son to transport.

## 2021-06-09 NOTE — CASE MANAGEMENT/SOCIAL WORK
Case Management Discharge Note      Final Note: Patient's plan is a skilled bed at [facility] [place] today, 6/9.  Son will transport.  Nurse to call report to [phone].  Liason will get transfer summary out of EPIC.  Mariana Esteban, RN [phone]         Selected Continued Care - Admitted Since 5/27/2021     Destination Coordination complete    Service Provider Selected Services Address Phone Fax Patient Preferred    [facility] [address], [place] [id number] [phone] --          Durable Medical Equipment    No services have been selected for the patient.              Dialysis/Infusion    No services have been selected for the patient.              Home Medical Care    No services have been selected for the patient.              Therapy    No services have been selected for the patient.              Community Resources    No services have been selected for the patient.              Community & DME    No services have been selected for the patient.                       Final Discharge Disposition Code: 03 - skilled nursing facility (SNF)

## 2021-06-09 NOTE — DISCHARGE SUMMARY
[facility] Medicine Services  DISCHARGE SUMMARY    Patient Name: Thuy Clark  : 1935  MRN: [id number]    Date of Admission: 2021  1:35 AM  Date of Discharge:  2021  Primary Care Physician: Isidra Eisenberg MD    Consults     No orders found from 2021 to 2021.          Hospital Course     Presenting Problem:   Altered mental status, unspecified altered mental status type [R41.82]    Active Hospital Problems    Diagnosis  POA   • **Hyponatremia [E87.1]  Yes   • Chronic anticoagulation (Eliquis) [Z79.01]  Not Applicable   • Chronic atrial fibrillation (CMS/HCC) [I48.21]  Yes   • Altered mental status [R41.82]  Yes   • Atrial flutter with rapid ventricular response (CMS/HCC) [I48.92]  Yes   • Constipation, slow transit [K59.01]  Yes   • Atrial septal defect determined by imaging [Q21.1]  Not Applicable      Resolved Hospital Problems   No resolved problems to display.          Hospital Course:  Thuy Clark is a 85 y.o. female with past medical history history of A. fib, on Eliquis, hypertension, chronic urinary retention who does intermittent self cath admitted on  for acute hyponatremia, symptomatic with AMS and transferred initially to the ICU for closer monitoring.  Urine studies consistent with possible ADH mechanism however she was not on any medications known to cause low sodium.  Sodium in the ER was 108.  She does have a history of chronic mild hyponatremia, the lowest previously being 123.       Only new medications prior to admission were Bactrim for a UTI started by her PCP at the [facility].  She was transferred up to general medical floor from ICU on .     Acute symptomatic hyponatremia, improving  Impaired memory  --Likely multifactorial including decreased solute intake, polydipsia as well as ADH mechanism.  --Her sodium has been largely stable for last several days with fluid restriction, salt tablets. Continue though her  impaired memory makes it challenging for her to remember measures and adhere to fluid restriction  --PT/OT recs SNF. Patient agreeable.      Complicated UTI related to chronic in and out catheterization  --Hx of self cathing for > 10 years. Follows with [facility] urology regularly.  --Urine culture positive for klebsiella oxytoca, susceptible to rocephin-she has received three days of treatment. Will transition to ceftin x 4 more days to complete a week of treatment.   --continue I/O cath at nursing facility.      Hypertension  - stable     A. fib, rate controlled  -continue on increased dose of bisoprolol  -AC on Eliquis     Constipation-resolved  --Continue bowel regimen. Added fiber.  --Discussed GI referral for her chronic constipation. She will discuss this with her PCP once she is discharged from rehab. She reports that she has been scheduled to see a GI doc from [facility] but had to cancel recently.       Discharge Follow Up Recommendations for outpatient labs/diagnostics:   Follow up with PCP, first available hospital follow up appt  Recommend repeat BMP to assess sodium level on Monday June 13.     Day of Discharge     HPI:     Resting comfortably in bed this morning with son at bedside.  Feels good today. Had a good BM this morning.  Son reports that she seems to be back to her baseline mental status now.    Review of Systems  Gen- No fevers, chills  CV- No chest pain, palpitations  Resp- No cough, dyspnea  GI- No N/V/D, abd pain    Vital Signs:   Temp:  [97.8 °F (36.6 °C)-98.3 °F (36.8 °C)] 97.8 °F (36.6 °C)  Heart Rate:  [] 100  Resp:  [14-18] 17  BP: (129-136)/(71-93) 129/93     Physical Exam:  Constitutional: No acute distress, awake, alert, upright in bed, son at bedside.   HENT: NCAT, mucous membranes moist  Respiratory: Clear to auscultation bilaterally, respiratory effort normal on room air  Cardiovascular: RRR, no murmurs, rubs, or gallops  Gastrointestinal: Positive bowel sounds,  soft, nontender, nondistended  Musculoskeletal: No bilateral ankle edema  Psychiatric: Appropriate affect, cooperative  Neurologic: Oriented x 3, strength symmetric in all extremities, Cranial Nerves grossly intact to confrontation, speech clear  Skin: No rashes noted to exposed loose fragile skin       Pertinent  and/or Most Recent Results     LAB RESULTS:      Lab 06/09/21  0421   WBC 5.21   HEMOGLOBIN 11.3*   HEMATOCRIT 35.0   PLATELETS 300   NEUTROS ABS 3.24   IMMATURE GRANS (ABS) 0.03   LYMPHS ABS 1.20   MONOS ABS 0.48   EOS ABS 0.20   MCV 89.1         Lab 06/09/21  0421 06/08/21  0527 06/07/21  0344 06/06/21  0357 06/05/21  0612   SODIUM 132* 134* 129* 132* 129*   POTASSIUM 4.2 4.2 4.1 4.3 4.3   CHLORIDE 98 100 95* 97* 95*   CO2 28.0 27.0 26.0 27.0 25.0   ANION GAP 6.0 7.0 8.0 8.0 9.0   BUN 9 7* 9 9 8   CREATININE 0.60 0.57 0.53* 0.66 0.65   GLUCOSE 96 93 100* 92 90   CALCIUM 9.1 9.0 8.6 8.9 9.0                         Brief Urine Lab Results  (Last result in the past 365 days)      Color   Clarity   Blood   Leuk Est   Nitrite   Protein   CREAT   Urine HCG        06/06/21 1734 Yellow Turbid Moderate (2+) Large (3+) Positive 30 mg/dL (1+)             Microbiology Results (last 10 days)     Procedure Component Value - Date/Time    COVID PRE-OP / PRE-PROCEDURE SCREENING ORDER (NO ISOLATION) - Swab, Nasopharynx [[id number]]  (Normal) Collected: 06/07/21 1500    Lab Status: Final result Specimen: Swab from Nasopharynx Updated: 06/07/21 1531    Narrative:      The following orders were created for panel order COVID PRE-OP / PRE-PROCEDURE SCREENING ORDER (NO ISOLATION) - Swab, Nasopharynx.  Procedure                               Abnormality         Status                     ---------                               -----------         ------                     COVID-19, ABBOTT IN-HOUS...[[id number]]  Normal              Final result                 Please view results for these tests on the individual orders.     COVID-19, ABBOTT IN-HOUSE,NASAL Swab (NO TRANSPORT MEDIA) 2 HR TAT - Swab, Nasopharynx [[id number]]  (Normal) Collected: 06/07/21 1500    Lab Status: Final result Specimen: Swab from Nasopharynx Updated: 06/07/21 1531     COVID19 Presumptive Negative    Narrative:      Fact sheet for providers: https://www.fda.gov/media/496733/download     Fact sheet for patients: https://www.fda.gov/media/764942/download    Test performed by PCR.  If inconsistent with clinical signs and symptoms patient should be tested with different authorized molecular test.    Urine Culture - Urine, Urine, Clean Catch [[id number]]  (Abnormal)  (Susceptibility) Collected: 06/06/21 1734    Lab Status: Final result Specimen: Urine, Clean Catch Updated: 06/09/21 1208     Urine Culture >100,000 CFU/mL Klebsiella oxytoca    Susceptibility      Klebsiella oxytoca      SYLVIA      Ampicillin Resistant      Ampicillin + Sulbactam Susceptible      Cefazolin Susceptible      Cefepime Susceptible      Ceftazidime Susceptible      Ceftriaxone Susceptible      Gentamicin Susceptible      Levofloxacin Susceptible      Nitrofurantoin Susceptible      Piperacillin + Tazobactam Susceptible      Tetracycline Susceptible      Trimethoprim + Sulfamethoxazole Susceptible               Linear View                         CT Chest Without Contrast Diagnostic    Result Date: 5/30/2021  EXAMINATION: CT CHEST WO CONTRAST DIAGNOSTIC-  INDICATION: hypona, adh, eval for malignancy; R41.82-Altered mental status, unspecified; E87.5-Rgqq-cqxptjeuwd and hyponatremia  TECHNIQUE: Multiplanar CT imaging of the chest was performed without administration of intravenous contrast.  The radiation dose reduction device was turned on for each scan per the ALARA (As Low as Reasonably Achievable) protocol.  COMPARISON: NONE  FINDINGS: Central airways are patent. Left basilar atelectasis. Some faint scattered groundglass opacities in the anterior margin of the right upper lobe. Right basilar  atelectasis and scarring. Small right pleural effusion. Degenerative changes of bilateral glenohumeral joints. Chronic appearing right-sided rib fractures. Homogenous attenuation of the thyroid. 3 cm left renal cyst. Adrenal glands are normal. Pancreas is normal. Spleen is normal in size. Homogenous attenuation of the visualized liver parenchyma. Prior cholecystectomy without biliary dilation. Stomach and visualized loops of bowel are unremarkable. Aortic atherosclerosis with normal caliber of the descending thoracic aorta. Right atrial enlargement with cardiomegaly. Main pulmonary artery is dilated. Calcified mediastinal lymph nodes. Slightly rounded right upper pretracheal lymph node measuring 1 cm). No axillary adenopathy. No suspicious soft tissue findings.        1. No suspicious pulmonary nodule. There is a rounded approximately 1 cm pretracheal lymph node which is nonspecific.  2. Trace right pleural effusion.  3. Cardiomegaly.   This report was finalized on 5/30/2021 10:06 AM by aJck Qureshi.                Results for orders placed during the hospital encounter of 11/02/19    Adult Transthoracic Echo Complete With Contrast if Necessary Per Protocol    Interpretation Summary  · Left ventricular systolic function is normal. Estimated EF = 65%.  · Left ventricular wall thickness is consistent with borderline concentric hypertrophy.  · Left ventricular diastolic dysfunction (grade II) consistent with pseudonormalization.  · Right ventricular cavity is dilated.  · Left atrial volume is severely increased.  · Saline test results are positive for evidence of an intra-atrial shunt.  · Right atrial cavity size is severely dilated.  · Mild aortic valve regurgitation is present.  · Mild tricuspid valve regurgitation is present.  · Estimated right ventricular systolic pressure from tricuspid regurgitation is mildly elevated (35-45 mmHg      Plan for Follow-up of Pending Labs/Results:     Discharge Details         Discharge Medications      New Medications      Instructions Start Date   cefuroxime 500 MG tablet  Commonly known as: CEFTIN   500 mg, Oral, 2 Times Daily  For 4 more days.     psyllium 58.12 % packet  Commonly known as: METAMUCIL MULTIHEALTH FIBER   1 packet, Oral, Daily   Start Date: Glory 10, 2021     sodium chloride 1 g tablet   2 g, Oral, 3 Times Daily With Meals         Changes to Medications      Instructions Start Date   apixaban 5 MG tablet tablet  Commonly known as: ELIQUIS  What changed:   · medication strength  · when to take this   5 mg, Oral, Every 12 Hours Scheduled      bisoprolol 5 MG tablet  Commonly known as: ZEBeta  What changed: how much to take   15 mg, Oral, Daily   Start Date: Glory 10, 2021        Continue These Medications      Instructions Start Date   docusate sodium 100 MG capsule  Commonly known as: COLACE   100 mg, Oral, 2 Times Daily      gabapentin 100 MG capsule  Commonly known as: NEURONTIN   200 mg, Oral, Nightly      hydrocortisone 25 MG suppository  Commonly known as: ANUSOL-HC   25 mg, Rectal, 2 Times Daily      ondansetron ODT 4 MG disintegrating tablet  Commonly known as: ZOFRAN-ODT   4 mg, Translingual, 4 Times Daily PRN      polyethylene glycol 17 g packet  Commonly known as: MIRALAX   17 g, Oral, 2 Times Daily      PROBIOTIC PEARLS ADVANTAGE PO   1  PRN      Vasculera tablet   1 tablet, Oral, 2 times daily      Vitamin D3 25 MCG (1000 UT) capsule   1 capsule, Oral, Daily         Stop These Medications    BACTRIM PO     linaclotide 72 MCG capsule capsule  Commonly known as: Linzess     lisinopril 10 MG tablet  Commonly known as: PRINIVIL,ZESTRIL            Allergies   Allergen Reactions   • Levofloxacin Other (See Comments)     OTHER    • Nitrofuran Derivatives Rash     OTHER    • Nitrofurantoin Rash         Discharge Disposition:  Skilled Nursing Facility (DC - External)    Diet:  Hospital:  Diet Order   Procedures   • Diet Regular; Daily Fluid Restriction; 1000 mL Fluid  Per Day       Activity:  Activity Instructions     Activity as Tolerated            Restrictions or Other Recommendations:         CODE STATUS:    Code Status and Medical Interventions:   Ordered at: 05/27/21 0541     Code Status:    CPR     Medical Interventions (Level of Support Prior to Arrest):    Full       Future Appointments   Date Time Provider Department Center   6/18/2021  1:45 PM Isidra Eisenberg MD [facility]       Additional Instructions for the Follow-ups that You Need to Schedule     Discharge Follow-up with PCP   As directed       Currently Documented PCP:    Isidra Eisenberg MD    PCP Phone Number:    [phone]     Follow Up Details: first available hospital follow up appt.                     Sobia Cowan, MEL  06/09/21      Time Spent on Discharge:  I spent  40 minutes on this discharge activity which included: face-to-face encounter with the patient, reviewing the data in the system, coordination of the care with the nursing staff as well as consultants, documentation, and entering orders.

## 2021-06-14 ENCOUNTER — TELEPHONE (OUTPATIENT)
Dept: INTERNAL MEDICINE | Facility: CLINIC | Age: 86
End: 2021-06-14

## 2021-06-14 NOTE — TELEPHONE ENCOUNTER
Caller: HOLLY PEREZ    Relationship: Emergency Contact    Best call back number: [phone]    What is the best time to reach you: anytime    Who are you requesting to speak with (clinical staff, provider,  specific staff member): Dr. Eisenberg    Do you know the name of the person who called: holly    What was the call regarding: patients daughter is calling about the fmla, and they need the dr to indicate 6 days a week until November 27th    Do you require a callback:

## 2021-06-16 ENCOUNTER — TELEPHONE (OUTPATIENT)
Dept: INTERNAL MEDICINE | Facility: CLINIC | Age: 86
End: 2021-06-16

## 2021-06-16 NOTE — TELEPHONE ENCOUNTER
Caller: MATI PEREZ    Relationship to patient: Emergency Contact    Best call back number:     New or established patient?  [] New  [x] Established    Date of discharge: 062421  Facility discharged from: [facility]    Diagnosis/Symptoms: LOW SODIUM    Length of stay (If applicable):     Specialty Only: Did you see a [facility] provider?    [] Yes  [x] No    PATIENT TO BE RELEASED FROM THE [facility] ON 062421  PATIENT WAS IN HOSPITAL FOR LOW SODIUM AND  ALSO WAS SEEN AT [facility] BACK AT THE END OF MAY 2021; PATIENT ALSO WANTED TO KNOW IF SHE NEEDED TO DO LABWORK PRIOR TO VISIT; PATIENT IS SCHEDULED 062521 WITH DR ORDAZ

## 2021-06-16 NOTE — TELEPHONE ENCOUNTER
She does not need to do labs before the visit with me.  We will decide what labs are needed while she is here.

## 2021-06-16 NOTE — TELEPHONE ENCOUNTER
HOLLY PEREZ CALLED CHECKING PROGRESS ON FMLA. SHE STATES THAT NEEDED TIME OFF NEEDS TO BE FOR ANY DAY/ANY TIME TO PROVIDE CARE

## 2021-06-22 ENCOUNTER — TELEPHONE (OUTPATIENT)
Dept: INTERNAL MEDICINE | Facility: CLINIC | Age: 86
End: 2021-06-22

## 2021-06-22 NOTE — TELEPHONE ENCOUNTER
Caller: ANDREW WITH [facility]    Relationship: Home Health    Best call back number: [phone]    What is the best time to reach you:   ANYTIME  Who are you requesting to speak with (clinical staff, provider,  specific staff member): CLINICAL  Do you know the name of the person who called: ANDREW WITH [facility]     What was the call regarding: REQUESTING A CALL BACK WITH VERBAL ORDERS THAT DR ORDAZ WILL FOLLOW HOME HEALTH ORDERS FOR SKILLED NURSING, PHYSICAL THERAPY, OCCUPATIONAL THERAPY, AND HOME HEALTH     Do you require a callback: YES, PLEASE CALL ANDREW WITH [facility] AND [facility] -[phone].

## 2021-06-22 NOTE — ED PROVIDER NOTES
Subjective   Pt is a pleasant 85 year old female who presents with altered mental status.  She is accompanied by her son and has been vaguely more confused over the past several days..  Once example, which prompted the visit to the ED, the patient was trying to use the TV remote as her telephone, which is not normal for her.  She was diagnosed with a UTI and started on Bactrim 5-6 days ago.  Otherwise pt denies a trigger for today's AMS.  Denies similar previous episode.    Family has also noticed possible swelling of the patient's right knee.        Altered Mental Status  Presenting symptoms: behavior changes and confusion    Presenting symptoms: no unresponsiveness    Severity:  Moderate  Most recent episode:  More than 2 days ago  Episode history:  Continuous  Timing:  Constant  Progression:  Waxing and waning  Chronicity:  New  Context: recent illness and recent infection    Context: not head injury and taking medications as prescribed    Associated symptoms: nausea    Associated symptoms: no fever and no seizures        Review of Systems   Unable to perform ROS: Mental status change   Constitutional: Negative for fever.   Gastrointestinal: Positive for nausea.   Neurological: Negative for seizures.   Psychiatric/Behavioral: Positive for confusion.       Past Medical History:   Diagnosis Date   • Bleeding hemorrhoid 3/8/2019   • Diverticulosis    • External hemorrhoid 6/11/2019 12/6/2019 Isidra Eisenberg MD   Continue vasculera daily. Drink plenty of fluids.  Avoid constipation.  Follow-up with Dr. Hebert as planned.   • Hx of colonic polyps 2003   • Hypertension    • Intraductal papilloma     R intraductal patheloma   • Left upper extremity numbness     L upper extremity numbness-ER visit; neuropathy   • Overweight (BMI 25.0-29.9) 6/11/2019   • Self-catheterizes urinary bladder        Allergies   Allergen Reactions   • Levofloxacin Other (See Comments)     OTHER    • Nitrofuran Derivatives Rash     OTHER     • Nitrofurantoin Rash       Past Surgical History:   Procedure Laterality Date   • BREAST LUMPECTOMY  2006   • CHOLECYSTECTOMY     • HYSTERECTOMY  1984   • VAGINECTOMY  2009       Family History   Problem Relation Age of Onset   • Hyperlipidemia Other    • Hypertension Other    • Coronary artery disease Other    • No Known Problems Mother    • No Known Problems Father        Social History     Socioeconomic History   • Marital status:      Spouse name: Not on file   • Number of children: Not on file   • Years of education: Not on file   • Highest education level: Not on file   Tobacco Use   • Smoking status: Never Smoker   • Smokeless tobacco: Never Used   Substance and Sexual Activity   • Alcohol use: Never     Alcohol/week: 1.0 standard drinks     Types: 1 Glasses of wine per week   • Drug use: Never   • Sexual activity: Defer           Objective   Physical Exam  Vitals and nursing note reviewed.   Constitutional:       General: She is not in acute distress.  HENT:      Head: Normocephalic and atraumatic.   Eyes:      Extraocular Movements: Extraocular movements intact.      Pupils: Pupils are equal, round, and reactive to light.   Cardiovascular:      Rate and Rhythm: Normal rate.      Heart sounds: Normal heart sounds. No murmur heard.   No friction rub.   Pulmonary:      Effort: Pulmonary effort is normal. No respiratory distress.      Breath sounds: Normal breath sounds.   Abdominal:      General: Bowel sounds are normal.      Palpations: Abdomen is soft.   Musculoskeletal:         General: Normal range of motion.      Cervical back: Normal range of motion.   Skin:     General: Skin is warm and dry.      Capillary Refill: Capillary refill takes less than 2 seconds.   Neurological:      Mental Status: She is alert. She is confused.      Cranial Nerves: No facial asymmetry.      Sensory: No sensory deficit.      Motor: No weakness.   Psychiatric:         Behavior: Behavior normal.         Procedures            ED Course        Results for CAMELIA PEREZ (MRN [id number]) as of 6/22/2021 02:49   Ref. Range 5/27/2021 00:14   Glucose Latest Ref Range: 65 - 99 mg/dL 113 (H)   Sodium Latest Ref Range: 136 - 145 mmol/L 109 (C)   Potassium Latest Ref Range: 3.5 - 5.2 mmol/L 4.8   CO2 Latest Ref Range: 22.0 - 29.0 mmol/L 18.0 (L)   Chloride Latest Ref Range: 98 - 107 mmol/L 79 (L)   Anion Gap Latest Ref Range: 5.0 - 15.0 mmol/L 12.0   Creatinine Latest Ref Range: 0.57 - 1.00 mg/dL 0.63   BUN Latest Ref Range: 8 - 23 mg/dL 10   BUN/Creatinine Ratio Latest Ref Range: 7.0 - 25.0  15.9   Calcium Latest Ref Range: 8.6 - 10.5 mg/dL 8.7   eGFR Non  Am Latest Ref Range: >60 mL/min/1.73 90   Alkaline Phosphatase Latest Ref Range: 39 - 117 U/L 127 (H)   Total Protein Latest Ref Range: 6.0 - 8.5 g/dL 6.4   ALT (SGPT) Latest Ref Range: 1 - 33 U/L 13   AST (SGOT) Latest Ref Range: 1 - 32 U/L 24   Total Bilirubin Latest Ref Range: 0.0 - 1.2 mg/dL 0.7   Albumin Latest Ref Range: 3.50 - 5.20 g/dL 3.80   Globulin Latest Units: gm/dL 2.6   A/G Ratio Latest Units: g/dL 1.5   Cortisol Latest Ref Range:   mcg/dL 27.92   TSH Baseline Latest Ref Range: 0.270 - 4.200 uIU/mL 2.360   Osmolality Latest Ref Range: 275 - 295 mOsm/kg 229 (L)   WBC Latest Ref Range: 3.40 - 10.80 10*3/mm3 11.12 (H)   RBC Latest Ref Range: 3.77 - 5.28 10*6/mm3 3.88   Hemoglobin Latest Ref Range: 12.0 - 15.9 g/dL 11.0 (L)   Hematocrit Latest Ref Range: 34.0 - 46.6 % 32.1 (L)   RDW Latest Ref Range: 12.3 - 15.4 % 14.0   MCV Latest Ref Range: 79.0 - 97.0 fL 82.7   MCH Latest Ref Range: 26.6 - 33.0 pg 28.4   MCHC Latest Ref Range: 31.5 - 35.7 g/dL 34.3   MPV Latest Ref Range: 6.0 - 12.0 fL 8.5   Platelets Latest Ref Range: 140 - 450 10*3/mm3 374   RDW-SD Latest Ref Range: 37.0 - 54.0 fl 42.2   Neutrophil Rel % Latest Ref Range: 42.7 - 76.0 % 80.8 (H)   Lymphocyte Rel % Latest Ref Range: 19.6 - 45.3 % 10.0 (L)   Monocyte Rel % Latest Ref Range: 5.0 - 12.0 % 8.3    Eosinophil Rel % Latest Ref Range: 0.3 - 6.2 % 0.3   Basophil Rel % Latest Ref Range: 0.0 - 1.5 % 0.2   Immature Granulocyte Rel % Latest Ref Range: 0.0 - 0.5 % 0.4   Neutrophils Absolute Latest Ref Range: 1.70 - 7.00 10*3/mm3 8.99 (H)   Lymphocytes Absolute Latest Ref Range: 0.70 - 3.10 10*3/mm3 1.11   Monocytes Absolute Latest Ref Range: 0.10 - 0.90 10*3/mm3 0.92 (H)   Eosinophils Absolute Latest Ref Range: 0.00 - 0.40 10*3/mm3 0.03   Basophils Absolute Latest Ref Range: 0.00 - 0.20 10*3/mm3 0.02   Immature Grans, Absolute Latest Ref Range: 0.00 - 0.05 10*3/mm3 0.05   nRBC Latest Ref Range: 0.0 - 0.2 /100 WBC 0.0               MDM    Final diagnoses:   Altered mental status, unspecified altered mental status type   Hyponatremia       ED Disposition  ED Disposition     ED Disposition Condition Comment    Decision to Admit  Level of Care: Critical Care [6]   Diagnosis: Altered mental status, unspecified altered mental status type [[id number]]   Admitting Physician: SARATH LEE [[id number]]   Certification: I Certify That Inpatient Hospital Services Are Medically Necessary For Greater Than 2 Midnights            Isidra Eisenberg MD  [address] [address]  [phone]    Follow up on 6/18/2021       1:45pm                                 first available hospital follow up appt.    [facility] [place] [address] [address] [address]  [phone]             Medication List      New Prescriptions    psyllium 58.12 % packet  Commonly known as: METAMUCIL MULTIHEALTH FIBER  Take 1 packet by mouth Daily.     sodium chloride 1 g tablet  Take 2 tablets by mouth 3 (Three) Times a Day With Meals.        Changed    apixaban 5 MG tablet tablet  Commonly known as: ELIQUIS  Take 1 tablet by mouth Every 12 (Twelve) Hours. Indications: Atrial Fibrillation  What changed:   · medication strength  · when to take this     bisoprolol 5 MG tablet  Commonly known as: ZEBeta  Take 3  tablets by mouth Daily.  What changed: how much to take        Stop    BACTRIM PO     linaclotide 72 MCG capsule capsule  Commonly known as: Linzess     lisinopril 10 MG tablet  Commonly known as: BRETT HERMANSTRIL        ASK your doctor about these medications    cefuroxime 500 MG tablet  Commonly known as: CEFTIN  Take 1 tablet by mouth 2 (Two) Times a Day for 4 days.  Ask about: Should I take this medication?           Where to Get Your Medications      These medications were sent to [facility] - [place], [place] - [address] [place] [address] [phone] [facility] [phone] [address] [address]    Phone: [phone]   · gabapentin 100 MG capsule     Information about where to get these medications is not yet available    Ask your nurse or doctor about these medications  · apixaban 5 MG tablet tablet  · bisoprolol 5 MG tablet  · cefuroxime 500 MG tablet  · psyllium 58.12 % packet  · sodium chloride 1 g tablet          Iván Oneill DO  06/22/21 0251

## 2021-06-24 ENCOUNTER — READMISSION MANAGEMENT (OUTPATIENT)
Dept: CALL CENTER | Facility: HOSPITAL | Age: 86
End: 2021-06-24

## 2021-06-24 NOTE — OUTREACH NOTE
Prep Survey      Responses   [facility] facility patient discharged from?  Non-BH   Is LACE score < 7 ?  Non-BH Discharge   Emergency Room discharge w/ pulse ox?  No   Eligibility  [facility]   Date of Admission  06/09/21   Date of Discharge  06/24/21   Discharge diagnosis  Hyponatremia, UTI    Does the patient have one of the following disease processes/diagnoses(primary or secondary)?  Other   Prep survey completed?  Yes          Siobhan Hinojosa RN

## 2021-06-25 ENCOUNTER — OFFICE VISIT (OUTPATIENT)
Dept: INTERNAL MEDICINE | Facility: CLINIC | Age: 86
End: 2021-06-25

## 2021-06-25 ENCOUNTER — TRANSITIONAL CARE MANAGEMENT TELEPHONE ENCOUNTER (OUTPATIENT)
Dept: CALL CENTER | Facility: HOSPITAL | Age: 86
End: 2021-06-25

## 2021-06-25 ENCOUNTER — LAB (OUTPATIENT)
Dept: LAB | Facility: HOSPITAL | Age: 86
End: 2021-06-25

## 2021-06-25 VITALS
SYSTOLIC BLOOD PRESSURE: 108 MMHG | DIASTOLIC BLOOD PRESSURE: 80 MMHG | TEMPERATURE: 98 F | HEART RATE: 90 BPM | WEIGHT: 136.4 LBS | BODY MASS INDEX: 23.29 KG/M2 | HEIGHT: 64 IN

## 2021-06-25 DIAGNOSIS — K59.01 CONSTIPATION, SLOW TRANSIT: ICD-10-CM

## 2021-06-25 DIAGNOSIS — Q21.10: ICD-10-CM

## 2021-06-25 DIAGNOSIS — R30.0 DYSURIA: ICD-10-CM

## 2021-06-25 DIAGNOSIS — R33.9 URINARY RETENTION: ICD-10-CM

## 2021-06-25 DIAGNOSIS — N30.01 ACUTE CYSTITIS WITH HEMATURIA: ICD-10-CM

## 2021-06-25 DIAGNOSIS — E87.1 HYPONATREMIA: Primary | ICD-10-CM

## 2021-06-25 DIAGNOSIS — I48.21 PERMANENT ATRIAL FIBRILLATION (HCC): ICD-10-CM

## 2021-06-25 DIAGNOSIS — M79.672 FOOT PAIN, BILATERAL: ICD-10-CM

## 2021-06-25 DIAGNOSIS — R26.9 GAIT DISORDER: ICD-10-CM

## 2021-06-25 DIAGNOSIS — E78.2 MIXED HYPERLIPIDEMIA: ICD-10-CM

## 2021-06-25 DIAGNOSIS — I10 BENIGN ESSENTIAL HYPERTENSION: ICD-10-CM

## 2021-06-25 DIAGNOSIS — E44.1 MILD PROTEIN-CALORIE MALNUTRITION (HCC): ICD-10-CM

## 2021-06-25 DIAGNOSIS — Z91.81 AT HIGH RISK FOR FALLS: Chronic | ICD-10-CM

## 2021-06-25 DIAGNOSIS — E87.1 HYPONATREMIA: ICD-10-CM

## 2021-06-25 DIAGNOSIS — R41.82 ALTERED MENTAL STATUS, UNSPECIFIED ALTERED MENTAL STATUS TYPE: ICD-10-CM

## 2021-06-25 DIAGNOSIS — Z79.01 CHRONIC ANTICOAGULATION: ICD-10-CM

## 2021-06-25 DIAGNOSIS — M81.0 SENILE OSTEOPOROSIS: ICD-10-CM

## 2021-06-25 DIAGNOSIS — M79.671 FOOT PAIN, BILATERAL: ICD-10-CM

## 2021-06-25 DIAGNOSIS — R73.09 ABNORMAL GLUCOSE: ICD-10-CM

## 2021-06-25 LAB
25(OH)D3 SERPL-MCNC: 44.7 NG/ML
BASOPHILS # BLD AUTO: 0.06 10*3/MM3 (ref 0–0.2)
BASOPHILS NFR BLD AUTO: 0.7 % (ref 0–1.5)
BILIRUB BLD-MCNC: ABNORMAL MG/DL
CHOLEST SERPL-MCNC: 135 MG/DL (ref 0–200)
CLARITY, POC: ABNORMAL
COLOR UR: ABNORMAL
DEPRECATED RDW RBC AUTO: 47 FL (ref 37–54)
EOSINOPHIL # BLD AUTO: 0.14 10*3/MM3 (ref 0–0.4)
EOSINOPHIL NFR BLD AUTO: 1.5 % (ref 0.3–6.2)
ERYTHROCYTE [DISTWIDTH] IN BLOOD BY AUTOMATED COUNT: 14.1 % (ref 12.3–15.4)
GLUCOSE UR STRIP-MCNC: ABNORMAL MG/DL
HBA1C MFR BLD: 5.92 % (ref 4.8–5.6)
HCT VFR BLD AUTO: 37.8 % (ref 34–46.6)
HDLC SERPL-MCNC: 41 MG/DL (ref 40–60)
HGB BLD-MCNC: 12.1 G/DL (ref 12–15.9)
IMM GRANULOCYTES # BLD AUTO: 0.05 10*3/MM3 (ref 0–0.05)
IMM GRANULOCYTES NFR BLD AUTO: 0.6 % (ref 0–0.5)
KETONES UR QL: ABNORMAL
LDLC SERPL CALC-MCNC: 80 MG/DL (ref 0–100)
LDLC/HDLC SERPL: 1.97 {RATIO}
LEUKOCYTE EST, POC: ABNORMAL
LYMPHOCYTES # BLD AUTO: 1.51 10*3/MM3 (ref 0.7–3.1)
LYMPHOCYTES NFR BLD AUTO: 16.6 % (ref 19.6–45.3)
MCH RBC QN AUTO: 29.1 PG (ref 26.6–33)
MCHC RBC AUTO-ENTMCNC: 32 G/DL (ref 31.5–35.7)
MCV RBC AUTO: 90.9 FL (ref 79–97)
MONOCYTES # BLD AUTO: 0.85 10*3/MM3 (ref 0.1–0.9)
MONOCYTES NFR BLD AUTO: 9.4 % (ref 5–12)
NEUTROPHILS NFR BLD AUTO: 6.47 10*3/MM3 (ref 1.7–7)
NEUTROPHILS NFR BLD AUTO: 71.2 % (ref 42.7–76)
NITRITE UR-MCNC: POSITIVE MG/ML
NRBC BLD AUTO-RTO: 0 /100 WBC (ref 0–0.2)
PH UR: 5 [PH] (ref 5–8)
PLATELET # BLD AUTO: 337 10*3/MM3 (ref 140–450)
PMV BLD AUTO: 9.6 FL (ref 6–12)
PROT UR STRIP-MCNC: ABNORMAL MG/DL
RBC # BLD AUTO: 4.16 10*6/MM3 (ref 3.77–5.28)
RBC # UR STRIP: ABNORMAL /UL
SP GR UR: 1.02 (ref 1–1.03)
TRIGL SERPL-MCNC: 67 MG/DL (ref 0–150)
UROBILINOGEN UR QL: ABNORMAL
VIT B12 BLD-MCNC: 447 PG/ML (ref 211–946)
VLDLC SERPL-MCNC: 14 MG/DL (ref 5–40)
WBC # BLD AUTO: 9.08 10*3/MM3 (ref 3.4–10.8)

## 2021-06-25 PROCEDURE — 83036 HEMOGLOBIN GLYCOSYLATED A1C: CPT

## 2021-06-25 PROCEDURE — 85025 COMPLETE CBC W/AUTO DIFF WBC: CPT

## 2021-06-25 PROCEDURE — 1111F DSCHRG MED/CURRENT MED MERGE: CPT | Performed by: INTERNAL MEDICINE

## 2021-06-25 PROCEDURE — 82306 VITAMIN D 25 HYDROXY: CPT

## 2021-06-25 PROCEDURE — 87086 URINE CULTURE/COLONY COUNT: CPT

## 2021-06-25 PROCEDURE — 81003 URINALYSIS AUTO W/O SCOPE: CPT | Performed by: INTERNAL MEDICINE

## 2021-06-25 PROCEDURE — 87088 URINE BACTERIA CULTURE: CPT

## 2021-06-25 PROCEDURE — 80061 LIPID PANEL: CPT

## 2021-06-25 PROCEDURE — 87186 SC STD MICRODIL/AGAR DIL: CPT

## 2021-06-25 PROCEDURE — 82607 VITAMIN B-12: CPT

## 2021-06-25 PROCEDURE — 84443 ASSAY THYROID STIM HORMONE: CPT

## 2021-06-25 PROCEDURE — 80053 COMPREHEN METABOLIC PANEL: CPT

## 2021-06-25 PROCEDURE — 99496 TRANSJ CARE MGMT HIGH F2F 7D: CPT | Performed by: INTERNAL MEDICINE

## 2021-06-25 RX ORDER — GABAPENTIN 100 MG/1
200 CAPSULE ORAL NIGHTLY PRN
Qty: 6 CAPSULE | Refills: 0
Start: 2021-06-25 | End: 2022-12-21 | Stop reason: SDUPTHER

## 2021-06-25 RX ORDER — POLYETHYLENE GLYCOL 3350 17 G/17G
17 POWDER, FOR SOLUTION ORAL DAILY
Qty: 100 EACH | Refills: 5
Start: 2021-06-25 | End: 2021-07-16

## 2021-06-25 RX ORDER — BISOPROLOL FUMARATE 5 MG/1
15 TABLET, FILM COATED ORAL NIGHTLY
Qty: 90 TABLET | Refills: 1 | Status: SHIPPED | OUTPATIENT
Start: 2021-06-25 | End: 2021-08-23 | Stop reason: SDUPTHER

## 2021-06-25 RX ORDER — SULFAMETHOXAZOLE AND TRIMETHOPRIM 800; 160 MG/1; MG/1
1 TABLET ORAL 2 TIMES DAILY
Qty: 14 TABLET | Refills: 0 | Status: SHIPPED | OUTPATIENT
Start: 2021-06-25 | End: 2021-07-02

## 2021-06-25 NOTE — PROGRESS NOTES
Transitional Care Follow Up Visit  Subjective     Thuy Clark is a 85 y.o. female who presents for a transitional care management visit.    Within 48 business hours after discharge our office contacted her via telephone to coordinate her care and needs.      I reviewed and discussed the details of that call along with the discharge summary, hospital problems, inpatient lab results, inpatient diagnostic studies, and consultation reports with Thuy.     Current outpatient and discharge medications have been reconciled for the patient.  Reviewed by: Isidra Eisenberg MD      Date of TCM Phone Call 6/24/2021   [facility]   Date of Admission 6/9/2021   Date of Discharge 6/24/2021   [facility] date of admission 5/27/2021  Discharge to [facility] 6/9/2021    Risk for Readmission (LACE) No data recorded    History of Present Illness   Course During Hospital Stay:  Admitted to hospital on 5/27/2021 with altered mental status.  She was found to have hyponatremia with serum sodium of 108.  She had a previous history of mild hyponatremia of the lowest being 123.  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  Patient was put on fluid restriction and salt tablets.    She also had a UTI secondary to Klebsiella oxytoca and was treated with 3 days of IV Rocephin.  She was then given Ceftin for 4 more days to complete treatment.    Patient was found to be in A. fib with rapid ventricular response.  Her lisinopril for hypertension was stopped and she was started on bisoprolol 15 mg nightly.  She was continued on Eliquis twice a day.  She denies any palpitations today.  She denies any chest pain.  She does have some mild swelling in ankles.    Echocardiogram revealed normal ejection fraction at 65% with normal systolic function.  Borderline concentric hypertrophy of the left ventricle.  LV diastolic dysfunction grade 2.  Right  "ventricular cavity dilation.  Left atrial volume severely increased.  Saline test positive for intra-atrial shunt.  Right atrial cavity severely dilated.  Mild AR.  Mild TR.  Elevated right ventricular systolic pressure of 35 to 45 mmHg.    Transferred to the [facility] for rehab on 6/9/2021. She had PT and OT there.  Tomorrow [facility] will start seeing her. Still feels weak.     Constipation is a chronic problem.  Softeners and MiraLAX do help.  She unfortunately does not take the MiraLAX regularly.  Linzess was stopped in the hospital.    Appetite pretty good. She has been eating a lot of soups like broccoli cheddar.  She does not eat much protein.  She does eat 3 meals a day.  No nausea or abdominal pain.    Chronic urinary retention.  She has been doing in and out caths 3 times a day for about 10 years.    She is having burning and frequency.  She states that she is miserable.  She brought in a cath specimen.  It does show UTI.  We will send it for culture.  (Treated with Rocephin then Ceftin 3 weeks ago)     The following portions of the patient's history were reviewed and updated as appropriate: allergies, current medications, past family history, past medical history, past social history, past surgical history and problem list.    Vitals:    06/25/21 1331   BP: 108/80   BP Location: Left arm   Patient Position: Sitting   Cuff Size: Adult   Pulse: 90   Temp: 98 °F (36.7 °C)   TempSrc: Temporal   Weight: 61.9 kg (136 lb 6.4 oz)   Height: 162.6 cm (64.02\")   PainSc:   4     Body mass index is 23.4 kg/m².    Review of Systems   Constitutional: Negative for chills, fatigue and fever.   HENT: Negative for congestion, ear pain and sinus pressure.    Respiratory: Negative for cough, chest tightness, shortness of breath and wheezing.    Cardiovascular: Negative for chest pain and palpitations.   Gastrointestinal: Negative for abdominal pain, blood in stool and constipation.   Genitourinary: Positive for dysuria " and frequency.   Musculoskeletal: Negative for arthralgias and back pain.   Skin: Negative for color change.   Allergic/Immunologic: Negative for environmental allergies.   Neurological: Negative for dizziness, tremors, speech difficulty and headaches.   Psychiatric/Behavioral: Negative for confusion, decreased concentration and dysphoric mood. The patient is not nervous/anxious.        Objective   Physical Exam  Vitals and nursing note reviewed.   Constitutional:       Appearance: She is well-developed.   HENT:      Head: Normocephalic.   Eyes:      Conjunctiva/sclera: Conjunctivae normal.      Pupils: Pupils are equal, round, and reactive to light.   Neck:      Thyroid: No thyromegaly.   Cardiovascular:      Rate and Rhythm: Normal rate. Rhythm irregularly irregular.      Heart sounds: Normal heart sounds.   Pulmonary:      Effort: Pulmonary effort is normal.      Breath sounds: Normal breath sounds. No wheezing.   Musculoskeletal:         General: Normal range of motion.      Cervical back: Normal range of motion and neck supple.   Lymphadenopathy:      Cervical: No cervical adenopathy.   Skin:     General: Skin is warm and dry.   Neurological:      Mental Status: She is alert and oriented to person, place, and time.      Gait: Gait abnormal.      Comments: Using cane.   Psychiatric:         Attention and Perception: Attention normal.         Mood and Affect: Mood and affect normal.         Speech: Speech normal.         Behavior: Behavior normal.         Thought Content: Thought content normal.         Judgment: Judgment normal.         Assessment/Plan     Patient Instructions   Problem List Items Addressed This Visit        Advance Directives and General Issues    At high risk for falls (Chronic)    Overview     12/18/2020 Isidra Eisenberg MD    Doing some daily exercises does help prevent falls and prevent injuries from falls.    Continue using the cane at all times.    Fall prevention discussed and education  given.            Cardiac and Vasculature    Benign essential hypertension    Overview     6/25/2021  Isidra Eisenberg MD    Continue bisoprolol 15mg every evening.      Continue to avoid salt in the diet.  Avoid sodas.         Relevant Medications    bisoprolol (ZEBeta) 5 MG tablet    Mixed hyperlipidemia    Overview     6/25/2021 Isidra Eisenberg MD    Continue low fat diet and regular physical activity.         Atrial septal defect determined by imaging    Overview     6/25/2021 Isidra Eisenberg MD    Noted on echo with bubble study 11/2019. Dr. Chow.    6/20/2021 echocardiogram: mild AR and mild TR.  Elevated right ventricular systolic pressure of 35 to 45 mmHg.  Severely dilated right atrial cavity.  Left atrial volume severely increased.  Saline test positive for intra-atrial shunt.  Borderline left ventricular hypertrophy.  Ejection fraction 65%.      Continue Eliquis twice a day and 15mg bisoprolol every evening.         Relevant Medications    bisoprolol (ZEBeta) 5 MG tablet    Chronic atrial fibrillation (CMS/HCC)    Overview     6/25/2021 Isidra Eisenberg MD    Continue Eliquis twice a day.  Continue 15 mg bisoprolol every evening.         Relevant Medications    bisoprolol (ZEBeta) 5 MG tablet       Coag and Thromboembolic    Chronic anticoagulation (Eliquis)       Endocrine and Metabolic    Mild protein-calorie malnutrition (CMS/HCC)    Overview     6/25/2021 Isidra Eisenberg MD    Patient was advised to have some protein with each meal and to eat at least 3 meals a day.  She is to drink any Ensure or boost at least once a day.  She was given education on protein content of foods.            Gastrointestinal Abdominal     Constipation, slow transit    Overview     6/25/2021 Isidra Eisenberg MD    Patient was advised to take MiraLAX and stool softeners and probiotic every day.                  Genitourinary and Reproductive     Urinary retention    Overview     6/25/2021 Isidra Eisenberg MD      Continue self catheterization regularly. Continue regular follow up with Dr. Marroquin.         Hyponatremia - Primary    Overview     6/25/2021 Isidra Eisenberg MD    Continue oral fluid restriction.  Continue sodium chloride tablets 3 times a day with meals.  Drink a Gatorade or other electrolyte drinks once a day.         Relevant Orders    Basic Metabolic Panel    Acute cystitis with hematuria    Overview     6/25/2021 Isidra Eisenberg MD    Urine sent for culture.         Relevant Orders    Urine Culture - Urine, Urine, Catheter    Dysuria    Relevant Orders    POC Urinalysis Dipstick, Automated (Completed)       Musculoskeletal and Injuries    Foot pain, bilateral    Overview     6/25/2021 Isidra Eisenberg MD    Continue nightly gabapentin as needed for foot pain.         Relevant Medications    gabapentin (NEURONTIN) 100 MG capsule       Neuro    Altered mental status    Overview     6/25/2021 Isidra Eisenberg MD    Admitted to [facility] 5/27/2021 with altered mental status secondary to hyponatremia with serum sodium of 108.  Patient is now back to baseline.            Symptoms and Signs    Gait disorder    Overview     6/25/2021 Isidra Eisenberg MD    Continue regular physical activity and exercise in the house. Use small handweights daily when watching TV.     Continue using cane, especially when going out.                   Diagnosis Plan   1. Hyponatremia  Basic Metabolic Panel   2. Altered mental status, unspecified altered mental status type     3. Acute cystitis with hematuria  Urine Culture - Urine, Urine, Catheter   4. Chronic atrial fibrillation (CMS/HCC)     5. Chronic anticoagulation (Eliquis)     6. Benign essential hypertension     7. At high risk for falls     8. Mild protein-calorie malnutrition (CMS/HCC)     9. Gait disorder     10. Constipation, slow transit     11. Foot pain, bilateral  gabapentin (NEURONTIN) 100 MG capsule   12. Urinary retention     13. Dysuria  POC Urinalysis  Dipstick, Automated   14. Mixed hyperlipidemia     15. Atrial septal defect determined by imaging

## 2021-06-25 NOTE — PATIENT INSTRUCTIONS
Patient Instructions   Problem List Items Addressed This Visit        Advance Directives and General Issues    At high risk for falls (Chronic)    Overview     12/18/2020 Isidra Eisenberg MD    Doing some daily exercises does help prevent falls and prevent injuries from falls.    Continue using the cane at all times.    Fall prevention discussed and education given.            Cardiac and Vasculature    Benign essential hypertension    Overview     6/25/2021  Isidra Eisenberg MD    Continue bisoprolol 15mg every evening.      Continue to avoid salt in the diet.  Avoid sodas.         Relevant Medications    bisoprolol (ZEBeta) 5 MG tablet    Mixed hyperlipidemia    Overview     6/25/2021 Isidra Eisenberg MD    Continue low fat diet and regular physical activity.         Atrial septal defect determined by imaging    Overview     6/25/2021 Isidra Eisenberg MD    Noted on echo with bubble study 11/2019. Dr. Chow.    6/20/2021 echocardiogram: mild AR and mild TR.  Elevated right ventricular systolic pressure of 35 to 45 mmHg.  Severely dilated right atrial cavity.  Left atrial volume severely increased.  Saline test positive for intra-atrial shunt.  Borderline left ventricular hypertrophy.  Ejection fraction 65%.      Continue Eliquis twice a day and 15mg bisoprolol every evening.         Relevant Medications    bisoprolol (ZEBeta) 5 MG tablet    Chronic atrial fibrillation (CMS/HCC)    Overview     6/25/2021 Isidra Eisenberg MD    Continue Eliquis twice a day.  Continue 15 mg bisoprolol every evening.         Relevant Medications    bisoprolol (ZEBeta) 5 MG tablet       Coag and Thromboembolic    Chronic anticoagulation (Eliquis)       Endocrine and Metabolic    Mild protein-calorie malnutrition (CMS/HCC)    Overview     6/25/2021 Isidra Eisenberg MD    Patient was advised to have some protein with each meal and to eat at least 3 meals a day.  She is to drink any Ensure or boost at least once a day.  She was given  education on protein content of foods.            Gastrointestinal Abdominal     Constipation, slow transit    Overview     6/25/2021 Isidra Eisenberg MD    Patient was advised to take MiraLAX and stool softeners and probiotic every day.                  Genitourinary and Reproductive     Urinary retention    Overview     6/25/2021 Isidra Eisenberg MD     Continue self catheterization regularly. Continue regular follow up with Dr. Marroquin.         Hyponatremia - Primary    Overview     6/25/2021 Isidra Eisenberg MD    Continue oral fluid restriction.  Continue sodium chloride tablets 3 times a day with meals.  Drink a Gatorade or other electrolyte drinks once a day.         Relevant Orders    Basic Metabolic Panel    Acute cystitis with hematuria    Overview     6/25/2021 Isidra Eisenberg MD    Urine sent for culture.         Relevant Orders    Urine Culture - Urine, Urine, Catheter    Dysuria    Relevant Orders    POC Urinalysis Dipstick, Automated (Completed)       Musculoskeletal and Injuries    Foot pain, bilateral    Overview     6/25/2021 Isidra Eisenberg MD    Continue nightly gabapentin as needed for foot pain.         Relevant Medications    gabapentin (NEURONTIN) 100 MG capsule       Neuro    Altered mental status    Overview     6/25/2021 Isidra Eisenberg MD    Admitted to [facility] 5/27/2021 with altered mental status secondary to hyponatremia with serum sodium of 108.  Patient is now back to baseline.            Symptoms and Signs    Gait disorder    Overview     6/25/2021 Isidra Eisenberg MD    Continue regular physical activity and exercise in the house. Use small handweights daily when watching TV.     Continue using cane, especially when going out.                 Protein Content in Foods  Protein is a necessary nutrient in any diet. It helps build and repair muscles, bones, and skin. Depending on your overall health, you may need more or less protein in your diet. You are encouraged to  eat a variety of protein foods to ensure that you get all the essential nutrients that are found in different protein foods. Talk to your health care provider or dietitian about how much protein you need each day and which sources of protein are best for you.  Protein is especially important for:  · Repairing and making cells and tissues.  · Fighting infection.  · Energy.  · Growth and development.  See the following list for the protein content of some common foods.  What are tips for getting more protein in your diet?  · Try to replace processed carbohydrates with high-quality protein.  · Snack on nuts and seeds instead of chips.  · Replace baked desserts with Greek yogurt.  · Eat protein foods from both plant and animal sources.  · Replace red meat with seafood choices.  What foods are high in proteins?    High-protein foods contain 4 grams (g) or more of protein per serving. They include:  Meat protein  · Beef, ground sirloin (cooked) -- 3 oz (85 g) have 24 g of protein.  · Chicken breast, boneless and skinless (cooked) -- 3 oz (85 g) have 25 g of protein.  · Egg -- 1 egg has 6 g of protein.  · Fish, filet (cooked) -- 1 oz (28 g) has 6-7 g of protein.  · Lamb (cooked) -- 3 oz (85 g) has 24 g of protein.  · Pork tenderloin (cooked) -- 3 oz (85 g) has 23 g of protein.  · Tuna (canned in water) -- 3 oz (85 g) has 20 g of protein.  Dairy  · Cottage cheese -- 1/2 cup (114 g) has 13.4 g of protein.  · Milk -- 1 cup (250 mL) has 8 g of protein.  · Cheese (hard) -- 1 oz (28 g) has 7 g of protein.  · Yogurt, regular -- 6 oz (170 g) has 8 g of protein.  · Greek yogurt -- 6 oz (200 g) has 18 g protein.  Plant protein  · Garbanzo beans (canned or cooked) -- 1/2 cup (130 g) has 6-7 g of protein.  · Kidney beans (canned or cooked) -- 1/2 cup (130 g) has 6-7 g of protein.  · Nuts (peanuts, pistachios, almonds) -- 1 oz (28 g) has 6 g of protein.  · Peanut butter -- 1 oz (32 g) has 7-8 g of protein.  · Pumpkin seeds -- 1 oz (28  g) has 8.5 g of protein.  · Soybeans (roasted) -- 1 oz (28 g) has 8 g of protein.  · Soybeans (cooked) -- 1/2 cup (90 g) has 11 g of protein.  · Soy milk -- 1 cup (250 mL) has 5-10 g of protein.  · Soy or vegetable lonnie -- 1 lonnie has 11 g of protein.  · Sunflower seeds -- 1 oz (28 g) has 5.5 g of protein.  · Tofu (firm) -- 1/2 cup (124 g) has 20 g of protein.  · Tempeh -- 1/2 cup (83 g) has 16 g of protein.  The items listed above may not be a complete list of foods high in protein. Actual amounts of protein may differ depending on processing. Contact a dietitian for more information.  What foods are low in protein?    Low-protein foods contain 3 grams (g) or less of protein per serving. They include:  Fruits  · Fruit or vegetable juice -- 1/2 cup (125 mL) has 1 g of protein.  Vegetables  · Beets (raw or cooked) -- 1/2 cup (68 g) has 1.5 g of protein.  · Broccoli (raw or cooked) -- 1/2 cup (44 g) has 2 g of protein.  · Topher greens (raw or cooked) -- 1/2 cup (42 g) has 2 g of protein.  · Green beans (raw or cooked) -- 1/2 cup (83 g) has 1 g of protein.  · Green peas (canned) -- 1/2 cup (80 g) has 3.5 g of protein.  · Potato (baked with skin) -- 1 medium potato (173 g) has 3 g of protein.  · Spinach (cooked) -- 1/2 cup (90 g) has 3 g of protein.  · Squash (cooked) -- 1/2 cup (90 g) has 1.5 g of protein.  · Avocado -- 1 cup (146 g) has 2.7 g of protein.  Grains  · Bran cereal -- 1/2 cup (30 g) has 2-3 g of protein.  · Bread -- 1 slice has 2.5 g of protein.  · Corn (fresh or cooked) -- 1/2 cup (77 g) has 2 g of protein.  · Flour tortilla -- One 6-inch (15 cm) tortilla has 2.5 g of protein.  · Muffins -- 1 small muffin (2 oz or 57 g) has 3 g of protein.  · Oatmeal (cooked) -- 1/2 cup (40 g) has 3 g of protein.  · Rice (cooked) -- 1/2 cup (79 g) has 2.5-3.5 g of protein.  Dairy  · Cream cheese -- 1 oz (29 g) has 2 g of protein.  · Creamer (half-and-half) -- 1 oz (29 mL) has 1 g of protein.  · Frozen yogurt -- 1/2 cup  (72 g) has 3 g of protein.  · Sour cream -- 1/2 cup (75 g) has 2.5 g of protein.  The items listed above may not be a complete list of foods low in protein. Actual amounts of protein may differ depending on processing. Contact a dietitian for more information.  Summary  · Protein is a nutrient that your body needs for growth and development, repairing and making cells and tissues, fighting infection, and providing energy.  · Protein is in both plant and animal foods. Some of these foods have more protein than others.  · Depending on your overall health, you may need more or less protein in your diet. Talk to your health care provider about how much protein you need.  This information is not intended to replace advice given to you by your health care provider. Make sure you discuss any questions you have with your health care provider.  Document Revised: 12/16/2020 Document Reviewed: 12/16/2020  [employer] Patient Education © 2021 [employer]

## 2021-06-25 NOTE — OUTREACH NOTE
Call Center TCM Note      Responses   [facility] patient discharged from?  Non- [[facility]]   Does the patient have one of the following disease processes/diagnoses(primary or secondary)?  Other   TCM attempt successful?  Yes   Revoked Reason  Other [PCP Dr Isidra Eisenberg. Patient has arrived at office today for follow up appt with PCP. This appt today 6/25/21 within 2 business days of discharge fulfills TCM requirement, no call needed. ]          Sally Singh RN    6/25/2021, 13:42 EDT

## 2021-06-26 LAB
ALBUMIN SERPL-MCNC: 4.3 G/DL (ref 3.5–5.2)
ALBUMIN/GLOB SERPL: 2 G/DL
ALP SERPL-CCNC: 110 U/L (ref 39–117)
ALT SERPL W P-5'-P-CCNC: 14 U/L (ref 1–33)
ANION GAP SERPL CALCULATED.3IONS-SCNC: 7.6 MMOL/L (ref 5–15)
AST SERPL-CCNC: 20 U/L (ref 1–32)
BILIRUB SERPL-MCNC: 0.4 MG/DL (ref 0–1.2)
BUN SERPL-MCNC: 10 MG/DL (ref 8–23)
BUN/CREAT SERPL: 13.2 (ref 7–25)
CALCIUM SPEC-SCNC: 8.8 MG/DL (ref 8.6–10.5)
CHLORIDE SERPL-SCNC: 97 MMOL/L (ref 98–107)
CO2 SERPL-SCNC: 25.4 MMOL/L (ref 22–29)
CREAT SERPL-MCNC: 0.76 MG/DL (ref 0.57–1)
GFR SERPL CREATININE-BSD FRML MDRD: 72 ML/MIN/1.73
GLOBULIN UR ELPH-MCNC: 2.1 GM/DL
GLUCOSE SERPL-MCNC: 73 MG/DL (ref 65–99)
POTASSIUM SERPL-SCNC: 4.5 MMOL/L (ref 3.5–5.2)
PROT SERPL-MCNC: 6.4 G/DL (ref 6–8.5)
SODIUM SERPL-SCNC: 130 MMOL/L (ref 136–145)
TSH SERPL DL<=0.05 MIU/L-ACNC: 2.84 UIU/ML (ref 0.27–4.2)

## 2021-06-27 LAB — BACTERIA SPEC AEROBE CULT: ABNORMAL

## 2021-06-30 ENCOUNTER — TELEPHONE (OUTPATIENT)
Dept: INTERNAL MEDICINE | Facility: CLINIC | Age: 86
End: 2021-06-30

## 2021-06-30 DIAGNOSIS — E87.1 HYPONATREMIA: Primary | ICD-10-CM

## 2021-06-30 NOTE — TELEPHONE ENCOUNTER
PATIENT HAD RECENT HOSPITAL STAY AND WAS DIAGNOSED WITH HYPO NATREMIA.  [facility] WAS REQUESTING AN ORDER FOR SPECIFIC LABS THAT PCP WANTS FROM PATIENT.    CALL BACK NUMBER [phone]

## 2021-07-08 ENCOUNTER — OUTSIDE FACILITY SERVICE (OUTPATIENT)
Dept: INTERNAL MEDICINE | Facility: CLINIC | Age: 86
End: 2021-07-08

## 2021-07-08 PROCEDURE — G0180 MD CERTIFICATION HHA PATIENT: HCPCS | Performed by: INTERNAL MEDICINE

## 2021-07-16 ENCOUNTER — OFFICE VISIT (OUTPATIENT)
Dept: INTERNAL MEDICINE | Facility: CLINIC | Age: 86
End: 2021-07-16

## 2021-07-16 ENCOUNTER — LAB (OUTPATIENT)
Dept: LAB | Facility: HOSPITAL | Age: 86
End: 2021-07-16

## 2021-07-16 VITALS
WEIGHT: 141.2 LBS | RESPIRATION RATE: 16 BRPM | BODY MASS INDEX: 24.11 KG/M2 | HEART RATE: 73 BPM | TEMPERATURE: 97.8 F | OXYGEN SATURATION: 95 % | HEIGHT: 64 IN | SYSTOLIC BLOOD PRESSURE: 128 MMHG | DIASTOLIC BLOOD PRESSURE: 82 MMHG

## 2021-07-16 DIAGNOSIS — I48.21 PERMANENT ATRIAL FIBRILLATION (HCC): ICD-10-CM

## 2021-07-16 DIAGNOSIS — E87.1 HYPONATREMIA: ICD-10-CM

## 2021-07-16 DIAGNOSIS — E44.1 MILD PROTEIN-CALORIE MALNUTRITION (HCC): ICD-10-CM

## 2021-07-16 DIAGNOSIS — K59.01 CONSTIPATION, SLOW TRANSIT: ICD-10-CM

## 2021-07-16 DIAGNOSIS — M25.561 CHRONIC PAIN OF BOTH KNEES: Chronic | ICD-10-CM

## 2021-07-16 DIAGNOSIS — R73.09 ABNORMAL GLUCOSE: ICD-10-CM

## 2021-07-16 DIAGNOSIS — I10 BENIGN ESSENTIAL HYPERTENSION: Primary | ICD-10-CM

## 2021-07-16 DIAGNOSIS — G89.29 CHRONIC PAIN OF BOTH KNEES: Chronic | ICD-10-CM

## 2021-07-16 DIAGNOSIS — M25.562 CHRONIC PAIN OF BOTH KNEES: Chronic | ICD-10-CM

## 2021-07-16 DIAGNOSIS — E78.2 MIXED HYPERLIPIDEMIA: ICD-10-CM

## 2021-07-16 DIAGNOSIS — I38 VALVULAR HEART DISEASE: ICD-10-CM

## 2021-07-16 DIAGNOSIS — R60.0 BILATERAL EDEMA OF LOWER EXTREMITY: Chronic | ICD-10-CM

## 2021-07-16 LAB
ANION GAP SERPL CALCULATED.3IONS-SCNC: 7.6 MMOL/L (ref 5–15)
BUN SERPL-MCNC: 13 MG/DL (ref 8–23)
BUN/CREAT SERPL: 16.5 (ref 7–25)
CALCIUM SPEC-SCNC: 9 MG/DL (ref 8.6–10.5)
CHLORIDE SERPL-SCNC: 101 MMOL/L (ref 98–107)
CO2 SERPL-SCNC: 26.4 MMOL/L (ref 22–29)
CREAT SERPL-MCNC: 0.79 MG/DL (ref 0.57–1)
GFR SERPL CREATININE-BSD FRML MDRD: 69 ML/MIN/1.73
GLUCOSE SERPL-MCNC: 91 MG/DL (ref 65–99)
POTASSIUM SERPL-SCNC: 4.7 MMOL/L (ref 3.5–5.2)
SODIUM SERPL-SCNC: 135 MMOL/L (ref 136–145)

## 2021-07-16 PROCEDURE — 80048 BASIC METABOLIC PNL TOTAL CA: CPT

## 2021-07-16 PROCEDURE — 99214 OFFICE O/P EST MOD 30 MIN: CPT | Performed by: INTERNAL MEDICINE

## 2021-07-16 NOTE — PROGRESS NOTES
[facility]     Thuy Clark  1935   [id number]      Patient Care Team:  Isidra Eisenberg MD as PCP - General (Internal Medicine)  Isidra Eisenberg MD as PCP - Internal Medicine  Azam Marroquin MD as Consulting Physician (Urology)  Lili Hebert MD as Consulting Physician (Gastroenterology)  Isidra Eisenberg MD as Consulting Physician (Internal Medicine)  Sabina Quinones, RN as Ambulatory  ([facility])    Chief Complaint   Patient presents with   • Hyperlipidemia     f/u   • Hypertension            HPI  Patient is a 85 y.o. female presents with hypertension, hyperlipidemia, edema, hyponatremia    CHRONIC CONDITIONS  Edema in ankles.  She has not been elevating feet or wearing support socks.    Hyponatremia-she did not go get her blood work rechecked yet.  She is taking the salt tablets to 3 times a day.Following fluid restriction and drinking V8 juice twice a day.  She has not had any further confusion or feeling foggy headed.  Energy is improving.    She reports that she had her hemorrhoids removed and her constipation has resolved.  She has not needed stool softeners or MiraLAX.  She is also eating for eggs and that helps.      Bilateral knee pain and swelling.  Gabapentin helps. Doesn't take it regularly. Has not tried cold pack or tylenol.    BPs at home 118 to 124/70s.  Taking bisoprolol.    For A. fib, taking Eliquis twice a day.  She denies any palpitations or rapid heartbeat.    A1c was 5.92.  She is trying to eat less sugars.    Past Medical History:   Diagnosis Date   • Bleeding hemorrhoid 3/8/2019   • Diverticulosis    • External hemorrhoid 6/11/2019 12/6/2019 Isidra Eisenberg MD   Continue vasculera daily. Drink plenty of fluids.  Avoid constipation.  Follow-up with Dr. Hebert as planned.   • Hx of colonic polyps 2003   • Hypertension    • Intraductal papilloma     R intraductal patheloma   • Left upper extremity numbness     L upper  extremity numbness-ER visit; neuropathy   • Overweight (BMI 25.0-29.9) 6/11/2019   • Self-catheterizes urinary bladder        Past Surgical History:   Procedure Laterality Date   • BREAST LUMPECTOMY  2006   • CHOLECYSTECTOMY     • HYSTERECTOMY  1984   • VAGINECTOMY  2009       Family History   Problem Relation Age of Onset   • Hyperlipidemia Other    • Hypertension Other    • Coronary artery disease Other    • No Known Problems Mother    • No Known Problems Father        Social History     Socioeconomic History   • Marital status:      Spouse name: Not on file   • Number of children: Not on file   • Years of education: Not on file   • Highest education level: Not on file   Tobacco Use   • Smoking status: Never Smoker   • Smokeless tobacco: Never Used   Substance and Sexual Activity   • Alcohol use: Never     Alcohol/week: 1.0 standard drinks     Types: 1 Glasses of wine per week   • Drug use: Never   • Sexual activity: Defer       Allergies   Allergen Reactions   • Levofloxacin Other (See Comments)     itching   • Nitrofuran Derivatives Rash     primarily in b/l hands   • Nitrofurantoin Rash       Review of Systems:     Review of Systems   Constitutional: Negative for chills, fatigue and fever.   HENT: Negative for congestion, ear pain and sinus pressure.    Respiratory: Negative for cough, chest tightness, shortness of breath and wheezing.    Cardiovascular: Positive for leg swelling. Negative for chest pain and palpitations.   Gastrointestinal: Negative for abdominal pain, blood in stool and constipation.   Genitourinary: Negative for dysuria and frequency.   Musculoskeletal: Positive for arthralgias and gait problem.   Skin: Negative for color change.   Allergic/Immunologic: Negative for environmental allergies.   Neurological: Negative for dizziness and headache.   Psychiatric/Behavioral: Negative for depressed mood. The patient is not nervous/anxious.        Vital Signs  Vitals:    07/16/21 1417   BP:  "128/82   BP Location: Left arm   Patient Position: Sitting   Cuff Size: Adult   Pulse: 73   Resp: 16   Temp: 97.8 °F (36.6 °C)   TempSrc: Infrared   SpO2: 95%   Weight: 64 kg (141 lb 3.2 oz)   Height: 162.6 cm (64.02\")   PainSc:   4   PainLoc: Knee  Comment: primarily rt     Body mass index is 24.22 kg/m².      Current Outpatient Medications:   •  apixaban (ELIQUIS) 5 MG tablet tablet, Take 1 tablet by mouth Every 12 (Twelve) Hours. Indications: Atrial Fibrillation, Disp: 60 tablet, Rfl:   •  bisoprolol (ZEBeta) 5 MG tablet, Take 3 tablets by mouth Every Night., Disp: 90 tablet, Rfl: 1  •  Cholecalciferol (VITAMIN D3) 1000 units capsule, Take 1 capsule by mouth Daily., Disp: , Rfl:   •  Dietary Management Product (Vasculera) tablet, Take 1 tablet by mouth 2 (two) times a day., Disp: 180 tablet, Rfl: 3  •  gabapentin (NEURONTIN) 100 MG capsule, Take 2 capsules by mouth At Night As Needed (leg pain)., Disp: 6 capsule, Rfl: 0  •  ondansetron ODT (ZOFRAN-ODT) 4 MG disintegrating tablet, Place 1 tablet on the tongue 4 (Four) Times a Day As Needed for Nausea or Vomiting., Disp: 15 tablet, Rfl: 0  •  Probiotic Product (PROBIOTIC PEARLS ADVANTAGE PO), 1  PRN, Disp: , Rfl:   •  sodium chloride 1 g tablet, Take 2 tablets by mouth 3 (Three) Times a Day With Meals., Disp: , Rfl:   •  Turmeric Curcumin 500 MG capsule, Take 1 capsule by mouth Daily., Disp: 60 capsule, Rfl: 5  •  vitamin B-12 (CYANOCOBALAMIN) 250 MCG tablet, Take 1 tablet by mouth Daily., Disp: 90 tablet, Rfl: 1  •  psyllium (METAMUCIL MULTIHEALTH FIBER) 58.12 % packet, Take 1 packet by mouth Daily., Disp: , Rfl:     Physical Exam:    Physical Exam  Vitals and nursing note reviewed.   Constitutional:       Appearance: She is well-developed.   HENT:      Head: Normocephalic.   Eyes:      Conjunctiva/sclera: Conjunctivae normal.      Pupils: Pupils are equal, round, and reactive to light.   Neck:      Thyroid: No thyromegaly.   Cardiovascular:      Rate and Rhythm: " Normal rate. Rhythm irregularly irregular.      Heart sounds: Normal heart sounds.      Comments: Puffy edema.  No pitting.  Pulmonary:      Effort: Pulmonary effort is normal.      Breath sounds: Normal breath sounds. No wheezing.   Musculoskeletal:         General: Normal range of motion.      Cervical back: Normal range of motion and neck supple.      Right knee: Swelling and deformity present. Tenderness present.      Left knee: Deformity present. No swelling. No tenderness.      Right lower le+ Edema present.      Left lower le+ Edema present.   Lymphadenopathy:      Cervical: No cervical adenopathy.   Skin:     General: Skin is warm and dry.   Neurological:      Mental Status: She is alert and oriented to person, place, and time.      Motor: Weakness present.      Gait: Gait abnormal.      Comments: Using 4-prong walker.  General weakness.   Psychiatric:         Attention and Perception: Attention normal.         Mood and Affect: Mood and affect normal.         Speech: Speech normal.         Thought Content: Thought content normal.          ACE III MINI        Results Review:    None    CMP:  Lab Results   Component Value Date    BUN 10 2021    CREATININE 0.76 2021    EGFRIFNONA 72 2021    BCR 13.2 2021     (L) 2021    K 4.5 2021    CO2 25.4 2021    CALCIUM 8.8 2021    ALBUMIN 4.30 2021    BILITOT 0.4 2021    ALKPHOS 110 2021    AST 20 2021    ALT 14 2021     HbA1c:  Lab Results   Component Value Date    HGBA1C 5.92 (H) 2021    HGBA1C 5.60 2020     Microalbumin:  No results found for: MICROALBUR, POCMALB, POCCREAT  Lipid Panel  Lab Results   Component Value Date    CHOL 135 2021    TRIG 67 2021    HDL 41 2021    LDL 80 2021    AST 20 2021    ALT 14 2021       Medication Review: Medications reviewed and noted  Patient Instructions   Problem List Items Addressed This Visit         Cardiac and Vasculature    Benign essential hypertension - Primary    Overview     7/16/2021  Isidra Eisenberg MD    Continue bisoprolol 15mg every evening.               Relevant Medications    bisoprolol (ZEBeta) 5 MG tablet    Mixed hyperlipidemia    Overview     7/16/2021 Isidra Eisenberg MD    Continue low fat diet and regular physical activity.         Valvular heart disease    Overview     7/16/2021 Isidra Eisenberg MD    6/20/2021 echocardiogram: mild AR and mild TR.  Elevated right ventricular systolic pressure of 35 to 45 mmHg.  Severely dilated right atrial cavity.  Left atrial volume severely increased.  Saline test positive for intra-atrial shunt.  Borderline left ventricular hypertrophy.  Ejection fraction 65%.      Continue Eliquis twice a day and 15mg bisoprolol every evening.         Relevant Medications    bisoprolol (ZEBeta) 5 MG tablet    Chronic atrial fibrillation (CMS/HCC)    Overview     7/16/2021 Isidra Eisenberg MD    Continue Eliquis twice a day.  Continue 15 mg bisoprolol every evening.         Relevant Medications    bisoprolol (ZEBeta) 5 MG tablet       Endocrine and Metabolic    Abnormal glucose    Overview     7/16/2021 Isidra Eisenberg MD    Hemoglobin A1c in June was 5.97 corresponding to an average blood sugar of about 124.    Continue to avoid sugars and snack foods in the diet.         Mild protein-calorie malnutrition (CMS/HCC)    Overview     7/16/2021 Isidra Eisenberg MD    Continue to have some protein with each meal and to eat at least 3 meals a day.  She is to drink an Ensure or boost at least once a day.  She was given education on protein content of foods at visit in June.            Gastrointestinal Abdominal     Constipation, slow transit    Overview     7/16/2021 Isidra Eisenberg MD    Continue eating figs and taking Metamucil and probiotic every day.                  Genitourinary and Reproductive     Hyponatremia    Overview     7/16/2021 Isidra RODRIGUEZ  MD [place]    Continue oral fluid restriction.  Continue sodium chloride tablets 3 times a day with meals.  Drink a Gatorade or other electrolyte drink once a day.            Musculoskeletal and Injuries    Chronic pain of both knees (Chronic)    Overview     7/16/2021 Isidra Eisenberg MD    Patient was advised to take Tylenol as needed for pain.  Continue gabapentin.    She was advised to use a cold pack on each knee for about 20 minutes every day and as needed to decrease pain, swelling, and inflammation.            Symptoms and Signs    Bilateral edema of lower extremity (Chronic)    Overview     7/16/2021 Isidra Eisenberg MD    Patient was advised to elevate feet whenever sitting at home.  She was advised to wear support socks that come to the knee every day.                  Diagnosis Plan   1. Benign essential hypertension     2. Hyponatremia     3. Bilateral edema of lower extremity     4. Chronic pain of both knees     5. Mixed hyperlipidemia     6. Valvular heart disease     7. Chronic atrial fibrillation (CMS/HCC)     8. Abnormal glucose     9. Mild protein-calorie malnutrition (CMS/HCC)     10. Constipation, slow transit             Plan of care reviewed with patient at the conclusion of today's visit. Education was provided regarding diagnosis, management, and any prescribed or recommended OTC medications.Patient verbalizes understanding of and agreement with management plan.      Patient has been erroneously marked as diabetic. Based on the available clinical information, she does not have diabetes and should therefore be excluded from diabetic health maintenance and quality measures for the remainder of the reporting period.      Isidra Eisenberg MD

## 2021-07-16 NOTE — PATIENT INSTRUCTIONS
Patient Instructions   Problem List Items Addressed This Visit        Cardiac and Vasculature    Benign essential hypertension - Primary    Overview     7/16/2021  Isidra Eisenberg MD    Continue bisoprolol 15mg every evening.               Relevant Medications    bisoprolol (ZEBeta) 5 MG tablet    Mixed hyperlipidemia    Overview     7/16/2021 Isidra Eisenberg MD    Continue low fat diet and regular physical activity.         Valvular heart disease    Overview     7/16/2021 Isidra Eisenberg MD    6/20/2021 echocardiogram: mild AR and mild TR.  Elevated right ventricular systolic pressure of 35 to 45 mmHg.  Severely dilated right atrial cavity.  Left atrial volume severely increased.  Saline test positive for intra-atrial shunt.  Borderline left ventricular hypertrophy.  Ejection fraction 65%.      Continue Eliquis twice a day and 15mg bisoprolol every evening.         Relevant Medications    bisoprolol (ZEBeta) 5 MG tablet    Chronic atrial fibrillation (CMS/HCC)    Overview     7/16/2021 Isidra Eisenberg MD    Continue Eliquis twice a day.  Continue 15 mg bisoprolol every evening.         Relevant Medications    bisoprolol (ZEBeta) 5 MG tablet       Endocrine and Metabolic    Abnormal glucose    Overview     7/16/2021 Isidra Eisenberg MD    Hemoglobin A1c in June was 5.97 corresponding to an average blood sugar of about 124.    Continue to avoid sugars and snack foods in the diet.         Mild protein-calorie malnutrition (CMS/HCC)    Overview     7/16/2021 Isidra Eisenberg MD    Continue to have some protein with each meal and to eat at least 3 meals a day.  She is to drink an Ensure or boost at least once a day.  She was given education on protein content of foods at visit in June.            Gastrointestinal Abdominal     Constipation, slow transit    Overview     7/16/2021 Isidra Eisenberg MD    Continue eating figs and taking Metamucil and probiotic every day.                  Genitourinary and  Reproductive     Hyponatremia    Overview     7/16/2021 Isidra Eisenberg MD    Continue oral fluid restriction.  Continue sodium chloride tablets 3 times a day with meals.  Drink a Gatorade or other electrolyte drink once a day.            Musculoskeletal and Injuries    Chronic pain of both knees (Chronic)    Overview     7/16/2021 Isidra Eisenberg MD    Patient was advised to take Tylenol as needed for pain.  Continue gabapentin.    She was advised to use a cold pack on each knee for about 20 minutes every day and as needed to decrease pain, swelling, and inflammation.            Symptoms and Signs    Bilateral edema of lower extremity (Chronic)    Overview     7/16/2021 Isidra Eisenberg MD    Patient was advised to elevate feet whenever sitting at home.  She was advised to wear support socks that come to the knee every day.

## 2021-07-28 ENCOUNTER — TELEPHONE (OUTPATIENT)
Dept: INTERNAL MEDICINE | Facility: CLINIC | Age: 86
End: 2021-07-28

## 2021-07-28 NOTE — TELEPHONE ENCOUNTER
WEIGHT GAIN SINCE THE END OF June.  LOW DOSE DIURETIC DEPENDING ON BMP RESULTS.   [facility] WOULD LIKE A CALL BACK TO DISCUSS THE PT.

## 2021-07-29 RX ORDER — SODIUM CHLORIDE 1000 MG
2 TABLET, SOLUBLE MISCELLANEOUS 2 TIMES DAILY
Qty: 60 TABLET | Refills: 5 | Status: SHIPPED | OUTPATIENT
Start: 2021-07-29 | End: 2021-08-25 | Stop reason: SDUPTHER

## 2021-07-30 ENCOUNTER — OFFICE VISIT (OUTPATIENT)
Dept: INTERNAL MEDICINE | Facility: CLINIC | Age: 86
End: 2021-07-30

## 2021-07-30 ENCOUNTER — TELEPHONE (OUTPATIENT)
Dept: INTERNAL MEDICINE | Facility: CLINIC | Age: 86
End: 2021-07-30

## 2021-07-30 ENCOUNTER — PATIENT OUTREACH (OUTPATIENT)
Dept: CASE MANAGEMENT | Facility: OTHER | Age: 86
End: 2021-07-30

## 2021-07-30 VITALS
WEIGHT: 150 LBS | DIASTOLIC BLOOD PRESSURE: 80 MMHG | TEMPERATURE: 98.2 F | OXYGEN SATURATION: 94 % | BODY MASS INDEX: 25.61 KG/M2 | SYSTOLIC BLOOD PRESSURE: 128 MMHG | RESPIRATION RATE: 20 BRPM | HEIGHT: 64 IN | HEART RATE: 102 BPM

## 2021-07-30 DIAGNOSIS — I10 BENIGN ESSENTIAL HYPERTENSION: ICD-10-CM

## 2021-07-30 DIAGNOSIS — I48.21 PERMANENT ATRIAL FIBRILLATION (HCC): ICD-10-CM

## 2021-07-30 DIAGNOSIS — R60.0 BILATERAL EDEMA OF LOWER EXTREMITY: Chronic | ICD-10-CM

## 2021-07-30 DIAGNOSIS — E87.1 HYPONATREMIA: Primary | ICD-10-CM

## 2021-07-30 PROCEDURE — 99214 OFFICE O/P EST MOD 30 MIN: CPT | Performed by: NURSE PRACTITIONER

## 2021-07-30 RX ORDER — FUROSEMIDE 40 MG/1
40 TABLET ORAL
Qty: 6 TABLET | Refills: 0 | Status: SHIPPED | OUTPATIENT
Start: 2021-07-30 | End: 2021-08-03 | Stop reason: SDUPTHER

## 2021-07-30 NOTE — TELEPHONE ENCOUNTER
Call [facility] and let them know we are adding furosemide 40mg every morning for 3 days starting tomorrow.  Have them draw CMP on Tuesday and call us with an update regarding pt status.

## 2021-07-30 NOTE — TELEPHONE ENCOUNTER
Arcelia with [facility] called to let Dr. Eisenberg know that pt has had a 8 lb wait gain since coming home from hospital. Her weight is currently 150 lbs and has been that way for 3 days now.      Pt is elevating her legs and is wearing her compression hose.    Ken her nurse is recommending a low dose diuretic.    Arcelia can be reached at [phone] if needed for questions.

## 2021-07-30 NOTE — OUTREACH NOTE
"Ambulatory Case Management Note    Patient Outreach    Pt contacted RN-ACM with some questions.  She was first having some trouble with her MyChart.  [facility] Inchart help desk number provided.  She also would like to know if her urologist, Dr. Marroquin's notes are getting into her records.  Does not appear that they are.  States she has call in to their office for a medical question.  Encouraged to let know know she would like records sent to her PCP, Dr. Eisenberg's office.  [facility]  RN-ACM asked if she was still independent, which she states she is, but not sure how much longer she can do it. Explained and offered home caregivers, which she states she will call and ask their services, etc.  5 agencies given ( [facility], [facility], [facility], [facility] and [facility]) and encouraged to ask questions such as what services they provide, cost per hour and if minimum required, what happens if she does not approve of the caregiver and will it be the same caregiver each time.  She also asked about [employer] Wheels and number provided.  Asked about transportation, which she states she was approved for something, but could not remember if it was actually Wheels.  She denies that she needs transportation assistance at present, that her \"kids live very close\" and can be transportation when needed. Reminded of [facility] 24/7 Nurse Call Center and number provided again.  She knows to contact RN-ACM for any future needs.     Sabina Quinones RN  Ambulatory Case Management    7/30/2021, 10:37 EDT    "

## 2021-07-30 NOTE — PROGRESS NOTES
Thuy Clark  1935  [id number]  Patient Care Team:  sIidra Eisenberg MD as PCP - General (Internal Medicine)  Isidra Eisenberg MD as PCP - Internal Medicine  Azam Marroquin MD as Consulting Physician (Urology)  Lili Hebert MD as Consulting Physician (Gastroenterology)  Isidra Eisenberg MD as Consulting Physician (Internal Medicine)  Sabina Quinones, RN as Ambulatory  ([facility])    Thuy Clark is a pleasant 85 y.o. female who presents for evaluation of Leg Swelling, Hypertension (f/u), and Hyperlipidemia    Chief Complaint   Patient presents with   • Leg Swelling   • Hypertension     f/u   • Hyperlipidemia       HPI:   Thuy Clark is a 85 y.o. female with past medical history history of A. fib, on Eliquis, hypertension, chronic urinary retention who does intermittent self cath.    New onset leg swelling: SOB and sig leg swelling x 4 days.  She has been elevating her legs but cannot get compression socks on.  Was discharged home from the [facility] on 7/15/2021.  [facility] is [facility] her.  She has had ongoing problem with hyponatremia since hospitalization 5/27/2021 (sodium of 108 with altered mental status).She continues drinking pedialyte once a day, V8 daily and on sodium chloride tabs 2 tabs BID (recent decrease in dose).  Normal weight is 138 and she is 150 today.    Reviewed labs drawn 7/27/2021: Creatinine 0.84, GFR 64, BUN 13, sodium 136, potassium 5    Had a follow up call from urology today and UTI has resolved    Past Medical History:   Diagnosis Date   • Bleeding hemorrhoid 3/8/2019   • Diverticulosis    • External hemorrhoid 6/11/2019 12/6/2019 Isidra Eisenberg MD   Continue vasculera daily. Drink plenty of fluids.  Avoid constipation.  Follow-up with Dr. Hebert as planned.   • Hx of colonic polyps 2003   • Hypertension    • Intraductal papilloma     R intraductal patheloma   • Left upper extremity numbness     L upper extremity  numbness-ER visit; neuropathy   • Overweight (BMI 25.0-29.9) 6/11/2019   • Self-catheterizes urinary bladder      Past Surgical History:   Procedure Laterality Date   • BREAST LUMPECTOMY  2006   • CHOLECYSTECTOMY     • HYSTERECTOMY  1984   • VAGINECTOMY  2009     Family History   Problem Relation Age of Onset   • Hyperlipidemia Other    • Hypertension Other    • Coronary artery disease Other    • No Known Problems Mother    • No Known Problems Father      Social History     Tobacco Use   Smoking Status Never Smoker   Smokeless Tobacco Never Used     Allergies   Allergen Reactions   • Levofloxacin Other (See Comments)     itching   • Nitrofuran Derivatives Rash     primarily in b/l hands   • Nitrofurantoin Rash       Current Outpatient Medications:   •  Turmeric Curcumin 500 MG capsule, Take 1 capsule by mouth Daily. (Patient taking differently: Take 1 capsule by mouth As Needed.), Disp: 60 capsule, Rfl: 5  •  apixaban (ELIQUIS) 5 MG tablet tablet, Take 1 tablet by mouth Every 12 (Twelve) Hours. Indications: Atrial Fibrillation, Disp: 60 tablet, Rfl:   •  bisoprolol (ZEBeta) 5 MG tablet, Take 3 tablets by mouth Every Night., Disp: 90 tablet, Rfl: 1  •  Cholecalciferol (VITAMIN D3) 1000 units capsule, Take 1 capsule by mouth Daily., Disp: , Rfl:   •  Dietary Management Product (Vasculera) tablet, Take 1 tablet by mouth 2 (two) times a day., Disp: 180 tablet, Rfl: 3  •  furosemide (Lasix) 40 MG tablet, Take 1 tablet by mouth Every Morning., Disp: 6 tablet, Rfl: 0  •  gabapentin (NEURONTIN) 100 MG capsule, Take 2 capsules by mouth At Night As Needed (leg pain)., Disp: 6 capsule, Rfl: 0  •  ondansetron ODT (ZOFRAN-ODT) 4 MG disintegrating tablet, Place 1 tablet on the tongue 4 (Four) Times a Day As Needed for Nausea or Vomiting., Disp: 15 tablet, Rfl: 0  •  Probiotic Product (PROBIOTIC PEARLS ADVANTAGE PO), 1  PRN, Disp: , Rfl:   •  psyllium (METAMUCIL MULTIHEALTH FIBER) 58.12 % packet, Take 1 packet by mouth Daily.,  "Disp: , Rfl:   •  sodium chloride 1 g tablet, Take 2 tablets by mouth 2 (Two) Times a Day., Disp: 60 tablet, Rfl: 5  •  vitamin B-12 (CYANOCOBALAMIN) 250 MCG tablet, Take 1 tablet by mouth Daily., Disp: 90 tablet, Rfl: 1    Review of Systems  /80 (BP Location: Left arm, Patient Position: Sitting, Cuff Size: Adult)   Pulse 102   Temp 98.2 °F (36.8 °C) (Infrared)   Resp 20   Ht 162.6 cm (64.02\")   Wt 68 kg (150 lb)   SpO2 94%   BMI 25.73 kg/m²     Physical Exam  Constitutional:       Appearance: She is well-developed.   HENT:      Head: Normocephalic and atraumatic.      Comments: *wearing mask  Eyes:      Conjunctiva/sclera: Conjunctivae normal.      Pupils: Pupils are equal, round, and reactive to light.   Cardiovascular:      Rate and Rhythm: Normal rate. Rhythm irregularly irregular.      Pulses: Normal pulses.      Heart sounds: Normal heart sounds.   Pulmonary:      Effort: Pulmonary effort is normal.      Breath sounds: Normal breath sounds.   Musculoskeletal:         General: Normal range of motion.      Cervical back: Normal range of motion and neck supple.      Right lower le+ Edema present.      Left lower le+ Edema present.   Skin:     General: Skin is warm and dry.   Neurological:      Mental Status: She is alert and oriented to person, place, and time.   Psychiatric:         Mood and Affect: Mood normal.         Behavior: Behavior normal.         Thought Content: Thought content normal.         Judgment: Judgment normal.         Procedures    Results Review:  I reviewed the patient's new clinical results.    PHQ-9 Total Score:      Assessment/Plan:  Diagnoses and all orders for this visit:    1. Hyponatremia (Primary)  Comments:  Labs next week, continue same dose of sodium chloride for now  Orders:  -     Comprehensive Metabolic Panel; Future    2. Bilateral edema of lower extremity  Comments:  Furosemide 40 mg x 3 days, call our office Monday with status update.  More frequently as " discussed. lab draw Tuesday per home health.    3. Chronic atrial fibrillation (CMS/[facility])  Comments:  Continue Eliquis and beta-blocker     4. Benign essential hypertension  Comments:  Good management on current medications, have home health check and update us next week    Other orders  -     furosemide (Lasix) 40 MG tablet; Take 1 tablet by mouth Every Morning.  Dispense: 6 tablet; Refill: 0       There are no Patient Instructions on file for this visit.  Plan of care reviewed with patient at the conclusion of today's visit. Education was provided regarding diagnosis, management and any prescribed or recommended OTC medications.  Patient verbalizes understanding of and agreement with management plan.    Return in about 1 week (around 8/6/2021) for Recheck.    *Note that portions of this note were completed with a voice recognition program.  Efforts were made to edit the dictation but occasionally words are transcribed.    Catrina Damon, APRN

## 2021-07-30 NOTE — TELEPHONE ENCOUNTER
Pt called the [facility] to schedule an appointment with Catrina, pt told [facility] she was having a hard time breathing. [facility] transferred call to the office per their protocol.    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

## 2021-07-30 NOTE — TELEPHONE ENCOUNTER
Refer to portal message from yesterday, patient message today, and telephone call from today. There is also a result note from 07/29/21.

## 2021-07-31 ENCOUNTER — NURSE TRIAGE (OUTPATIENT)
Dept: CALL CENTER | Facility: HOSPITAL | Age: 86
End: 2021-07-31

## 2021-07-31 NOTE — TELEPHONE ENCOUNTER
"    Reason for Disposition  • Health Information question, no triage required and triager able to answer question    Additional Information  • Negative: [1] Caller is not with the adult (patient) AND [2] reporting urgent symptoms  • Negative: Lab result questions  • Negative: Medication questions  • Negative: Caller can't be reached by phone  • Negative: Caller has already spoken to PCP or another triager  • Negative: RN needs further essential information from caller in order to complete triage  • Negative: Requesting regular office appointment  • Negative: [1] Caller requesting NON-URGENT health information AND [2] PCP's office is the best resource    Answer Assessment - Initial Assessment Questions  1. REASON FOR CALL or QUESTION: \"What is your reason for calling today?\" or \"How can I best help you?\" or \"What question do you have that I can help answer?\"      She was started on Lasix yesterday and will take first dose today. She has questions about taking this and self caths. She will do this more frequently while taking medication and will take the pill as early as possible during the day.    Protocols used: INFORMATION ONLY CALL - NO TRIAGE-ADULT-      "

## 2021-08-03 ENCOUNTER — LAB (OUTPATIENT)
Dept: LAB | Facility: HOSPITAL | Age: 86
End: 2021-08-03

## 2021-08-03 ENCOUNTER — OFFICE VISIT (OUTPATIENT)
Dept: INTERNAL MEDICINE | Facility: CLINIC | Age: 86
End: 2021-08-03

## 2021-08-03 VITALS
HEIGHT: 64 IN | TEMPERATURE: 97.8 F | SYSTOLIC BLOOD PRESSURE: 130 MMHG | HEART RATE: 96 BPM | OXYGEN SATURATION: 97 % | RESPIRATION RATE: 18 BRPM | DIASTOLIC BLOOD PRESSURE: 66 MMHG | BODY MASS INDEX: 23.97 KG/M2 | WEIGHT: 140.4 LBS

## 2021-08-03 DIAGNOSIS — E87.1 HYPONATREMIA: ICD-10-CM

## 2021-08-03 DIAGNOSIS — I48.21 PERMANENT ATRIAL FIBRILLATION (HCC): ICD-10-CM

## 2021-08-03 DIAGNOSIS — R60.0 BILATERAL EDEMA OF LOWER EXTREMITY: Chronic | ICD-10-CM

## 2021-08-03 DIAGNOSIS — I10 BENIGN ESSENTIAL HYPERTENSION: Primary | ICD-10-CM

## 2021-08-03 DIAGNOSIS — E78.2 MIXED HYPERLIPIDEMIA: ICD-10-CM

## 2021-08-03 DIAGNOSIS — R33.9 URINARY RETENTION: ICD-10-CM

## 2021-08-03 DIAGNOSIS — E44.1 MILD PROTEIN-CALORIE MALNUTRITION (HCC): ICD-10-CM

## 2021-08-03 DIAGNOSIS — I27.20 PULMONARY HYPERTENSION (HCC): ICD-10-CM

## 2021-08-03 PROBLEM — N30.01 ACUTE CYSTITIS WITH HEMATURIA: Status: RESOLVED | Noted: 2021-06-25 | Resolved: 2021-08-03

## 2021-08-03 PROCEDURE — 99214 OFFICE O/P EST MOD 30 MIN: CPT | Performed by: INTERNAL MEDICINE

## 2021-08-03 PROCEDURE — 80053 COMPREHEN METABOLIC PANEL: CPT

## 2021-08-03 RX ORDER — FUROSEMIDE 40 MG/1
40 TABLET ORAL
Qty: 30 TABLET | Refills: 5 | Status: SHIPPED | OUTPATIENT
Start: 2021-08-03 | End: 2021-08-25 | Stop reason: SDUPTHER

## 2021-08-03 RX ORDER — LANOLIN ALCOHOL/MO/W.PET/CERES
1000 CREAM (GRAM) TOPICAL DAILY
Start: 2021-08-03 | End: 2022-06-07

## 2021-08-03 NOTE — PROGRESS NOTES
"[facility]     Thuy Clark  1935   [id number]      Patient Care Team:  Isdira Eisenberg MD as PCP - General (Internal Medicine)  Isidra Eisenberg MD as PCP - Internal Medicine  Azam Marroquin MD as Consulting Physician (Urology)  Lili Hebert MD as Consulting Physician (Gastroenterology)  Isidra Eisenberg MD as Consulting Physician (Internal Medicine)  Sabina Quinones, RN as Ambulatory  (Population Health)    Chief Complaint   Patient presents with   • Hypertension     f/u   • Hyperlipidemia   • Leg Swelling     b/l x3wks            HPI  Patient is a 85 y.o. female presents with bilateral edema and shortness of breath, hypertension, A. fib, hyperlipidemia    CHRONIC CONDITIONS    Swelling in both legs all the way up into the thighs is improving since we started Lasix on 7/30/2021.  She is wearing support hose that come to the knee.  She is propping the legs up on pillows.  She states that she cannot see that it is helping.  But she states the Lasix has helped a lot to decrease the swelling.  She notes that she has lost 7 pounds on her scale since starting Lasix and that the shortness of breath she had last week has resolved.    For hyponatremia, she continues to take these sodium tablets 2 g twice a day.  We decreased from 3 times a day on 7/29/2021.    Blood pressures at home have been controlled on bisoprolol.    For A. fib, she is taking Eliquis twice a day and bisoprolol.  She never notices any palpitations during the day.  Occasionally at night she feels a \"flip-flop\".  She denies any rapid heartbeat.  She denies chest pain or cough.    She is trying to eat more protein in the diet and eat more vegetables.    Past Medical History:   Diagnosis Date   • Acute cystitis with hematuria 6/25/2021 6/25/2021 Isidra Eisenberg MD  Urine sent for culture.   • Bleeding hemorrhoid 3/8/2019   • Diverticulosis    • External hemorrhoid 6/11/2019 12/6/2019 Isidra RODRIGUEZ " MD [place]   Continue vasculera daily. Drink plenty of fluids.  Avoid constipation.  Follow-up with Dr. Hebert as planned.   • Hx of colonic polyps 2003   • Hypertension    • Intraductal papilloma     R intraductal patheloma   • Left upper extremity numbness     L upper extremity numbness-ER visit; neuropathy   • Overweight (BMI 25.0-29.9) 6/11/2019   • Self-catheterizes urinary bladder        Past Surgical History:   Procedure Laterality Date   • BREAST LUMPECTOMY  2006   • CHOLECYSTECTOMY     • HYSTERECTOMY  1984   • VAGINECTOMY  2009       Family History   Problem Relation Age of Onset   • Hyperlipidemia Other    • Hypertension Other    • Coronary artery disease Other    • No Known Problems Mother    • No Known Problems Father        Social History     Socioeconomic History   • Marital status:      Spouse name: Not on file   • Number of children: Not on file   • Years of education: Not on file   • Highest education level: Not on file   Tobacco Use   • Smoking status: Never Smoker   • Smokeless tobacco: Never Used   Substance and Sexual Activity   • Alcohol use: Never     Alcohol/week: 1.0 standard drinks     Types: 1 Glasses of wine per week   • Drug use: Never   • Sexual activity: Defer       Allergies   Allergen Reactions   • Levofloxacin Other (See Comments)     itching   • Nitrofuran Derivatives Rash     primarily in b/l hands   • Nitrofurantoin Rash       Review of Systems:     Review of Systems   Constitutional: Negative for chills, fatigue and fever.   HENT: Negative for congestion, ear pain and sinus pressure.    Respiratory: Positive for shortness of breath. Negative for cough, chest tightness and wheezing.    Cardiovascular: Positive for palpitations and leg swelling. Negative for chest pain.   Gastrointestinal: Negative for abdominal pain, blood in stool and constipation.   Genitourinary: Negative for dysuria.   Skin: Negative for color change.   Allergic/Immunologic: Negative for environmental  "allergies.   Neurological: Negative for dizziness and headache.   Psychiatric/Behavioral: Negative for depressed mood. The patient is not nervous/anxious.        Vital Signs  Vitals:    08/03/21 1305   BP: 130/66   BP Location: Left arm   Patient Position: Sitting   Cuff Size: Adult   Pulse: 96   Resp: 18   Temp: 97.8 °F (36.6 °C)   TempSrc: Infrared   SpO2: 97%   Weight: 63.7 kg (140 lb 6.4 oz)   Height: 162.6 cm (64\")   PainSc: 0-No pain     Body mass index is 24.1 kg/m².      Current Outpatient Medications:   •  apixaban (ELIQUIS) 5 MG tablet tablet, Take 1 tablet by mouth Every 12 (Twelve) Hours. Indications: Atrial Fibrillation, Disp: 60 tablet, Rfl:   •  bisoprolol (ZEBeta) 5 MG tablet, Take 3 tablets by mouth Every Night., Disp: 90 tablet, Rfl: 1  •  Cholecalciferol (VITAMIN D3) 1000 units capsule, Take 1 capsule by mouth Daily., Disp: , Rfl:   •  Dietary Management Product (Vasculera) tablet, Take 1 tablet by mouth 2 (two) times a day., Disp: 180 tablet, Rfl: 3  •  furosemide (Lasix) 40 MG tablet, Take 1 tablet by mouth Every Morning., Disp: 30 tablet, Rfl: 5  •  gabapentin (NEURONTIN) 100 MG capsule, Take 2 capsules by mouth At Night As Needed (leg pain)., Disp: 6 capsule, Rfl: 0  •  ondansetron ODT (ZOFRAN-ODT) 4 MG disintegrating tablet, Place 1 tablet on the tongue 4 (Four) Times a Day As Needed for Nausea or Vomiting., Disp: 15 tablet, Rfl: 0  •  Probiotic Product (PROBIOTIC PEARLS ADVANTAGE PO), 1  PRN, Disp: , Rfl:   •  sodium chloride 1 g tablet, Take 2 tablets by mouth 2 (Two) Times a Day., Disp: 60 tablet, Rfl: 5  •  Turmeric Curcumin 500 MG capsule, Take 1 capsule by mouth Daily. (Patient taking differently: Take 1 capsule by mouth As Needed.), Disp: 60 capsule, Rfl: 5  •  vitamin B-12 (CYANOCOBALAMIN) 1000 MCG tablet, Take 1 tablet by mouth Daily., Disp: , Rfl:   •  psyllium (METAMUCIL MULTIHEALTH FIBER) 58.12 % packet, Take 1 packet by mouth Daily., Disp: , Rfl:     Physical Exam:    Physical " Exam  Vitals and nursing note reviewed.   Constitutional:       General: She is not in acute distress.     Appearance: She is well-developed. She is not ill-appearing.   HENT:      Head: Normocephalic.   Eyes:      Conjunctiva/sclera: Conjunctivae normal.      Pupils: Pupils are equal, round, and reactive to light.   Neck:      Thyroid: No thyromegaly.   Cardiovascular:      Rate and Rhythm: Normal rate and regular rhythm.      Heart sounds: Normal heart sounds.   Pulmonary:      Effort: Pulmonary effort is normal.      Breath sounds: Examination of the right-upper field reveals wheezing. Examination of the left-upper field reveals wheezing. Examination of the right-middle field reveals wheezing. Examination of the left-middle field reveals wheezing. Examination of the right-lower field reveals wheezing. Examination of the left-lower field reveals wheezing. Wheezing present. No decreased breath sounds, rhonchi or rales.   Musculoskeletal:         General: Normal range of motion.      Cervical back: Normal range of motion and neck supple.      Right lower leg: 3+ Pitting Edema present.      Left lower leg: 3+ Pitting Edema present.   Lymphadenopathy:      Cervical: No cervical adenopathy.   Skin:     General: Skin is warm and dry.   Neurological:      Mental Status: She is alert and oriented to person, place, and time.   Psychiatric:         Attention and Perception: Attention normal.         Mood and Affect: Mood normal.         Thought Content: Thought content normal.          ACE III MINI        Results Review:    I reviewed the patient's new clinical results.    CMP:  Lab Results   Component Value Date    BUN 13 07/16/2021    CREATININE 0.79 07/16/2021    EGFRIFNONA 69 07/16/2021    BCR 16.5 07/16/2021     (L) 07/16/2021    K 4.7 07/16/2021    CO2 26.4 07/16/2021    CALCIUM 9.0 07/16/2021    ALBUMIN 4.30 06/25/2021    BILITOT 0.4 06/25/2021    ALKPHOS 110 06/25/2021    AST 20 06/25/2021    ALT 14 06/25/2021      HbA1c:  Lab Results   Component Value Date    HGBA1C 5.92 (H) 06/25/2021    HGBA1C 5.60 12/17/2020     Microalbumin:  No results found for: MICROALBUR, POCMALB, POCCREAT  Lipid Panel  Lab Results   Component Value Date    CHOL 135 06/25/2021    TRIG 67 06/25/2021    HDL 41 06/25/2021    LDL 80 06/25/2021    AST 20 06/25/2021    ALT 14 06/25/2021       Medication Review: Medications reviewed and noted  Patient Instructions   Problem List Items Addressed This Visit        Cardiac and Vasculature    Benign essential hypertension - Primary    Overview     8/3/2021  Isidra Eisenberg MD    Continue bisoprolol 15mg every evening.               Relevant Medications    bisoprolol (ZEBeta) 5 MG tablet    furosemide (Lasix) 40 MG tablet    Mixed hyperlipidemia    Overview     8/3/2021 Isidra Eisenberg MD    Continue low fat diet and regular physical activity.         Chronic atrial fibrillation (CMS/HCC)    Overview     8/3/2021 Isidra Eisenberg MD    Continue Eliquis twice a day.  Continue 15 mg bisoprolol every evening.         Relevant Medications    bisoprolol (ZEBeta) 5 MG tablet       Endocrine and Metabolic    Mild protein-calorie malnutrition (CMS/HCC)    Overview     8/3/2021 Isidra Eisenberg MD    Continue to have some protein with each meal and to eat at least 3 meals a day.  She is to drink an Ensure or boost at least once a day.  She was given education on protein content of foods at visit in June.            Genitourinary and Reproductive     Urinary retention    Overview     8/3/2021 Isidra Eisenberg MD     Continue self catheterization regularly. Continue regular follow up with Dr. Marroquin.         Hyponatremia    Overview     8/3/2021 Isidra Eisenberg MD    Continue oral fluid restriction.  Continue sodium chloride tablets 2 times a day with meals.  Drink a Gatorade or other electrolyte drink once a day.    Recheck labs today to see if we can decrease salt tablets further.            Pulmonary and  Pneumonias    Pulmonary hypertension (CMS/HCC)    Overview     8/3/2021 Isidra Eisenberg MD    Mild Pulmonary hypertension noted on echocardiogram 11/2019.            Symptoms and Signs    Bilateral edema of lower extremity (Chronic)    Overview     8/3/2021 Isidra Eisenberg MD    Continue Lasix 40 mg daily.      Continue to elevate feet whenever sitting at home.  She will continue to wear support socks that come to the knee every day.  (She would be unable to get full-length support hose on.)    Recheck labs today.                  Diagnosis Plan   1. Benign essential hypertension     2. Mixed hyperlipidemia     3. Chronic atrial fibrillation (CMS/HCC)     4. Bilateral edema of lower extremity     5. Mild protein-calorie malnutrition (CMS/HCC)     6. Hyponatremia     7. Pulmonary hypertension (CMS/HCC)     8. Urinary retention             Plan of care reviewed with patient at the conclusion of today's visit. Education was provided regarding diagnosis, management, and any prescribed or recommended OTC medications.Patient verbalizes understanding of and agreement with management plan.         Isidra Eisenberg MD

## 2021-08-03 NOTE — PATIENT INSTRUCTIONS
Patient Instructions   Problem List Items Addressed This Visit        Cardiac and Vasculature    Benign essential hypertension - Primary    Overview     8/3/2021  Isidra Eisenberg MD    Continue bisoprolol 15mg every evening.               Relevant Medications    bisoprolol (ZEBeta) 5 MG tablet    furosemide (Lasix) 40 MG tablet    Mixed hyperlipidemia    Overview     8/3/2021 Isidra Eisenberg MD    Continue low fat diet and regular physical activity.         Chronic atrial fibrillation (CMS/HCC)    Overview     8/3/2021 Isidra Eisenberg MD    Continue Eliquis twice a day.  Continue 15 mg bisoprolol every evening.         Relevant Medications    bisoprolol (ZEBeta) 5 MG tablet       Endocrine and Metabolic    Mild protein-calorie malnutrition (CMS/HCC)    Overview     8/3/2021 Isidra Eisenberg MD    Continue to have some protein with each meal and to eat at least 3 meals a day.  She is to drink an Ensure or boost at least once a day.  She was given education on protein content of foods at visit in June.            Genitourinary and Reproductive     Urinary retention    Overview     8/3/2021 Isidra Eisenberg MD     Continue self catheterization regularly. Continue regular follow up with Dr. Marroquin.         Hyponatremia    Overview     8/3/2021 Isidra Eisenberg MD    Continue oral fluid restriction.  Continue sodium chloride tablets 2 times a day with meals.  Drink a Gatorade or other electrolyte drink once a day.    Recheck labs today to see if we can decrease salt tablets further.            Pulmonary and Pneumonias    Pulmonary hypertension (CMS/HCC)    Overview     8/3/2021 Isidra Eisenberg MD    Mild Pulmonary hypertension noted on echocardiogram 11/2019.            Symptoms and Signs    Bilateral edema of lower extremity (Chronic)    Overview     8/3/2021 Isidra Eisenberg MD    Continue Lasix 40 mg daily.      Continue to elevate feet whenever sitting at home.  She will continue to wear support socks that  come to the knee every day.  (She would be unable to get full-length support hose on.)    Recheck labs today.

## 2021-08-04 ENCOUNTER — TELEPHONE (OUTPATIENT)
Dept: INTERNAL MEDICINE | Facility: CLINIC | Age: 86
End: 2021-08-04

## 2021-08-04 LAB
ALBUMIN SERPL-MCNC: 4.1 G/DL (ref 3.5–5.2)
ALBUMIN/GLOB SERPL: 1.6 G/DL
ALP SERPL-CCNC: 103 U/L (ref 39–117)
ALT SERPL W P-5'-P-CCNC: 9 U/L (ref 1–33)
ANION GAP SERPL CALCULATED.3IONS-SCNC: 10.4 MMOL/L (ref 5–15)
AST SERPL-CCNC: 16 U/L (ref 1–32)
BILIRUB SERPL-MCNC: 0.7 MG/DL (ref 0–1.2)
BUN SERPL-MCNC: 11 MG/DL (ref 8–23)
BUN/CREAT SERPL: 14.7 (ref 7–25)
CALCIUM SPEC-SCNC: 9 MG/DL (ref 8.6–10.5)
CHLORIDE SERPL-SCNC: 92 MMOL/L (ref 98–107)
CO2 SERPL-SCNC: 30.6 MMOL/L (ref 22–29)
CREAT SERPL-MCNC: 0.75 MG/DL (ref 0.57–1)
GFR SERPL CREATININE-BSD FRML MDRD: 73 ML/MIN/1.73
GLOBULIN UR ELPH-MCNC: 2.6 GM/DL
GLUCOSE SERPL-MCNC: 88 MG/DL (ref 65–99)
POTASSIUM SERPL-SCNC: 3.9 MMOL/L (ref 3.5–5.2)
PROT SERPL-MCNC: 6.7 G/DL (ref 6–8.5)
SODIUM SERPL-SCNC: 133 MMOL/L (ref 136–145)

## 2021-08-04 NOTE — TELEPHONE ENCOUNTER
Called and was transferred to [facility] but the phone kept ringing with no answer

## 2021-08-04 NOTE — TELEPHONE ENCOUNTER
PHONE CALL FROM [facility].  THEY HAD ORDER TO OBTAIN LABS YESTERDAY BUT PATIENT CAME INTO OFFICE.  NEEDS CONFIRMATION THAT PATIENT WAS SEEN (GIVEN) AND LABS DO NOT NEED OBTAINED.      PLEASE CALL MELCHOR @ [phone]

## 2021-08-04 NOTE — TELEPHONE ENCOUNTER
[facility] to indicated that no labs need to be drawn and that provider as requested patient to half lasix for 48hrs and then d/c on Saturday 08/07/21

## 2021-08-09 ENCOUNTER — LAB (OUTPATIENT)
Dept: LAB | Facility: HOSPITAL | Age: 86
End: 2021-08-09

## 2021-08-09 ENCOUNTER — OFFICE VISIT (OUTPATIENT)
Dept: INTERNAL MEDICINE | Facility: CLINIC | Age: 86
End: 2021-08-09

## 2021-08-09 ENCOUNTER — HOSPITAL ENCOUNTER (OUTPATIENT)
Dept: GENERAL RADIOLOGY | Facility: HOSPITAL | Age: 86
Discharge: HOME | End: 2021-08-09

## 2021-08-09 VITALS
WEIGHT: 144 LBS | RESPIRATION RATE: 22 BRPM | TEMPERATURE: 97.7 F | BODY MASS INDEX: 24.59 KG/M2 | OXYGEN SATURATION: 96 % | HEIGHT: 64 IN | SYSTOLIC BLOOD PRESSURE: 136 MMHG | DIASTOLIC BLOOD PRESSURE: 82 MMHG | HEART RATE: 101 BPM

## 2021-08-09 DIAGNOSIS — R06.02 SHORTNESS OF BREATH: ICD-10-CM

## 2021-08-09 DIAGNOSIS — I10 BENIGN ESSENTIAL HYPERTENSION: ICD-10-CM

## 2021-08-09 DIAGNOSIS — R60.0 BILATERAL EDEMA OF LOWER EXTREMITY: ICD-10-CM

## 2021-08-09 DIAGNOSIS — I48.21 PERMANENT ATRIAL FIBRILLATION (HCC): ICD-10-CM

## 2021-08-09 DIAGNOSIS — E78.2 MIXED HYPERLIPIDEMIA: ICD-10-CM

## 2021-08-09 DIAGNOSIS — E87.1 HYPONATREMIA: ICD-10-CM

## 2021-08-09 DIAGNOSIS — R60.0 BILATERAL EDEMA OF LOWER EXTREMITY: Primary | ICD-10-CM

## 2021-08-09 LAB
BASOPHILS # BLD AUTO: 0.06 10*3/MM3 (ref 0–0.2)
BASOPHILS NFR BLD AUTO: 0.9 % (ref 0–1.5)
DEPRECATED RDW RBC AUTO: 44.5 FL (ref 37–54)
EOSINOPHIL # BLD AUTO: 0.17 10*3/MM3 (ref 0–0.4)
EOSINOPHIL NFR BLD AUTO: 2.5 % (ref 0.3–6.2)
ERYTHROCYTE [DISTWIDTH] IN BLOOD BY AUTOMATED COUNT: 13.8 % (ref 12.3–15.4)
HCT VFR BLD AUTO: 38.6 % (ref 34–46.6)
HGB BLD-MCNC: 12.1 G/DL (ref 12–15.9)
IMM GRANULOCYTES # BLD AUTO: 0.02 10*3/MM3 (ref 0–0.05)
IMM GRANULOCYTES NFR BLD AUTO: 0.3 % (ref 0–0.5)
LYMPHOCYTES # BLD AUTO: 1.63 10*3/MM3 (ref 0.7–3.1)
LYMPHOCYTES NFR BLD AUTO: 23.8 % (ref 19.6–45.3)
MCH RBC QN AUTO: 27.8 PG (ref 26.6–33)
MCHC RBC AUTO-ENTMCNC: 31.3 G/DL (ref 31.5–35.7)
MCV RBC AUTO: 88.5 FL (ref 79–97)
MONOCYTES # BLD AUTO: 0.66 10*3/MM3 (ref 0.1–0.9)
MONOCYTES NFR BLD AUTO: 9.6 % (ref 5–12)
NEUTROPHILS NFR BLD AUTO: 4.31 10*3/MM3 (ref 1.7–7)
NEUTROPHILS NFR BLD AUTO: 62.9 % (ref 42.7–76)
NRBC BLD AUTO-RTO: 0 /100 WBC (ref 0–0.2)
PLATELET # BLD AUTO: 332 10*3/MM3 (ref 140–450)
PMV BLD AUTO: 9.7 FL (ref 6–12)
RBC # BLD AUTO: 4.36 10*6/MM3 (ref 3.77–5.28)
WBC # BLD AUTO: 6.85 10*3/MM3 (ref 3.4–10.8)

## 2021-08-09 PROCEDURE — 80053 COMPREHEN METABOLIC PANEL: CPT

## 2021-08-09 PROCEDURE — 85025 COMPLETE CBC W/AUTO DIFF WBC: CPT

## 2021-08-09 PROCEDURE — 71046 X-RAY EXAM CHEST 2 VIEWS: CPT

## 2021-08-09 PROCEDURE — 83880 ASSAY OF NATRIURETIC PEPTIDE: CPT

## 2021-08-09 PROCEDURE — 99214 OFFICE O/P EST MOD 30 MIN: CPT | Performed by: PHYSICIAN ASSISTANT

## 2021-08-09 NOTE — PROGRESS NOTES
Patient Care Team:  Isidra Eisenberg MD as PCP - General (Internal Medicine)  Isidra Eisenberg MD as PCP - Internal Medicine  Azam Marroquin MD as Consulting Physician (Urology)  Lili Hebert MD as Consulting Physician (Gastroenterology)  Isidra Eisenberg MD as Consulting Physician (Internal Medicine)  Sabina Quinones, RN as Ambulatory  ([facility])    Chief Complaint::   Chief Complaint   Patient presents with   • Hypertension     f/u   • Hyperlipidemia        Subjective     HPI  Thuy is an 85 year old female with history of hypertension, hyperlipidemia, atrial fibrillation, and hyponatremia, who presents with worsening leg edema and shortness of breath. She wears compression stockings daily. She did see Dr. Eisenberg in the office on August 3, 2021 and instructed to continue furosemide 40 mg tablets. She ran out of this medication on Thursday. She is unable to walk to mailbox because of dyspnea. She denies chest pain, palpitations, or headaches. Chronic hyponatremia, last sodium level 135 3 weeks ago.        The following portions of the patient's history were reviewed and updated as appropriate: active problem list, medication list, allergies, family history, social history    Review of Systems:   Review of Systems   Constitutional: Positive for activity change and unexpected weight gain. Negative for appetite change, diaphoresis, fatigue and unexpected weight loss.   HENT: Negative for hearing loss.    Eyes: Negative for blurred vision, double vision and visual disturbance.   Respiratory: Positive for shortness of breath. Negative for cough, chest tightness and wheezing.    Cardiovascular: Positive for leg swelling. Negative for chest pain and palpitations.   Gastrointestinal: Negative for abdominal pain, blood in stool, GERD and indigestion.   Endocrine: Negative for cold intolerance and heat intolerance.   Genitourinary: Negative for dysuria and hematuria.  "  Musculoskeletal: Positive for myalgias. Negative for arthralgias.   Skin: Negative for skin lesions.   Neurological: Negative for tremors, seizures, syncope, speech difficulty, weakness, headache, memory problem and confusion.   Hematological: Does not bruise/bleed easily.   Psychiatric/Behavioral: Negative for sleep disturbance and depressed mood. The patient is not nervous/anxious.        Vital Signs  Vitals:    08/09/21 1317   BP: 136/82   BP Location: Left arm   Patient Position: Sitting   Cuff Size: Adult   Pulse: 101   Resp: 22   Temp: 97.7 °F (36.5 °C)   TempSrc: Infrared   SpO2: 96%   Weight: 65.3 kg (144 lb)   Height: 162.6 cm (64\")   PainSc: 0-No pain     Body mass index is 24.72 kg/m².    Labs  Lab on 08/09/2021   Component Date Value Ref Range Status   • proBNP 08/09/2021 2,983.0* 0.0-1,800.0 pg/mL Final   • Glucose 08/09/2021 98  65 - 99 mg/dL Final   • BUN 08/09/2021 11  8 - 23 mg/dL Final   • Creatinine 08/09/2021 0.71  0.57 - 1.00 mg/dL Final   • Sodium 08/09/2021 133* 136 - 145 mmol/L Final   • Potassium 08/09/2021 4.4  3.5 - 5.2 mmol/L Final   • Chloride 08/09/2021 98  98 - 107 mmol/L Final   • CO2 08/09/2021 23.3  22.0 - 29.0 mmol/L Final   • Calcium 08/09/2021 9.0  8.6 - 10.5 mg/dL Final   • Total Protein 08/09/2021 6.7  6.0 - 8.5 g/dL Final   • Albumin 08/09/2021 4.10  3.50 - 5.20 g/dL Final   • ALT (SGPT) 08/09/2021 13  1 - 33 U/L Final   • AST (SGOT) 08/09/2021 21  1 - 32 U/L Final   • Alkaline Phosphatase 08/09/2021 91  39 - 117 U/L Final   • Total Bilirubin 08/09/2021 0.6  0.0 - 1.2 mg/dL Final   • eGFR Non African Amer 08/09/2021 78  >60 mL/min/1.73 Final   • Globulin 08/09/2021 2.6  gm/dL Final   • A/G Ratio 08/09/2021 1.6  g/dL Final   • BUN/Creatinine Ratio 08/09/2021 15.5  7.0 - 25.0 Final   • Anion Gap 08/09/2021 11.7  5.0 - 15.0 mmol/L Final   • WBC 08/09/2021 6.85  3.40 - 10.80 10*3/mm3 Final   • RBC 08/09/2021 4.36  3.77 - 5.28 10*6/mm3 Final   • Hemoglobin 08/09/2021 12.1  12.0 - " 15.9 g/dL Final   • Hematocrit 08/09/2021 38.6  34.0 - 46.6 % Final   • MCV 08/09/2021 88.5  79.0 - 97.0 fL Final   • MCH 08/09/2021 27.8  26.6 - 33.0 pg Final   • MCHC 08/09/2021 31.3* 31.5 - 35.7 g/dL Final   • RDW 08/09/2021 13.8  12.3 - 15.4 % Final   • RDW-SD 08/09/2021 44.5  37.0 - 54.0 fl Final   • MPV 08/09/2021 9.7  6.0 - 12.0 fL Final   • Platelets 08/09/2021 332  140 - 450 10*3/mm3 Final   • Neutrophil % 08/09/2021 62.9  42.7 - 76.0 % Final   • Lymphocyte % 08/09/2021 23.8  19.6 - 45.3 % Final   • Monocyte % 08/09/2021 9.6  5.0 - 12.0 % Final   • Eosinophil % 08/09/2021 2.5  0.3 - 6.2 % Final   • Basophil % 08/09/2021 0.9  0.0 - 1.5 % Final   • Immature Grans % 08/09/2021 0.3  0.0 - 0.5 % Final   • Neutrophils, Absolute 08/09/2021 4.31  1.70 - 7.00 10*3/mm3 Final   • Lymphocytes, Absolute 08/09/2021 1.63  0.70 - 3.10 10*3/mm3 Final   • Monocytes, Absolute 08/09/2021 0.66  0.10 - 0.90 10*3/mm3 Final   • Eosinophils, Absolute 08/09/2021 0.17  0.00 - 0.40 10*3/mm3 Final   • Basophils, Absolute 08/09/2021 0.06  0.00 - 0.20 10*3/mm3 Final   • Immature Grans, Absolute 08/09/2021 0.02  0.00 - 0.05 10*3/mm3 Final   • nRBC 08/09/2021 0.0  0.0 - 0.2 /100 WBC Final   Lab on 08/03/2021   Component Date Value Ref Range Status   • Glucose 08/03/2021 88  65 - 99 mg/dL Final   • BUN 08/03/2021 11  8 - 23 mg/dL Final   • Creatinine 08/03/2021 0.75  0.57 - 1.00 mg/dL Final   • Sodium 08/03/2021 133* 136 - 145 mmol/L Final   • Potassium 08/03/2021 3.9  3.5 - 5.2 mmol/L Final   • Chloride 08/03/2021 92* 98 - 107 mmol/L Final   • CO2 08/03/2021 30.6* 22.0 - 29.0 mmol/L Final   • Calcium 08/03/2021 9.0  8.6 - 10.5 mg/dL Final   • Total Protein 08/03/2021 6.7  6.0 - 8.5 g/dL Final   • Albumin 08/03/2021 4.10  3.50 - 5.20 g/dL Final   • ALT (SGPT) 08/03/2021 9  1 - 33 U/L Final   • AST (SGOT) 08/03/2021 16  1 - 32 U/L Final   • Alkaline Phosphatase 08/03/2021 103  39 - 117 U/L Final   • Total Bilirubin 08/03/2021 0.7  0.0 - 1.2  mg/dL Final   • eGFR Non  Amer 08/03/2021 73  >60 mL/min/1.73 Final   • Globulin 08/03/2021 2.6  gm/dL Final   • A/G Ratio 08/03/2021 1.6  g/dL Final   • BUN/Creatinine Ratio 08/03/2021 14.7  7.0 - 25.0 Final   • Anion Gap 08/03/2021 10.4  5.0 - 15.0 mmol/L Final   Lab on 07/16/2021   Component Date Value Ref Range Status   • Glucose 07/16/2021 91  65 - 99 mg/dL Final   • BUN 07/16/2021 13  8 - 23 mg/dL Final   • Creatinine 07/16/2021 0.79  0.57 - 1.00 mg/dL Final   • Sodium 07/16/2021 135* 136 - 145 mmol/L Final   • Potassium 07/16/2021 4.7  3.5 - 5.2 mmol/L Final   • Chloride 07/16/2021 101  98 - 107 mmol/L Final   • CO2 07/16/2021 26.4  22.0 - 29.0 mmol/L Final   • Calcium 07/16/2021 9.0  8.6 - 10.5 mg/dL Final   • eGFR Non  Amer 07/16/2021 69  >60 mL/min/1.73 Final   • BUN/Creatinine Ratio 07/16/2021 16.5  7.0 - 25.0 Final   • Anion Gap 07/16/2021 7.6  5.0 - 15.0 mmol/L Final   Lab on 06/25/2021   Component Date Value Ref Range Status   • Urine Culture 06/25/2021 >100,000 CFU/mL Escherichia coli*  Final   • Glucose 06/25/2021 73  65 - 99 mg/dL Final   • BUN 06/25/2021 10  8 - 23 mg/dL Final   • Creatinine 06/25/2021 0.76  0.57 - 1.00 mg/dL Final   • Sodium 06/25/2021 130* 136 - 145 mmol/L Final   • Potassium 06/25/2021 4.5  3.5 - 5.2 mmol/L Final   • Chloride 06/25/2021 97* 98 - 107 mmol/L Final   • CO2 06/25/2021 25.4  22.0 - 29.0 mmol/L Final   • Calcium 06/25/2021 8.8  8.6 - 10.5 mg/dL Final   • Total Protein 06/25/2021 6.4  6.0 - 8.5 g/dL Final   • Albumin 06/25/2021 4.30  3.50 - 5.20 g/dL Final   • ALT (SGPT) 06/25/2021 14  1 - 33 U/L Final   • AST (SGOT) 06/25/2021 20  1 - 32 U/L Final   • Alkaline Phosphatase 06/25/2021 110  39 - 117 U/L Final   • Total Bilirubin 06/25/2021 0.4  0.0 - 1.2 mg/dL Final   • eGFR Non  Amer 06/25/2021 72  >60 mL/min/1.73 Final   • Globulin 06/25/2021 2.1  gm/dL Final   • A/G Ratio 06/25/2021 2.0  g/dL Final   • BUN/Creatinine Ratio 06/25/2021 13.2  7.0 -  25.0 Final   • Anion Gap 06/25/2021 7.6  5.0 - 15.0 mmol/L Final   • Total Cholesterol 06/25/2021 135  0 - 200 mg/dL Final   • Triglycerides 06/25/2021 67  0 - 150 mg/dL Final   • HDL Cholesterol 06/25/2021 41  40 - 60 mg/dL Final   • LDL Cholesterol  06/25/2021 80  0 - 100 mg/dL Final   • VLDL Cholesterol 06/25/2021 14  5 - 40 mg/dL Final   • LDL/HDL Ratio 06/25/2021 1.97   Final   • Hemoglobin A1C 06/25/2021 5.92* 4.80 - 5.60 % Final   • TSH 06/25/2021 2.840  0.270 - 4.200 uIU/mL Final   • 25 Hydroxy, Vitamin D 06/25/2021 44.7  ng/ml Final   • Vitamin B-12 06/25/2021 447  211 - 946 pg/mL Final   • WBC 06/25/2021 9.08  3.40 - 10.80 10*3/mm3 Final   • RBC 06/25/2021 4.16  3.77 - 5.28 10*6/mm3 Final   • Hemoglobin 06/25/2021 12.1  12.0 - 15.9 g/dL Final   • Hematocrit 06/25/2021 37.8  34.0 - 46.6 % Final   • MCV 06/25/2021 90.9  79.0 - 97.0 fL Final   • MCH 06/25/2021 29.1  26.6 - 33.0 pg Final   • MCHC 06/25/2021 32.0  31.5 - 35.7 g/dL Final   • RDW 06/25/2021 14.1  12.3 - 15.4 % Final   • RDW-SD 06/25/2021 47.0  37.0 - 54.0 fl Final   • MPV 06/25/2021 9.6  6.0 - 12.0 fL Final   • Platelets 06/25/2021 337  140 - 450 10*3/mm3 Final   • Neutrophil % 06/25/2021 71.2  42.7 - 76.0 % Final   • Lymphocyte % 06/25/2021 16.6* 19.6 - 45.3 % Final   • Monocyte % 06/25/2021 9.4  5.0 - 12.0 % Final   • Eosinophil % 06/25/2021 1.5  0.3 - 6.2 % Final   • Basophil % 06/25/2021 0.7  0.0 - 1.5 % Final   • Immature Grans % 06/25/2021 0.6* 0.0 - 0.5 % Final   • Neutrophils, Absolute 06/25/2021 6.47  1.70 - 7.00 10*3/mm3 Final   • Lymphocytes, Absolute 06/25/2021 1.51  0.70 - 3.10 10*3/mm3 Final   • Monocytes, Absolute 06/25/2021 0.85  0.10 - 0.90 10*3/mm3 Final   • Eosinophils, Absolute 06/25/2021 0.14  0.00 - 0.40 10*3/mm3 Final   • Basophils, Absolute 06/25/2021 0.06  0.00 - 0.20 10*3/mm3 Final   • Immature Grans, Absolute 06/25/2021 0.05  0.00 - 0.05 10*3/mm3 Final   • nRBC 06/25/2021 0.0  0.0 - 0.2 /100 WBC Final   Office Visit on  06/25/2021   Component Date Value Ref Range Status   • Color 06/25/2021 Red* Yellow, Straw, Dark Yellow, Zee Final   • Clarity, UA 06/25/2021 Cloudy* Clear Final   • Specific Gravity  06/25/2021 1.020  1.005 - 1.030 Final   • pH, Urine 06/25/2021 5.0  5.0 - 8.0 Final   • Leukocytes 06/25/2021 Large (3+)* Negative Final   • Nitrite, UA 06/25/2021 Positive* Negative Final   • Protein, POC 06/25/2021 300 mg/dL* Negative mg/dL Final   • Glucose, UA 06/25/2021 250 mg/dL* Negative, 1000 mg/dL (3+) mg/dL Final   • Ketones, UA 06/25/2021 15 mg/dL* Negative Final   • Urobilinogen, UA 06/25/2021 4 E.U./dL * Normal Final   • Bilirubin 06/25/2021 Moderate (2+)* Negative Final   • Blood, UA 06/25/2021 Large* Negative Final   No results displayed because visit has over 200 results.          Imaging  XR Chest PA & Lateral    Result Date: 8/10/2021  Increased bibasilar opacities with likely component of increasing pleural effusions and basilar volume loss.  This report was finalized on 8/10/2021 10:57 AM by Homero Gonzales.          Current Outpatient Medications:   •  apixaban (ELIQUIS) 5 MG tablet tablet, Take 1 tablet by mouth Every 12 (Twelve) Hours. Indications: Atrial Fibrillation, Disp: 60 tablet, Rfl:   •  bisoprolol (ZEBeta) 5 MG tablet, Take 3 tablets by mouth Every Night., Disp: 90 tablet, Rfl: 1  •  Cholecalciferol (VITAMIN D3) 1000 units capsule, Take 1 capsule by mouth Daily., Disp: , Rfl:   •  Dietary Management Product (Vasculera) tablet, Take 1 tablet by mouth 2 (two) times a day., Disp: 180 tablet, Rfl: 3  •  gabapentin (NEURONTIN) 100 MG capsule, Take 2 capsules by mouth At Night As Needed (leg pain)., Disp: 6 capsule, Rfl: 0  •  ondansetron ODT (ZOFRAN-ODT) 4 MG disintegrating tablet, Place 1 tablet on the tongue 4 (Four) Times a Day As Needed for Nausea or Vomiting., Disp: 15 tablet, Rfl: 0  •  Probiotic Product (PROBIOTIC PEARLS ADVANTAGE PO), 1  PRN, Disp: , Rfl:   •  psyllium (METAMUCIL MULTIHEALTH FIBER)  58.12 % packet, Take 1 packet by mouth Daily., Disp: , Rfl:   •  sodium chloride 1 g tablet, Take 2 tablets by mouth 2 (Two) Times a Day., Disp: 60 tablet, Rfl: 5  •  Turmeric Curcumin 500 MG capsule, Take 1 capsule by mouth Daily. (Patient taking differently: Take 1 capsule by mouth As Needed.), Disp: 60 capsule, Rfl: 5  •  vitamin B-12 (CYANOCOBALAMIN) 1000 MCG tablet, Take 1 tablet by mouth Daily., Disp: , Rfl:   •  furosemide (Lasix) 40 MG tablet, Take 1 tablet by mouth Every Morning., Disp: 30 tablet, Rfl: 5    Physical Exam:    Physical Exam  Vitals and nursing note reviewed.   Constitutional:       Appearance: Normal appearance. She is normal weight.   HENT:      Head: Normocephalic and atraumatic.      Nose: Nose normal.   Eyes:      Extraocular Movements: Extraocular movements intact.      Conjunctiva/sclera: Conjunctivae normal.      Pupils: Pupils are equal, round, and reactive to light.   Cardiovascular:      Rate and Rhythm: Normal rate and regular rhythm.      Pulses: Normal pulses.      Heart sounds: Normal heart sounds.   Pulmonary:      Effort: Pulmonary effort is normal.      Breath sounds: Decreased breath sounds present. No wheezing or rales.   Abdominal:      General: Abdomen is flat. Bowel sounds are normal.      Tenderness: There is no abdominal tenderness.   Musculoskeletal:      Cervical back: Normal range of motion and neck supple.      Right lower leg: Edema present.      Left lower leg: Edema present.   Skin:     General: Skin is warm and dry.   Neurological:      General: No focal deficit present.      Mental Status: She is alert and oriented to person, place, and time. Mental status is at baseline.   Psychiatric:         Mood and Affect: Mood normal.         Behavior: Behavior normal.         Thought Content: Thought content normal.         Judgment: Judgment normal.         Procedures        Assessment/Plan   Problem List Items Addressed This Visit        Cardiac and Vasculature     Benign essential hypertension    Overview     8/10/2021  Jasmin Mchugh PA-C    Continue bisoprolol 15mg every evening.               Relevant Medications    bisoprolol (ZEBeta) 5 MG tablet    furosemide (Lasix) 40 MG tablet    Mixed hyperlipidemia    Overview     8/10/2021 Jasmin Mchugh PA-C    Continue low fat diet and regular physical activity.         Chronic atrial fibrillation (CMS/HCC)    Overview     8/10/2021 Jasmin Mchugh PA-C    Continue Eliquis twice a day.  Continue 15 mg bisoprolol every evening.         Relevant Medications    bisoprolol (ZEBeta) 5 MG tablet       Genitourinary and Reproductive     Hyponatremia    Overview     8/10/2021 Jasmin Mchugh PA-C    Continue oral fluid restriction.  Continue sodium chloride tablets 2 times a day with meals.  Drink a Gatorade or other electrolyte drink once a day.                Pulmonary and Pneumonias    Shortness of breath    Overview     Worsening. Does have decreas may need follow-up with cardiology. Ed breath sounds posterior lung fields. Will pursue chest x-ray. Patient will likely need to restart diuretic. We will keep a close eye on sodium levels.         Relevant Orders    XR Chest PA & Lateral (Completed)    BNP (Completed)    Comprehensive Metabolic Panel (Completed)    CBC & Differential (Completed)       Symptoms and Signs    Bilateral edema of lower extremity - Primary (Chronic)    Overview     8/10/2021 Jasmin Mchugh PA-C    Recheck labs today add BNP and chest x-ray.     Continue to elevate feet whenever sitting at home.  She will continue to wear support socks that come to the knee every day.  (She would be unable to get full-length support hose on.)             Relevant Orders    XR Chest PA & Lateral (Completed)    BNP (Completed)    Comprehensive Metabolic Panel (Completed)    CBC & Differential (Completed)          Return for Recheck, Next scheduled follow up.    Plan of care reviewed with patient at  the conclusion of today's visit. Education was provided regarding diagnosis, management, and any prescribed or recommended OTC medications.Patient verbalizes understanding of and agreement with management plan.       Jasmin Mchugh PA-C    Please note that portions of this note were completed with a voice recognition program. Efforts were made to edit the dictations, but occasionally words are mistranscribed.

## 2021-08-10 ENCOUNTER — PATIENT OUTREACH (OUTPATIENT)
Dept: CASE MANAGEMENT | Facility: OTHER | Age: 86
End: 2021-08-10

## 2021-08-10 ENCOUNTER — TELEPHONE (OUTPATIENT)
Dept: INTERNAL MEDICINE | Facility: CLINIC | Age: 86
End: 2021-08-10

## 2021-08-10 PROBLEM — R06.02 SHORTNESS OF BREATH: Status: ACTIVE | Noted: 2021-08-10

## 2021-08-10 LAB
ALBUMIN SERPL-MCNC: 4.1 G/DL (ref 3.5–5.2)
ALBUMIN/GLOB SERPL: 1.6 G/DL
ALP SERPL-CCNC: 91 U/L (ref 39–117)
ALT SERPL W P-5'-P-CCNC: 13 U/L (ref 1–33)
ANION GAP SERPL CALCULATED.3IONS-SCNC: 11.7 MMOL/L (ref 5–15)
AST SERPL-CCNC: 21 U/L (ref 1–32)
BILIRUB SERPL-MCNC: 0.6 MG/DL (ref 0–1.2)
BUN SERPL-MCNC: 11 MG/DL (ref 8–23)
BUN/CREAT SERPL: 15.5 (ref 7–25)
CALCIUM SPEC-SCNC: 9 MG/DL (ref 8.6–10.5)
CHLORIDE SERPL-SCNC: 98 MMOL/L (ref 98–107)
CO2 SERPL-SCNC: 23.3 MMOL/L (ref 22–29)
CREAT SERPL-MCNC: 0.71 MG/DL (ref 0.57–1)
GFR SERPL CREATININE-BSD FRML MDRD: 78 ML/MIN/1.73
GLOBULIN UR ELPH-MCNC: 2.6 GM/DL
GLUCOSE SERPL-MCNC: 98 MG/DL (ref 65–99)
NT-PROBNP SERPL-MCNC: 2983 PG/ML (ref 0–1800)
POTASSIUM SERPL-SCNC: 4.4 MMOL/L (ref 3.5–5.2)
PROT SERPL-MCNC: 6.7 G/DL (ref 6–8.5)
SODIUM SERPL-SCNC: 133 MMOL/L (ref 136–145)

## 2021-08-10 RX ORDER — FUROSEMIDE 40 MG/1
40 TABLET ORAL
Qty: 30 TABLET | Refills: 5 | OUTPATIENT
Start: 2021-08-10

## 2021-08-10 NOTE — TELEPHONE ENCOUNTER
Caller: Thuy Clark    Relationship: Self    Best call back number: [phone]    Medication needed:   Requested Prescriptions     Pending Prescriptions Disp Refills   • furosemide (Lasix) 40 MG tablet 30 tablet 5     Sig: Take 1 tablet by mouth Every Morning.       When do you need the refill by: ASAP    What additional details did the patient provide when requesting the medication: WILL NEED NEW PRESCRIPTION SENT OVER     Does the patient have less than a 3 day supply:  [x] Yes  [] No    What is the patient's preferred pharmacy: [id number] [place], [place] - [facility] [address] AT [facility] [phone] [facility] [phone] FX

## 2021-08-10 NOTE — TELEPHONE ENCOUNTER
Caller: Thuy Clark    Relationship: Self    Best call back number: [phone]    What medications are you currently taking:   Current Outpatient Medications on File Prior to Visit   Medication Sig Dispense Refill   • apixaban (ELIQUIS) 5 MG tablet tablet Take 1 tablet by mouth Every 12 (Twelve) Hours. Indications: Atrial Fibrillation 60 tablet    • bisoprolol (ZEBeta) 5 MG tablet Take 3 tablets by mouth Every Night. 90 tablet 1   • Cholecalciferol (VITAMIN D3) 1000 units capsule Take 1 capsule by mouth Daily.     • Dietary Management Product (Vasculera) tablet Take 1 tablet by mouth 2 (two) times a day. 180 tablet 3   • furosemide (Lasix) 40 MG tablet Take 1 tablet by mouth Every Morning. 30 tablet 5   • gabapentin (NEURONTIN) 100 MG capsule Take 2 capsules by mouth At Night As Needed (leg pain). 6 capsule 0   • ondansetron ODT (ZOFRAN-ODT) 4 MG disintegrating tablet Place 1 tablet on the tongue 4 (Four) Times a Day As Needed for Nausea or Vomiting. 15 tablet 0   • Probiotic Product (PROBIOTIC PEARLS ADVANTAGE PO) 1  PRN     • psyllium (METAMUCIL MULTIHEALTH FIBER) 58.12 % packet Take 1 packet by mouth Daily.     • sodium chloride 1 g tablet Take 2 tablets by mouth 2 (Two) Times a Day. 60 tablet 5   • Turmeric Curcumin 500 MG capsule Take 1 capsule by mouth Daily. (Patient taking differently: Take 1 capsule by mouth As Needed.) 60 capsule 5   • vitamin B-12 (CYANOCOBALAMIN) 1000 MCG tablet Take 1 tablet by mouth Daily.       No current facility-administered medications on file prior to visit.        When did you start taking these medications: HAVEN'T    Which medication are you concerned about: furosemide (Lasix) 40 MG tablet    Who prescribed you this medication: KEV PERALES    What are your concerns: DOESN'T UNDERSTAND DIRECTIONS FOR TAKING. IS IT SUPPOSED TO BE 1 TABLET DAILY OR 1 AND 1/2 DAILY? PILLS ARE TO SMALL TO CUT IN HALF.    How long have you been taking these medications: NEW PRESCRIPTION I  HAVEN'T STARTED YET    How long have you had these concerns: TODAY WHEN I PICKED UP MEDICATION AND ALSO WHEN I LOOKED AT MESSAGE IN MY CHART.

## 2021-08-10 NOTE — OUTREACH NOTE
Ambulatory Case Management Note    Patient Outreach    Pt left message on RN-ACM voicemail asking for return call.  When contacted pt, she states she was confused about the medication furosemide and exactly how to take.  States the pills are too small to cut in half.  She did state she had contacted Jasmin Mchugh's office for clarification and was waiting on return call.  Read Dr. Eisenberg's note to pt that she could take 1 tablet every other day alternating with  2 tablets every other day.  Pt did verbalize understanding, and said she would take it as discussed,  but would like confirmation that this is the way she should take the medicines.      Pt asked if the office would send her another MyChart message regarding this.     Sabina Quinones RN  Ambulatory Case Management    8/10/2021, 18:01 EDT

## 2021-08-23 ENCOUNTER — OFFICE VISIT (OUTPATIENT)
Dept: INTERNAL MEDICINE | Facility: CLINIC | Age: 86
End: 2021-08-23

## 2021-08-23 ENCOUNTER — LAB (OUTPATIENT)
Dept: LAB | Facility: HOSPITAL | Age: 86
End: 2021-08-23

## 2021-08-23 VITALS
HEIGHT: 64 IN | WEIGHT: 128.4 LBS | SYSTOLIC BLOOD PRESSURE: 102 MMHG | HEART RATE: 99 BPM | OXYGEN SATURATION: 97 % | DIASTOLIC BLOOD PRESSURE: 60 MMHG | TEMPERATURE: 98.9 F | RESPIRATION RATE: 16 BRPM | BODY MASS INDEX: 21.92 KG/M2

## 2021-08-23 DIAGNOSIS — E87.1 HYPONATREMIA: ICD-10-CM

## 2021-08-23 DIAGNOSIS — I48.92 ATRIAL FLUTTER WITH RAPID VENTRICULAR RESPONSE (HCC): ICD-10-CM

## 2021-08-23 DIAGNOSIS — R60.0 BILATERAL EDEMA OF LOWER EXTREMITY: Chronic | ICD-10-CM

## 2021-08-23 DIAGNOSIS — E44.1 MILD PROTEIN-CALORIE MALNUTRITION (HCC): ICD-10-CM

## 2021-08-23 DIAGNOSIS — I49.3 PVC'S (PREMATURE VENTRICULAR CONTRACTIONS): ICD-10-CM

## 2021-08-23 DIAGNOSIS — E87.1 HYPONATREMIA: Primary | ICD-10-CM

## 2021-08-23 DIAGNOSIS — I10 BENIGN ESSENTIAL HYPERTENSION: Primary | ICD-10-CM

## 2021-08-23 LAB
ANION GAP SERPL CALCULATED.3IONS-SCNC: 11.1 MMOL/L (ref 5–15)
BUN SERPL-MCNC: 13 MG/DL (ref 8–23)
BUN/CREAT SERPL: 15.5 (ref 7–25)
CALCIUM SPEC-SCNC: 9 MG/DL (ref 8.6–10.5)
CHLORIDE SERPL-SCNC: 97 MMOL/L (ref 98–107)
CO2 SERPL-SCNC: 28.9 MMOL/L (ref 22–29)
CREAT SERPL-MCNC: 0.84 MG/DL (ref 0.57–1)
GFR SERPL CREATININE-BSD FRML MDRD: 64 ML/MIN/1.73
GLUCOSE SERPL-MCNC: 104 MG/DL (ref 65–99)
POTASSIUM SERPL-SCNC: 3.6 MMOL/L (ref 3.5–5.2)
SODIUM SERPL-SCNC: 137 MMOL/L (ref 136–145)

## 2021-08-23 PROCEDURE — 80048 BASIC METABOLIC PNL TOTAL CA: CPT

## 2021-08-23 PROCEDURE — 99214 OFFICE O/P EST MOD 30 MIN: CPT | Performed by: INTERNAL MEDICINE

## 2021-08-23 RX ORDER — BISOPROLOL FUMARATE 5 MG/1
10 TABLET, FILM COATED ORAL NIGHTLY
Qty: 180 TABLET | Refills: 1 | Status: SHIPPED | OUTPATIENT
Start: 2021-08-23 | End: 2022-08-31 | Stop reason: SDUPTHER

## 2021-08-23 NOTE — PROGRESS NOTES
[facility]     Thuy Clark  1935   [id number]      Patient Care Team:  Isidra Eisenberg MD as PCP - General (Internal Medicine)  Isidra Eisenberg MD as PCP - Internal Medicine  Azam Marroquin MD as Consulting Physician (Urology)  Lili Hebert MD as Consulting Physician (Gastroenterology)  Isidra Eisenberg MD as Consulting Physician (Internal Medicine)    CC: Low sodium       HPI  Patient is a 85 y.o. female presents with low sodium, hypertension,      CHRONIC CONDITIONS    For hyponatremia, she is taking the salt tablets as a prescribed.  She would like to decrease the dose.  She does not like taking them.    Edema is much better.  She has diuresed quite a bit.  She is taking the Lasix every morning.    Blood pressures well controlled on bisoprolol and Lasix.    Palpitations and PVCs controlled with bisoprolol.  Taking Eliquis for A. fib a flutter    For weight loss and malnutrition, she is trying to eat more protein.  She is eating at least 3 meals a day now.  She is drinking some Ensure and boost.    She has lost 16 more pounds since last visit, but she has diuresed a lot and her edema is much better.    Past Medical History:   Diagnosis Date   • Acute cystitis with hematuria 6/25/2021 6/25/2021 Isidra Eisenberg MD  Urine sent for culture.   • Bleeding hemorrhoid 3/8/2019   • Diverticulosis    • External hemorrhoid 6/11/2019 12/6/2019 Isidra Eisenberg MD   Continue vasculera daily. Drink plenty of fluids.  Avoid constipation.  Follow-up with Dr. Hebert as planned.   • Hx of colonic polyps 2003   • Hypertension    • Intraductal papilloma     R intraductal patheloma   • Left upper extremity numbness     L upper extremity numbness-ER visit; neuropathy   • Overweight (BMI 25.0-29.9) 6/11/2019   • Self-catheterizes urinary bladder        Past Surgical History:   Procedure Laterality Date   • BREAST LUMPECTOMY  2006   • CHOLECYSTECTOMY     • HYSTERECTOMY  1984   •  "VAGINECTOMY  2009       Family History   Problem Relation Age of Onset   • Hyperlipidemia Other    • Hypertension Other    • Coronary artery disease Other    • No Known Problems Mother    • No Known Problems Father        Social History     Socioeconomic History   • Marital status:      Spouse name: Not on file   • Number of children: Not on file   • Years of education: Not on file   • Highest education level: Not on file   Tobacco Use   • Smoking status: Never Smoker   • Smokeless tobacco: Never Used   Substance and Sexual Activity   • Alcohol use: Never     Alcohol/week: 1.0 standard drinks     Types: 1 Glasses of wine per week   • Drug use: Never   • Sexual activity: Defer       Allergies   Allergen Reactions   • Levofloxacin Other (See Comments)     itching   • Nitrofuran Derivatives Rash     primarily in b/l hands   • Nitrofurantoin Rash       Review of Systems:     Review of Systems   Constitutional: Negative for chills, fatigue and fever.   HENT: Negative for congestion, ear pain and sinus pressure.    Respiratory: Negative for cough, chest tightness, shortness of breath and wheezing.    Cardiovascular: Positive for leg swelling. Negative for chest pain and palpitations.   Gastrointestinal: Negative for abdominal pain, blood in stool and constipation.   Skin: Negative for color change.   Allergic/Immunologic: Negative for environmental allergies.   Neurological: Negative for dizziness, speech difficulty, headache and confusion.   Psychiatric/Behavioral: Negative for decreased concentration. The patient is not nervous/anxious.        Vital Signs  Vitals:    08/23/21 1543   BP: 102/60   BP Location: Left arm   Patient Position: Sitting   Cuff Size: Adult   Pulse: 99   Resp: 16   Temp: 98.9 °F (37.2 °C)   TempSrc: Infrared   SpO2: 97%   Weight: 58.2 kg (128 lb 6.4 oz)   Height: 162.6 cm (64\")   PainSc: 0-No pain     Body mass index is 22.04 kg/m².      Current Outpatient Medications:   •  apixaban " (ELIQUIS) 5 MG tablet tablet, Take 1 tablet by mouth Every 12 (Twelve) Hours. Indications: Atrial Fibrillation, Disp: 60 tablet, Rfl:   •  bisoprolol (ZEBeta) 5 MG tablet, Take 2 tablets by mouth Every Night., Disp: 180 tablet, Rfl: 1  •  Cholecalciferol (VITAMIN D3) 1000 units capsule, Take 1 capsule by mouth Daily., Disp: , Rfl:   •  gabapentin (NEURONTIN) 100 MG capsule, Take 2 capsules by mouth At Night As Needed (leg pain)., Disp: 6 capsule, Rfl: 0  •  ondansetron ODT (ZOFRAN-ODT) 4 MG disintegrating tablet, Place 1 tablet on the tongue 4 (Four) Times a Day As Needed for Nausea or Vomiting., Disp: 15 tablet, Rfl: 0  •  Probiotic Product (PROBIOTIC PEARLS ADVANTAGE PO), 1  PRN, Disp: , Rfl:   •  psyllium (METAMUCIL MULTIHEALTH FIBER) 58.12 % packet, Take 1 packet by mouth Daily., Disp: , Rfl:   •  Turmeric Curcumin 500 MG capsule, Take 1 capsule by mouth Daily. (Patient taking differently: Take 1 capsule by mouth As Needed.), Disp: 60 capsule, Rfl: 5  •  vitamin B-12 (CYANOCOBALAMIN) 1000 MCG tablet, Take 1 tablet by mouth Daily., Disp: , Rfl:   •  Dietary Management Product (Vasculera) tablet, Take 1 tablet by mouth 2 (two) times a day., Disp: 180 tablet, Rfl: 3  •  furosemide (Lasix) 40 MG tablet, Take 1 tablet by mouth Every Morning., Disp: 30 tablet, Rfl: 5  •  sodium chloride 1 g tablet, Take 2 tablets by mouth Daily., Disp: 60 tablet, Rfl: 5    Physical Exam:    Physical Exam  Vitals and nursing note reviewed.   Constitutional:       Appearance: She is well-developed.   HENT:      Head: Normocephalic.   Eyes:      Conjunctiva/sclera: Conjunctivae normal.      Pupils: Pupils are equal, round, and reactive to light.   Neck:      Thyroid: No thyromegaly.   Cardiovascular:      Rate and Rhythm: Normal rate and regular rhythm.      Heart sounds: Normal heart sounds.   Pulmonary:      Effort: Pulmonary effort is normal.      Breath sounds: Normal breath sounds. No wheezing.   Musculoskeletal:         General:  Normal range of motion.      Cervical back: Normal range of motion and neck supple.   Lymphadenopathy:      Cervical: No cervical adenopathy.   Skin:     General: Skin is warm and dry.   Neurological:      Mental Status: She is alert and oriented to person, place, and time.      Gait: Gait abnormal.   Psychiatric:         Attention and Perception: Attention normal.         Mood and Affect: Mood normal.         Thought Content: Thought content normal.          ACE III MINI        Results Review:    None    CMP:  Lab Results   Component Value Date    BUN 13 08/23/2021    CREATININE 0.84 08/23/2021    EGFRIFNONA 64 08/23/2021    BCR 15.5 08/23/2021     08/23/2021    K 3.6 08/23/2021    CO2 28.9 08/23/2021    CALCIUM 9.0 08/23/2021    ALBUMIN 4.10 08/09/2021    BILITOT 0.6 08/09/2021    ALKPHOS 91 08/09/2021    AST 21 08/09/2021    ALT 13 08/09/2021     HbA1c:  Lab Results   Component Value Date    HGBA1C 5.92 (H) 06/25/2021    HGBA1C 5.60 12/17/2020     Microalbumin:  No results found for: MICROALBUR, POCMALB, POCCREAT  Lipid Panel  Lab Results   Component Value Date    CHOL 135 06/25/2021    TRIG 67 06/25/2021    HDL 41 06/25/2021    LDL 80 06/25/2021    AST 21 08/09/2021    ALT 13 08/09/2021       Medication Review: Medications reviewed and noted  Patient Instructions   Problem List Items Addressed This Visit        Cardiac and Vasculature    Benign essential hypertension - Primary    Overview     8/29/2021  Jasmin Mchugh PA-C    Continue bisoprolol 15mg every evening and furosemide (Lasix) every morning.               Relevant Medications    bisoprolol (ZEBeta) 5 MG tablet    furosemide (Lasix) 40 MG tablet    PVC's (premature ventricular contractions)    Overview     8/29/2021 Isidra Eisenberg MD    Continue bisoprolol daily.  Decrease caffeine use.         Relevant Medications    bisoprolol (ZEBeta) 5 MG tablet    Atrial flutter with rapid ventricular response (CMS/HCC)    Overview     8/29/2021  Isidra Eisenberg MD     Patient presented to the ER 11/2/19 with lightheadedness and was found to be in a flutter with rapid ventricular response.  She received IV beta-blocker and converted.  Bisoprolol was increased to 10 mg and she was started on Eliquis 5 mg twice a day.     Echo showed EF 65% with borderline concentric hypertrophy grade 2 diastolic dysfunction with pseudonormalization.  Saline test are positive for evidence of an intra-atrial shunt.  Right atrial cavity is severely dilated, mild AR, mild TR RVSP 35 to 45 mmHg.     Continue eliquis twice a day and bisoprolol 10mg daily.     She is to follow-up with Dr. Kaye Aleman December 9.             Relevant Medications    bisoprolol (ZEBeta) 5 MG tablet       Endocrine and Metabolic    Mild protein-calorie malnutrition (CMS/HCC)    Overview     8/29/2021 Isidra Eisenberg MD    Continue to have some protein with each meal and to eat at least 3 meals a day.  She is to drink an Ensure or boost at least once a day.  She was given education on protein content of foods at visit in June.            Genitourinary and Reproductive     Hyponatremia    Overview       Continue oral fluid restriction.  Continue sodium chloride tablets 2 times a day with meals.  Drink a Gatorade or other electrolyte drink once a day.    Recheck labs today              Symptoms and Signs    Bilateral edema of lower extremity (Chronic)    Overview       Recheck labs today.    Continue furosemide (Lasix) daily.    Continue to elevate feet whenever sitting at home.  She will continue to wear support socks that come to the knee every day.  (She would be unable to get full-length support hose on.)                      Diagnosis Plan   1. Benign essential hypertension     2. PVC's (premature ventricular contractions)     3. Atrial flutter with rapid ventricular response (CMS/HCC)     4. Mild protein-calorie malnutrition (CMS/HCC)     5. Hyponatremia     6. Bilateral edema of lower  extremity             Plan of care reviewed with patient at the conclusion of today's visit. Education was provided regarding diagnosis, management, and any prescribed or recommended OTC medications.Patient verbalizes understanding of and agreement with management plan.         Isidra Eisenberg MD

## 2021-08-25 RX ORDER — FUROSEMIDE 40 MG/1
40 TABLET ORAL
Qty: 30 TABLET | Refills: 5 | Status: SHIPPED | OUTPATIENT
Start: 2021-08-25 | End: 2021-09-03 | Stop reason: SDUPTHER

## 2021-08-25 RX ORDER — DIOSMIN COMPLEX NO.1 630 MG
1 TABLET ORAL 2 TIMES DAILY
Qty: 180 TABLET | Refills: 3 | Status: SHIPPED | OUTPATIENT
Start: 2021-08-25 | End: 2021-12-22 | Stop reason: SDUPTHER

## 2021-08-25 RX ORDER — SODIUM CHLORIDE 1000 MG
2 TABLET, SOLUBLE MISCELLANEOUS DAILY
Qty: 60 TABLET | Refills: 5 | Status: SHIPPED | OUTPATIENT
Start: 2021-08-25 | End: 2021-09-24 | Stop reason: SDUPTHER

## 2021-08-29 NOTE — PATIENT INSTRUCTIONS
Patient Instructions   Problem List Items Addressed This Visit        Cardiac and Vasculature    Benign essential hypertension - Primary    Overview     8/29/2021  Jasmin Mchugh PA-C    Continue bisoprolol 15mg every evening and furosemide (Lasix) every morning.               Relevant Medications    bisoprolol (ZEBeta) 5 MG tablet    furosemide (Lasix) 40 MG tablet    PVC's (premature ventricular contractions)    Overview     8/29/2021 Isidra Eisenberg MD    Continue bisoprolol daily.  Decrease caffeine use.         Relevant Medications    bisoprolol (ZEBeta) 5 MG tablet    Atrial flutter with rapid ventricular response (CMS/HCC)    Overview     8/29/2021 Isidra Eisenberg MD     Patient presented to the ER 11/2/19 with lightheadedness and was found to be in a flutter with rapid ventricular response.  She received IV beta-blocker and converted.  Bisoprolol was increased to 10 mg and she was started on Eliquis 5 mg twice a day.     Echo showed EF 65% with borderline concentric hypertrophy grade 2 diastolic dysfunction with pseudonormalization.  Saline test are positive for evidence of an intra-atrial shunt.  Right atrial cavity is severely dilated, mild AR, mild TR RVSP 35 to 45 mmHg.     Continue eliquis twice a day and bisoprolol 10mg daily.     She is to follow-up with Dr. Kaye Aleman December 9.             Relevant Medications    bisoprolol (ZEBeta) 5 MG tablet       Endocrine and Metabolic    Mild protein-calorie malnutrition (CMS/HCC)    Overview     8/29/2021 Isidra Eisenberg MD    Continue to have some protein with each meal and to eat at least 3 meals a day.  She is to drink an Ensure or boost at least once a day.  She was given education on protein content of foods at visit in June.            Genitourinary and Reproductive     Hyponatremia    Overview       Continue oral fluid restriction.  Continue sodium chloride tablets 2 times a day with meals.  Drink a Gatorade or other electrolyte drink  once a day.    Recheck labs today              Symptoms and Signs    Bilateral edema of lower extremity (Chronic)    Overview       Recheck labs today.    Continue furosemide (Lasix) daily.    Continue to elevate feet whenever sitting at home.  She will continue to wear support socks that come to the knee every day.  (She would be unable to get full-length support hose on.)

## 2021-09-03 RX ORDER — FUROSEMIDE 40 MG/1
20 TABLET ORAL
Qty: 30 TABLET | Refills: 5 | Status: SHIPPED | OUTPATIENT
Start: 2021-09-03 | End: 2021-09-24 | Stop reason: SDUPTHER

## 2021-09-09 ENCOUNTER — PATIENT MESSAGE (OUTPATIENT)
Dept: INTERNAL MEDICINE | Facility: CLINIC | Age: 86
End: 2021-09-09

## 2021-09-09 ENCOUNTER — TELEPHONE (OUTPATIENT)
Dept: INTERNAL MEDICINE | Facility: CLINIC | Age: 86
End: 2021-09-09

## 2021-09-09 NOTE — TELEPHONE ENCOUNTER
I do not under stand question please call her and have her clarify ideally after seeing Dr Marroquin ----- Message from Amy Lee LPN sent at 9/9/2021  3:44 PM EDT -----  Regarding: FW: Non-Urgent Medical Question  Contact: [phone]    ----- Message -----  From: Thuy Clark  Sent: 9/9/2021   3:43 PM EDT  To: [facility] [address] [facility] [address]  Subject: Non-Urgent Medical Question                      I think I chose to stop the diuretic because the swelling is 99% gone. [place] began to flow, as a result of 1/2 pill of Lasix. This has never occurred before. That means I must take urine sample to Dr Marroquin tomorrow morning and find out what,s happening.Allan

## 2021-09-14 DIAGNOSIS — R73.09 ABNORMAL GLUCOSE: Primary | ICD-10-CM

## 2021-09-14 DIAGNOSIS — E87.1 HYPONATREMIA: ICD-10-CM

## 2021-09-16 ENCOUNTER — LAB (OUTPATIENT)
Dept: LAB | Facility: HOSPITAL | Age: 86
End: 2021-09-16

## 2021-09-16 DIAGNOSIS — R73.09 ABNORMAL GLUCOSE: ICD-10-CM

## 2021-09-16 DIAGNOSIS — E87.1 HYPONATREMIA: ICD-10-CM

## 2021-09-16 LAB
ANION GAP SERPL CALCULATED.3IONS-SCNC: 11.8 MMOL/L (ref 5–15)
BUN SERPL-MCNC: 13 MG/DL (ref 8–23)
BUN/CREAT SERPL: 18.3 (ref 7–25)
CALCIUM SPEC-SCNC: 9.5 MG/DL (ref 8.6–10.5)
CHLORIDE SERPL-SCNC: 100 MMOL/L (ref 98–107)
CO2 SERPL-SCNC: 24.2 MMOL/L (ref 22–29)
CREAT SERPL-MCNC: 0.71 MG/DL (ref 0.57–1)
GFR SERPL CREATININE-BSD FRML MDRD: 78 ML/MIN/1.73
GLUCOSE SERPL-MCNC: 103 MG/DL (ref 65–99)
POTASSIUM SERPL-SCNC: 4.3 MMOL/L (ref 3.5–5.2)
SODIUM SERPL-SCNC: 136 MMOL/L (ref 136–145)

## 2021-09-16 PROCEDURE — 83036 HEMOGLOBIN GLYCOSYLATED A1C: CPT

## 2021-09-16 PROCEDURE — 80048 BASIC METABOLIC PNL TOTAL CA: CPT

## 2021-09-17 LAB — HBA1C MFR BLD: 5.87 % (ref 4.8–5.6)

## 2021-09-24 ENCOUNTER — OFFICE VISIT (OUTPATIENT)
Dept: INTERNAL MEDICINE | Facility: CLINIC | Age: 86
End: 2021-09-24

## 2021-09-24 VITALS
DIASTOLIC BLOOD PRESSURE: 68 MMHG | TEMPERATURE: 98 F | HEIGHT: 64 IN | WEIGHT: 131.8 LBS | HEART RATE: 88 BPM | SYSTOLIC BLOOD PRESSURE: 122 MMHG | BODY MASS INDEX: 22.5 KG/M2

## 2021-09-24 DIAGNOSIS — I48.92 ATRIAL FLUTTER WITH RAPID VENTRICULAR RESPONSE (HCC): ICD-10-CM

## 2021-09-24 DIAGNOSIS — I10 BENIGN ESSENTIAL HYPERTENSION: ICD-10-CM

## 2021-09-24 DIAGNOSIS — E87.1 HYPONATREMIA: Primary | ICD-10-CM

## 2021-09-24 DIAGNOSIS — R60.0 BILATERAL EDEMA OF LOWER EXTREMITY: Chronic | ICD-10-CM

## 2021-09-24 DIAGNOSIS — I38 VALVULAR HEART DISEASE: ICD-10-CM

## 2021-09-24 PROCEDURE — 99214 OFFICE O/P EST MOD 30 MIN: CPT | Performed by: INTERNAL MEDICINE

## 2021-09-24 RX ORDER — SODIUM CHLORIDE 1000 MG
1 TABLET, SOLUBLE MISCELLANEOUS DAILY
Qty: 90 TABLET | Refills: 1 | Status: SHIPPED | OUTPATIENT
Start: 2021-09-24 | End: 2022-05-02

## 2021-09-24 RX ORDER — FUROSEMIDE 20 MG/1
TABLET ORAL
Qty: 90 TABLET | Refills: 1 | Status: SHIPPED | OUTPATIENT
Start: 2021-09-24 | End: 2022-08-31

## 2021-09-24 NOTE — PROGRESS NOTES
"[facility]     Thuy Clark  1935   [id number]      Patient Care Team:  Isidra Eisenberg MD as PCP - General (Internal Medicine)  Isidra Eisenberg MD as PCP - Internal Medicine  Azam Marroquin MD as Consulting Physician (Urology)  Lili Hebert MD as Consulting Physician (Gastroenterology)  Isidra Eisenberg MD as Consulting Physician (Internal Medicine)    Chief Complaint   Patient presents with   • Hyperlipidemia   • Hypertension            HPI  Patient is a 85 y.o. female presents with hyponatremia, edema, A. fib, valvular heart disease    CHRONIC CONDITIONS  She asked questions about the heart test including echo and stress test.  I answered her questions about \"leaky valves\".  She is not having any current shortness of breath or palpitations or feelings of rapid heartbeat.  She is taking Eliquis twice a day for A. fib and taking bisoprolol 10 mg a day.    Her edema has resolved.  She is taking one half of the 40 mg Lasix currently.  She is elevating feet at home when sitting.  She is taking Vasculera twice a day.    She is taking 2 of the sodium chloride tablets every morning.  Serum sodium in August was 137.  Today serum sodium is 136.    Blood pressures are well controlled on  bisoprolol and furosemide.    Past Medical History:   Diagnosis Date   • Acute cystitis with hematuria 6/25/2021 6/25/2021 Isidra Eisenberg MD  Urine sent for culture.   • Bleeding hemorrhoid 3/8/2019   • Diverticulosis    • External hemorrhoid 6/11/2019 12/6/2019 Isidra Eisenberg MD   Continue vasculera daily. Drink plenty of fluids.  Avoid constipation.  Follow-up with Dr. Hebert as planned.   • Hx of colonic polyps 2003   • Hypertension    • Intraductal papilloma     R intraductal patheloma   • Left upper extremity numbness     L upper extremity numbness-ER visit; neuropathy   • Overweight (BMI 25.0-29.9) 6/11/2019   • Self-catheterizes urinary bladder        Past Surgical History: " "  Procedure Laterality Date   • BREAST LUMPECTOMY  2006   • CHOLECYSTECTOMY     • HYSTERECTOMY  1984   • VAGINECTOMY  2009       Family History   Problem Relation Age of Onset   • Hyperlipidemia Other    • Hypertension Other    • Coronary artery disease Other    • No Known Problems Mother    • No Known Problems Father        Social History     Socioeconomic History   • Marital status:      Spouse name: Not on file   • Number of children: Not on file   • Years of education: Not on file   • Highest education level: Not on file   Tobacco Use   • Smoking status: Never Smoker   • Smokeless tobacco: Never Used   Substance and Sexual Activity   • Alcohol use: Never     Alcohol/week: 1.0 standard drinks     Types: 1 Glasses of wine per week   • Drug use: Never   • Sexual activity: Defer       Allergies   Allergen Reactions   • Levofloxacin Other (See Comments)     itching   • Nitrofuran Derivatives Rash     primarily in b/l hands   • Nitrofurantoin Rash       Review of Systems:     Review of Systems   Constitutional: Negative for chills, fatigue and fever.   HENT: Negative for congestion, ear pain and sinus pressure.    Respiratory: Negative for cough, chest tightness, shortness of breath and wheezing.    Cardiovascular: Negative for chest pain, palpitations and leg swelling.   Gastrointestinal: Negative for abdominal pain, blood in stool and constipation.   Skin: Negative for color change.   Allergic/Immunologic: Negative for environmental allergies.   Neurological: Negative for dizziness and headache.   Psychiatric/Behavioral: Negative for depressed mood. The patient is not nervous/anxious.        Vital Signs  Vitals:    09/24/21 1634   BP: 122/68   BP Location: Left arm   Patient Position: Sitting   Cuff Size: Adult   Pulse: 88  Comment: irregular   Temp: 98 °F (36.7 °C)   Weight: 59.8 kg (131 lb 12.8 oz)   Height: 162.6 cm (64.02\")   PainSc: 0-No pain     Body mass index is 22.61 kg/m².  Patient's Body mass index " is 22.61 kg/m². indicating that she is within normal range (BMI 18.5-24.9). No BMI management plan needed..        Current Outpatient Medications:   •  apixaban (ELIQUIS) 5 MG tablet tablet, Take 1 tablet by mouth Every 12 (Twelve) Hours. Indications: Atrial Fibrillation, Disp: 60 tablet, Rfl:   •  bisoprolol (ZEBeta) 5 MG tablet, Take 2 tablets by mouth Every Night., Disp: 180 tablet, Rfl: 1  •  Cholecalciferol (VITAMIN D3) 1000 units capsule, Take 1 capsule by mouth Daily., Disp: , Rfl:   •  Dietary Management Product (Vasculera) tablet, Take 1 tablet by mouth 2 (two) times a day., Disp: 180 tablet, Rfl: 3  •  furosemide (Lasix) 20 MG tablet, Take one tablet every other day., Disp: 90 tablet, Rfl: 1  •  gabapentin (NEURONTIN) 100 MG capsule, Take 2 capsules by mouth At Night As Needed (leg pain)., Disp: 6 capsule, Rfl: 0  •  ondansetron ODT (ZOFRAN-ODT) 4 MG disintegrating tablet, Place 1 tablet on the tongue 4 (Four) Times a Day As Needed for Nausea or Vomiting., Disp: 15 tablet, Rfl: 0  •  sodium chloride 1 g tablet, Take 1 tablet by mouth Daily., Disp: 90 tablet, Rfl: 1  •  Turmeric Curcumin 500 MG capsule, Take 1 capsule by mouth Daily. (Patient taking differently: Take 1 capsule by mouth As Needed.), Disp: 60 capsule, Rfl: 5  •  vitamin B-12 (CYANOCOBALAMIN) 1000 MCG tablet, Take 1 tablet by mouth Daily., Disp: , Rfl:   •  Probiotic Product (PROBIOTIC PEARLS ADVANTAGE PO), 1  PRN, Disp: , Rfl:   •  psyllium (METAMUCIL MULTIHEALTH FIBER) 58.12 % packet, Take 1 packet by mouth Daily., Disp: , Rfl:     Physical Exam:    Physical Exam  Vitals and nursing note reviewed.   Constitutional:       Appearance: She is well-developed.   HENT:      Head: Normocephalic.   Eyes:      Conjunctiva/sclera: Conjunctivae normal.      Pupils: Pupils are equal, round, and reactive to light.   Neck:      Thyroid: No thyromegaly.   Cardiovascular:      Rate and Rhythm: Normal rate. Rhythm irregularly irregular.      Heart sounds:  Normal heart sounds.   Pulmonary:      Effort: Pulmonary effort is normal.      Breath sounds: Normal breath sounds. No wheezing.   Musculoskeletal:         General: Normal range of motion.      Cervical back: Normal range of motion and neck supple.      Right lower leg: No edema.      Left lower leg: No edema.   Lymphadenopathy:      Cervical: No cervical adenopathy.   Skin:     General: Skin is warm and dry.   Neurological:      Mental Status: She is alert and oriented to person, place, and time.   Psychiatric:         Attention and Perception: Attention normal.         Mood and Affect: Mood normal.         Thought Content: Thought content normal.          ACE III MINI        Results Review:    I reviewed the patient's new clinical results.    CMP:  Lab Results   Component Value Date    BUN 13 09/16/2021    CREATININE 0.71 09/16/2021    EGFRIFNONA 78 09/16/2021    BCR 18.3 09/16/2021     09/16/2021    K 4.3 09/16/2021    CO2 24.2 09/16/2021    CALCIUM 9.5 09/16/2021    ALBUMIN 4.10 08/09/2021    BILITOT 0.6 08/09/2021    ALKPHOS 91 08/09/2021    AST 21 08/09/2021    ALT 13 08/09/2021     HbA1c:  Lab Results   Component Value Date    HGBA1C 5.87 (H) 09/16/2021    HGBA1C 5.92 (H) 06/25/2021     Microalbumin:  No results found for: MICROALBUR, POCMALB, POCCREAT  Lipid Panel  Lab Results   Component Value Date    CHOL 135 06/25/2021    TRIG 67 06/25/2021    HDL 41 06/25/2021    LDL 80 06/25/2021    AST 21 08/09/2021    ALT 13 08/09/2021       Medication Review: Medications reviewed and noted  Patient Instructions   Problem List Items Addressed This Visit        Cardiac and Vasculature    Benign essential hypertension    Overview     9/24/2021 Isidra Eisenberg MD    Continue bisoprolol 10mg every evening.    Decrease Lasix dose to 20 mg every other day.             Relevant Medications    bisoprolol (ZEBeta) 5 MG tablet    furosemide (Lasix) 20 MG tablet    Other Relevant Orders    Basic Metabolic Panel     Valvular heart disease    Overview     9/24/2021 Isidra Eisenberg MD    6/20/2021 echocardiogram: mild AR and mild TR.  Elevated right ventricular systolic pressure of 35 to 45 mmHg.  Severely dilated right atrial cavity.  Left atrial volume severely increased.  Saline test positive for intra-atrial shunt.  Borderline left ventricular hypertrophy.  Ejection fraction 65%.     8/27/21 echo at [facility] by Dr. Kaye Aleman-EF 60%.  Moderate MR and moderate TR and mild AR. Stress test showed no ischemic changes.  Afib thru out study.    Continue Eliquis twice a day and 10mg bisoprolol every evening.         Relevant Medications    bisoprolol (ZEBeta) 5 MG tablet    Atrial flutter with rapid ventricular response (CMS/HCC)    Overview     9/24/2021 Isidra Eisenberg MD     Patient presented to the ER 11/2/19 with lightheadedness and was found to be in a flutter with rapid ventricular response.  She received IV beta-blocker and converted.  Bisoprolol was increased to 10 mg and she was started on Eliquis 5 mg twice a day.     Echo showed EF 65% with borderline concentric hypertrophy grade 2 diastolic dysfunction with pseudonormalization.  Saline test are positive for evidence of an intra-atrial shunt.  Right atrial cavity is severely dilated, mild AR, mild TR RVSP 35 to 45 mmHg.     Continue eliquis twice a day and bisoprolol 10mg daily.     She is to follow-up with Dr. Kaye Aleman December 9.             Relevant Medications    bisoprolol (ZEBeta) 5 MG tablet       Genitourinary and Reproductive     Hyponatremia - Primary    Overview     9/24/2021 Isidra Eisenberg MD    Continue oral fluid restriction.  May decrease sodium chloride tablets to 1 tablet every morning with meals.  Drink a Gatorade or other electrolyte drink once a day.    Recheck labs in November.            Symptoms and Signs    Bilateral edema of lower extremity (Chronic)    Overview     9/24/2021 Isidra Eisenberg MD    Edema has improved.    May decrease  furosemide (Lasix) to 20 mg every other day.     Continue to elevate feet whenever sitting at home.  She will continue to wear support socks that come to the knee every day.  (She would be unable to get full-length support hose on.)                      Diagnosis Plan   1. Hyponatremia     2. Benign essential hypertension  Basic Metabolic Panel   3. Valvular heart disease     4. Atrial flutter with rapid ventricular response (CMS/HCC)     5. Bilateral edema of lower extremity             Plan of care reviewed with patient at the conclusion of today's visit. Education was provided regarding diagnosis, management, and any prescribed or recommended OTC medications.Patient verbalizes understanding of and agreement with management plan.         Isidra Eisenberg MD

## 2021-09-24 NOTE — PATIENT INSTRUCTIONS
Patient Instructions   Problem List Items Addressed This Visit        Cardiac and Vasculature    Benign essential hypertension    Overview     9/24/2021 Isidra Eisenberg MD    Continue bisoprolol 10mg every evening.    Decrease Lasix dose to 20 mg every other day.             Relevant Medications    bisoprolol (ZEBeta) 5 MG tablet    furosemide (Lasix) 20 MG tablet    Other Relevant Orders    Basic Metabolic Panel    Valvular heart disease    Overview     9/24/2021 Isidra Eisenberg MD    6/20/2021 echocardiogram: mild AR and mild TR.  Elevated right ventricular systolic pressure of 35 to 45 mmHg.  Severely dilated right atrial cavity.  Left atrial volume severely increased.  Saline test positive for intra-atrial shunt.  Borderline left ventricular hypertrophy.  Ejection fraction 65%.     8/27/21 echo at [facility] by Dr. Kaye Aleman-EF 60%.  Moderate MR and moderate TR and mild AR. Stress test showed no ischemic changes.  Afib thru out study.    Continue Eliquis twice a day and 10mg bisoprolol every evening.         Relevant Medications    bisoprolol (ZEBeta) 5 MG tablet    Atrial flutter with rapid ventricular response (CMS/HCC)    Overview     9/24/2021 Isidra Eisenberg MD     Patient presented to the ER 11/2/19 with lightheadedness and was found to be in a flutter with rapid ventricular response.  She received IV beta-blocker and converted.  Bisoprolol was increased to 10 mg and she was started on Eliquis 5 mg twice a day.     Echo showed EF 65% with borderline concentric hypertrophy grade 2 diastolic dysfunction with pseudonormalization.  Saline test are positive for evidence of an intra-atrial shunt.  Right atrial cavity is severely dilated, mild AR, mild TR RVSP 35 to 45 mmHg.     Continue eliquis twice a day and bisoprolol 10mg daily.     She is to follow-up with Dr. Kaye Aleman December 9.             Relevant Medications    bisoprolol (ZEBeta) 5 MG tablet       Genitourinary and Reproductive      Hyponatremia - Primary    Overview     9/24/2021 Isidra Eisenberg MD    Continue oral fluid restriction.  May decrease sodium chloride tablets to 1 tablet every morning with meals.  Drink a Gatorade or other electrolyte drink once a day.    Recheck labs in November.            Symptoms and Signs    Bilateral edema of lower extremity (Chronic)    Overview     9/24/2021 Isidra Eisenberg MD    Edema has improved.    May decrease furosemide (Lasix) to 20 mg every other day.     Continue to elevate feet whenever sitting at home.  She will continue to wear support socks that come to the knee every day.  (She would be unable to get full-length support hose on.)

## 2021-10-26 DIAGNOSIS — I10 BENIGN ESSENTIAL HYPERTENSION: Primary | ICD-10-CM

## 2021-10-27 ENCOUNTER — LAB (OUTPATIENT)
Dept: LAB | Facility: HOSPITAL | Age: 86
End: 2021-10-27

## 2021-10-27 DIAGNOSIS — I10 BENIGN ESSENTIAL HYPERTENSION: ICD-10-CM

## 2021-10-27 PROCEDURE — 80048 BASIC METABOLIC PNL TOTAL CA: CPT

## 2021-10-28 LAB
ANION GAP SERPL CALCULATED.3IONS-SCNC: 9.9 MMOL/L (ref 5–15)
BUN SERPL-MCNC: 12 MG/DL (ref 8–23)
BUN/CREAT SERPL: 15.4 (ref 7–25)
CALCIUM SPEC-SCNC: 9.2 MG/DL (ref 8.6–10.5)
CHLORIDE SERPL-SCNC: 97 MMOL/L (ref 98–107)
CO2 SERPL-SCNC: 26.1 MMOL/L (ref 22–29)
CREAT SERPL-MCNC: 0.78 MG/DL (ref 0.57–1)
GFR SERPL CREATININE-BSD FRML MDRD: 70 ML/MIN/1.73
GLUCOSE SERPL-MCNC: 88 MG/DL (ref 65–99)
POTASSIUM SERPL-SCNC: 3.9 MMOL/L (ref 3.5–5.2)
SODIUM SERPL-SCNC: 133 MMOL/L (ref 136–145)

## 2021-11-09 ENCOUNTER — TELEPHONE (OUTPATIENT)
Dept: INTERNAL MEDICINE | Facility: CLINIC | Age: 86
End: 2021-11-09

## 2021-11-09 NOTE — TELEPHONE ENCOUNTER
Caller: Thuy Clark    Relationship to patient: Self    Best call back number: [phone]    Type of visit: FOLLOW UP    Requested date: ANYTIME IN NOVEMBER     Additional notes:  PATIENT IS WANTING TO KNOW WHEN DR ORDAZ WANTS HER TO SCHEDULE ANOTHER APPOINTMENT.  ALSO, SHE NEEDS SOME SAMPLES OF ELOQUIS.

## 2021-11-14 DIAGNOSIS — I10 BENIGN ESSENTIAL HYPERTENSION: Primary | ICD-10-CM

## 2021-11-16 ENCOUNTER — APPOINTMENT (OUTPATIENT)
Dept: GENERAL RADIOLOGY | Facility: HOSPITAL | Age: 86
End: 2021-11-16

## 2021-11-16 ENCOUNTER — HOSPITAL ENCOUNTER (EMERGENCY)
Facility: HOSPITAL | Age: 86
Discharge: HOME | End: 2021-11-16
Attending: EMERGENCY MEDICINE | Admitting: EMERGENCY MEDICINE

## 2021-11-16 ENCOUNTER — APPOINTMENT (OUTPATIENT)
Dept: CT IMAGING | Facility: HOSPITAL | Age: 86
End: 2021-11-16

## 2021-11-16 VITALS
HEART RATE: 100 BPM | TEMPERATURE: 97.7 F | DIASTOLIC BLOOD PRESSURE: 96 MMHG | RESPIRATION RATE: 16 BRPM | BODY MASS INDEX: 22.2 KG/M2 | SYSTOLIC BLOOD PRESSURE: 155 MMHG | WEIGHT: 130 LBS | OXYGEN SATURATION: 97 % | HEIGHT: 64 IN

## 2021-11-16 DIAGNOSIS — R51.9 ACUTE NONINTRACTABLE HEADACHE, UNSPECIFIED HEADACHE TYPE: Primary | ICD-10-CM

## 2021-11-16 LAB
ALBUMIN SERPL-MCNC: 4.4 G/DL (ref 3.5–5.2)
ALBUMIN/GLOB SERPL: 1.8 G/DL
ALP SERPL-CCNC: 114 U/L (ref 39–117)
ALT SERPL W P-5'-P-CCNC: 13 U/L (ref 1–33)
ANION GAP SERPL CALCULATED.3IONS-SCNC: 10 MMOL/L (ref 5–15)
AST SERPL-CCNC: 20 U/L (ref 1–32)
BASOPHILS # BLD AUTO: 0.06 10*3/MM3 (ref 0–0.2)
BASOPHILS NFR BLD AUTO: 0.9 % (ref 0–1.5)
BILIRUB SERPL-MCNC: 0.7 MG/DL (ref 0–1.2)
BUN SERPL-MCNC: 14 MG/DL (ref 8–23)
BUN/CREAT SERPL: 18.2 (ref 7–25)
CALCIUM SPEC-SCNC: 9.1 MG/DL (ref 8.6–10.5)
CHLORIDE SERPL-SCNC: 99 MMOL/L (ref 98–107)
CO2 SERPL-SCNC: 25 MMOL/L (ref 22–29)
CREAT SERPL-MCNC: 0.77 MG/DL (ref 0.57–1)
DEPRECATED RDW RBC AUTO: 48.6 FL (ref 37–54)
EOSINOPHIL # BLD AUTO: 0.13 10*3/MM3 (ref 0–0.4)
EOSINOPHIL NFR BLD AUTO: 2 % (ref 0.3–6.2)
ERYTHROCYTE [DISTWIDTH] IN BLOOD BY AUTOMATED COUNT: 15.4 % (ref 12.3–15.4)
GFR SERPL CREATININE-BSD FRML MDRD: 71 ML/MIN/1.73
GLOBULIN UR ELPH-MCNC: 2.5 GM/DL
GLUCOSE SERPL-MCNC: 102 MG/DL (ref 65–99)
HCT VFR BLD AUTO: 36.1 % (ref 34–46.6)
HGB BLD-MCNC: 11.7 G/DL (ref 12–15.9)
HOLD SPECIMEN: NORMAL
IMM GRANULOCYTES # BLD AUTO: 0.02 10*3/MM3 (ref 0–0.05)
IMM GRANULOCYTES NFR BLD AUTO: 0.3 % (ref 0–0.5)
INR PPP: 1.4 (ref 0.85–1.16)
LYMPHOCYTES # BLD AUTO: 1.6 10*3/MM3 (ref 0.7–3.1)
LYMPHOCYTES NFR BLD AUTO: 24.1 % (ref 19.6–45.3)
MCH RBC QN AUTO: 27.9 PG (ref 26.6–33)
MCHC RBC AUTO-ENTMCNC: 32.4 G/DL (ref 31.5–35.7)
MCV RBC AUTO: 86.2 FL (ref 79–97)
MONOCYTES # BLD AUTO: 0.59 10*3/MM3 (ref 0.1–0.9)
MONOCYTES NFR BLD AUTO: 8.9 % (ref 5–12)
NEUTROPHILS NFR BLD AUTO: 4.24 10*3/MM3 (ref 1.7–7)
NEUTROPHILS NFR BLD AUTO: 63.8 % (ref 42.7–76)
NRBC BLD AUTO-RTO: 0 /100 WBC (ref 0–0.2)
NT-PROBNP SERPL-MCNC: 2130 PG/ML (ref 0–1800)
PLATELET # BLD AUTO: 279 10*3/MM3 (ref 140–450)
PMV BLD AUTO: 9.4 FL (ref 6–12)
POTASSIUM SERPL-SCNC: 4.2 MMOL/L (ref 3.5–5.2)
PROT SERPL-MCNC: 6.9 G/DL (ref 6–8.5)
PROTHROMBIN TIME: 16.7 SECONDS (ref 11.4–14.4)
QT INTERVAL: 374 MS
QTC INTERVAL: 474 MS
RBC # BLD AUTO: 4.19 10*6/MM3 (ref 3.77–5.28)
SODIUM SERPL-SCNC: 134 MMOL/L (ref 136–145)
TROPONIN T SERPL-MCNC: <0.01 NG/ML (ref 0–0.03)
WBC # BLD AUTO: 6.64 10*3/MM3 (ref 3.4–10.8)
WHOLE BLOOD HOLD SPECIMEN: NORMAL
WHOLE BLOOD HOLD SPECIMEN: NORMAL

## 2021-11-16 PROCEDURE — 85025 COMPLETE CBC W/AUTO DIFF WBC: CPT | Performed by: EMERGENCY MEDICINE

## 2021-11-16 PROCEDURE — 93005 ELECTROCARDIOGRAM TRACING: CPT | Performed by: EMERGENCY MEDICINE

## 2021-11-16 PROCEDURE — 71046 X-RAY EXAM CHEST 2 VIEWS: CPT

## 2021-11-16 PROCEDURE — 85610 PROTHROMBIN TIME: CPT | Performed by: EMERGENCY MEDICINE

## 2021-11-16 PROCEDURE — 36415 COLL VENOUS BLD VENIPUNCTURE: CPT

## 2021-11-16 PROCEDURE — 99283 EMERGENCY DEPT VISIT LOW MDM: CPT

## 2021-11-16 PROCEDURE — 80053 COMPREHEN METABOLIC PANEL: CPT | Performed by: EMERGENCY MEDICINE

## 2021-11-16 PROCEDURE — 83880 ASSAY OF NATRIURETIC PEPTIDE: CPT | Performed by: EMERGENCY MEDICINE

## 2021-11-16 PROCEDURE — 84484 ASSAY OF TROPONIN QUANT: CPT | Performed by: EMERGENCY MEDICINE

## 2021-11-16 PROCEDURE — 70450 CT HEAD/BRAIN W/O DYE: CPT

## 2021-11-16 RX ORDER — SODIUM CHLORIDE 0.9 % (FLUSH) 0.9 %
10 SYRINGE (ML) INJECTION AS NEEDED
Status: DISCONTINUED | OUTPATIENT
Start: 2021-11-16 | End: 2021-11-16 | Stop reason: HOSPADM

## 2021-11-16 NOTE — ED PROVIDER NOTES
"Subjective   Pt complains of headaches for about a week. Intermittent, every few hours, last for 2-3 minutes at time.  Feeling is like \"lightning\" but moves around inside of head \"like a worm moving around.\"  No associated numbness, tingling, weakness, nausea, vomiting, vision change, speech change.  She did not try taking anything for it.  No recent injury.  No fever or vomiting or AMS.  She has never had this before.  She is on Eliquis and is worried about bleeding.  She denies any systemic complaints such as fever, cough, vomiting, chest pain, dyspnea, abdominal pain.        History provided by:  Patient and medical records      Review of Systems   Constitutional: Negative for fever.   Eyes: Negative for visual disturbance.   Respiratory: Negative for cough and shortness of breath.    Cardiovascular: Negative for chest pain.   Gastrointestinal: Negative for nausea and vomiting.   Neurological: Positive for headaches. Negative for speech difficulty, weakness and numbness.   Psychiatric/Behavioral: Negative for confusion.   All other systems reviewed and are negative.      Past Medical History:   Diagnosis Date   • Acute cystitis with hematuria 6/25/2021 6/25/2021 Isidra Eisenberg MD  Urine sent for culture.   • Atrial fibrillation (HCC) 2019   • Bleeding hemorrhoid 3/8/2019   • Diverticulosis    • External hemorrhoid 6/11/2019 12/6/2019 Isidra Eisenberg MD   Continue vasculera daily. Drink plenty of fluids.  Avoid constipation.  Follow-up with Dr. Hebert as planned.   • Hx of colonic polyps 2003   • Hypertension    • Intraductal papilloma     R intraductal patheloma   • Left upper extremity numbness     L upper extremity numbness-ER visit; neuropathy   • Overweight (BMI 25.0-29.9) 6/11/2019   • Self-catheterizes urinary bladder        Allergies   Allergen Reactions   • Levofloxacin Other (See Comments)     itching   • Nitrofuran Derivatives Rash     primarily in b/l hands   • Nitrofurantoin Rash       Past " Surgical History:   Procedure Laterality Date   • BREAST LUMPECTOMY  2006   • CHOLECYSTECTOMY     • HYSTERECTOMY  1984   • VAGINECTOMY  2009       Family History   Problem Relation Age of Onset   • Hyperlipidemia Other    • Hypertension Other    • Coronary artery disease Other    • No Known Problems Mother    • No Known Problems Father        Social History     Socioeconomic History   • Marital status:    Tobacco Use   • Smoking status: Never Smoker   • Smokeless tobacco: Never Used   Substance and Sexual Activity   • Alcohol use: Never     Alcohol/week: 1.0 standard drink     Types: 1 Glasses of wine per week   • Drug use: Never   • Sexual activity: Defer           Objective   Physical Exam  Vitals and nursing note reviewed.   Constitutional:       General: She is not in acute distress.     Appearance: Normal appearance. She is not ill-appearing.   HENT:      Head: Normocephalic and atraumatic.      Mouth/Throat:      Mouth: Mucous membranes are moist.   Eyes:      General: No scleral icterus.        Right eye: No discharge.         Left eye: No discharge.      Extraocular Movements: Extraocular movements intact.      Conjunctiva/sclera: Conjunctivae normal.      Pupils: Pupils are equal, round, and reactive to light.   Cardiovascular:      Rate and Rhythm: Normal rate. Rhythm irregular.      Heart sounds: No murmur heard.      Pulmonary:      Effort: Pulmonary effort is normal. No respiratory distress.      Breath sounds: Normal breath sounds. No wheezing.   Abdominal:      General: Bowel sounds are normal. There is no distension.      Palpations: Abdomen is soft.      Tenderness: There is no abdominal tenderness. There is no guarding or rebound.   Musculoskeletal:         General: No swelling. Normal range of motion.      Cervical back: Normal range of motion and neck supple.   Skin:     General: Skin is warm and dry.      Findings: No rash.   Neurological:      General: No focal deficit present.       Mental Status: She is alert and oriented to person, place, and time. Mental status is at baseline.      Comments: Speech fluent and articulate.  No facial droop.  5/5 strength in arms and legs.  Normal .  Mentation normal.   Psychiatric:         Mood and Affect: Mood normal.         Behavior: Behavior normal.         Thought Content: Thought content normal.         Procedures           ED Course         Normal neuro exam.  CT head negative.  No symptoms at present.    Unclear why dyspnea protocol ordered from triage.  She denies all chest/dyspnea symptoms.  CXR suggests mild edema.  EKG AF.  Labs benign.    Patient stable on serial rechecks.  Discussed findings, concerns, plan of care, expected course, reasons to return and followup.  Provided the opportunity to ask questions.                                    MDM  Number of Diagnoses or Management Options     Amount and/or Complexity of Data Reviewed  Clinical lab tests: ordered and reviewed  Tests in the radiology section of CPT®: ordered and reviewed  Independent visualization of images, tracings, or specimens: yes        Final diagnoses:   Acute nonintractable headache, unspecified headache type       ED Disposition  ED Disposition     ED Disposition Condition Comment    Discharge Stable           Isidra Eisenberg MD  [address] [id number] [phone]    In 2 days  As scheduled         Medication List      Changed    furosemide 20 MG tablet  Commonly known as: Lasix  Take one tablet every other day.  What changed: additional instructions     Turmeric Curcumin 500 MG capsule  Take 1 capsule by mouth Daily.  What changed:   · when to take this  · reasons to take this             Daniel Brown MD  11/16/21 [id number]

## 2021-11-17 ENCOUNTER — PATIENT OUTREACH (OUTPATIENT)
Dept: CASE MANAGEMENT | Facility: OTHER | Age: 86
End: 2021-11-17

## 2021-11-17 NOTE — OUTREACH NOTE
"Ambulatory Case Management Note    Patient Outreach    Pt contacted RN-ACM stating she was at [facility] ER yest (11/16/21) which her PCP recommended she go, due to \"shooting h/a.\"  States her h/a has completely resolved, however feels a little stiff in neck and shoulders today. She does have f/u appt with her PCP, Dr. Eisenberg 11/19/21. Symptoms that would warrant return to ED reviewed with pt and v/u.  She continues to live alone, is self sufficient and is compliant with her medicines.  She is very inquisitive and knowledgeable in her medical conditions. She ambulates with cane and states her son lives \"just around the corner.\"  Discussed fall prevention and states she has never fallen.  She also states when she goes to basement for laundry, etc, she calls her daughter to let her know and then calls her when she is back on her main floor.  Care gaps reviewed with pt and is utd. She does not have living will and is not interested at present.  She is active on MyChart with her PCP.  She denies any needs at present.  Knows to contact RN-ACM for future needs.     SDOH updated and reviewed with the patient during this program:     Food Insecurity: No Food Insecurity   • Worried About Running Out of Food in the Last Year: Never true   • Ran Out of Food in the Last Year: Never true       Transportation Needs: No Transportation Needs   • Lack of Transportation (Medical): No   • Lack of Transportation (Non-Medical): No       General & Health Literacy Assessment    Questions/Answers      Most Recent Value   Assessment Completed With Patient   Living Arrangement Alone   Type of Residence Private Residence   Communication Device No   Bed or Wheelchair Confined No   Difficulty Keeping Appointments No   Health Literacy Excellent        Care Evaluation    Questions/Answers      Most Recent Value   Annual Wellness Visit:  Patient Has Completed   Care Gaps Addressed Flu Shot,  Pneumonia Vaccine   Flu Shot Status Up to Date   Pneumonia " Vaccine Status Up to Date   Other Patient Education/Resources  24/7 [facility] Nurse Call Line,  Advanced Care Planning   24/7 Nurse Call Line Education Method Verbal   ACP Education Method Verbal   Advanced Directives: Not Interested At This Time   Healthy Lifestyle (Self-Efficacy) recognizes when to contact medical assistance,  self-reports important symptoms to medical professional        Sabina Quinones RN  Ambulatory Case Management    11/17/2021, 16:30 EST

## 2021-11-19 ENCOUNTER — OFFICE VISIT (OUTPATIENT)
Dept: INTERNAL MEDICINE | Facility: CLINIC | Age: 86
End: 2021-11-19

## 2021-11-19 VITALS
TEMPERATURE: 97.1 F | DIASTOLIC BLOOD PRESSURE: 68 MMHG | WEIGHT: 135.6 LBS | RESPIRATION RATE: 16 BRPM | OXYGEN SATURATION: 96 % | BODY MASS INDEX: 23.15 KG/M2 | HEIGHT: 64 IN | HEART RATE: 100 BPM | SYSTOLIC BLOOD PRESSURE: 122 MMHG

## 2021-11-19 DIAGNOSIS — I48.21 PERMANENT ATRIAL FIBRILLATION (HCC): ICD-10-CM

## 2021-11-19 DIAGNOSIS — I10 BENIGN ESSENTIAL HYPERTENSION: ICD-10-CM

## 2021-11-19 DIAGNOSIS — E87.1 HYPONATREMIA: ICD-10-CM

## 2021-11-19 DIAGNOSIS — R60.0 BILATERAL EDEMA OF LOWER EXTREMITY: Chronic | ICD-10-CM

## 2021-11-19 DIAGNOSIS — R51.9 ACUTE NONINTRACTABLE HEADACHE, UNSPECIFIED HEADACHE TYPE: Primary | Chronic | ICD-10-CM

## 2021-11-19 PROCEDURE — 99214 OFFICE O/P EST MOD 30 MIN: CPT | Performed by: INTERNAL MEDICINE

## 2021-11-19 NOTE — PROGRESS NOTES
"[facility]     Thuy Clark  1935   [id number]      Patient Care Team:  Isidra Eisenberg MD as PCP - General (Internal Medicine)  Isidra Eisenberg MD as PCP - Internal Medicine  Azam Marroquin MD as Consulting Physician (Urology)  Lili Hebert MD as Consulting Physician (Gastroenterology)  Isidra Eisenberg MD as Consulting Physician (Internal Medicine)    Chief Complaint   Patient presents with   • Leg Swelling   • Hypertension   • Hyperlipidemia   • Atrial Fibrillation   • Med Refill     and discuss on sodium             HPI  Patient is a 85 y.o. female presents with on 11/16/2021 for headaches.  She was having intermittent pains \"like lightning\" that lasted for 2 to 3 minutes at a time.  No numbness, weakness, nausea vomiting, tingling, vision or speech changes.  This was very unusual for her.  She is on Eliquis twice a day.    Labs and CT of the head at the ER were acceptable.      CHRONIC CONDITIONS  Hyponatremia is stable and very mild now with the serum sodium in the ER being 134.  She is taking 1 sodium chloride tablet daily.    Bilateral ankle edema is overall much better than it had been.  She is taking 1/2 tablet of Lasix every other day.  She denies any shortness of breath with exertion.  She does elevate her feet at home, but she admits that she has not been wearing support socks because they are hard to get on.    For A. fib, she is taking bisoprolol and Eliquis.  Occasionally she feels palpitations but it does not cause any shortness of breath or chest pain.    Past Medical History:   Diagnosis Date   • Acute cystitis with hematuria 6/25/2021 6/25/2021 Isidra Eisenberg MD  Urine sent for culture.   • Atrial fibrillation (HCC) 2019   • Bleeding hemorrhoid 3/8/2019   • Diverticulosis    • External hemorrhoid 6/11/2019 12/6/2019 Isidra Eisenberg MD   Continue vasculera daily. Drink plenty of fluids.  Avoid constipation.  Follow-up with Dr. Hebert as " planned.   • Hx of colonic polyps 2003   • Hypertension    • Intraductal papilloma     R intraductal patheloma   • Left upper extremity numbness     L upper extremity numbness-ER visit; neuropathy   • Overweight (BMI 25.0-29.9) 6/11/2019   • Self-catheterizes urinary bladder        Past Surgical History:   Procedure Laterality Date   • BREAST LUMPECTOMY  2006   • CHOLECYSTECTOMY     • HYSTERECTOMY  1984   • VAGINECTOMY  2009       Family History   Problem Relation Age of Onset   • Hyperlipidemia Other    • Hypertension Other    • Coronary artery disease Other    • No Known Problems Mother    • No Known Problems Father        Social History     Socioeconomic History   • Marital status:    Tobacco Use   • Smoking status: Never Smoker   • Smokeless tobacco: Never Used   Substance and Sexual Activity   • Alcohol use: Never     Alcohol/week: 1.0 standard drink     Types: 1 Glasses of wine per week   • Drug use: Never   • Sexual activity: Defer       Allergies   Allergen Reactions   • Levofloxacin Other (See Comments)     itching   • Nitrofuran Derivatives Rash     primarily in b/l hands   • Nitrofurantoin Rash       Review of Systems:     Review of Systems   Constitutional: Negative for chills, fatigue and fever.   HENT: Negative for congestion, ear pain and sinus pressure.    Respiratory: Negative for cough, chest tightness, shortness of breath and wheezing.    Cardiovascular: Positive for palpitations and leg swelling. Negative for chest pain.   Gastrointestinal: Negative for abdominal pain, blood in stool and constipation.   Skin: Negative for color change.   Allergic/Immunologic: Negative for environmental allergies.   Neurological: Positive for headache. Negative for dizziness.   Psychiatric/Behavioral: Negative for depressed mood. The patient is not nervous/anxious.        Vital Signs  Vitals:    11/19/21 1426   BP: 122/68   BP Location: Left arm   Patient Position: Lying   Cuff Size: Adult   Pulse: 100  "  Resp: 16   Temp: 97.1 °F (36.2 °C)   TempSrc: Infrared   SpO2: 96%   Weight: 61.5 kg (135 lb 9.6 oz)   Height: 162.6 cm (64\")   PainSc: 0-No pain     Body mass index is 23.28 kg/m².  Patient's Body mass index is 23.28 kg/m². indicating that she is within normal range (BMI 18.5-24.9). No BMI management plan needed..        Current Outpatient Medications:   •  apixaban (ELIQUIS) 5 MG tablet tablet, Take 1 tablet by mouth Every 12 (Twelve) Hours. Indications: Atrial Fibrillation, Disp: 60 tablet, Rfl:   •  bisoprolol (ZEBeta) 5 MG tablet, Take 2 tablets by mouth Every Night., Disp: 180 tablet, Rfl: 1  •  Cholecalciferol (VITAMIN D3) 1000 units capsule, Take 1 capsule by mouth Daily., Disp: , Rfl:   •  Dietary Management Product (Vasculera) tablet, Take 1 tablet by mouth 2 (two) times a day., Disp: 180 tablet, Rfl: 3  •  furosemide (Lasix) 20 MG tablet, Take one tablet every other day. (Patient taking differently: 1/2 tablet every other day), Disp: 90 tablet, Rfl: 1  •  gabapentin (NEURONTIN) 100 MG capsule, Take 2 capsules by mouth At Night As Needed (leg pain)., Disp: 6 capsule, Rfl: 0  •  ondansetron ODT (ZOFRAN-ODT) 4 MG disintegrating tablet, Place 1 tablet on the tongue 4 (Four) Times a Day As Needed for Nausea or Vomiting., Disp: 15 tablet, Rfl: 0  •  Probiotic Product (PROBIOTIC PEARLS ADVANTAGE PO), 1  PRN, Disp: , Rfl:   •  psyllium (METAMUCIL MULTIHEALTH FIBER) 58.12 % packet, Take 1 packet by mouth Daily., Disp: , Rfl:   •  sodium chloride 1 g tablet, Take 1 tablet by mouth Daily., Disp: 90 tablet, Rfl: 1  •  Turmeric Curcumin 500 MG capsule, Take 1 capsule by mouth Daily. (Patient taking differently: Take 1 capsule by mouth As Needed.), Disp: 60 capsule, Rfl: 5  •  vitamin B-12 (CYANOCOBALAMIN) 1000 MCG tablet, Take 1 tablet by mouth Daily., Disp: , Rfl:     Physical Exam:    Physical Exam  Vitals and nursing note reviewed.   Constitutional:       General: She is not in acute distress.     Appearance: She " is well-developed.   HENT:      Head: Normocephalic.   Eyes:      Conjunctiva/sclera: Conjunctivae normal.      Pupils: Pupils are equal, round, and reactive to light.   Neck:      Thyroid: No thyromegaly.   Cardiovascular:      Rate and Rhythm: Normal rate. Rhythm irregularly irregular.      Heart sounds: Normal heart sounds.      Comments: No pitting.  Pulmonary:      Effort: Pulmonary effort is normal.      Breath sounds: Normal breath sounds. No wheezing.   Musculoskeletal:         General: Normal range of motion.      Cervical back: Normal range of motion and neck supple.      Right lower le+ Edema present.      Left lower le+ Edema present.   Lymphadenopathy:      Cervical: No cervical adenopathy.   Skin:     General: Skin is warm and dry.   Neurological:      Mental Status: She is alert and oriented to person, place, and time.   Psychiatric:         Attention and Perception: Attention normal.         Mood and Affect: Mood normal.         Thought Content: Thought content normal.          ACE III MINI        Results Review:    I reviewed the patient's new clinical results.    CMP:  Lab Results   Component Value Date    BUN 14 2021    CREATININE 0.77 2021    EGFRIFNONA 71 2021    BCR 18.2 2021     (L) 2021    K 4.2 2021    CO2 25.0 2021    CALCIUM 9.1 2021    ALBUMIN 4.40 2021    BILITOT 0.7 2021    ALKPHOS 114 2021    AST 20 2021    ALT 13 2021     HbA1c:  Lab Results   Component Value Date    HGBA1C 5.87 (H) 2021    HGBA1C 5.92 (H) 2021     Microalbumin:  No results found for: MICROALBUR, POCMALB, POCCREAT  Lipid Panel  Lab Results   Component Value Date    CHOL 135 2021    TRIG 67 2021    HDL 41 2021    LDL 80 2021    AST 20 2021    ALT 13 2021       Medication Review: Medications reviewed and noted  Patient Instructions   Problem List Items Addressed This Visit         Cardiac and Vasculature    Benign essential hypertension    Overview     11/19/2021 Isidra Eisenberg MD    Continue bisoprolol 10mg every evening.    Continue Lasix at 1/2 of the 20 mg tablet every other day.             Relevant Medications    bisoprolol (ZEBeta) 5 MG tablet    furosemide (Lasix) 20 MG tablet    Chronic atrial fibrillation (CMS/HCC)    Overview     11/19/2021 Jasmin Mchugh PA-C    Continue Eliquis twice a day.  Continue  bisoprolol every evening.         Relevant Medications    bisoprolol (ZEBeta) 5 MG tablet       Genitourinary and Reproductive     Hyponatremia    Overview     11/19/2021 Isidra Eisenberg MD    Continue oral fluid restriction.  Continue sodium chloride tablets at 1 tablet per day.  Drink a Gatorade or other electrolyte drink once a day.    Recheck labs in 3-4 months.            Neuro    Acute nonintractable headache - Primary (Chronic)    Overview     11/19/2021 Isidra Eisenberg MD    Patient was seen in the ER on 11/17/2021 and CT of the head was unremarkable.  Labs were acceptable.  The headache has resolved.            Symptoms and Signs    Bilateral edema of lower extremity (Chronic)    Overview     11/19/2021 Isidra Eisenberg MD    Continue one half of the 20 mg furosemide (Lasix) tablet every other day.     Continue to elevate feet whenever sitting at home.  Wear support socks that come to the knee every day.                    Diagnosis Plan   1. Acute nonintractable headache, unspecified headache type     2. Bilateral edema of lower extremity     3. Hyponatremia     4. Benign essential hypertension     5. Chronic atrial fibrillation (CMS/HCC)             Plan of care reviewed with patient at the conclusion of today's visit. Education was provided regarding diagnosis, management, and any prescribed or recommended OTC medications.Patient verbalizes understanding of and agreement with management plan.         Isidra Eisenberg MD

## 2021-11-19 NOTE — PATIENT INSTRUCTIONS
Patient Instructions   Problem List Items Addressed This Visit        Cardiac and Vasculature    Benign essential hypertension    Overview     11/19/2021 Isidra Eisenberg MD    Continue bisoprolol 10mg every evening.    Continue Lasix at 1/2 of the 20 mg tablet every other day.             Relevant Medications    bisoprolol (ZEBeta) 5 MG tablet    furosemide (Lasix) 20 MG tablet    Chronic atrial fibrillation (CMS/HCC)    Overview     11/19/2021 Jasmin Mchugh PA-C    Continue Eliquis twice a day.  Continue  bisoprolol every evening.         Relevant Medications    bisoprolol (ZEBeta) 5 MG tablet       Genitourinary and Reproductive     Hyponatremia    Overview     11/19/2021 Isidra Eisenberg MD    Continue oral fluid restriction.  Continue sodium chloride tablets at 1 tablet per day.  Drink a Gatorade or other electrolyte drink once a day.    Recheck labs in 3-4 months.            Neuro    Acute nonintractable headache - Primary (Chronic)    Overview     11/19/2021 Isidra Eisenberg MD    Patient was seen in the ER on 11/17/2021 and CT of the head was unremarkable.  Labs were acceptable.  The headache has resolved.            Symptoms and Signs    Bilateral edema of lower extremity (Chronic)    Overview     11/19/2021 Isidra Eisenberg MD    Continue one half of the 20 mg furosemide (Lasix) tablet every other day.     Continue to elevate feet whenever sitting at home.  Wear support socks that come to the knee every day.

## 2021-12-15 ENCOUNTER — PATIENT MESSAGE (OUTPATIENT)
Dept: INTERNAL MEDICINE | Facility: CLINIC | Age: 86
End: 2021-12-15

## 2021-12-16 NOTE — TELEPHONE ENCOUNTER
From: Thuy Clark  To: Isidra Eisenberg MD  Sent: 12/15/2021 5:45 PM EST  Subject: Lab work    Please request a lab work up before Dec 20. I expect to stop by there about Friday before 20th.

## 2021-12-16 NOTE — TELEPHONE ENCOUNTER
Please call patient to let her know that I put in her lab order.  She may get a urine specimen cup from the lab next-door and collect her specimen at home when she self caths and get back to the lab next-door immediately.

## 2021-12-17 ENCOUNTER — PATIENT MESSAGE (OUTPATIENT)
Dept: INTERNAL MEDICINE | Facility: CLINIC | Age: 86
End: 2021-12-17

## 2021-12-17 ENCOUNTER — LAB (OUTPATIENT)
Dept: LAB | Facility: HOSPITAL | Age: 86
End: 2021-12-17

## 2021-12-17 DIAGNOSIS — M81.0 SENILE OSTEOPOROSIS: ICD-10-CM

## 2021-12-17 DIAGNOSIS — I10 BENIGN ESSENTIAL HYPERTENSION: ICD-10-CM

## 2021-12-17 DIAGNOSIS — R73.09 ABNORMAL GLUCOSE: ICD-10-CM

## 2021-12-17 DIAGNOSIS — E78.2 MIXED HYPERLIPIDEMIA: ICD-10-CM

## 2021-12-17 LAB
25(OH)D3 SERPL-MCNC: 62.2 NG/ML
ALBUMIN SERPL-MCNC: 4.3 G/DL (ref 3.5–5.2)
ALBUMIN/GLOB SERPL: 1.5 G/DL
ALP SERPL-CCNC: 116 U/L (ref 39–117)
ALT SERPL W P-5'-P-CCNC: 12 U/L (ref 1–33)
ANION GAP SERPL CALCULATED.3IONS-SCNC: 9 MMOL/L (ref 5–15)
AST SERPL-CCNC: 17 U/L (ref 1–32)
BASOPHILS # BLD AUTO: 0.04 10*3/MM3 (ref 0–0.2)
BASOPHILS NFR BLD AUTO: 0.7 % (ref 0–1.5)
BILIRUB SERPL-MCNC: 0.6 MG/DL (ref 0–1.2)
BUN SERPL-MCNC: 9 MG/DL (ref 8–23)
BUN/CREAT SERPL: 10.8 (ref 7–25)
CALCIUM SPEC-SCNC: 9.3 MG/DL (ref 8.6–10.5)
CHLORIDE SERPL-SCNC: 97 MMOL/L (ref 98–107)
CHOLEST SERPL-MCNC: 112 MG/DL (ref 0–200)
CO2 SERPL-SCNC: 26 MMOL/L (ref 22–29)
CREAT SERPL-MCNC: 0.83 MG/DL (ref 0.57–1)
DEPRECATED RDW RBC AUTO: 43.4 FL (ref 37–54)
EOSINOPHIL # BLD AUTO: 0.14 10*3/MM3 (ref 0–0.4)
EOSINOPHIL NFR BLD AUTO: 2.4 % (ref 0.3–6.2)
ERYTHROCYTE [DISTWIDTH] IN BLOOD BY AUTOMATED COUNT: 14.1 % (ref 12.3–15.4)
GFR SERPL CREATININE-BSD FRML MDRD: 65 ML/MIN/1.73
GLOBULIN UR ELPH-MCNC: 2.8 GM/DL
GLUCOSE SERPL-MCNC: 72 MG/DL (ref 65–99)
HBA1C MFR BLD: 6.06 % (ref 4.8–5.6)
HCT VFR BLD AUTO: 35.9 % (ref 34–46.6)
HDLC SERPL-MCNC: 33 MG/DL (ref 40–60)
HGB BLD-MCNC: 11.6 G/DL (ref 12–15.9)
IMM GRANULOCYTES # BLD AUTO: 0.03 10*3/MM3 (ref 0–0.05)
IMM GRANULOCYTES NFR BLD AUTO: 0.5 % (ref 0–0.5)
LDLC SERPL CALC-MCNC: 67 MG/DL (ref 0–100)
LDLC/HDLC SERPL: 2.1 {RATIO}
LYMPHOCYTES # BLD AUTO: 1.21 10*3/MM3 (ref 0.7–3.1)
LYMPHOCYTES NFR BLD AUTO: 20.9 % (ref 19.6–45.3)
MCH RBC QN AUTO: 27.8 PG (ref 26.6–33)
MCHC RBC AUTO-ENTMCNC: 32.3 G/DL (ref 31.5–35.7)
MCV RBC AUTO: 86.1 FL (ref 79–97)
MONOCYTES # BLD AUTO: 0.48 10*3/MM3 (ref 0.1–0.9)
MONOCYTES NFR BLD AUTO: 8.3 % (ref 5–12)
NEUTROPHILS NFR BLD AUTO: 3.9 10*3/MM3 (ref 1.7–7)
NEUTROPHILS NFR BLD AUTO: 67.2 % (ref 42.7–76)
NRBC BLD AUTO-RTO: 0 /100 WBC (ref 0–0.2)
PLATELET # BLD AUTO: 341 10*3/MM3 (ref 140–450)
PMV BLD AUTO: 9.6 FL (ref 6–12)
POTASSIUM SERPL-SCNC: 4.2 MMOL/L (ref 3.5–5.2)
PROT SERPL-MCNC: 7.1 G/DL (ref 6–8.5)
RBC # BLD AUTO: 4.17 10*6/MM3 (ref 3.77–5.28)
SODIUM SERPL-SCNC: 132 MMOL/L (ref 136–145)
TRIGL SERPL-MCNC: 48 MG/DL (ref 0–150)
TSH SERPL DL<=0.05 MIU/L-ACNC: 2.08 UIU/ML (ref 0.27–4.2)
VIT B12 BLD-MCNC: 559 PG/ML (ref 211–946)
VLDLC SERPL-MCNC: 12 MG/DL (ref 5–40)
WBC NRBC COR # BLD: 5.8 10*3/MM3 (ref 3.4–10.8)

## 2021-12-17 PROCEDURE — 82306 VITAMIN D 25 HYDROXY: CPT

## 2021-12-17 PROCEDURE — 82607 VITAMIN B-12: CPT

## 2021-12-17 PROCEDURE — 84443 ASSAY THYROID STIM HORMONE: CPT

## 2021-12-17 PROCEDURE — 80061 LIPID PANEL: CPT

## 2021-12-17 PROCEDURE — 85025 COMPLETE CBC W/AUTO DIFF WBC: CPT

## 2021-12-17 PROCEDURE — 80053 COMPREHEN METABOLIC PANEL: CPT

## 2021-12-17 PROCEDURE — 83036 HEMOGLOBIN GLYCOSYLATED A1C: CPT

## 2021-12-20 ENCOUNTER — OFFICE VISIT (OUTPATIENT)
Dept: INTERNAL MEDICINE | Facility: CLINIC | Age: 86
End: 2021-12-20

## 2021-12-20 VITALS
BODY MASS INDEX: 22.74 KG/M2 | HEIGHT: 64 IN | DIASTOLIC BLOOD PRESSURE: 58 MMHG | WEIGHT: 133.2 LBS | HEART RATE: 77 BPM | RESPIRATION RATE: 16 BRPM | SYSTOLIC BLOOD PRESSURE: 118 MMHG | OXYGEN SATURATION: 97 % | TEMPERATURE: 98 F

## 2021-12-20 DIAGNOSIS — R60.0 BILATERAL EDEMA OF LOWER EXTREMITY: Chronic | ICD-10-CM

## 2021-12-20 DIAGNOSIS — I48.21 PERMANENT ATRIAL FIBRILLATION (HCC): ICD-10-CM

## 2021-12-20 DIAGNOSIS — I27.20 PULMONARY HYPERTENSION (HCC): ICD-10-CM

## 2021-12-20 DIAGNOSIS — M81.0 SENILE OSTEOPOROSIS: ICD-10-CM

## 2021-12-20 DIAGNOSIS — K64.9 BLEEDING HEMORRHOID: ICD-10-CM

## 2021-12-20 DIAGNOSIS — E44.1 MILD PROTEIN-CALORIE MALNUTRITION (HCC): ICD-10-CM

## 2021-12-20 DIAGNOSIS — Z91.81 AT HIGH RISK FOR FALLS: Chronic | ICD-10-CM

## 2021-12-20 DIAGNOSIS — R73.09 ABNORMAL GLUCOSE: ICD-10-CM

## 2021-12-20 DIAGNOSIS — Z79.01 CHRONIC ANTICOAGULATION: ICD-10-CM

## 2021-12-20 DIAGNOSIS — E87.1 HYPONATREMIA: ICD-10-CM

## 2021-12-20 DIAGNOSIS — R33.9 URINARY RETENTION: ICD-10-CM

## 2021-12-20 DIAGNOSIS — Z00.00 MEDICARE ANNUAL WELLNESS VISIT, SUBSEQUENT: Primary | ICD-10-CM

## 2021-12-20 DIAGNOSIS — I38 VALVULAR HEART DISEASE: ICD-10-CM

## 2021-12-20 DIAGNOSIS — I10 BENIGN ESSENTIAL HYPERTENSION: ICD-10-CM

## 2021-12-20 DIAGNOSIS — I49.3 PVC'S (PREMATURE VENTRICULAR CONTRACTIONS): ICD-10-CM

## 2021-12-20 DIAGNOSIS — E78.2 MIXED HYPERLIPIDEMIA: ICD-10-CM

## 2021-12-20 DIAGNOSIS — H93.13 TINNITUS OF BOTH EARS: Chronic | ICD-10-CM

## 2021-12-20 PROCEDURE — 1126F AMNT PAIN NOTED NONE PRSNT: CPT | Performed by: INTERNAL MEDICINE

## 2021-12-20 PROCEDURE — G0439 PPPS, SUBSEQ VISIT: HCPCS | Performed by: INTERNAL MEDICINE

## 2021-12-20 PROCEDURE — 1159F MED LIST DOCD IN RCRD: CPT | Performed by: INTERNAL MEDICINE

## 2021-12-20 PROCEDURE — 1170F FXNL STATUS ASSESSED: CPT | Performed by: INTERNAL MEDICINE

## 2021-12-20 PROCEDURE — 99213 OFFICE O/P EST LOW 20 MIN: CPT | Performed by: INTERNAL MEDICINE

## 2021-12-20 RX ORDER — DOCUSATE SODIUM 100 MG/1
100 CAPSULE, LIQUID FILLED ORAL 2 TIMES DAILY
COMMUNITY
End: 2022-05-31 | Stop reason: SDUPTHER

## 2021-12-20 RX ORDER — SULFAMETHOXAZOLE AND TRIMETHOPRIM 800; 160 MG/1; MG/1
800 TABLET ORAL 2 TIMES DAILY
COMMUNITY
Start: 2021-12-15 | End: 2022-06-07

## 2021-12-20 RX ORDER — ONDANSETRON 4 MG/1
4 TABLET, ORALLY DISINTEGRATING ORAL 4 TIMES DAILY PRN
Qty: 15 TABLET | Refills: 0 | Status: SHIPPED | OUTPATIENT
Start: 2021-12-20

## 2021-12-20 NOTE — PROGRESS NOTES
The ABCs of the Annual Wellness Visit  Initial Medicare Wellness Visit    Chief Complaint   Patient presents with   • Medicare Wellness-subsequent   • Hypertension     f/u   • Hyperlipidemia   • Tinnitus     b/l worse in last few weeks   • Hemorrhoids     inflamation    • Med Refill       Subjective   History of Present Illness:  Thuy Clark is a 86 y.o. female who presents for an Initial Medicare Wellness Visit.    HPI  Tinnitus bilateral is worse gradually.  Denies any decrease in hearing. No ear pain.    HPI  UTI treated with Bactrim by Dr. Marroquin.  She has not finished antibiotic yet.  She is feeling better.  She is trying to drink more water.    HPI  Hemorroids bleed sometimes.  Bright red blood.  Sometimes accompanied by some pain and irritation.  About once a month. For now, she feels apple cider vinegar is working fine.     CHRONIC CONDITIONS:    A1c has increased from 5.87 to 6.06.  She does try to eat less sugars.    LDL is excellent at 67 and triglycerides are excellent at 48.  She does try to eat a low-fat low sugar diet most of the time.    Blood pressures are controlled on bisoprolol every evening.  Serum sodium is mildly low at 134.     For A. fib, she is taking Eliquis twice a day and taking bisoprolol every evening.  She denies any rapid heartbeat, chest pain, increased shortness of breath with exertion, or edema.    Swelling in feet has actually improved.    HEALTH RISK ASSESSMENT    Recent Hospitalizations:  No hospitalization(s) within the last year.    Current Medical Providers:  Patient Care Team:  Isidra Eisenberg MD as PCP - General (Internal Medicine)  Isidra Eisenberg MD as PCP - Internal Medicine  Azam Marroquin MD as Consulting Physician (Urology)  Lili Hebert MD as Consulting Physician (Gastroenterology)  Isidra Eisenberg MD as Consulting Physician (Internal Medicine)    Smoking Status:  Social History     Tobacco Use   Smoking Status Never Smoker   Smokeless  Tobacco Never Used       Alcohol Consumption:  Social History     Substance and Sexual Activity   Alcohol Use Never   • Alcohol/week: 1.0 standard drink   • Types: 1 Glasses of wine per week       Depression Screen:   PHQ-2/PHQ-9 Depression Screening 12/20/2021   Little interest or pleasure in doing things 0   Feeling down, depressed, or hopeless 0   Total Score 0       Fall Risk Screen:  MAGDA Fall Risk Assessment was completed, and patient is at LOW risk for falls.Assessment completed on:12/20/2021    Health Habits and Functional and Cognitive Screening:  Functional & Cognitive Status 12/20/2021   Do you have difficulty preparing food and eating? No   Do you have difficulty bathing yourself, getting dressed or grooming yourself? No   Do you have difficulty using the toilet? No   Do you have difficulty moving around from place to place? No   Do you have trouble with steps or getting out of a bed or a chair? No   Current Diet Well Balanced Diet   Dental Exam Up to date        Dental Exam Comment 12/10/21   Eye Exam Not up to date   Exercise (times per week) 2 times per week   Current Exercises Include Stationary Bicycling/Spin Class   Current Exercise Activities Include -   Do you need help using the phone?  No   Are you deaf or do you have serious difficulty hearing?  No   Do you need help with transportation? No   Do you need help shopping? No   Do you need help preparing meals?  No   Do you need help with housework?  Yes   Do you need help with laundry? No   Do you need help taking your medications? No   Do you need help managing money? No   Do you ever drive or ride in a car without wearing a seat belt? No   Have you felt unusual stress, anger or loneliness in the last month? No   Who do you live with? Alone   If you need help, do you have trouble finding someone available to you? No   Have you been bothered in the last four weeks by sexual problems? No   Do you have difficulty concentrating, remembering or  making decisions? Yes       Does the patient have evidence of cognitive impairment? No    Asprin use counseling:Does not need ASA (and currently is not on it)    Age-appropriate Screening Schedule:  Refer to the list below for future screening recommendations based on patient's age, sex and/or medical conditions. Orders for these recommended tests are listed in the plan section. The patient has been provided with a written plan.    Health Maintenance   Topic Date Due   • DXA SCAN  03/09/2019   • TDAP/TD VACCINES (3 - Td or Tdap) 08/28/2022   • LIPID PANEL  12/17/2022   • INFLUENZA VACCINE  Completed   • ZOSTER VACCINE  Completed        The following portions of the patient's history were reviewed and updated as appropriate: allergies, current medications, past family history, past medical history, past social history, past surgical history and problem list.    Outpatient Medications Prior to Visit   Medication Sig Dispense Refill   • bisoprolol (ZEBeta) 5 MG tablet Take 2 tablets by mouth Every Night. 180 tablet 1   • Cholecalciferol (VITAMIN D3) 1000 units capsule Take 1 capsule by mouth Daily.     • Dietary Management Product (Vasculera) tablet Take 1 tablet by mouth 2 (two) times a day. 180 tablet 3   • docusate sodium (COLACE) 100 MG capsule Take 100 mg by mouth 2 (Two) Times a Day.     • furosemide (Lasix) 20 MG tablet Take one tablet every other day. (Patient taking differently: 1/2 tablet every other day) 90 tablet 1   • gabapentin (NEURONTIN) 100 MG capsule Take 2 capsules by mouth At Night As Needed (leg pain). 6 capsule 0   • Probiotic Product (PROBIOTIC PEARLS ADVANTAGE PO) 1  PRN     • psyllium (METAMUCIL MULTIHEALTH FIBER) 58.12 % packet Take 1 packet by mouth Daily.     • sodium chloride 1 g tablet Take 1 tablet by mouth Daily. 90 tablet 1   • sulfamethoxazole-trimethoprim (BACTRIM DS,SEPTRA DS) 800-160 MG per tablet Take 800 mg by mouth 2 (Two) Times a Day. Take 1 tablet by mouth every 12 hours for 7  days     • vitamin B-12 (CYANOCOBALAMIN) 1000 MCG tablet Take 1 tablet by mouth Daily.     • apixaban (ELIQUIS) 5 MG tablet tablet Take 1 tablet by mouth Every 12 (Twelve) Hours. Indications: Atrial Fibrillation 60 tablet    • ondansetron ODT (ZOFRAN-ODT) 4 MG disintegrating tablet Place 1 tablet on the tongue 4 (Four) Times a Day As Needed for Nausea or Vomiting. 15 tablet 0   • Turmeric Curcumin 500 MG capsule Take 1 capsule by mouth Daily. (Patient taking differently: Take 1 capsule by mouth As Needed.) 60 capsule 5     No facility-administered medications prior to visit.       Patient Active Problem List   Diagnosis   • Pulmonary hypertension (HCC)   • Benign essential hypertension   • Mixed hyperlipidemia   • Bleeding hemorrhoid   • Atrial septal defect determined by imaging   • Abnormal glucose   • Generalized anxiety disorder   • Valvular heart disease   • Senile osteoporosis   • PVC's (premature ventricular contractions)   • Calculus of gallbladder   • Urinary retention   • Chronic pain of both knees   • Osteoarthritis   • Quadriceps weakness   • Constipation, slow transit   • Atrial flutter with rapid ventricular response (HCC)   • Gait disorder   • Primary osteoarthritis of both knees   • At high risk for falls   • Medicare annual wellness visit, subsequent   • Hyponatremia   • Chronic anticoagulation (Eliquis)   • Chronic atrial fibrillation (CMS/HCC)   • Altered mental status   • Foot pain, bilateral   • Mild protein-calorie malnutrition (HCC)   • Bilateral edema of lower extremity   • Shortness of breath   • Acute nonintractable headache   • Tinnitus of both ears       Advanced Care Planning:  ACP discussion was held with the patient during this visit. Patient has an advance directive (not in EMR), copy requested.    Review of Systems   Constitutional: Negative for chills, fatigue and fever.   HENT: Positive for tinnitus. Negative for congestion, ear pain, hearing loss and sinus pressure.    Eyes:  "Negative for visual disturbance.   Respiratory: Negative for cough, chest tightness, shortness of breath and wheezing.    Cardiovascular: Negative for chest pain, palpitations and leg swelling.   Gastrointestinal: Positive for anal bleeding. Negative for abdominal pain, blood in stool and constipation.        Hemorrhoids flare on and off   Endocrine: Negative for cold intolerance and heat intolerance.   Genitourinary: Positive for dysuria and frequency.   Musculoskeletal: Negative for arthralgias, back pain and gait problem.   Skin: Negative for color change and rash.   Allergic/Immunologic: Negative for environmental allergies.   Neurological: Negative for dizziness and headaches.   Hematological: Negative for adenopathy. Does not bruise/bleed easily.   Psychiatric/Behavioral: Negative for confusion, dysphoric mood, sleep disturbance and suicidal ideas. The patient is not nervous/anxious.        Compared to one year ago, the patient feels her physical health is better.  Compared to one year ago, the patient feels her mental health is the same.    Reviewed chart for potential of high risk medication in the elderly: yes  Reviewed chart for potential of harmful drug interactions in the elderly:yes    Objective         Vitals:    12/20/21 1526   BP: 118/58   BP Location: Left arm   Patient Position: Sitting   Cuff Size: Adult   Pulse: 77   Resp: 16   Temp: 98 °F (36.7 °C)   TempSrc: Infrared   SpO2: 97%   Weight: 60.4 kg (133 lb 3.2 oz)   Height: 162.6 cm (64\")   PainSc: 0-No pain     Patient's Body mass index is 22.86 kg/m². indicating that she is within normal range (BMI 18.5-24.9). No BMI management plan needed..    Body mass index is 22.86 kg/m².  Discussed the patient's BMI with her. The BMI is in the acceptable range.    Physical Exam  Vitals and nursing note reviewed.   Constitutional:       Appearance: She is well-developed.   Eyes:      Conjunctiva/sclera: Conjunctivae normal.      Pupils: Pupils are equal, " round, and reactive to light.   Neck:      Thyroid: No thyromegaly.   Cardiovascular:      Rate and Rhythm: Normal rate and regular rhythm.      Heart sounds: Normal heart sounds. No murmur heard.      Pulmonary:      Effort: Pulmonary effort is normal.      Breath sounds: Normal breath sounds. No wheezing.   Chest:   Breasts:      Right: No inverted nipple, mass, nipple discharge, skin change or tenderness.      Left: No inverted nipple, mass, nipple discharge, skin change or tenderness.       Abdominal:      General: Bowel sounds are normal. There is no distension.      Palpations: Abdomen is soft. There is no mass.      Tenderness: There is no abdominal tenderness.   Musculoskeletal:         General: No tenderness. Normal range of motion.      Cervical back: Normal range of motion and neck supple.   Lymphadenopathy:      Cervical: No cervical adenopathy.   Skin:     General: Skin is warm and dry.      Findings: No rash.   Neurological:      Mental Status: She is alert and oriented to person, place, and time.      Cranial Nerves: No cranial nerve deficit.      Sensory: No sensory deficit.      Coordination: Coordination normal.      Gait: Gait abnormal.   Psychiatric:         Attention and Perception: Attention normal.         Speech: Speech normal.         Behavior: Behavior normal.         Thought Content: Thought content normal.         Judgment: Judgment normal.         Lab Results   Component Value Date    TRIG 48 12/17/2021    HDL 33 (L) 12/17/2021    LDL 67 12/17/2021    VLDL 12 12/17/2021    HGBA1C 6.06 (H) 12/17/2021        Assessment/Plan   Medicare Risks and Personalized Health Plan  CMS Preventative Services Quick Reference  Cardiovascular risk  Fall Risk  Osteoporosis Risk    The above risks/problems have been discussed with the patient.  Pertinent information has been shared with the patient in the After Visit Summary.  Follow up plans and orders are seen below in the Assessment/Plan  Section.  Patient Instructions   Problem List Items Addressed This Visit        Advance Directives and General Issues    At high risk for falls (Chronic)    Overview     12/20/2021 Isidra Eisenberg MD    Doing some daily exercises does help prevent falls and prevent injuries from falls.    Continue using the cane at all times.    Fall prevention discussed and education given.            Cardiac and Vasculature    Benign essential hypertension    Overview     12/20/2021 Isidra Eisenberg MD    Continue bisoprolol 10mg every evening.    Continue Lasix at 1/2 of the 20 mg tablet every other day.             Relevant Medications    bisoprolol (ZEBeta) 5 MG tablet    furosemide (Lasix) 20 MG tablet    Other Relevant Orders    CBC & Differential (Completed)    Comprehensive Metabolic Panel (Completed)    Urinalysis With Microscopic - Urine, Clean Catch    Microalbumin / Creatinine Urine Ratio - Urine, Clean Catch    Mixed hyperlipidemia    Overview     12/20/2021     Continue low fat diet and regular physical activity.         Relevant Orders    Lipid Panel (Completed)    TSH (Completed)    Vitamin B12 (Completed)    Valvular heart disease    Overview     12/20/2021 Isidra Eisenberg MD    6/20/2021 echocardiogram: mild AR and mild TR.  Elevated right ventricular systolic pressure of 35 to 45 mmHg.  Severely dilated right atrial cavity.  Left atrial volume severely increased.  Saline test positive for intra-atrial shunt.  Borderline left ventricular hypertrophy.  Ejection fraction 65%.     8/27/21 echo at [facility] [place] by [place]. Kaye Aleman-EF 60%.  Moderate MR and moderate TR and mild AR. Stress test showed no ischemic changes.  Afib thru out study.    Continue Eliquis twice a day and 10mg bisoprolol every evening.         Relevant Medications    bisoprolol (ZEBeta) 5 MG tablet    PVC's (premature ventricular contractions)    Overview     12/20/2021 Isidra Eisenberg MD    Continue bisoprolol daily.  Decrease caffeine use.          Relevant Medications    bisoprolol (ZEBeta) 5 MG tablet    Chronic atrial fibrillation (CMS/HCC)    Overview     12/20/2021 Jasmin Mchugh PA-C    Continue Eliquis twice a day.  Continue  bisoprolol every evening.         Relevant Medications    bisoprolol (ZEBeta) 5 MG tablet       Coag and Thromboembolic    Chronic anticoagulation (Eliquis)       ENT    Tinnitus of both ears (Chronic)    Overview     12/20/2021 Isidra Eisenberg MD    Some worsening of chronic tinnitus without worsening of hearing loss.    Recommended a noise machine to use at night or leaving on a fan.            Endocrine and Metabolic    Abnormal glucose    Overview     12/20/2021 Isidra Eisenberg MD    A1c has increased to 6.06.  The average blood sugar is about 126.    Continue to avoid sugars and snack foods in the diet.         Relevant Orders    Hemoglobin A1c (Completed)    Mild protein-calorie malnutrition (HCC)    Overview     12/20/2021 Isidra Eisenberg MD    Continue to have some protein with each meal and to eat at least 3 meals a day.  She is to drink an Ensure or boost at least once a day.  She was given education on protein content of foods at visit in June.            Gastrointestinal Abdominal     Bleeding hemorrhoid    Overview     12/20/2021 Isidra Eisenberg MD    Patient will let us know if she wants to try prescription hydrocortisone cream or suppository.            Genitourinary and Reproductive     Urinary retention    Overview     8/3/2021 Isidra Eisenberg MD     Continue self catheterization regularly. Continue regular follow up with Dr. Marroquin.         Hyponatremia    Overview     12/20/2021 Isidra Eisenberg MD    Continue oral fluid restriction.  Continue sodium chloride tablets at 1 tablet per day.  Drink a Gatorade or other electrolyte drink once a day.    Recheck labs in 3-4 months.            Health Encounters    Medicare annual wellness visit, subsequent - Primary    Overview     12/20/2021 Isidra RODRIGUEZ  MD [place]    She is up-to-date on vaccinations.            Musculoskeletal and Injuries    Senile osteoporosis    Overview     12/20/2021 Isidra Eisenberg MD    Continue regular weight bearing exercises including handweights and walking.  Continue taking calcium and vitamin D.     Patient declines DEXA and declines treatment for osteoporosis.    She will continue taking vitamin D3 daily.  She will try to use small hand weights daily at home.         Relevant Orders    Vitamin D 25 Hydroxy (Completed)       Pulmonary and Pneumonias    Pulmonary hypertension (HCC)    Overview     12/20/2021 Isidra Eisenberg MD    Mild Pulmonary hypertension noted on echocardiogram 11/2019.            Symptoms and Signs    Bilateral edema of lower extremity (Chronic)    Overview     12/20/2021 Isidra Eisenberg MD    Continue one half of the 20 mg furosemide (Lasix) tablet every other day.     Continue to elevate feet whenever sitting at home.  Wear support socks that come to the knee every day.                  Follow Up:  No follow-ups on file.     An After Visit Summary and PPPS were given to the patient.

## 2021-12-21 NOTE — PATIENT INSTRUCTIONS
Patient Instructions   Problem List Items Addressed This Visit        Advance Directives and General Issues    At high risk for falls (Chronic)    Overview     12/20/2021 Isidra Eisenberg MD    Doing some daily exercises does help prevent falls and prevent injuries from falls.    Continue using the cane at all times.    Fall prevention discussed and education given.            Cardiac and Vasculature    Benign essential hypertension    Overview     12/20/2021 Isidra Eisenberg MD    Continue bisoprolol 10mg every evening.    Continue Lasix at 1/2 of the 20 mg tablet every other day.             Relevant Medications    bisoprolol (ZEBeta) 5 MG tablet    furosemide (Lasix) 20 MG tablet    Other Relevant Orders    CBC & Differential (Completed)    Comprehensive Metabolic Panel (Completed)    Urinalysis With Microscopic - Urine, Clean Catch    Microalbumin / Creatinine Urine Ratio - Urine, Clean Catch    Mixed hyperlipidemia    Overview     12/20/2021     Continue low fat diet and regular physical activity.         Relevant Orders    Lipid Panel (Completed)    TSH (Completed)    Vitamin B12 (Completed)    Valvular heart disease    Overview     12/20/2021 Isidra Eisenberg MD    6/20/2021 echocardiogram: mild AR and mild TR.  Elevated right ventricular systolic pressure of 35 to 45 mmHg.  Severely dilated right atrial cavity.  Left atrial volume severely increased.  Saline test positive for intra-atrial shunt.  Borderline left ventricular hypertrophy.  Ejection fraction 65%.     8/27/21 echo at [facility] [place] by Dr. Kaye Aleman-EF 60%.  Moderate MR and moderate TR and mild AR. Stress test showed no ischemic changes.  Afib thru out study.    Continue Eliquis twice a day and 10mg bisoprolol every evening.         Relevant Medications    bisoprolol (ZEBeta) 5 MG tablet    PVC's (premature ventricular contractions)    Overview     12/20/2021 Isidra Eisenberg MD    Continue bisoprolol daily.  Decrease caffeine use.         Relevant  Medications    bisoprolol (ZEBeta) 5 MG tablet    Chronic atrial fibrillation (CMS/HCC)    Overview     12/20/2021 Jasmin Mchugh PA-C    Continue Eliquis twice a day.  Continue  bisoprolol every evening.         Relevant Medications    bisoprolol (ZEBeta) 5 MG tablet       Coag and Thromboembolic    Chronic anticoagulation (Eliquis)       ENT    Tinnitus of both ears (Chronic)    Overview     12/20/2021 Isidra Eisenberg MD    Some worsening of chronic tinnitus without worsening of hearing loss.    Recommended a noise machine to use at night or leaving on a fan.            Endocrine and Metabolic    Abnormal glucose    Overview     12/20/2021 Isidra Eisenberg MD    A1c has increased to 6.06.  The average blood sugar is about 126.    Continue to avoid sugars and snack foods in the diet.         Relevant Orders    Hemoglobin A1c (Completed)    Mild protein-calorie malnutrition (HCC)    Overview     12/20/2021 Isidra Eisenberg MD    Continue to have some protein with each meal and to eat at least 3 meals a day.  She is to drink an Ensure or boost at least once a day.  She was given education on protein content of foods at visit in June.            Gastrointestinal Abdominal     Bleeding hemorrhoid    Overview     12/20/2021 Isidra Eisenberg MD    Patient will let us know if she wants to try prescription hydrocortisone cream or suppository.            Genitourinary and Reproductive     Urinary retention    Overview     8/3/2021 Isidra Eisenberg MD     Continue self catheterization regularly. Continue regular follow up with Dr. Marroquin.         Hyponatremia    Overview     12/20/2021 Isidra Eisenberg MD    Continue oral fluid restriction.  Continue sodium chloride tablets at 1 tablet per day.  Drink a Gatorade or other electrolyte drink once a day.    Recheck labs in 3-4 months.            Health Encounters    Medicare annual wellness visit, subsequent - Primary    Overview     12/20/2021 Isidra Eisenberg  MD    She is up-to-date on vaccinations.            Musculoskeletal and Injuries    Senile osteoporosis    Overview     12/20/2021 Isidra Eisenberg MD    Continue regular weight bearing exercises including handweights and walking.  Continue taking calcium and vitamin D.     Patient declines DEXA and declines treatment for osteoporosis.    She will continue taking vitamin D3 daily.  She will try to use small hand weights daily at home.         Relevant Orders    Vitamin D 25 Hydroxy (Completed)       Pulmonary and Pneumonias    Pulmonary hypertension (HCC)    Overview     12/20/2021 Isidra Eisenberg MD    Mild Pulmonary hypertension noted on echocardiogram 11/2019.            Symptoms and Signs    Bilateral edema of lower extremity (Chronic)    Overview     12/20/2021 Isidra Eisenberg MD    Continue one half of the 20 mg furosemide (Lasix) tablet every other day.     Continue to elevate feet whenever sitting at home.  Wear support socks that come to the knee every day.                 Fall Prevention in the Home, Adult  Falls can cause injuries and can affect people from all age groups. There are many simple things that you can do to make your home safe and to help prevent falls. Ask for help when making these changes, if needed.  What actions can I take to prevent falls?  General instructions  · Use good lighting in all rooms. Replace any light bulbs that burn out.  · Turn on lights if it is dark. Use night-lights.  · Place frequently used items in easy-to-reach places. Lower the shelves around your home if necessary.  · Set up furniture so that there are clear paths around it. Avoid moving your furniture around.  · Remove throw rugs and other tripping hazards from the floor.  · Avoid walking on wet floors.  · Fix any uneven floor surfaces.  · Add color or contrast paint or tape to grab bars and handrails in your home. Place contrasting color strips on the first and last steps of stairways.  · When you use a  stepladder, make sure that it is completely opened and that the sides are firmly locked. Have someone hold the ladder while you are using it. Do not climb a closed stepladder.  · Be aware of any and all pets.  What can I do in the bathroom?         · Keep the floor dry. Immediately clean up any water that spills onto the floor.  · Remove soap buildup in the tub or shower on a regular basis.  · Use non-skid mats or decals on the floor of the tub or shower.  · Attach bath mats securely with double-sided, non-slip rug tape.  · If you need to sit down while you are in the shower, use a plastic, non-slip stool.  · Install grab bars by the toilet and in the tub and shower. Do not use towel bars as grab bars.  What can I do in the bedroom?  · Make sure that a bedside light is easy to reach.  · Do not use oversized bedding that drapes onto the floor.  · Have a firm chair that has side arms to use for getting dressed.  What can I do in the kitchen?  · Clean up any spills right away.  · If you need to reach for something above you, use a sturdy step stool that has a grab bar.  · Keep electrical cables out of the way.  · Do not use floor polish or wax that makes floors slippery. If you must use wax, make sure that it is non-skid floor wax.  What can I do in the stairways?  · Do not leave any items on the stairs.  · Make sure that you have a light switch at the top of the stairs and the bottom of the stairs. Have them installed if you do not have them.  · Make sure that there are handrails on both sides of the stairs. Fix handrails that are broken or loose. Make sure that handrails are as long as the stairways.  · Install non-slip stair treads on all stairs in your home.  · Avoid having throw rugs at the top or bottom of stairways, or secure the rugs with carpet tape to prevent them from moving.  · Choose a carpet design that does not hide the edge of steps on the stairway.  · Check any carpeting to make sure that it is firmly  attached to the stairs. Fix any carpet that is loose or worn.  What can I do on the outside of my home?  · Use bright outdoor lighting.  · Regularly repair the edges of walkways and driveways and fix any cracks.  · Remove high doorway thresholds.  · Trim any shrubbery on the main path into your home.  · Regularly check that handrails are securely fastened and in good repair. Both sides of any steps should have handrails.  · Install guardrails along the edges of any raised decks or porches.  · Clear walkways of debris and clutter, including tools and rocks.  · Have leaves, snow, and ice cleared regularly.  · Use sand or salt on walkways during winter months.  · In the garage, clean up any spills right away, including grease or oil spills.  What other actions can I take?  · Wear closed-toe shoes that fit well and support your feet. Wear shoes that have rubber soles or low heels.  · Use mobility aids as needed, such as canes, walkers, scooters, and crutches.  · Review your medicines with your health care provider. Some medicines can cause dizziness or changes in blood pressure, which increase your risk of falling.  Talk with your health care provider about other ways that you can decrease your risk of falls. This may include working with a physical therapist or  to improve your strength, balance, and endurance.  Where to find more information  · Centers for Disease Control and Prevention, [place]: https://www.cdc.gov  · National [place] on Aging: https://zx5aioa.[place].nih.gov  Contact a health care provider if:  · You are afraid of falling at home.  · You feel weak, drowsy, or dizzy at home.  · You fall at home.  Summary  · There are many simple things that you can do to make your home safe and to help prevent falls.  · Ways to make your home safe include removing tripping hazards and installing grab bars in the bathroom.  · Ask for help when making these changes in your home.  This information is not intended to  replace advice given to you by your health care provider. Make sure you discuss any questions you have with your health care provider.  Document Revised: 11/30/2018 Document Reviewed: 08/02/2018  Elsevier Patient Education © 2021 [employer]vier Inc.

## 2021-12-22 RX ORDER — DIOSMIN COMPLEX NO.1 630 MG
1 TABLET ORAL 2 TIMES DAILY
Qty: 180 TABLET | Refills: 3 | Status: SHIPPED | OUTPATIENT
Start: 2021-12-22 | End: 2022-08-31

## 2021-12-22 RX ORDER — DIOSMIN COMPLEX NO.1 630 MG
1 TABLET ORAL 2 TIMES DAILY
Qty: 180 TABLET | Refills: 3 | Status: CANCELLED | OUTPATIENT
Start: 2021-12-22

## 2021-12-22 RX ORDER — DIOSMIN COMPLEX NO.1 630 MG
1 TABLET ORAL 2 TIMES DAILY
Qty: 180 TABLET | Refills: 3 | Status: SHIPPED | OUTPATIENT
Start: 2021-12-22 | End: 2021-12-22 | Stop reason: SDUPTHER

## 2021-12-22 NOTE — TELEPHONE ENCOUNTER
madhav 12/20/21  Na 06/07/22    Changed to mail order [facility] and please verify if you would like pt to stay bid or qd for medication

## 2022-01-11 RX ORDER — HYDROCORTISONE 25 MG/G
CREAM TOPICAL 2 TIMES DAILY
Qty: 28 G | Refills: 1 | Status: SHIPPED | OUTPATIENT
Start: 2022-01-11 | End: 2022-06-07

## 2022-02-08 RX ORDER — HYDROCORTISONE ACETATE 25 MG/1
25 SUPPOSITORY RECTAL 2 TIMES DAILY PRN
Qty: 24 EACH | Refills: 1 | Status: SHIPPED | OUTPATIENT
Start: 2022-02-08 | End: 2022-06-07

## 2022-02-11 ENCOUNTER — TELEPHONE (OUTPATIENT)
Dept: INTERNAL MEDICINE | Facility: CLINIC | Age: 87
End: 2022-02-11

## 2022-02-11 NOTE — TELEPHONE ENCOUNTER
Caller: Thuy Clark    Relationship: Self    Best call back number:[phone]    What orders are you requesting (i.e. lab or imaging): LABS; SPECIFICALLY CHECK SODIUM    In what timeframe would the patient need to come in: ASAP    Where will you receive your lab/imaging services:     Additional notes:

## 2022-02-14 DIAGNOSIS — E87.1 HYPONATREMIA: Primary | ICD-10-CM

## 2022-02-15 ENCOUNTER — LAB (OUTPATIENT)
Dept: LAB | Facility: HOSPITAL | Age: 87
End: 2022-02-15

## 2022-02-15 DIAGNOSIS — E87.1 HYPONATREMIA: ICD-10-CM

## 2022-02-15 PROCEDURE — 80048 BASIC METABOLIC PNL TOTAL CA: CPT

## 2022-02-16 LAB
ANION GAP SERPL CALCULATED.3IONS-SCNC: 11.2 MMOL/L (ref 5–15)
BUN SERPL-MCNC: 13 MG/DL (ref 8–23)
BUN/CREAT SERPL: 16 (ref 7–25)
CALCIUM SPEC-SCNC: 9.3 MG/DL (ref 8.6–10.5)
CHLORIDE SERPL-SCNC: 97 MMOL/L (ref 98–107)
CO2 SERPL-SCNC: 24.8 MMOL/L (ref 22–29)
CREAT SERPL-MCNC: 0.81 MG/DL (ref 0.57–1)
GFR SERPL CREATININE-BSD FRML MDRD: 67 ML/MIN/1.73
GLUCOSE SERPL-MCNC: 138 MG/DL (ref 65–99)
POTASSIUM SERPL-SCNC: 4.4 MMOL/L (ref 3.5–5.2)
SODIUM SERPL-SCNC: 133 MMOL/L (ref 136–145)

## 2022-03-02 ENCOUNTER — PATIENT OUTREACH (OUTPATIENT)
Dept: CASE MANAGEMENT | Facility: OTHER | Age: 87
End: 2022-03-02

## 2022-03-02 NOTE — OUTREACH NOTE
Ambulatory Case Management Note    Patient Outreach    Pt contacted [facility].  States she is going to have dental surgery last of this month and her son would like her to have a nurse stay with her at night because of her eliquis.  Did discuss home caregivers and offered to send a list via [employer], which she accepted.  Encouraged to ask the agencies also, if they have nurses on staff for private duty.         Sabina Quinones RN  Ambulatory Case Management    3/2/2022, 16:06 EST

## 2022-03-08 ENCOUNTER — PATIENT OUTREACH (OUTPATIENT)
Dept: CASE MANAGEMENT | Facility: OTHER | Age: 87
End: 2022-03-08

## 2022-03-08 NOTE — OUTREACH NOTE
"AMBULATORY CASE MANAGEMENT NOTE    Name and Relationship of Patient/Support Person: Thuy Clark M - Self    Patient Outreach    F/u regarding caregiver after her oral surgery.  States she did receive the list of home caregiver agencies and \"I wrote them all down.\"  Did explain that could not find any agency that offered RNs or LPNs.  She has appt with her surgeon 3/21/22 and will discuss the need for home care at this time.  States her biggest concern is that she gets up in middle of night to cath herself and wants to make sure she is not so weak she falls.  She denies any other needs at present.      HANSA MARROQUIN  Ambulatory Case Management    3/8/2022, 13:33 EST  "

## 2022-03-21 ENCOUNTER — TELEPHONE (OUTPATIENT)
Dept: INTERNAL MEDICINE | Facility: CLINIC | Age: 87
End: 2022-03-21

## 2022-04-01 RX ORDER — BUSPIRONE HYDROCHLORIDE 5 MG/1
2.5 TABLET ORAL 3 TIMES DAILY PRN
Qty: 15 TABLET | Refills: 0 | Status: SHIPPED | OUTPATIENT
Start: 2022-04-01 | End: 2022-06-07

## 2022-05-02 RX ORDER — SODIUM CHLORIDE 1000 MG
TABLET, SOLUBLE MISCELLANEOUS
Qty: 41 TABLET | Refills: 0 | Status: SHIPPED | OUTPATIENT
Start: 2022-05-02 | End: 2022-07-01 | Stop reason: SDUPTHER

## 2022-05-31 DIAGNOSIS — E87.1 HYPONATREMIA: ICD-10-CM

## 2022-05-31 DIAGNOSIS — R73.09 ABNORMAL GLUCOSE: ICD-10-CM

## 2022-05-31 DIAGNOSIS — I10 BENIGN ESSENTIAL HYPERTENSION: Primary | ICD-10-CM

## 2022-05-31 RX ORDER — DOCUSATE SODIUM 100 MG/1
100 CAPSULE, LIQUID FILLED ORAL 2 TIMES DAILY
Qty: 180 CAPSULE | Refills: 1 | Status: SHIPPED | OUTPATIENT
Start: 2022-05-31 | End: 2022-12-27 | Stop reason: SDUPTHER

## 2022-06-01 ENCOUNTER — LAB (OUTPATIENT)
Dept: LAB | Facility: HOSPITAL | Age: 87
End: 2022-06-01

## 2022-06-01 DIAGNOSIS — I10 BENIGN ESSENTIAL HYPERTENSION: ICD-10-CM

## 2022-06-01 DIAGNOSIS — R73.09 ABNORMAL GLUCOSE: ICD-10-CM

## 2022-06-01 DIAGNOSIS — E87.1 HYPONATREMIA: ICD-10-CM

## 2022-06-01 LAB
ANION GAP SERPL CALCULATED.3IONS-SCNC: 10.9 MMOL/L (ref 5–15)
BASOPHILS # BLD AUTO: 0.05 10*3/MM3 (ref 0–0.2)
BASOPHILS NFR BLD AUTO: 0.9 % (ref 0–1.5)
BUN SERPL-MCNC: 10 MG/DL (ref 8–23)
BUN/CREAT SERPL: 14.9 (ref 7–25)
CALCIUM SPEC-SCNC: 9.2 MG/DL (ref 8.6–10.5)
CHLORIDE SERPL-SCNC: 99 MMOL/L (ref 98–107)
CO2 SERPL-SCNC: 24.1 MMOL/L (ref 22–29)
CREAT SERPL-MCNC: 0.67 MG/DL (ref 0.57–1)
DEPRECATED RDW RBC AUTO: 47.9 FL (ref 37–54)
EGFRCR SERPLBLD CKD-EPI 2021: 85.2 ML/MIN/1.73
EOSINOPHIL # BLD AUTO: 0.23 10*3/MM3 (ref 0–0.4)
EOSINOPHIL NFR BLD AUTO: 4 % (ref 0.3–6.2)
ERYTHROCYTE [DISTWIDTH] IN BLOOD BY AUTOMATED COUNT: 16.6 % (ref 12.3–15.4)
GLUCOSE SERPL-MCNC: 84 MG/DL (ref 65–99)
HBA1C MFR BLD: 5.9 % (ref 4.8–5.6)
HCT VFR BLD AUTO: 35 % (ref 34–46.6)
HGB BLD-MCNC: 11.1 G/DL (ref 12–15.9)
IMM GRANULOCYTES # BLD AUTO: 0.02 10*3/MM3 (ref 0–0.05)
IMM GRANULOCYTES NFR BLD AUTO: 0.4 % (ref 0–0.5)
LYMPHOCYTES # BLD AUTO: 1.05 10*3/MM3 (ref 0.7–3.1)
LYMPHOCYTES NFR BLD AUTO: 18.4 % (ref 19.6–45.3)
MCH RBC QN AUTO: 25.7 PG (ref 26.6–33)
MCHC RBC AUTO-ENTMCNC: 31.7 G/DL (ref 31.5–35.7)
MCV RBC AUTO: 81 FL (ref 79–97)
MONOCYTES # BLD AUTO: 0.63 10*3/MM3 (ref 0.1–0.9)
MONOCYTES NFR BLD AUTO: 11.1 % (ref 5–12)
NEUTROPHILS NFR BLD AUTO: 3.72 10*3/MM3 (ref 1.7–7)
NEUTROPHILS NFR BLD AUTO: 65.2 % (ref 42.7–76)
NRBC BLD AUTO-RTO: 0 /100 WBC (ref 0–0.2)
PLATELET # BLD AUTO: 319 10*3/MM3 (ref 140–450)
PMV BLD AUTO: 9.2 FL (ref 6–12)
POTASSIUM SERPL-SCNC: 4.3 MMOL/L (ref 3.5–5.2)
RBC # BLD AUTO: 4.32 10*6/MM3 (ref 3.77–5.28)
SODIUM SERPL-SCNC: 134 MMOL/L (ref 136–145)
WBC NRBC COR # BLD: 5.7 10*3/MM3 (ref 3.4–10.8)

## 2022-06-01 PROCEDURE — 83036 HEMOGLOBIN GLYCOSYLATED A1C: CPT

## 2022-06-01 PROCEDURE — 85025 COMPLETE CBC W/AUTO DIFF WBC: CPT

## 2022-06-01 PROCEDURE — 80048 BASIC METABOLIC PNL TOTAL CA: CPT

## 2022-06-07 ENCOUNTER — OFFICE VISIT (OUTPATIENT)
Dept: INTERNAL MEDICINE | Facility: CLINIC | Age: 87
End: 2022-06-07

## 2022-06-07 VITALS
SYSTOLIC BLOOD PRESSURE: 122 MMHG | WEIGHT: 128.4 LBS | HEART RATE: 89 BPM | TEMPERATURE: 98 F | OXYGEN SATURATION: 91 % | HEIGHT: 64 IN | BODY MASS INDEX: 21.92 KG/M2 | DIASTOLIC BLOOD PRESSURE: 78 MMHG

## 2022-06-07 DIAGNOSIS — K59.01 CONSTIPATION, SLOW TRANSIT: ICD-10-CM

## 2022-06-07 DIAGNOSIS — E78.2 MIXED HYPERLIPIDEMIA: ICD-10-CM

## 2022-06-07 DIAGNOSIS — I10 BENIGN ESSENTIAL HYPERTENSION: Primary | ICD-10-CM

## 2022-06-07 DIAGNOSIS — E87.1 HYPONATREMIA: ICD-10-CM

## 2022-06-07 DIAGNOSIS — E63.9 POOR NUTRITION: Chronic | ICD-10-CM

## 2022-06-07 DIAGNOSIS — M79.671 FOOT PAIN, BILATERAL: ICD-10-CM

## 2022-06-07 DIAGNOSIS — M79.672 FOOT PAIN, BILATERAL: ICD-10-CM

## 2022-06-07 DIAGNOSIS — M81.0 SENILE OSTEOPOROSIS: ICD-10-CM

## 2022-06-07 DIAGNOSIS — E44.1 MILD PROTEIN-CALORIE MALNUTRITION: ICD-10-CM

## 2022-06-07 DIAGNOSIS — I48.92 ATRIAL FLUTTER WITH RAPID VENTRICULAR RESPONSE: ICD-10-CM

## 2022-06-07 PROCEDURE — 99214 OFFICE O/P EST MOD 30 MIN: CPT | Performed by: INTERNAL MEDICINE

## 2022-06-07 NOTE — PATIENT INSTRUCTIONS
Patient Instructions   Problem List Items Addressed This Visit          Cardiac and Vasculature    Benign essential hypertension - Primary    Overview      Taking bisoprolol 10mg every evening and Lasix at 1/2 of the 20 mg tablet every other day.               Current Assessment & Plan     Continue bisoprolol every evening. Continue Lasix at 0.5 tablet every other day. Continue to avoid salt in the diet.           Relevant Medications    bisoprolol (ZEBeta) 5 MG tablet    furosemide (Lasix) 20 MG tablet    Mixed hyperlipidemia    Current Assessment & Plan     Continue to avoid sugars and fats in the diet.           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  She received IV beta-blocker and converted.  Bisoprolol was increased to 10 mg and she was started on Eliquis 5 mg twice a day.     Echo showed EF 65% with borderline concentric hypertrophy grade 2 diastolic dysfunction with pseudonormalization.  Saline test are positive for evidence of an intra-atrial shunt.  Right atrial cavity is severely dilated, mild AR, mild TR RVSP 35 to 45 mmHg.     [place] eliquis twice a day and bisoprolol 10mg daily.     She is to follow-up with Dr. Kaye Aleman December 9.               Relevant Medications    bisoprolol (ZEBeta) 5 MG tablet       Endocrine and Metabolic    Poor nutrition (Chronic)    Current Assessment & Plan     We discussed getting more protein in the diet, at least 3 times per day. We discussed adding eggs, can tuna, minced chicken, beans and lentils, yogurt, cottage cheese, peanut butter, etc.           Mild protein-calorie malnutrition (HCC)    Overview     See plan above           Current Assessment & Plan     We discussed getting more protein in the diet, at least 3 times per day. We discussed adding eggs, can tuna, minced chicken, beans and lentils, yogurt,  cottage cheese, peanut butter, etc.              Gastrointestinal Abdominal     Constipation, slow transit    Overview     Continue eating figs or other dried fruits and taking Metamucil and probiotic every day.                 Current Assessment & Plan     Continue eating foods or other dried fruit, taking Metamucil, probiotic, and stool softener every day. Drink plenty of water.  Continue Metamucil daily.   Continue Colace once daily.              Genitourinary and Reproductive     Hyponatremia    Overview     Oral fluid restriction.  Taking one sodium chloride  tablet per day.  Drink a Gatorade or other electrolyte drink once a day.             Current Assessment & Plan     Continue 1 sodium chloride tablet daily. Continue to drink 1 Gatorade or other a like electrolyte drink daily.                Musculoskeletal and Injuries    Senile osteoporosis    Overview     Patient declines DEXA and declines treatment for osteoporosis.    She will continue taking vitamin D3 and calcium daily.  She will try to use small hand weights daily at home.           Current Assessment & Plan     Continue vitamin D daily. Continue calcium chews. Continue vitamin D daily.           Foot pain, bilateral    Overview     Foot pain with mild numbness bilaterally most likely mild neuropathy.  Takes nightly gabapentin as needed.           Current Assessment & Plan     Continue nightly gabapentin as needed.           Relevant Medications    gabapentin (NEURONTIN) 100 MG capsule          Fall Prevention in the Home, Adult  Falls can cause injuries and can affect people from all age groups. There are many simple things that you can do to make your home safe and to help prevent falls. Ask for help when making these changes, if needed.  What actions can I take to prevent falls?  General instructions  Use good lighting in all rooms. Replace any light bulbs that burn out.  Turn on lights if it is dark. Use night-lights.  Place frequently used  items in easy-to-reach places. Lower the shelves around your home if necessary.  Set up furniture so that there are clear paths around it. Avoid moving your furniture around.  Remove throw rugs and other tripping hazards from the floor.  Avoid walking on wet floors.  Fix any uneven floor surfaces.  Add color or contrast paint or tape to grab bars and handrails in your home. Place contrasting color strips on the first and last steps of stairways.  When you use a stepladder, make sure that it is completely opened and that the sides are firmly locked. Have someone hold the ladder while you are using it. Do not climb a closed stepladder.  Be aware of any and all pets.  What can I do in the bathroom?         Keep the floor dry. Immediately clean up any water that spills onto the floor.  Remove soap buildup in the tub or shower on a regular basis.  Use non-skid mats or decals on the floor of the tub or shower.  Attach bath mats securely with double-sided, non-slip rug tape.  If you need to sit down while you are in the shower, use a plastic, non-slip stool.  Install grab bars by the toilet and in the tub and shower. Do not use towel bars as grab bars.  What can I do in the bedroom?  Make sure that a bedside light is easy to reach.  Do not use oversized bedding that drapes onto the floor.  Have a firm chair that has side arms to use for getting dressed.  What can I do in the kitchen?  Clean up any spills right away.  If you need to reach for something above you, use a sturdy step stool that has a grab bar.  Keep electrical cables out of the way.  Do not use floor polish or wax that makes floors slippery. If you must use wax, make sure that it is non-skid floor wax.  What can I do in the stairways?  Do not leave any items on the stairs.  Make sure that you have a light switch at the top of the stairs and the bottom of the stairs. Have them installed if you do not have them.  Make sure that there are handrails on both sides  of the stairs. Fix handrails that are broken or loose. Make sure that handrails are as long as the stairways.  Install non-slip stair treads on all stairs in your home.  Avoid having throw rugs at the top or bottom of stairways, or secure the rugs with carpet tape to prevent them from moving.  Choose a carpet design that does not hide the edge of steps on the stairway.  Check any carpeting to make sure that it is firmly attached to the stairs. Fix any carpet that is loose or worn.  What can I do on the outside of my home?  Use bright outdoor lighting.  Regularly repair the edges of walkways and driveways and fix any cracks.  Remove high doorway thresholds.  Trim any shrubbery on the main path into your home.  Regularly check that handrails are securely fastened and in good repair. Both sides of any steps should have handrails.  Install guardrails along the edges of any raised decks or porches.  Clear walkways of debris and clutter, including tools and rocks.  Have leaves, snow, and ice cleared regularly.  Use sand or salt on walkways during winter months.  In the garage, clean up any spills right away, including grease or oil spills.  What other actions can I take?  Wear closed-toe shoes that fit well and support your feet. Wear shoes that have rubber soles or low heels.  Use mobility aids as needed, such as canes, walkers, scooters, and crutches.  Review your medicines with your health care provider. Some medicines can cause dizziness or changes in blood pressure, which increase your risk of falling.  Talk with your health care provider about other ways that you can decrease your risk of falls. This may include working with a physical therapist or  to improve your strength, balance, and endurance.  Where to find more information  Centers for Disease Control and PreventionMAGDA: https://www.cdc.gov  National [place] on Aging: https://xo4lwtq.[place].nih.gov  Contact a health care provider if:  You are afraid  of falling at home.  You feel weak, drowsy, or dizzy at home.  You fall at home.  Summary  There are many simple things that you can do to make your home safe and to help prevent falls.  Ways to make your home safe include removing tripping hazards and installing grab bars in the bathroom.  Ask for help when making these changes in your home.  This information is not intended to replace advice given to you by your health care provider. Make sure you discuss any questions you have with your health care provider.  Document Revised: 11/30/2018 Document Reviewed: 08/02/2018  Elsevier Patient Education © 2021 Elsevier Inc.

## 2022-06-07 NOTE — ASSESSMENT & PLAN NOTE
We discussed getting more protein in the diet, at least 3 times per day. We discussed adding eggs, can tuna, minced chicken, beans and lentils, yogurt, cottage cheese, peanut butter, etc.

## 2022-06-07 NOTE — ASSESSMENT & PLAN NOTE
Continue bisoprolol every evening. Continue Lasix at 0.5 tablet every other day. Continue to avoid salt in the diet.

## 2022-06-07 NOTE — ASSESSMENT & PLAN NOTE
Continue Eliquis twice a day. Continue bisoprolol nightly. Avoid excessive caffeine and chocolate in the diet. Continue regular follow-up with the cardiologist.

## 2022-06-07 NOTE — ASSESSMENT & PLAN NOTE
Continue 1 sodium chloride tablet daily. Continue to drink 1 Gatorade or other a like electrolyte drink daily.

## 2022-06-07 NOTE — PROGRESS NOTES
[facility]     Thuy Clark  1935   [id number]      Patient Care Team:  Isidra Eisenberg MD as PCP - General (Internal Medicine)  Isidra Eisenberg MD as PCP - Internal Medicine  Azam Marroquin MD as Consulting Physician (Urology)  Lili Hebert MD as Consulting Physician (Gastroenterology)  Isidra Eisenberg MD as Consulting Physician (Internal Medicine)    Chief Complaint   Patient presents with   • Hypertension   • Hyperlipidemia            HPI  Patient is a 86 y.o. female presents today for a follow-up. She is accompanied by her daughter.    The patient states she has only minor concerns today.    Fingernail issues  The patient states that her fingernails have stopped growing and they break off. She states that she can not do anything about it and they do not grow back. She states that she is wondering if the salt pill has anything to do with her nails stop growing. The patient states this morning she was making up her bed and a nail snagged.  .  Dental issues.   The patient states that she had dental surgery on 05/01/2022. She states that she had all of her top teeth extracted. The accompanying adult female states that she is not eating like normal with her teeth, so maybe she has just had a little less nutrition. The patient states that she is eating a lot of sweet potatoes, mashed potatoes, grits, and oatmeal.  The patient states her teeth extractions are finished, but the  are still being adjusted.     Toe numbness  She states that her toes go numb on occasion. She states that she is interested in buying a TENS unit. She states that they are not numb all the time. She states that it is mostly when she gets ready for bed. She states that it is all of her toes. She states that she has been noticing that for 4 to 5 months. She states that she thinks it has to do with her circulation. She states that she is not getting numbness when she is walking. She states that they do  "not hurt and burn. She states that she is drinking Gatorade.  She has used gabapentin in the evenings as needed for several years for intermittent sharp burning pain in the feet.  It works very well.    Hypertension  Her blood pressure looks good today at 122/78 mmHg. She states that she is taking her sodium tablet once a day. Her sodium is still a little low at 134 mEq/L. She states that she has been trying to do some exercises at home. She states that she does not enjoy walking at all. She states that her yard is very rough and downhill. She states that she is going to get out and walk a little bit more. She states that she has a foot bicycle.    Constipation  The adult female states the patient's biggest struggle is constipation. The patient states she take Metamucil every day and Colace once a day. The patient indicates if she eat 4 or 5 cherries or if she eats 4 or 5 prunes she does not have any hemorrhoid problems. She also states the constipation is virtually gone. She reports after a bowel movement, sometimes her colon will feel like it is twisted a little bit and it does not feel normal. She notes it does not feel like \"cramping\" just uncomfortable. The patient states her stools are not hard anymore.    Jury Duty  The adult female asks if the patient's daughter who does a lot for the patient as well if she can be provided with a statement that she has daily needs that they attend to that could probably excuse her from that responsibility.    Atrial fibrillation  She states that she is satisfied with her atrial fibrillation. She states that it is not bothering her. She states that she sees Dr. Aleman every 6 months. She states that she can not remember when she was there.    Health maintenance  The patient states that she does not take buspirone. She confirms that she takes bisoprolol every evening. She states she takes gabapentin as needed.    The patient is up-to-date of her shingles and pneumonia " vaccines. She is due for a tetanus shot. She states she get the COVID-19 booster. The patient notes her last bone density was in 2017. She was not interested in any treatment for osteoporosis in 12/2021.      CHRONIC CONDITIONS      Past Medical History:   Diagnosis Date   • Acute cystitis with hematuria 6/25/2021 6/25/2021 Isidra Eisenberg MD  Urine sent for culture.   • Atrial fibrillation (HCC) 2019   • Bleeding hemorrhoid 3/8/2019   • Diverticulosis    • External hemorrhoid 6/11/2019 12/6/2019 Isidra Eisenberg MD   Continue vasculera daily. Drink plenty of fluids.  Avoid constipation.  Follow-up with Dr. Hebert as planned.   • Hx of colonic polyps 2003   • Hypertension    • Intraductal papilloma     R intraductal patheloma   • Left upper extremity numbness     L upper extremity numbness-ER visit; neuropathy   • Overweight (BMI 25.0-29.9) 6/11/2019   • Self-catheterizes urinary bladder        Past Surgical History:   Procedure Laterality Date   • BREAST LUMPECTOMY  2006   • CHOLECYSTECTOMY     • DENTAL PROCEDURE  12/13/2021    tooth exteraction    • HYSTERECTOMY  1984   • VAGINECTOMY  2009       Family History   Problem Relation Age of Onset   • Hyperlipidemia Other    • Hypertension Other    • Coronary artery disease Other    • No Known Problems Mother    • No Known Problems Father        Social History     Socioeconomic History   • Marital status:    Tobacco Use   • Smoking status: Never Smoker   • Smokeless tobacco: Never Used   Substance and Sexual Activity   • Alcohol use: Never     Alcohol/week: 1.0 standard drink     Types: 1 Glasses of wine per week   • Drug use: Never   • Sexual activity: Defer       Allergies   Allergen Reactions   • Levofloxacin Other (See Comments)     itching   • Nitrofuran Derivatives Rash     primarily in b/l hands   • Nitrofurantoin Rash       Review of Systems:     Review of Systems    Vital Signs  Vitals:    06/07/22 1408   BP: 122/78   BP Location: Left arm  "  Patient Position: Sitting   Cuff Size: Adult   Pulse: 89   Temp: 98 °F (36.7 °C)   TempSrc: Infrared   SpO2: 91%   Weight: 58.2 kg (128 lb 6.4 oz)   Height: 162.6 cm (64\")   PainSc: 0-No pain     Body mass index is 22.04 kg/m².  BMI is within normal parameters. No other follow-up for BMI required.        Current Outpatient Medications:   •  apixaban (ELIQUIS) 5 MG tablet tablet, Take 1 tablet by mouth Every 12 (Twelve) Hours. Indications: Atrial Fibrillation, Disp: 180 tablet, Rfl: 1  •  bisoprolol (ZEBeta) 5 MG tablet, Take 2 tablets by mouth Every Night., Disp: 180 tablet, Rfl: 1  •  busPIRone (BUSPAR) 5 MG tablet, Take 0.5 tablets by mouth 3 (Three) Times a Day As Needed (anxiety)., Disp: 15 tablet, Rfl: 0  •  Cholecalciferol (VITAMIN D3) 1000 units capsule, Take 1 capsule by mouth Daily., Disp: , Rfl:   •  Dietary Management Product (Vasculera) tablet, Take 1 tablet by mouth 2 (Two) Times a Day., Disp: 180 tablet, Rfl: 3  •  docusate sodium (COLACE) 100 MG capsule, Take 1 capsule by mouth 2 (Two) Times a Day., Disp: 180 capsule, Rfl: 1  •  furosemide (Lasix) 20 MG tablet, Take one tablet every other day. (Patient taking differently: 1/2 tablet every other day), Disp: 90 tablet, Rfl: 1  •  gabapentin (NEURONTIN) 100 MG capsule, Take 2 capsules by mouth At Night As Needed (leg pain)., Disp: 6 capsule, Rfl: 0  •  ondansetron ODT (ZOFRAN-ODT) 4 MG disintegrating tablet, Place 1 tablet on the tongue 4 (Four) Times a Day As Needed for Nausea or Vomiting., Disp: 15 tablet, Rfl: 0  •  Probiotic Product (PROBIOTIC PEARLS ADVANTAGE PO), 1  PRN, Disp: , Rfl:   •  psyllium (METAMUCIL MULTIHEALTH FIBER) 58.12 % packet, Take 1 packet by mouth Daily., Disp: , Rfl:   •  sodium chloride 1 g tablet, Take 1 tablet by mouth once daily, Disp: 41 tablet, Rfl: 0  •  hydrocortisone (ANUSOL-HC) 25 MG suppository, Insert 1 suppository into the rectum 2 (Two) Times a Day As Needed for Hemorrhoids., Disp: 24 each, Rfl: 1  •  " Hydrocortisone, Perianal, (ANUSOL-HC) 2.5 % rectal cream, Insert  into the rectum 2 (Two) Times a Day., Disp: 28 g, Rfl: 1  •  Turmeric Curcumin 500 MG capsule, Take 1 capsule by mouth Daily. (Patient taking differently: Take 1 capsule by mouth As Needed.), Disp: 60 capsule, Rfl: 5  •  vitamin B-12 (CYANOCOBALAMIN) 1000 MCG tablet, Take 1 tablet by mouth Daily., Disp: , Rfl:     Physical Exam:    Physical Exam  Vitals reviewed.   Constitutional:       Appearance: She is well-developed.   HENT:      Head: Normocephalic and atraumatic.   Cardiovascular:      Rate and Rhythm: Normal rate and regular rhythm.      Heart sounds: Normal heart sounds. No murmur heard.     Comments: Peripheral pulses mildly decreased bilaterally but palpable. Heart is irregularly irregular, normal rate. No edema.  Pulmonary:      Effort: Pulmonary effort is normal.      Breath sounds: Normal breath sounds.   Skin:     Comments: Mild longitudinal ridging in some of her nails. No discoloration. No splitting. Nails appear normal.   Neurological:      Mental Status: She is alert and oriented to person, place, and time.      Comments: Abnormal gait of old age. Using cane.          ACE III MINI        Results Review:    I reviewed the patient's new clinical results.    CMP:  Lab Results   Component Value Date    BUN 10 06/01/2022    CREATININE 0.67 06/01/2022    EGFRIFNONA 67 02/15/2022    BCR 14.9 06/01/2022     (L) 06/01/2022    K 4.3 06/01/2022    CO2 24.1 06/01/2022    CALCIUM 9.2 06/01/2022    ALBUMIN 4.30 12/17/2021    BILITOT 0.6 12/17/2021    ALKPHOS 116 12/17/2021    AST 17 12/17/2021    ALT 12 12/17/2021     HbA1c:  Lab Results   Component Value Date    HGBA1C 5.90 (H) 06/01/2022    HGBA1C 6.06 (H) 12/17/2021     Microalbumin:  No results found for: MICROALBUR, POCMALB, POCCREAT  Lipid Panel  Lab Results   Component Value Date    CHOL 112 12/17/2021    TRIG 48 12/17/2021    HDL 33 (L) 12/17/2021    LDL 67 12/17/2021    AST 17  12/17/2021    ALT 12 12/17/2021       Medication Review: Medications reviewed and noted  Patient Instructions   Problem List Items Addressed This Visit        Cardiac and Vasculature    Benign essential hypertension - Primary    Overview      Taking bisoprolol 10mg every evening and Lasix at 1/2 of the 20 mg tablet every other day.               Current Assessment & Plan     Continue bisoprolol every evening. Continue Lasix at 0.5 tablet every other day. Continue to avoid salt in the diet.           Relevant Medications    bisoprolol (ZEBeta) 5 MG tablet    furosemide (Lasix) 20 MG tablet    Mixed hyperlipidemia    Current Assessment & Plan     Continue to avoid sugars and fats in the diet.           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  She received IV beta-blocker and converted.  Bisoprolol was increased to 10 mg and she was started on Eliquis 5 mg twice a day.     Echo showed EF 65% with borderline concentric hypertrophy grade 2 diastolic dysfunction with pseudonormalization.  Saline test are positive for evidence of an intra-atrial shunt.  Right atrial cavity is severely dilated, mild AR, mild TR RVSP 35 to 45 mmHg.     [place] eliquis twice a day and bisoprolol 10mg daily.     She is to follow-up with Dr. Kaye Aleman December 9.               Relevant Medications    bisoprolol (ZEBeta) 5 MG tablet       Endocrine and Metabolic    Poor nutrition (Chronic)    Current Assessment & Plan     We discussed getting more protein in the diet, at least 3 times per day. We discussed adding eggs, can tuna, minced chicken, beans and lentils, yogurt, cottage cheese, peanut butter, etc.           Mild protein-calorie malnutrition (HCC)    Overview     See plan above           Current Assessment & Plan     We discussed getting more protein in the diet, at least 3 times per  day. We discussed adding eggs, can tuna, minced chicken, beans and lentils, yogurt, cottage cheese, peanut butter, etc.              Gastrointestinal Abdominal     Constipation, slow transit    Overview     Continue eating figs or other dried fruits and taking Metamucil and probiotic every day.                 Current Assessment & Plan     Continue eating foods or other dried fruit, taking Metamucil, probiotic, and stool softener every day. Drink plenty of water.  Continue Metamucil daily.   Continue Colace once daily.              Genitourinary and Reproductive     Hyponatremia    Overview     Oral fluid restriction.  Taking one sodium chloride  tablet per day.  Drink a Gatorade or other electrolyte drink once a day.             Current Assessment & Plan     Continue 1 sodium chloride tablet daily. Continue to drink 1 Gatorade or other a like electrolyte drink daily.                Musculoskeletal and Injuries    Senile osteoporosis    Overview     Patient declines DEXA and declines treatment for osteoporosis.    She will continue taking vitamin D3 and calcium daily.  She will try to use small hand weights daily at home.           Current Assessment & Plan     Continue vitamin D daily. Continue calcium chews. Continue vitamin D daily.           Foot pain, bilateral    Overview     Foot pain with mild numbness bilaterally most likely mild neuropathy.  Takes nightly gabapentin as needed.           Current Assessment & Plan     Continue nightly gabapentin as needed.           Relevant Medications    gabapentin (NEURONTIN) 100 MG capsule            No diagnosis found.        Plan of care reviewed with patient at the conclusion of today's visit. Education was provided regarding diagnosis, management, and any prescribed or recommended OTC medications.Patient verbalizes understanding of and agreement with management plan.         Transcribed from ambient dictation for Isidra Eisenberg MD by GHISLAINE WEAVER.  06/07/22   17:36  EDT    Patient verbalized consent to the visit recording.

## 2022-06-07 NOTE — ASSESSMENT & PLAN NOTE
Continue eating foods or other dried fruit, taking Metamucil, probiotic, and stool softener every day. Drink plenty of water.

## 2022-07-01 RX ORDER — SODIUM CHLORIDE 1000 MG
1 TABLET, SOLUBLE MISCELLANEOUS DAILY
Qty: 90 TABLET | Refills: 1 | Status: SHIPPED | OUTPATIENT
Start: 2022-07-01 | End: 2022-08-31 | Stop reason: SDUPTHER

## 2022-08-18 ENCOUNTER — READMISSION MANAGEMENT (OUTPATIENT)
Dept: CALL CENTER | Facility: HOSPITAL | Age: 87
End: 2022-08-18

## 2022-08-18 NOTE — OUTREACH NOTE
Prep Survey    Flowsheet Row Responses   [facility] facility patient discharged from? Non-BH   Is LACE score < 7 ? Non-BH Discharge   Emergency Room discharge w/ pulse ox? No   Eligibility [facility]    Date of Discharge 08/18/22   Discharge diagnosis Unavailable   Does the patient have one of the following disease processes/diagnoses(primary or secondary)? Other   Prep survey completed? Yes          JO ANN CORTES - Registered Nurse

## 2022-08-19 ENCOUNTER — TRANSITIONAL CARE MANAGEMENT TELEPHONE ENCOUNTER (OUTPATIENT)
Dept: CALL CENTER | Facility: HOSPITAL | Age: 87
End: 2022-08-19

## 2022-08-19 NOTE — OUTREACH NOTE
Call Center TCM Note    Flowsheet Row Responses   [facility] [place] patient discharged from? Non-BH   Does the patient have one of the following disease processes/diagnoses(primary or secondary)? Other   TCM attempt successful? Yes   Call start time 1445   Call end time 1450   Discharge diagnosis REhab after fall   Does the patient have a primary care provider?  Yes   Comments regarding PCP HOSP DC FU APPT 8/31/22 @ 12:45 pm.    Psychosocial issues? No   What is the patient's perception of their health status since discharge? Improving   Is the patient/caregiver able to teach back the hierarchy of who to call/visit for symptoms/problems? PCP, Specialist, Home health nurse, Urgent Care, ED, 911 Yes   TCM call completed? Yes   Wrap up additional comments Pt not happy because she is in quarantine at the [place]. Pt did not want to discuss anything but wanting booster shot.           Elen Merchant RN    8/19/2022, 14:52 EDT

## 2022-08-19 NOTE — PROGRESS NOTES
Called [facility] [phone] and spoke to Sonia, let her know that the patient had a hospital follow up appointment scheduled with Dr Eisenberg 08/31/2022 at 12:45 PM and she verbalized understanding

## 2022-08-19 NOTE — OUTREACH NOTE
Call Center TCM Note    Flowsheet Row Responses   [facility] patient discharged from? Non-BH   Does the patient have one of the following disease processes/diagnoses(primary or secondary)? Other   TCM attempt successful? No   Unsuccessful attempts Attempt 1   Does the patient have a primary care provider?  Yes   Does the patient have an appointment with their PCP within 7 days of discharge? Greater than 7 days   Comments regarding PCP HOSP DC FU APPT 8/31/22 @ 12:45 pm.    What is preventing the patient from scheduling follow up appointments within 7 days of discharge? Earlier appointment not available   Nursing Interventions Verified appointment date/time/provider   Has the patient kept scheduled appointments due by today? N/A          Elen Merchant RN    8/19/2022, 11:47 EDT

## 2022-08-31 ENCOUNTER — LAB (OUTPATIENT)
Dept: LAB | Facility: HOSPITAL | Age: 87
End: 2022-08-31

## 2022-08-31 ENCOUNTER — OFFICE VISIT (OUTPATIENT)
Dept: INTERNAL MEDICINE | Facility: CLINIC | Age: 87
End: 2022-08-31

## 2022-08-31 VITALS
SYSTOLIC BLOOD PRESSURE: 108 MMHG | HEART RATE: 80 BPM | HEIGHT: 64 IN | BODY MASS INDEX: 21.48 KG/M2 | WEIGHT: 125.8 LBS | TEMPERATURE: 98.4 F | DIASTOLIC BLOOD PRESSURE: 60 MMHG

## 2022-08-31 DIAGNOSIS — E44.1 MILD PROTEIN-CALORIE MALNUTRITION: ICD-10-CM

## 2022-08-31 DIAGNOSIS — I10 BENIGN ESSENTIAL HYPERTENSION: ICD-10-CM

## 2022-08-31 DIAGNOSIS — I27.20 PULMONARY HYPERTENSION: ICD-10-CM

## 2022-08-31 DIAGNOSIS — M25.561 CHRONIC PAIN OF BOTH KNEES: Chronic | ICD-10-CM

## 2022-08-31 DIAGNOSIS — Z79.01 CHRONIC ANTICOAGULATION: ICD-10-CM

## 2022-08-31 DIAGNOSIS — Z91.81 AT HIGH RISK FOR FALLS: Chronic | ICD-10-CM

## 2022-08-31 DIAGNOSIS — K59.01 CONSTIPATION, SLOW TRANSIT: ICD-10-CM

## 2022-08-31 DIAGNOSIS — I50.43 ACUTE ON CHRONIC SYSTOLIC AND DIASTOLIC HEART FAILURE, NYHA CLASS 4: Chronic | ICD-10-CM

## 2022-08-31 DIAGNOSIS — I21.4 NSTEMI (NON-ST ELEVATED MYOCARDIAL INFARCTION): Chronic | ICD-10-CM

## 2022-08-31 DIAGNOSIS — I48.21 PERMANENT ATRIAL FIBRILLATION: ICD-10-CM

## 2022-08-31 DIAGNOSIS — M25.562 CHRONIC PAIN OF BOTH KNEES: Chronic | ICD-10-CM

## 2022-08-31 DIAGNOSIS — S32.82XD MULTIPLE CLOSED FRACTURES OF PELVIS WITHOUT DISRUPTION OF PELVIC RING WITH ROUTINE HEALING: Primary | Chronic | ICD-10-CM

## 2022-08-31 DIAGNOSIS — E87.1 HYPONATREMIA: ICD-10-CM

## 2022-08-31 DIAGNOSIS — G89.29 CHRONIC PAIN OF BOTH KNEES: Chronic | ICD-10-CM

## 2022-08-31 DIAGNOSIS — I38 VALVULAR HEART DISEASE: ICD-10-CM

## 2022-08-31 PROBLEM — I50.33 ACUTE ON CHRONIC DIASTOLIC (CONGESTIVE) HEART FAILURE: Chronic | Status: ACTIVE | Noted: 2022-08-31

## 2022-08-31 LAB
BASOPHILS # BLD AUTO: 0.04 10*3/MM3 (ref 0–0.2)
BASOPHILS NFR BLD AUTO: 0.7 % (ref 0–1.5)
DEPRECATED RDW RBC AUTO: 48.8 FL (ref 37–54)
EOSINOPHIL # BLD AUTO: 0.21 10*3/MM3 (ref 0–0.4)
EOSINOPHIL NFR BLD AUTO: 3.9 % (ref 0.3–6.2)
ERYTHROCYTE [DISTWIDTH] IN BLOOD BY AUTOMATED COUNT: 15.6 % (ref 12.3–15.4)
HCT VFR BLD AUTO: 37.9 % (ref 34–46.6)
HGB BLD-MCNC: 11.9 G/DL (ref 12–15.9)
IMM GRANULOCYTES # BLD AUTO: 0.02 10*3/MM3 (ref 0–0.05)
IMM GRANULOCYTES NFR BLD AUTO: 0.4 % (ref 0–0.5)
LYMPHOCYTES # BLD AUTO: 1.35 10*3/MM3 (ref 0.7–3.1)
LYMPHOCYTES NFR BLD AUTO: 25.2 % (ref 19.6–45.3)
MCH RBC QN AUTO: 27.3 PG (ref 26.6–33)
MCHC RBC AUTO-ENTMCNC: 31.4 G/DL (ref 31.5–35.7)
MCV RBC AUTO: 86.9 FL (ref 79–97)
MONOCYTES # BLD AUTO: 0.44 10*3/MM3 (ref 0.1–0.9)
MONOCYTES NFR BLD AUTO: 8.2 % (ref 5–12)
NEUTROPHILS NFR BLD AUTO: 3.29 10*3/MM3 (ref 1.7–7)
NEUTROPHILS NFR BLD AUTO: 61.6 % (ref 42.7–76)
NRBC BLD AUTO-RTO: 0 /100 WBC (ref 0–0.2)
PLATELET # BLD AUTO: 338 10*3/MM3 (ref 140–450)
PMV BLD AUTO: 9.2 FL (ref 6–12)
RBC # BLD AUTO: 4.36 10*6/MM3 (ref 3.77–5.28)
WBC NRBC COR # BLD: 5.35 10*3/MM3 (ref 3.4–10.8)

## 2022-08-31 PROCEDURE — 80048 BASIC METABOLIC PNL TOTAL CA: CPT

## 2022-08-31 PROCEDURE — 99214 OFFICE O/P EST MOD 30 MIN: CPT | Performed by: INTERNAL MEDICINE

## 2022-08-31 PROCEDURE — 85025 COMPLETE CBC W/AUTO DIFF WBC: CPT

## 2022-08-31 RX ORDER — LORATADINE 10 MG/1
10 TABLET ORAL DAILY PRN
Start: 2022-08-31 | End: 2022-12-21

## 2022-08-31 RX ORDER — LORATADINE 10 MG/1
10 TABLET ORAL DAILY
COMMUNITY
End: 2022-08-31 | Stop reason: SDUPTHER

## 2022-08-31 RX ORDER — TROLAMINE SALICYLATE 10 G/100G
1 CREAM TOPICAL 3 TIMES DAILY PRN
COMMUNITY
End: 2022-12-21

## 2022-08-31 RX ORDER — PANTOPRAZOLE SODIUM 40 MG/1
40 TABLET, DELAYED RELEASE ORAL DAILY
COMMUNITY
End: 2022-12-21

## 2022-08-31 RX ORDER — HYDROCORTISONE ACETATE 25 MG/1
25 SUPPOSITORY RECTAL 2 TIMES DAILY PRN
Start: 2022-08-31 | End: 2023-03-17 | Stop reason: SDUPTHER

## 2022-08-31 RX ORDER — ACETAMINOPHEN 325 MG/1
650 TABLET ORAL EVERY 4 HOURS PRN
COMMUNITY

## 2022-08-31 RX ORDER — MENTHOL 40 MG/ML
GEL TOPICAL 2 TIMES DAILY PRN
COMMUNITY

## 2022-08-31 RX ORDER — SODIUM CHLORIDE 1000 MG
1 TABLET, SOLUBLE MISCELLANEOUS 3 TIMES DAILY
Qty: 90 TABLET | Refills: 1
Start: 2022-08-31 | End: 2022-11-05 | Stop reason: SDUPTHER

## 2022-08-31 RX ORDER — SACCHAROMYCES BOULARDII 250 MG
250 CAPSULE ORAL DAILY
Start: 2022-08-31 | End: 2022-12-21

## 2022-08-31 RX ORDER — CLONIDINE HYDROCHLORIDE 0.1 MG/1
0.1 TABLET ORAL EVERY 6 HOURS PRN
COMMUNITY
End: 2022-12-21

## 2022-08-31 RX ORDER — BISOPROLOL FUMARATE 5 MG/1
5 TABLET, FILM COATED ORAL NIGHTLY
Qty: 90 TABLET | Refills: 1
Start: 2022-08-31 | End: 2022-12-21

## 2022-08-31 RX ORDER — SACCHAROMYCES BOULARDII 250 MG
250 CAPSULE ORAL 2 TIMES DAILY PRN
COMMUNITY
End: 2022-08-31 | Stop reason: SDUPTHER

## 2022-08-31 RX ORDER — SENNA PLUS 8.6 MG/1
1 TABLET ORAL 2 TIMES DAILY
COMMUNITY
End: 2022-12-21

## 2022-08-31 RX ORDER — LANOLIN ALCOHOL/MO/W.PET/CERES
CREAM (GRAM) TOPICAL NIGHTLY
COMMUNITY
End: 2022-12-21

## 2022-08-31 RX ORDER — HYDROCORTISONE ACETATE 25 MG/1
25 SUPPOSITORY RECTAL 2 TIMES DAILY
COMMUNITY
End: 2022-08-31 | Stop reason: SDUPTHER

## 2022-08-31 RX ORDER — CARBOXYMETHYLCELLULOSE SODIUM AND GLYCERIN 5; 9 MG/ML; MG/ML
2 SOLUTION/ DROPS OPHTHALMIC 2 TIMES DAILY
COMMUNITY

## 2022-08-31 RX ORDER — HYDROCODONE BITARTRATE AND ACETAMINOPHEN 5; 325 MG/1; MG/1
1 TABLET ORAL EVERY 6 HOURS PRN
COMMUNITY
End: 2022-12-21

## 2022-08-31 RX ORDER — SACUBITRIL AND VALSARTAN 24; 26 MG/1; MG/1
1 TABLET, FILM COATED ORAL 2 TIMES DAILY
COMMUNITY
End: 2022-12-21

## 2022-09-01 LAB
ANION GAP SERPL CALCULATED.3IONS-SCNC: 12.2 MMOL/L (ref 5–15)
BUN SERPL-MCNC: 7 MG/DL (ref 8–23)
BUN/CREAT SERPL: 9.9 (ref 7–25)
CALCIUM SPEC-SCNC: 9.8 MG/DL (ref 8.6–10.5)
CHLORIDE SERPL-SCNC: 97 MMOL/L (ref 98–107)
CO2 SERPL-SCNC: 26.8 MMOL/L (ref 22–29)
CREAT SERPL-MCNC: 0.71 MG/DL (ref 0.57–1)
EGFRCR SERPLBLD CKD-EPI 2021: 82.9 ML/MIN/1.73
GLUCOSE SERPL-MCNC: 82 MG/DL (ref 65–99)
POTASSIUM SERPL-SCNC: 4.5 MMOL/L (ref 3.5–5.2)
SODIUM SERPL-SCNC: 136 MMOL/L (ref 136–145)

## 2022-09-01 NOTE — PATIENT INSTRUCTIONS
Problem List Items Addressed This Visit          Advance Directives and General Issues    At high risk for falls (Chronic)       Cardiac and Vasculature    Acute on chronic diastolic (congestive) heart failure (HCC) (Chronic)    Relevant Medications    sacubitril-valsartan (Entresto) 24-26 MG tablet    bisoprolol (ZEBeta) 5 MG tablet    Benign essential hypertension    Overview      Taking bisoprolol 10mg every evening and entresto twice daily.           Relevant Medications    cloNIDine (CATAPRES) 0.1 MG tablet    bisoprolol (ZEBeta) 5 MG tablet    Other Relevant Orders    CBC & Differential (Completed)    Basic Metabolic Panel (Completed)    Chronic atrial fibrillation (CMS/HCC)    Overview     Taking Eliquis twice a day.  Taking bisoprolol every evening.           Relevant Medications    sacubitril-valsartan (Entresto) 24-26 MG tablet    bisoprolol (ZEBeta) 5 MG tablet       Coag and Thromboembolic    Chronic anticoagulation (Eliquis)       Endocrine and Metabolic    Mild protein-calorie malnutrition (HCC)    Overview     Patient has lost 10 pounds over the last 9 months.              Gastrointestinal Abdominal     Constipation, slow transit    Overview     Patient is on an aggressive bowel regimen and is now having loose stools every day.                Genitourinary and Reproductive     Hyponatremia    Overview     Oral fluid restriction at 1500 mL daily.  Taking one sodium chloride  tablet 3 times per day.  Drink a Gatorade or other electrolyte drink once a day.                Musculoskeletal and Injuries    Chronic pain of both knees (Chronic)    Overview     Taking Tylenol as needed for pain.  Taking gabapentin.               Multiple closed fractures of pelvis without disruption of pelvic ring with routine healing - Primary (Chronic)

## 2022-09-02 ENCOUNTER — TELEPHONE (OUTPATIENT)
Dept: INTERNAL MEDICINE | Facility: CLINIC | Age: 87
End: 2022-09-02

## 2022-09-02 DIAGNOSIS — K64.9 BLEEDING HEMORRHOID: ICD-10-CM

## 2022-09-02 DIAGNOSIS — K59.01 CONSTIPATION, SLOW TRANSIT: Primary | ICD-10-CM

## 2022-09-02 PROBLEM — I50.43 ACUTE ON CHRONIC SYSTOLIC AND DIASTOLIC HEART FAILURE, NYHA CLASS 4 (HCC): Chronic | Status: ACTIVE | Noted: 2022-08-31

## 2022-09-02 PROBLEM — I21.4 NSTEMI (NON-ST ELEVATED MYOCARDIAL INFARCTION): Chronic | Status: ACTIVE | Noted: 2022-09-02

## 2022-09-02 PROBLEM — S22.32XD CLOSED FRACTURE OF ONE RIB OF LEFT SIDE WITH ROUTINE HEALING: Chronic | Status: ACTIVE | Noted: 2022-09-02

## 2022-09-02 NOTE — TELEPHONE ENCOUNTER
FAXED FOR PATIENT'S RECORDS FROM [facility]  SECOND ATTEMPTED REQUEST  FAX# [phone]

## 2022-09-06 ENCOUNTER — TELEPHONE (OUTPATIENT)
Dept: INTERNAL MEDICINE | Facility: CLINIC | Age: 87
End: 2022-09-06

## 2022-09-06 NOTE — TELEPHONE ENCOUNTER
PT CALLED AND WANTED DR ORDAZ TO KNOW THAT SHE IS STILL AT THE [facility] AND IF ANYONE NEEDS HER TO PLEASE CALL HER CELL NUMBER [phone].

## 2022-09-06 NOTE — TELEPHONE ENCOUNTER
Patient is upset because we ordered a stool study test. She states she is using mychart as a way to only update us on her current situation not for us to order any test. She states [facility] and their is a doctor there when she needs something acute and that we only need to order things when she is in the office.

## 2022-09-16 ENCOUNTER — READMISSION MANAGEMENT (OUTPATIENT)
Dept: CALL CENTER | Facility: HOSPITAL | Age: 87
End: 2022-09-16

## 2022-09-16 NOTE — OUTREACH NOTE
Prep Survey    Flowsheet Row Responses   [facility] patient discharged from? Non-BH   Is LACE score < 7 ? Non-BH Discharge   Emergency Room discharge w/ pulse ox? No   [facility]    Date of Discharge 09/16/22   Discharge Disposition Home or Self Care   Discharge diagnosis Unknown   Does the patient have one of the following disease processes/diagnoses(primary or secondary)? Other   Prep survey completed? Yes          BIRGIT LOYOLA - Registered Nurse

## 2022-09-19 ENCOUNTER — TRANSITIONAL CARE MANAGEMENT TELEPHONE ENCOUNTER (OUTPATIENT)
Dept: CALL CENTER | Facility: HOSPITAL | Age: 87
End: 2022-09-19

## 2022-09-19 NOTE — OUTREACH NOTE
Call Center TCM Note    Flowsheet Row Responses   [facility] patient discharged from? Non-BH   Does the patient have one of the following disease processes/diagnoses(primary or secondary)? Other   TCM attempt successful? No   Unsuccessful attempts Attempt 1          Zee Gomez RN    9/19/2022, 15:36 EDT

## 2022-09-19 NOTE — OUTREACH NOTE
Call Center TCM Note    Flowsheet Row Responses   [facility] patient discharged from? Non-BH   Does the patient have one of the following disease processes/diagnoses(primary or secondary)? Other   TCM attempt successful? No   Unsuccessful attempts Attempt 2          Zee Gomez RN    9/19/2022, 16:10 EDT

## 2022-09-20 ENCOUNTER — TRANSITIONAL CARE MANAGEMENT TELEPHONE ENCOUNTER (OUTPATIENT)
Dept: CALL CENTER | Facility: HOSPITAL | Age: 87
End: 2022-09-20

## 2022-09-20 NOTE — PROGRESS NOTES
"Attempted to contact the patient, when her preferred number did not work I called and spoke with her daughter. She was DC from the [facility] on Friday 09/16/2022. Daughter states that she knows the patient will likely need therapy to help her walk, tests for sodium levels if that is not done at [facility],  and that her knees \"hurt very badly\" and she will want to discuss what to do about that either through referrals, testing, imaging etc.    She stated she will call back tomorrow 09/21/2022 to schedule the TCM visit  "

## 2022-09-20 NOTE — OUTREACH NOTE
Call Center TCM Note    Flowsheet Row Responses   [facility] patient discharged from? Non-BH   Does the patient have one of the following disease processes/diagnoses(primary or secondary)? Other   TCM attempt successful? No   Unsuccessful attempts Attempt 3   Revoked Reason Other  [Patient answered the phone however was in a meeting. Call was ended.]   Call end time --  [Pt answered the phone however was in a meeting. Call was ended. Message sent to PCP office. ]          Fanny John RN    9/20/2022, 09:22 EDT

## 2022-09-23 ENCOUNTER — LAB (OUTPATIENT)
Dept: LAB | Facility: HOSPITAL | Age: 87
End: 2022-09-23

## 2022-09-23 ENCOUNTER — TELEPHONE (OUTPATIENT)
Dept: INTERNAL MEDICINE | Facility: CLINIC | Age: 87
End: 2022-09-23

## 2022-09-23 DIAGNOSIS — I10 BENIGN ESSENTIAL HYPERTENSION: Primary | ICD-10-CM

## 2022-09-23 DIAGNOSIS — I10 BENIGN ESSENTIAL HYPERTENSION: ICD-10-CM

## 2022-09-23 LAB
BASOPHILS # BLD AUTO: 0.06 10*3/MM3 (ref 0–0.2)
BASOPHILS NFR BLD AUTO: 1 % (ref 0–1.5)
DEPRECATED RDW RBC AUTO: 49.7 FL (ref 37–54)
EOSINOPHIL # BLD AUTO: 0.22 10*3/MM3 (ref 0–0.4)
EOSINOPHIL NFR BLD AUTO: 3.6 % (ref 0.3–6.2)
ERYTHROCYTE [DISTWIDTH] IN BLOOD BY AUTOMATED COUNT: 15.5 % (ref 12.3–15.4)
HCT VFR BLD AUTO: 35.4 % (ref 34–46.6)
HGB BLD-MCNC: 11.3 G/DL (ref 12–15.9)
IMM GRANULOCYTES # BLD AUTO: 0.01 10*3/MM3 (ref 0–0.05)
IMM GRANULOCYTES NFR BLD AUTO: 0.2 % (ref 0–0.5)
LYMPHOCYTES # BLD AUTO: 2.09 10*3/MM3 (ref 0.7–3.1)
LYMPHOCYTES NFR BLD AUTO: 33.9 % (ref 19.6–45.3)
MCH RBC QN AUTO: 28.2 PG (ref 26.6–33)
MCHC RBC AUTO-ENTMCNC: 31.9 G/DL (ref 31.5–35.7)
MCV RBC AUTO: 88.3 FL (ref 79–97)
MONOCYTES # BLD AUTO: 0.59 10*3/MM3 (ref 0.1–0.9)
MONOCYTES NFR BLD AUTO: 9.6 % (ref 5–12)
NEUTROPHILS NFR BLD AUTO: 3.19 10*3/MM3 (ref 1.7–7)
NEUTROPHILS NFR BLD AUTO: 51.7 % (ref 42.7–76)
NRBC BLD AUTO-RTO: 0 /100 WBC (ref 0–0.2)
PLATELET # BLD AUTO: 314 10*3/MM3 (ref 140–450)
PMV BLD AUTO: 9.7 FL (ref 6–12)
RBC # BLD AUTO: 4.01 10*6/MM3 (ref 3.77–5.28)
WBC NRBC COR # BLD: 6.16 10*3/MM3 (ref 3.4–10.8)

## 2022-09-23 PROCEDURE — 85025 COMPLETE CBC W/AUTO DIFF WBC: CPT

## 2022-09-23 PROCEDURE — 80048 BASIC METABOLIC PNL TOTAL CA: CPT

## 2022-09-23 NOTE — TELEPHONE ENCOUNTER
PT WOULD LIKE HER SODIUM LEVELS CHECKED AND HAS TRANSPORTATION FOR TODAY ONLY.      PT WOULD LIKE CONFORMATION THAT THIS HAS BEEN COMPLETED.  PLEASE CALL HER CELL NUMBER [phone].

## 2022-09-24 LAB
ANION GAP SERPL CALCULATED.3IONS-SCNC: 10.4 MMOL/L (ref 5–15)
BUN SERPL-MCNC: 8 MG/DL (ref 8–23)
BUN/CREAT SERPL: 9.9 (ref 7–25)
CALCIUM SPEC-SCNC: 9 MG/DL (ref 8.6–10.5)
CHLORIDE SERPL-SCNC: 96 MMOL/L (ref 98–107)
CO2 SERPL-SCNC: 24.6 MMOL/L (ref 22–29)
CREAT SERPL-MCNC: 0.81 MG/DL (ref 0.57–1)
EGFRCR SERPLBLD CKD-EPI 2021: 70.8 ML/MIN/1.73
GLUCOSE SERPL-MCNC: 108 MG/DL (ref 65–99)
POTASSIUM SERPL-SCNC: 4.8 MMOL/L (ref 3.5–5.2)
SODIUM SERPL-SCNC: 131 MMOL/L (ref 136–145)

## 2022-09-26 NOTE — PROGRESS NOTES
Please call patient.  I sent her lab letter.  Serum sodium has dropped to 131.  Please confirm that she is taking her sodium chloride tablets 3 times a day and drinking some electrolyte drinks.    If she is having concerns for her knees or other concerns, she needs an appointment.

## 2022-09-26 NOTE — PROGRESS NOTES
Called patient back to follow up on scheduling a hospital follow up visit this week, she says that as long as her sodium is holding steady in the 132-134 range and not dropping she does not want to go through with the visit and states she is feeling fine. She says if there is an abnormal value or other reason that she is needed to come in she will come for an appointment.    She also states that she has not had her sodium result reviewed with her and would like clinical to call and go over her labs

## 2022-09-28 ENCOUNTER — TELEPHONE (OUTPATIENT)
Dept: INTERNAL MEDICINE | Facility: CLINIC | Age: 87
End: 2022-09-28

## 2022-10-13 ENCOUNTER — TELEPHONE (OUTPATIENT)
Dept: INTERNAL MEDICINE | Facility: CLINIC | Age: 87
End: 2022-10-13

## 2022-10-13 NOTE — TELEPHONE ENCOUNTER
Caller: Thuy Clark    Relationship: Self    Best call back number: [phone]    What form or medical record are you requesting: COPY OF MEDICAL RECORDS    Who is requesting this form or medical record from you: PATIENT    Timeframe paperwork needed: ASAP    Additional notes: PATIENT IS TRYING TO GET INTO [facility] AND THEY NEED A COPY OF MEDICAL RECORDS BEFORE SHE CAN MOVE IN. PLEASE CALL [facility] [phone] TO GET A FAX NUMBER. PLEASE CALL PATIENT WHEN THIS HAS BEEN SENT.

## 2022-10-13 NOTE — TELEPHONE ENCOUNTER
Advised that you are out of the office and we were told that the paperwork didn't need to be done until 11/1/22. They are requesting that we get it back to them by Monday or Tuesday.

## 2022-10-13 NOTE — TELEPHONE ENCOUNTER
Caller: HENNA PEREZ    Relationship: Other Relative    Best call back number: [phone]    What form or medical record are you requesting: PLACEMENT PAPERWORK     Who is requesting this form or medical record from you: DAVID STEWART      How would you like to receive the form or medical records (pick-up, mail, fax): FAX; ATTENTION JUANITA BERG   If fax, what is the fax number: ON PAPERWORK   If mail, what is the address:   If pick-up, provide patient with address and location details    Timeframe paperwork needed: ASAP     Additional notes: PATIENT WILL BE MOVING OUT OF [facility] 10/13/22 AND CANNOT MOVE INTO [facility] UNTIL PAPERWORK IS COMPLETE.

## 2022-10-17 PROBLEM — G31.84 MILD COGNITIVE IMPAIRMENT: Chronic | Status: ACTIVE | Noted: 2022-10-17

## 2022-10-21 ENCOUNTER — TELEPHONE (OUTPATIENT)
Dept: INTERNAL MEDICINE | Facility: CLINIC | Age: 87
End: 2022-10-21

## 2022-10-21 DIAGNOSIS — I10 BENIGN ESSENTIAL HYPERTENSION: Primary | ICD-10-CM

## 2022-10-21 DIAGNOSIS — E55.9 VITAMIN D DEFICIENCY, UNSPECIFIED: ICD-10-CM

## 2022-10-21 DIAGNOSIS — I48.21 PERMANENT ATRIAL FIBRILLATION: ICD-10-CM

## 2022-10-21 DIAGNOSIS — R73.09 ABNORMAL GLUCOSE: ICD-10-CM

## 2022-10-21 DIAGNOSIS — E63.9 POOR NUTRITION: Chronic | ICD-10-CM

## 2022-10-21 NOTE — TELEPHONE ENCOUNTER
PLEASE CALL HENNA PEREZ BACK ABOUT PAPERWORK THAT WAS TO BE FAXED.[phone]  NOTHING HAS BEEN RECEIVED.  [phone] ATTN:  ESTRELLA

## 2022-10-21 NOTE — TELEPHONE ENCOUNTER
"Patient called stating she got a call from [facility] saying that they had gotten the assessment from Dr Eisenberg regarding her physical condition    She states that the assessment said she needs \"help in all areas\", she says she strongly denies this and that \"her health is good with the exception of one thing\"    States \"she is extremely mad at [employer]\" because this could keep her from moving into the facility    She will be moving in on Monday 10/24/2022    After speaking with Dinah I let her know that the assessment of her condition would have been based on the last time that we had seen her which would have been on 08/31/2022 and if this is inaccurate to please come in and see Dr Eisenberg to do a current assessment    She wants a call back with more information and an explanation of why she was described in the manner she was, stating when they told her she thought they were talking about someone completely different.    She can be called back on her mobile phone number [phone]    She also gave the phone number of Nallely who is the  with [facility] as this person makes decisions on who can move in.  Nallely's phone number is [phone]  "

## 2022-10-24 NOTE — TELEPHONE ENCOUNTER
Nallely at [facility] called back    She stated that they talked with the patient, who is moving in today, about her needs and she had stated that she goes not need assistance. According to our functional needs assessment the patient needs assistance in all areas including ambulation. She wanted clarification on what needed to be done for the patient    I put her on hold and informed Dinah, call disconnected before she could

## 2022-10-25 ENCOUNTER — TELEPHONE (OUTPATIENT)
Dept: INTERNAL MEDICINE | Facility: CLINIC | Age: 87
End: 2022-10-25

## 2022-10-25 NOTE — TELEPHONE ENCOUNTER
Caller: Thuy Clark    Relationship to patient: Self    Best call back number: [phone]    Chief complaint: SMWV    Type of visit: SUBSEQUENT MEDICARE WELLNESS VISIT    Requested date: 12/21/22    If rescheduling, when is the original appointment: 12/27/22     Additional notes:PLEASE ADVISE IF POSSIBLE TO RESCHEDULE SOONER FOR THE PATIENT.

## 2022-10-26 NOTE — TELEPHONE ENCOUNTER
It can actually be anytime in the same month but she asked for Dec 21st so that's what we decided on.

## 2022-11-02 ENCOUNTER — LAB (OUTPATIENT)
Dept: LAB | Facility: HOSPITAL | Age: 87
End: 2022-11-02

## 2022-11-02 DIAGNOSIS — R73.09 ABNORMAL GLUCOSE: ICD-10-CM

## 2022-11-02 DIAGNOSIS — E63.9 POOR NUTRITION: Chronic | ICD-10-CM

## 2022-11-02 DIAGNOSIS — I10 BENIGN ESSENTIAL HYPERTENSION: ICD-10-CM

## 2022-11-02 DIAGNOSIS — I48.21 PERMANENT ATRIAL FIBRILLATION: ICD-10-CM

## 2022-11-02 DIAGNOSIS — E55.9 VITAMIN D DEFICIENCY, UNSPECIFIED: ICD-10-CM

## 2022-11-02 PROCEDURE — 82607 VITAMIN B-12: CPT

## 2022-11-02 PROCEDURE — 83036 HEMOGLOBIN GLYCOSYLATED A1C: CPT

## 2022-11-02 PROCEDURE — 80048 BASIC METABOLIC PNL TOTAL CA: CPT

## 2022-11-02 PROCEDURE — 82306 VITAMIN D 25 HYDROXY: CPT

## 2022-11-02 PROCEDURE — 84443 ASSAY THYROID STIM HORMONE: CPT

## 2022-11-03 LAB
25(OH)D3 SERPL-MCNC: 52.8 NG/ML (ref 30–100)
ANION GAP SERPL CALCULATED.3IONS-SCNC: 7.2 MMOL/L (ref 5–15)
BUN SERPL-MCNC: 9 MG/DL (ref 8–23)
BUN/CREAT SERPL: 14.3 (ref 7–25)
CALCIUM SPEC-SCNC: 9.3 MG/DL (ref 8.6–10.5)
CHLORIDE SERPL-SCNC: 94 MMOL/L (ref 98–107)
CO2 SERPL-SCNC: 25.8 MMOL/L (ref 22–29)
CREAT SERPL-MCNC: 0.63 MG/DL (ref 0.57–1)
EGFRCR SERPLBLD CKD-EPI 2021: 86.5 ML/MIN/1.73
GLUCOSE SERPL-MCNC: 91 MG/DL (ref 65–99)
HBA1C MFR BLD: 5.8 % (ref 4.8–5.6)
POTASSIUM SERPL-SCNC: 4.4 MMOL/L (ref 3.5–5.2)
SODIUM SERPL-SCNC: 127 MMOL/L (ref 136–145)
TSH SERPL DL<=0.05 MIU/L-ACNC: 2.6 UIU/ML (ref 0.27–4.2)
VIT B12 BLD-MCNC: 492 PG/ML (ref 211–946)

## 2022-11-05 RX ORDER — SODIUM CHLORIDE 1000 MG
1 TABLET, SOLUBLE MISCELLANEOUS 4 TIMES DAILY
Qty: 120 TABLET | Refills: 5
Start: 2022-11-05 | End: 2022-11-09 | Stop reason: SDUPTHER

## 2022-11-07 ENCOUNTER — PATIENT MESSAGE (OUTPATIENT)
Dept: INTERNAL MEDICINE | Facility: CLINIC | Age: 87
End: 2022-11-07

## 2022-11-09 RX ORDER — SODIUM CHLORIDE 1000 MG
1 TABLET, SOLUBLE MISCELLANEOUS 4 TIMES DAILY
Qty: 120 TABLET | Refills: 5 | Status: SHIPPED | OUTPATIENT
Start: 2022-11-09 | End: 2023-03-14

## 2022-11-09 NOTE — TELEPHONE ENCOUNTER
Yennyt, Generic 11/9/2022 9:39 AM EST    Change of subject.....[facility] has not been notified to refill sodium

## 2022-12-12 PROBLEM — I48.92 ATRIAL FLUTTER WITH RAPID VENTRICULAR RESPONSE (HCC): Status: RESOLVED | Noted: 2019-11-02 | Resolved: 2022-12-12

## 2022-12-16 ENCOUNTER — LAB (OUTPATIENT)
Dept: LAB | Facility: HOSPITAL | Age: 87
End: 2022-12-16

## 2022-12-16 DIAGNOSIS — E78.2 MIXED HYPERLIPIDEMIA: ICD-10-CM

## 2022-12-16 DIAGNOSIS — R73.09 ABNORMAL GLUCOSE: ICD-10-CM

## 2022-12-16 DIAGNOSIS — E44.1 MILD PROTEIN-CALORIE MALNUTRITION: ICD-10-CM

## 2022-12-16 DIAGNOSIS — M81.0 SENILE OSTEOPOROSIS: ICD-10-CM

## 2022-12-16 DIAGNOSIS — I10 BENIGN ESSENTIAL HYPERTENSION: ICD-10-CM

## 2022-12-16 LAB
25(OH)D3 SERPL-MCNC: 51.7 NG/ML (ref 30–100)
BASOPHILS # BLD AUTO: 0.06 10*3/MM3 (ref 0–0.2)
BASOPHILS NFR BLD AUTO: 1.2 % (ref 0–1.5)
CHOLEST SERPL-MCNC: 117 MG/DL (ref 0–200)
DEPRECATED RDW RBC AUTO: 46.5 FL (ref 37–54)
EOSINOPHIL # BLD AUTO: 0.21 10*3/MM3 (ref 0–0.4)
EOSINOPHIL NFR BLD AUTO: 4.2 % (ref 0.3–6.2)
ERYTHROCYTE [DISTWIDTH] IN BLOOD BY AUTOMATED COUNT: 14.3 % (ref 12.3–15.4)
HBA1C MFR BLD: 6.1 % (ref 4.8–5.6)
HCT VFR BLD AUTO: 37.4 % (ref 34–46.6)
HCYS SERPL-MCNC: 18 UMOL/L (ref 0–15)
HDLC SERPL-MCNC: 34 MG/DL (ref 40–60)
HGB BLD-MCNC: 11.6 G/DL (ref 12–15.9)
IMM GRANULOCYTES # BLD AUTO: 0.02 10*3/MM3 (ref 0–0.05)
IMM GRANULOCYTES NFR BLD AUTO: 0.4 % (ref 0–0.5)
LDLC SERPL CALC-MCNC: 71 MG/DL (ref 0–100)
LDLC/HDLC SERPL: 2.14 {RATIO}
LYMPHOCYTES # BLD AUTO: 1.33 10*3/MM3 (ref 0.7–3.1)
LYMPHOCYTES NFR BLD AUTO: 26.9 % (ref 19.6–45.3)
MCH RBC QN AUTO: 27.6 PG (ref 26.6–33)
MCHC RBC AUTO-ENTMCNC: 31 G/DL (ref 31.5–35.7)
MCV RBC AUTO: 88.8 FL (ref 79–97)
MONOCYTES # BLD AUTO: 0.48 10*3/MM3 (ref 0.1–0.9)
MONOCYTES NFR BLD AUTO: 9.7 % (ref 5–12)
NEUTROPHILS NFR BLD AUTO: 2.85 10*3/MM3 (ref 1.7–7)
NEUTROPHILS NFR BLD AUTO: 57.6 % (ref 42.7–76)
NRBC BLD AUTO-RTO: 0.2 /100 WBC (ref 0–0.2)
PLATELET # BLD AUTO: 271 10*3/MM3 (ref 140–450)
PMV BLD AUTO: 9.7 FL (ref 6–12)
RBC # BLD AUTO: 4.21 10*6/MM3 (ref 3.77–5.28)
TRIGL SERPL-MCNC: 52 MG/DL (ref 0–150)
TSH SERPL DL<=0.05 MIU/L-ACNC: 1.91 UIU/ML (ref 0.27–4.2)
VIT B12 BLD-MCNC: 570 PG/ML (ref 211–946)
VLDLC SERPL-MCNC: 12 MG/DL (ref 5–40)
WBC NRBC COR # BLD: 4.95 10*3/MM3 (ref 3.4–10.8)

## 2022-12-16 PROCEDURE — 82607 VITAMIN B-12: CPT

## 2022-12-16 PROCEDURE — 83036 HEMOGLOBIN GLYCOSYLATED A1C: CPT

## 2022-12-16 PROCEDURE — 85025 COMPLETE CBC W/AUTO DIFF WBC: CPT

## 2022-12-16 PROCEDURE — 82306 VITAMIN D 25 HYDROXY: CPT

## 2022-12-16 PROCEDURE — 80053 COMPREHEN METABOLIC PANEL: CPT

## 2022-12-16 PROCEDURE — 80061 LIPID PANEL: CPT

## 2022-12-16 PROCEDURE — 84443 ASSAY THYROID STIM HORMONE: CPT

## 2022-12-16 PROCEDURE — 84207 ASSAY OF VITAMIN B-6: CPT

## 2022-12-16 PROCEDURE — 83090 ASSAY OF HOMOCYSTEINE: CPT

## 2022-12-17 LAB
ALBUMIN SERPL-MCNC: 4.3 G/DL (ref 3.5–5.2)
ALBUMIN/GLOB SERPL: 1.7 G/DL
ALP SERPL-CCNC: 124 U/L (ref 39–117)
ALT SERPL W P-5'-P-CCNC: 9 U/L (ref 1–33)
ANION GAP SERPL CALCULATED.3IONS-SCNC: 7.6 MMOL/L (ref 5–15)
AST SERPL-CCNC: 22 U/L (ref 1–32)
BILIRUB SERPL-MCNC: 0.7 MG/DL (ref 0–1.2)
BUN SERPL-MCNC: 7 MG/DL (ref 8–23)
BUN/CREAT SERPL: 10 (ref 7–25)
CALCIUM SPEC-SCNC: 9.2 MG/DL (ref 8.6–10.5)
CHLORIDE SERPL-SCNC: 99 MMOL/L (ref 98–107)
CO2 SERPL-SCNC: 26.4 MMOL/L (ref 22–29)
CREAT SERPL-MCNC: 0.7 MG/DL (ref 0.57–1)
EGFRCR SERPLBLD CKD-EPI 2021: 83.8 ML/MIN/1.73
GLOBULIN UR ELPH-MCNC: 2.6 GM/DL
GLUCOSE SERPL-MCNC: 80 MG/DL (ref 65–99)
POTASSIUM SERPL-SCNC: 4.5 MMOL/L (ref 3.5–5.2)
PROT SERPL-MCNC: 6.9 G/DL (ref 6–8.5)
SODIUM SERPL-SCNC: 133 MMOL/L (ref 136–145)

## 2022-12-21 ENCOUNTER — OFFICE VISIT (OUTPATIENT)
Dept: INTERNAL MEDICINE | Facility: CLINIC | Age: 87
End: 2022-12-21

## 2022-12-21 ENCOUNTER — TELEPHONE (OUTPATIENT)
Dept: INTERNAL MEDICINE | Facility: CLINIC | Age: 87
End: 2022-12-21

## 2022-12-21 VITALS
TEMPERATURE: 98 F | BODY MASS INDEX: 23.86 KG/M2 | HEART RATE: 73 BPM | SYSTOLIC BLOOD PRESSURE: 120 MMHG | WEIGHT: 126.4 LBS | OXYGEN SATURATION: 98 % | HEIGHT: 61 IN | DIASTOLIC BLOOD PRESSURE: 62 MMHG

## 2022-12-21 DIAGNOSIS — R60.0 BILATERAL EDEMA OF LOWER EXTREMITY: Chronic | ICD-10-CM

## 2022-12-21 DIAGNOSIS — E78.2 MIXED HYPERLIPIDEMIA: ICD-10-CM

## 2022-12-21 DIAGNOSIS — Z79.01 CHRONIC ANTICOAGULATION: ICD-10-CM

## 2022-12-21 DIAGNOSIS — M79.672 FOOT PAIN, BILATERAL: ICD-10-CM

## 2022-12-21 DIAGNOSIS — M81.0 SENILE OSTEOPOROSIS: ICD-10-CM

## 2022-12-21 DIAGNOSIS — Z00.00 MEDICARE ANNUAL WELLNESS VISIT, SUBSEQUENT: Primary | ICD-10-CM

## 2022-12-21 DIAGNOSIS — I38 VALVULAR HEART DISEASE: ICD-10-CM

## 2022-12-21 DIAGNOSIS — I50.42 CHRONIC COMBINED SYSTOLIC AND DIASTOLIC HEART FAILURE: Chronic | ICD-10-CM

## 2022-12-21 DIAGNOSIS — E87.1 HYPONATREMIA: ICD-10-CM

## 2022-12-21 DIAGNOSIS — E44.1 MILD PROTEIN-CALORIE MALNUTRITION: ICD-10-CM

## 2022-12-21 DIAGNOSIS — M79.671 FOOT PAIN, BILATERAL: ICD-10-CM

## 2022-12-21 DIAGNOSIS — Z91.81 AT HIGH RISK FOR FALLS: Chronic | ICD-10-CM

## 2022-12-21 DIAGNOSIS — R73.09 ABNORMAL GLUCOSE: ICD-10-CM

## 2022-12-21 DIAGNOSIS — I48.21 PERMANENT ATRIAL FIBRILLATION: ICD-10-CM

## 2022-12-21 DIAGNOSIS — I10 BENIGN ESSENTIAL HYPERTENSION: ICD-10-CM

## 2022-12-21 PROCEDURE — 1170F FXNL STATUS ASSESSED: CPT | Performed by: INTERNAL MEDICINE

## 2022-12-21 PROCEDURE — 1126F AMNT PAIN NOTED NONE PRSNT: CPT | Performed by: INTERNAL MEDICINE

## 2022-12-21 PROCEDURE — 1159F MED LIST DOCD IN RCRD: CPT | Performed by: INTERNAL MEDICINE

## 2022-12-21 PROCEDURE — G0439 PPPS, SUBSEQ VISIT: HCPCS | Performed by: INTERNAL MEDICINE

## 2022-12-21 PROCEDURE — 93000 ELECTROCARDIOGRAM COMPLETE: CPT | Performed by: INTERNAL MEDICINE

## 2022-12-21 RX ORDER — SODIUM CHLORIDE 1000 MG
TABLET, SOLUBLE MISCELLANEOUS
COMMUNITY
End: 2022-12-21

## 2022-12-21 RX ORDER — POLYETHYLENE GLYCOL 3350 17 G/17G
POWDER, FOR SOLUTION ORAL
COMMUNITY
End: 2022-12-21

## 2022-12-21 RX ORDER — FOLIC ACID 1 MG/1
1 TABLET ORAL DAILY
Qty: 90 TABLET | Refills: 1 | Status: SHIPPED | OUTPATIENT
Start: 2022-12-21

## 2022-12-21 RX ORDER — LISINOPRIL 10 MG/1
10 TABLET ORAL NIGHTLY
COMMUNITY
End: 2022-12-27 | Stop reason: SDUPTHER

## 2022-12-21 RX ORDER — BACILLUS COAGULANS/INULIN 1B-250 MG
CAPSULE ORAL
COMMUNITY

## 2022-12-21 RX ORDER — FUROSEMIDE 40 MG/1
40 TABLET ORAL DAILY
COMMUNITY

## 2022-12-21 RX ORDER — CEFDINIR 300 MG/1
CAPSULE ORAL EVERY 12 HOURS
COMMUNITY
End: 2022-12-21

## 2022-12-21 RX ORDER — BISOPROLOL FUMARATE 5 MG/1
3 TABLET, FILM COATED ORAL DAILY
COMMUNITY
Start: 2022-11-14

## 2022-12-21 RX ORDER — GABAPENTIN 100 MG/1
200 CAPSULE ORAL NIGHTLY PRN
Qty: 6 CAPSULE | Refills: 0 | Status: SHIPPED | OUTPATIENT
Start: 2022-12-21 | End: 2023-01-11 | Stop reason: SDUPTHER

## 2022-12-21 RX ORDER — DIOSMIN COMPLEX NO.1 630 MG
630 TABLET ORAL 2 TIMES DAILY
COMMUNITY

## 2022-12-21 RX ORDER — GABAPENTIN 100 MG/1
CAPSULE ORAL
COMMUNITY
End: 2022-12-21

## 2022-12-21 NOTE — ASSESSMENT & PLAN NOTE
The patient has been eating better since she was in the hospital and at rehab and now at the [facility]. She is encouraged to continue getting some protein in the diet 3 times a day.

## 2022-12-21 NOTE — ASSESSMENT & PLAN NOTE
She will continue fluid restriction of 1500 mL per day. She will continue taking the sodium chloride tablets 4 times a day. She will continue drinking a Gatorade or other electrolyte drink once a day.

## 2022-12-21 NOTE — ASSESSMENT & PLAN NOTE
She will continue going to the gym to exercise some every day. She will continue taking calcium and vitamin D3.

## 2022-12-21 NOTE — PROGRESS NOTES
The ABCs of the Annual Wellness Visit  Initial Medicare Wellness Visit    Chief Complaint   Patient presents with   • Hyperlipidemia   • Hypertension   • Medicare Wellness-subsequent       Subjective   History of Present Illness:  Thuy Clark is a 87 y.o. female who presents for an Initial Medicare Wellness Visit.    Fall risk  The patient denies any falls since her last visit. She states she is doing very little physical therapy. The patient goes to the workout room at [facility] where she lives and works out herself. She has bone on bone in her knees. The patient received a cortisone injection in her knees, which did not help. The patient is not using Aspercreme. She is keeping her home so she can go home in the summer. Her children were afraid that she would not have enough to eat properly. She is trying to eat some at each meal. The patient tries to get some protein with each meal. She eats chicken or fish. The patient eats beans, lentils, and eggs.    Heart failure  The patient sees her cardiologist, Dr. Kaye Hebert, twice per year. She prescribed the Eliquis and bisoprolol. The patient is not taking Entresto.     Atrial fibrillation  She denies fast or irregular heartbeats. She denies chest pain or shortness of breath.     Hypertension  The patient is taking furosemide once a day. She has been noticing a little bit of swelling in her ankles. The patient is not taking clonidine. She is taking bisoprolol 3 tablets daily. The patient is not having a lot of irregular heartbeats. She denies any chest pain or shortness of breath. Her blood pressure at [facility] have been 116/64 mmHg and 128/76 mmHg. Today it is 120/62 mmHg.     Hyperlipidemia   LDL cholesterol is good at 71 mg/dL. Triglycerides good at 52 mg/dL. HDL is low.      Hyponatremia   She is taking sodium tablets 4 times per day. The patient drinks Gatorade for the sodium and takes Pedialyte once a day.    Constipation  She states she is  always constipated and is taking an over-the-counter probiotic. The patient is taking Colace 2 in the morning and 1 in the afternoon, MiraLAX once per day, and also Vasculera. She supplements with prunes, pears, and peaches. The patient is taking Metamucil once a day. She denies heartburn, indigestion, or reflux. The patient has a hemorrhoid at the base of her anus. She has an appointment with her gastroenterologist in 01/2023.     Neuropathy  The patient states that she has had neuropathy in the last 2 days. She was taking gabapentin for that, but she was not getting a lot of relief. The patient is having pain in her feet only in the middle of the night. She states it is like a shooting pain. The patient states the neuropathy went away for about 3 months and now it is back. She is taking gabapentin 100 mg 2 capsules at night. She is not taking hydrocodone.    Bilateral edema   She has noticed swelling in her legs. The patient will ask if someone can help her don support stockings that come up to her knee every day.       Health maintenance  The patient states that she has already seen her dentist and hearing doctor.   She states that she saw her urologist, Dr. Marroquin, in 11/2022 and everything was okay.  She denies allergies.     Medication Reconciliation  She is not taking melatonin for sleep and is sleeping well.   The patient does not take pantoprazole and denies heartburn, indigestion, reflux, or pain in her stomach.  She denies taking clonidine.   She is no longer using the earwax softener.     CHRONIC CONDITIONS:      HEALTH RISK ASSESSMENT    Recent Hospitalizations:  She was admitted within the past 365 days at [facility].       Current Medical Providers:  Patient Care Team:  Isidra Eisenberg MD as PCP - General (Internal Medicine)  Isidra Eisenberg MD as PCP - Internal Medicine  Azam Marroquin MD as Consulting Physician (Urology)  Lili Hebert MD as Consulting Physician  (Gastroenterology)  Isidra Eisenberg MD as Consulting Physician (Internal Medicine)    Smoking Status:  Social History     Tobacco Use   Smoking Status Never   Smokeless Tobacco Never       Alcohol Consumption:  Social History     Substance and Sexual Activity   Alcohol Use Never   • Alcohol/week: 1.0 standard drink   • Types: 1 Glasses of wine per week       Depression Screen:   PHQ-2/PHQ-9 Depression Screening 12/21/2022   Retired PHQ-9 Total Score -   Retired Total Score -   Little Interest or Pleasure in Doing Things 0-->not at all   Feeling Down, Depressed or Hopeless 1-->several days   PHQ-9: Brief Depression Severity Measure Score 1       Fall Risk Screen:  MAGDA Fall Risk Assessment was completed, and patient is at HIGH risk for falls. Assessment completed on:12/21/2022    Health Habits and Functional and Cognitive Screening:  Functional & Cognitive Status 12/20/2021   Do you have difficulty preparing food and eating? No   Do you have difficulty bathing yourself, getting dressed or grooming yourself? No   Do you have difficulty using the toilet? No   Do you have difficulty moving around from place to place? No   Do you have trouble with steps or getting out of a bed or a chair? No   Current Diet Well Balanced Diet   Dental Exam Up to date        Dental Exam Comment 12/10/21   Eye Exam Not up to date   Exercise (times per week) 2 times per week   Current Exercises Include Stationary Bicycling/Spin Class   Current Exercise Activities Include -   Do you need help using the phone?  No   Are you deaf or do you have serious difficulty hearing?  No   Do you need help with transportation? No   Do you need help shopping? No   Do you need help preparing meals?  No   Do you need help with housework?  Yes   Do you need help with laundry? No   Do you need help taking your medications? No   Do you need help managing money? No   Do you ever drive or ride in a car without wearing a seat belt? No   Have you felt unusual  stress, anger or loneliness in the last month? No   Who do you live with? Alone   If you need help, do you have trouble finding someone available to you? No   Have you been bothered in the last four weeks by sexual problems? No   Do you have difficulty concentrating, remembering or making decisions? Yes         Age-appropriate Screening Schedule:  Refer to the list below for future screening recommendations based on patient's age, sex and/or medical conditions. Orders for these recommended tests are listed in the plan section. The patient has been provided with a written plan.    Health Maintenance   Topic Date Due   • DXA SCAN  03/09/2019   • TDAP/TD VACCINES (3 - Td or Tdap) 08/28/2022   • LIPID PANEL  12/16/2023   • INFLUENZA VACCINE  Completed   • ZOSTER VACCINE  Completed        The following portions of the patient's history were reviewed and updated as appropriate: allergies, current medications, past family history, past medical history, past social history, past surgical history and problem list.    Does the patient have evidence of cognitive impairment? No    Aspirin is not on active medication list.  Aspirin use is not indicated based on review of current medical condition/s. Risk of harm outweighs potential benefits.  .    Outpatient Medications Prior to Visit   Medication Sig Dispense Refill   • acetaminophen (TYLENOL) 325 MG tablet Take 650 mg by mouth Every 4 (Four) Hours As Needed for Mild Pain.     • apixaban (ELIQUIS) 2.5 MG tablet tablet Take 1 tablet by mouth 2 (Two) Times a Day. 60 tablet 5   • Bacillus Coagulans-Inulin (Probiotic) 1-250 BILLION-MG capsule Probiotic     • bisoprolol (ZEBeta) 5 MG tablet Take 3 tablets by mouth Daily.     • Carboxymethylcellul-Glycerin (Refresh Optive) 0.5-0.9 % solution Apply 2 drops to eye(s) as directed by provider 2 (Two) Times a Day.     • Cholecalciferol (VITAMIN D3) 1000 units capsule Take 1 capsule by mouth Daily.     • Dietary Management Product  (Vasculera) tablet Take 630 mg by mouth 2 (Two) Times a Day. Take one tablet by mouth twice daily     • docusate sodium (COLACE) 100 MG capsule Take 1 capsule by mouth 2 (Two) Times a Day. 180 capsule 1   • furosemide (LASIX) 40 MG tablet Take 40 mg by mouth Daily. Take one tablet by mouth once daily in the morning     • hydrocortisone (ANUSOL-HC) 25 MG suppository Insert 1 suppository into the rectum 2 (Two) Times a Day As Needed for Hemorrhoids.     • lisinopril (PRINIVIL,ZESTRIL) 10 MG tablet Take 10 mg by mouth Every Night. Take one tablet by mouth daily at bedtime     • Menthol, Topical Analgesic, (Biofreeze) 4 % gel Apply  topically 2 (Two) Times a Day As Needed. To bilateral knees     • ondansetron ODT (ZOFRAN-ODT) 4 MG disintegrating tablet Place 1 tablet on the tongue 4 (Four) Times a Day As Needed for Nausea or Vomiting. 15 tablet 0   • psyllium (METAMUCIL MULTIHEALTH FIBER) 58.12 % packet Take 1 packet by mouth Daily.     • sodium chloride 1 g tablet Take 1 tablet by mouth 4 (Four) Times a Day. 120 tablet 5   • bisoprolol (ZEBeta) 5 MG tablet Take 1 tablet by mouth Every Night. 90 tablet 1   • carbamide peroxide (DEBROX) 6.5 % otic solution Administer 2 drops into both ears At Night As Needed for Ear Pain (or congestion).     • gabapentin (NEURONTIN) 100 MG capsule Take 2 capsules by mouth At Night As Needed (leg pain). (Patient taking differently: Take 100 mg by mouth 2 (Two) Times a Day.) 6 capsule 0   • polyethylene glycol (MIRALAX) 17 g packet Gavilax     • senna (SENOKOT) 8.6 MG tablet Take 1 tablet by mouth 2 (Two) Times a Day.     • cefdinir (OMNICEF) 300 MG capsule Every 12 (Twelve) Hours.     • cloNIDine (CATAPRES) 0.1 MG tablet Take 0.1 mg by mouth Every 6 (Six) Hours As Needed for High Blood Pressure.     • diphenhydrAMINE (SOMINEX) 25 MG tablet Take 25 mg by mouth Every 6 (Six) Hours As Needed for Sleep.     • gabapentin (NEURONTIN) 100 MG capsule gabapentin 100 mg capsule   Take 2 capsules  every day by oral route.     • HYDROcodone-acetaminophen (NORCO) 5-325 MG per tablet Take 1 tablet by mouth Every 6 (Six) Hours As Needed.     • loratadine (CLARITIN) 10 MG tablet Take 1 tablet by mouth Daily As Needed for Allergies.     • melatonin 3 MG tablet Take  by mouth Every Night.     • pantoprazole (PROTONIX) 40 MG EC tablet Take 40 mg by mouth Daily.     • saccharomyces boulardii (FLORASTOR) 250 MG capsule Take 1 capsule by mouth Daily.     • sacubitril-valsartan (Entresto) 24-26 MG tablet Take 1 tablet by mouth 2 (Two) Times a Day.     • sodium chloride 1 g tablet sodium chloride   1 gm daily     • trolamine salicylate (ASPERCREME) 10 % cream Apply 1 application topically to the appropriate area as directed 3 (Three) Times a Day As Needed for Muscle / Joint Pain.       No facility-administered medications prior to visit.       Patient Active Problem List   Diagnosis   • Pulmonary hypertension (HCC)   • Benign essential hypertension   • Mixed hyperlipidemia   • Bleeding hemorrhoid   • Atrial septal defect determined by imaging   • Abnormal glucose   • Generalized anxiety disorder   • Valvular heart disease   • Senile osteoporosis   • PVC's (premature ventricular contractions)   • Calculus of gallbladder   • Urinary retention   • Chronic pain of both knees   • Osteoarthritis   • Quadriceps weakness   • Constipation, slow transit   • Gait disorder   • Primary osteoarthritis of both knees   • At high risk for falls   • Medicare annual wellness visit, subsequent   • Hyponatremia   • Chronic anticoagulation (Eliquis)   • Chronic atrial fibrillation (CMS/HCC)   • Altered mental status   • Foot pain, bilateral   • Mild protein-calorie malnutrition (HCC)   • Bilateral edema of lower extremity   • Shortness of breath   • Acute nonintractable headache   • Tinnitus of both ears   • Poor nutrition   • Chronic combined systolic and diastolic heart failure (HCC)   • Multiple closed fractures of pelvis without disruption  "of pelvic ring with routine healing   • NSTEMI (non-ST elevated myocardial infarction) (HCC)   • Closed fracture of one rib of left side with routine healing   • Mild cognitive impairment       Advanced Care Planning:  Advance Directive is not on file.  ACP discussion was held with the patient during this visit. Patient does not have an advance directive, information provided.    Review of Systems    Compared to one year ago, the patient feels her physical health is better.  Compared to one year ago, the patient feels her mental health is better.    Reviewed chart for potential of high risk medication in the elderly: yes  Reviewed chart for potential of harmful drug interactions in the elderly:yes    Objective         Vitals:    12/21/22 1437   BP: 120/62   BP Location: Left arm   Patient Position: Sitting   Cuff Size: Adult   Pulse: 73   Temp: 98 °F (36.7 °C)   TempSrc: Infrared   SpO2: 98%   Weight: 57.3 kg (126 lb 6.4 oz)   Height: 155.2 cm (61.1\")   PainSc: 0-No pain     BMI is within normal parameters. No other follow-up for BMI required.    Body mass index is 23.8 kg/m².  Discussed the patient's BMI with her. The BMI is in the acceptable range.    Physical Exam  Vitals and nursing note reviewed.   Constitutional:       Appearance: She is well-developed.   Eyes:      Conjunctiva/sclera: Conjunctivae normal.      Pupils: Pupils are equal, round, and reactive to light.   Neck:      Thyroid: No thyromegaly.   Cardiovascular:      Rate and Rhythm: Normal rate and regular rhythm.      Heart sounds: Normal heart sounds. No murmur heard.     Comments: Irregularly irregular, normal rate.  Edema of both ankles without pitting.  Pulmonary:      Effort: Pulmonary effort is normal.      Breath sounds: Normal breath sounds. No wheezing.   Abdominal:      General: Bowel sounds are normal. There is no distension.      Palpations: Abdomen is soft. There is no mass.      Tenderness: There is no abdominal tenderness. "   Musculoskeletal:         General: No tenderness. Normal range of motion.      Cervical back: Normal range of motion and neck supple.   Lymphadenopathy:      Cervical: No cervical adenopathy.   Skin:     General: Skin is warm and dry.      Findings: No rash.   Neurological:      Mental Status: She is alert and oriented to person, place, and time.      Cranial Nerves: No cranial nerve deficit.      Sensory: No sensory deficit.      Coordination: Coordination normal.      Gait: Gait normal.      Comments: Abnormal gait, using a rollator walker.   Psychiatric:         Speech: Speech normal.         Behavior: Behavior normal.         Thought Content: Thought content normal.         Judgment: Judgment normal.      Comments: Normal attention, normal mood.         ECG 12 Lead    Date/Time: 12/21/2022 2:59 PM  Performed by: Isidra Eisenberg MD  Authorized by: Isidra Eisenberg MD   Comparison: compared with previous ECG   Similar to previous ECG  Rhythm: atrial fibrillation  Rate: normal  Conduction: incomplete right bundle branch block and left posterior fascicular block  ST Segments: ST segments normal  T Waves: T waves normal  QRS axis: normal    Clinical impression: abnormal EKG            Lab Results   Component Value Date    TRIG 52 12/16/2022    HDL 34 (L) 12/16/2022    LDL 71 12/16/2022    VLDL 12 12/16/2022    HGBA1C 6.10 (H) 12/16/2022      Labs Reviewed:   Recent labs showed blood glucose of 80 mg/dL.  Creatinine is good.  Sodium improved from 127 to 133.   Potassium is good.   Liver enzymes good.   Average blood glucose is high at approximately 127 mg/dL.  Homocysteine is a little high.   B12 level is good at 570.   Vitamin D level is good.   Thyroid is normal.   Blood counts are all good.  Hemoglobin is a tad low. Platelet counts are normal.    Assessment & Plan   Medicare Risks and Personalized Health Plan  CMS Preventative Services Quick Reference  Cardiovascular risk    The above risks/problems have been  discussed with the patient.  Pertinent information has been shared with the patient in the After Visit Summary.  Follow up plans and orders are seen below in the Assessment/Plan Section.  Patient Instructions   Problem List Items Addressed This Visit        Advance Directives and General Issues    At high risk for falls (Chronic)    Current Assessment & Plan     She will continue going to the gym where she lives and exercising every day.            Cardiac and Vasculature    Chronic combined systolic and diastolic heart failure (HCC) (Chronic)    Overview     DX 7/2022 at [facility].         Current Assessment & Plan     The patient is no longer taking the Entresto. She is taking Lasix daily, bisoprolol 3 tablets daily, and Eliquis twice per day.         Relevant Medications    bisoprolol (ZEBeta) 5 MG tablet    Benign essential hypertension    Overview      Taking bisoprolol 15mg every evening and furosemide daily.         Current Assessment & Plan     She will continue bisoprolol 3 of the 5 mg tablets every evening and furosemide 40 mg daily.         Relevant Medications    bisoprolol (ZEBeta) 5 MG tablet    furosemide (LASIX) 40 MG tablet    lisinopril (PRINIVIL,ZESTRIL) 10 MG tablet    Mixed hyperlipidemia    Current Assessment & Plan     The patient will continue trying to avoid fats and sugars in the diet. She will continue going to the gym every day to exercise.         Valvular heart disease    Overview       6/20/2021 echocardiogram: mild AR and mild TR.  Elevated right ventricular systolic pressure of 35 to 45 mmHg.  Severely dilated right atrial cavity.  Left atrial volume severely increased.  Saline test positive for intra-atrial shunt.  Borderline left ventricular hypertrophy.  Ejection fraction 65%.     [place] [facility] [place]EF 60%.  Moderate MR and moderate TR and mild AR. Stress test showed no ischemic changes.  Afib thru out study.    7/2022 echo at [facility] with ejection  fraction 65%.  Severe MR and severe TR.  Pulmonary hypertension.  Severely dilated right atrial and right ventricular cavities.    Taking Eliquis twice a day and 10mg bisoprolol every evening.          Current Assessment & Plan     She will continue Eliquis and bisoprolol.         Relevant Medications    bisoprolol (ZEBeta) 5 MG tablet    Chronic atrial fibrillation (CMS/HCC)    Overview     Taking Eliquis twice a day.  Taking bisoprolol every evening.         Current Assessment & Plan     She will continue Eliquis twice per day and bisoprolol every evening.         Relevant Medications    bisoprolol (ZEBeta) 5 MG tablet       Coag and Thromboembolic    Chronic anticoagulation (Eliquis)       Endocrine and Metabolic    Abnormal glucose    Overview     A1c has increased to 6.1.  The average blood sugar is about 127.           Current Assessment & Plan     A1c is now 6.1 percent corresponding to an average blood sugar of about 127 mg/dL. She is encouraged to try to avoid sugars and snack foods and white carbohydrates in the diet. The patient will continue going to the gym every day to exercise.         Mild protein-calorie malnutrition (HCC)    Current Assessment & Plan     The patient has been eating better since she was in the hospital and at rehab and now at the [facility]. She is encouraged to continue getting some protein in the diet 3 times a day.            Genitourinary and Reproductive     Hyponatremia    Overview     Oral fluid restriction at 1500 mL daily.  Taking one sodium chloride  tablet 4 times per day.  Drink a Gatorade or other electrolyte drink once a day.           Current Assessment & Plan     She will continue fluid restriction of 1500 mL per day. She will continue taking the sodium chloride tablets 4 times a day. She will continue drinking a Gatorade or other electrolyte drink once a day.            Health Encounters    Medicare annual wellness visit, subsequent - Primary        "Musculoskeletal and Injuries    Senile osteoporosis    Overview     Multiple pelvic fractures and rib fractures.  Patient declines DEXA and declines treatment for osteoporosis.             Current Assessment & Plan     She will continue going to the gym to exercise some every day. She will continue taking calcium and vitamin D3.         Foot pain, bilateral    Overview     Foot pain with mild numbness bilaterally most likely mild neuropathy.  Takes nightly gabapentin as needed.         Current Assessment & Plan     She will resume nightly gabapentin.         Relevant Medications    gabapentin (NEURONTIN) 100 MG capsule       Symptoms and Signs    Bilateral edema of lower extremity (Chronic)    Overview     Taking furosemide.         Current Assessment & Plan     She will continue taking furosemide 40 mg daily. She is encouraged to use support socks that come up to the knee every day. She is encouraged to elevate her feet when sitting at home. Exercising also helps.             Follow Up:  Next Medicare Wellness visit to be scheduled in 1 year.    Return in about 4 months (around 4/21/2023) for Recheck.    An After Visit Summary and PPPS were given to the patient.           Objective   Vital Signs:  /62 (BP Location: Left arm, Patient Position: Sitting, Cuff Size: Adult)   Pulse 73   Temp 98 °F (36.7 °C) (Infrared)   Ht 155.2 cm (61.1\")   Wt 57.3 kg (126 lb 6.4 oz)   SpO2 98%   BMI 23.80 kg/m²          Follow Up   Return in about 4 months (around 4/21/2023) for Recheck.  Patient was given instructions and counseling regarding her condition or for health maintenance advice. Please see specific information pulled into the AVS if appropriate.     Transcribed from ambient dictation for Isidra Eisenberg MD by Kaye Martin.  12/21/22   17:21 EST    Patient or patient representative verbalized consent to the visit recording.  I have personally performed the services described in this document as transcribed " by the above individual, and it is both accurate and complete.

## 2022-12-21 NOTE — ASSESSMENT & PLAN NOTE
A1c is now 6.1 percent corresponding to an average blood sugar of about 127 mg/dL. She is encouraged to try to avoid sugars and snack foods and white carbohydrates in the diet. The patient will continue going to the gym every day to exercise.

## 2022-12-21 NOTE — ASSESSMENT & PLAN NOTE
The patient will continue trying to avoid fats and sugars in the diet. She will continue going to the gym every day to exercise.

## 2022-12-21 NOTE — ASSESSMENT & PLAN NOTE
She will continue taking furosemide 40 mg daily. She is encouraged to use support socks that come up to the knee every day. She is encouraged to elevate her feet when sitting at home. Exercising also helps.

## 2022-12-21 NOTE — ASSESSMENT & PLAN NOTE
The patient is no longer taking the Entresto. She is taking Lasix daily, bisoprolol 3 tablets daily, and Eliquis twice per day.

## 2022-12-21 NOTE — TELEPHONE ENCOUNTER
LACTULOSE WAS SENT IN AS A PACKET AND THEY NEED TO KNOW IF IT CAN BE A LIQUID INSTEAD.   PLEASE CALL PHARMACY BACK.

## 2022-12-22 NOTE — PATIENT INSTRUCTIONS
Patient Instructions   Problem List Items Addressed This Visit          Advance Directives and General Issues    At high risk for falls (Chronic)    Current Assessment & Plan     She will continue going to the gym where she lives and exercising every day.            Cardiac and Vasculature    Chronic combined systolic and diastolic heart failure (HCC) (Chronic)    Overview     DX 7/2022 at [facility].         Current Assessment & Plan     The patient is no longer taking the Entresto. She is taking Lasix daily, bisoprolol 3 tablets daily, and Eliquis twice per day.         Relevant Medications    bisoprolol (ZEBeta) 5 MG tablet    Benign essential hypertension    Overview      Taking bisoprolol 15mg every evening and furosemide daily.         Current Assessment & Plan     She will continue bisoprolol 3 of the 5 mg tablets every evening and furosemide 40 mg daily.         Relevant Medications    bisoprolol (ZEBeta) 5 MG tablet    furosemide (LASIX) 40 MG tablet    lisinopril (PRINIVIL,ZESTRIL) 10 MG tablet    Mixed hyperlipidemia    Current Assessment & Plan     The patient will continue trying to avoid fats and sugars in the diet. She will continue going to the gym every day to exercise.         Valvular heart disease    Overview       6/20/2021 echocardiogram: mild AR and mild TR.  Elevated right ventricular systolic pressure of 35 to 45 mmHg.  Severely dilated right atrial cavity.  Left atrial volume severely increased.  Saline test positive for intra-atrial shunt.  Borderline left ventricular hypertrophy.  Ejection fraction 65%.     8/27/21 echo at [facility] [place] by [place]. Kaye Aleman-EF 60%.  Moderate MR and moderate TR and mild AR. Stress test showed no ischemic changes.  Afib thru out study.    7/2022 echo at [facility] with ejection fraction 65%.  Severe MR and severe TR.  Pulmonary hypertension.  Severely dilated right atrial and right ventricular cavities.    Taking Eliquis twice a day and 10mg bisoprolol every  evening.          Current Assessment & Plan     She will continue Eliquis and bisoprolol.         Relevant Medications    bisoprolol (ZEBeta) 5 MG tablet    Chronic atrial fibrillation (CMS/HCC)    Overview     Taking Eliquis twice a day.  Taking bisoprolol every evening.         Current Assessment & Plan     She will continue Eliquis twice per day and bisoprolol every evening.         Relevant Medications    bisoprolol (ZEBeta) 5 MG tablet       Coag and Thromboembolic    Chronic anticoagulation (Eliquis)       Endocrine and Metabolic    Abnormal glucose    Overview     A1c has increased to 6.1.  The average blood sugar is about 127.           Current Assessment & Plan     A1c is now 6.1 percent corresponding to an average blood sugar of about 127 mg/dL. She is encouraged to try to avoid sugars and snack foods and white carbohydrates in the diet. The patient will continue going to the gym every day to exercise.         Mild protein-calorie malnutrition (HCC)    Current Assessment & Plan     The patient has been eating better since she was in the hospital and at rehab and now at the [facility]. She is encouraged to continue getting some protein in the diet 3 times a day.            Genitourinary and Reproductive     Hyponatremia    Overview     Oral fluid restriction at 1500 mL daily.  Taking one sodium chloride  tablet 4 times per day.  Drink a Gatorade or other electrolyte drink once a day.           Current Assessment & Plan     She will continue fluid restriction of 1500 mL per day. She will continue taking the sodium chloride tablets 4 times a day. She will continue drinking a Gatorade or other electrolyte drink once a day.            Health Encounters    Medicare annual wellness visit, subsequent - Primary       Musculoskeletal and Injuries    Senile osteoporosis    Overview     Multiple pelvic fractures and rib fractures.  Patient declines DEXA and declines treatment for osteoporosis.             Current  Assessment & Plan     She will continue going to the gym to exercise some every day. She will continue taking calcium and vitamin D3.         Foot pain, bilateral    Overview     Foot pain with mild numbness bilaterally most likely mild neuropathy.  Takes nightly gabapentin as needed.         Current Assessment & Plan     She will resume nightly gabapentin.         Relevant Medications    gabapentin (NEURONTIN) 100 MG capsule       Symptoms and Signs    Bilateral edema of lower extremity (Chronic)    Overview     Taking furosemide.         Current Assessment & Plan     She will continue taking furosemide 40 mg daily. She is encouraged to use support socks that come up to the knee every day. She is encouraged to elevate her feet when sitting at home. Exercising also helps.         Fall Prevention in the Home, Adult  Falls can cause injuries and affect people of all ages. There are many simple things that you can do to make your home safe and to help prevent falls. Ask for help when making these changes, if needed.  What actions can I take to prevent falls?  General instructions  Use good lighting in all rooms. Replace any light bulbs that burn out, turn on lights if it is dark, and use night-lights.  Place frequently used items in easy-to-reach places. Lower the shelves around your home if necessary.  Set up furniture so that there are clear paths around it. Avoid moving your furniture around.  Remove throw rugs and other tripping hazards from the floor.  Avoid walking on wet floors.  Fix any uneven floor surfaces.  Add color or contrast paint or tape to grab bars and handrails in your home. Place contrasting color strips on the first and last steps of staircases.  When you use a stepladder, make sure that it is completely opened and that the sides and supports are firmly locked. Have someone hold the ladder while you are using it. Do not climb a closed stepladder.  Know where your pets are when moving through your  home.  What can I do in the bathroom?     Keep the floor dry. Immediately clean up any water that is on the floor.  Remove soap buildup in the tub or shower regularly.  Use nonskid mats or decals on the floor of the tub or shower.  Attach bath mats securely with double-sided, nonslip rug tape.  If you need to sit down while you are in the shower, use a plastic, nonslip stool.  Install grab bars by the toilet and in the tub and shower. Do not use towel bars as grab bars.  What can I do in the bedroom?  Make sure that a bedside light is easy to reach.  Do not use oversized bedding that reaches the floor.  Have a firm chair that has side arms to use for getting dressed.  What can I do in the kitchen?  Clean up any spills right away.  If you need to reach for something above you, use a sturdy step stool that has a grab bar.  Keep electrical cables out of the way.  Do not use floor polish or wax that makes floors slippery. If you must use wax, make sure that it is non-skid floor wax.  What can I do with my stairs?  Do not leave any items on the stairs.  Make sure that you have a light switch at the top and the bottom of the stairs. Have them installed if you do not have them.  Make sure that there are handrails on both sides of the stairs. Fix handrails that are broken or loose. Make sure that handrails are as long as the staircases.  Install non-slip stair treads on all stairs in your home.  Avoid having throw rugs at the top or bottom of stairs, or secure the rugs with carpet tape to prevent them from moving.  Choose a carpet design that does not hide the edge of steps on the stairs.  Check any carpeting to make sure that it is firmly attached to the stairs. Fix any carpet that is loose or worn.  What can I do on the outside of my home?  Use bright outdoor lighting.  Regularly repair the edges of walkways and driveways and fix any cracks.  Remove high doorway thresholds.  Trim any shrubbery on the main path into your  home.  Regularly check that handrails are securely fastened and in good repair. Both sides of all steps should have handrails.  Install guardrails along the edges of any raised decks or porches.  Clear walkways of debris and clutter, including tools and rocks.  Have leaves, snow, and ice cleared regularly.  Use sand or salt on walkways during winter months.  In the garage, clean up any spills right away, including grease or oil spills.  What other actions can I take?  Wear closed-toe shoes that fit well and support your feet. Wear shoes that have rubber soles or low heels.  Use mobility aids as needed, such as canes, walkers, scooters, and crutches.  Review your medicines with your health care provider. Some medicines can cause dizziness or changes in blood pressure, which increase your risk of falling.  Talk with your health care provider about other ways that you can decrease your risk of falls. This may include working with a physical therapist or  to improve your strength, balance, and endurance.  Where to find more information  Centers for Disease Control and PreventionMAGDA: www.cdc.gov  National [place] on Aging: www.[place].nih.gov  Contact a health care provider if:  You are afraid of falling at home.  You feel weak, drowsy, or dizzy at home.  You fall at home.  Summary  There are many simple things that you can do to make your home safe and to help prevent falls.  Ways to make your home safe include removing tripping hazards and installing grab bars in the bathroom.  Ask for help when making these changes in your home.  This information is not intended to replace advice given to you by your health care provider. Make sure you discuss any questions you have with your health care provider.  Document Revised: 09/19/2022 Document Reviewed: 07/21/2021  Elsevier Patient Education © 2022 [employer]

## 2022-12-27 RX ORDER — LISINOPRIL 10 MG/1
10 TABLET ORAL NIGHTLY
Qty: 90 TABLET | Refills: 1 | Status: SHIPPED | OUTPATIENT
Start: 2022-12-27

## 2022-12-27 RX ORDER — DOCUSATE SODIUM 100 MG/1
100 CAPSULE, LIQUID FILLED ORAL 2 TIMES DAILY
Qty: 180 CAPSULE | Refills: 1 | Status: SHIPPED | OUTPATIENT
Start: 2022-12-27

## 2022-12-27 NOTE — TELEPHONE ENCOUNTER
Rx Refill Note  Requested Prescriptions     Pending Prescriptions Disp Refills   • docusate sodium (COLACE) 100 MG capsule 180 capsule 1     Sig: Take 1 capsule by mouth 2 (Two) Times a Day.   • lisinopril (PRINIVIL,ZESTRIL) 10 MG tablet 90 tablet 1     Sig: Take 1 tablet by mouth Every Night. Take one tablet by mouth daily at bedtime      Last office visit with prescribing clinician: 12/21/2022   Last telemedicine visit with prescribing clinician: 4/21/2023   Next office visit with prescribing clinician: 4/21/2023                             Sadaf Hand RN  12/27/22, 16:17 EST

## 2023-01-01 NOTE — TELEPHONE ENCOUNTER
LM for pt to call back.   Past Medical History:   Diagnosis Date    Arthritis     Diabetes mellitus with neurological manifestation     Essential hypertension 2/28/2019    High blood pressure     Hyperlipidemia     Urinary urgency        Past Surgical History:   Procedure Laterality Date    EPIDURAL STEROID INJECTION N/A 1/31/2019    Procedure: INJECTION, STEROID, EPIDURAL, L3-L4 need consent;  Surgeon: Marlin Barber MD;  Location: [facility];  Service: Pain Management;  Laterality: N/A;    left knee replacement       low back surgery          Review of patient's allergies indicates:   Allergen Reactions    No known drug allergies        No current facility-administered medications on file prior to encounter.     Current Outpatient Medications on File Prior to Encounter   Medication Sig    acetaminophen (TYLENOL) 500 MG tablet Take 2 tablets (1,000 mg total) by mouth every 8 (eight) hours as needed for Pain.    aspirin (EHCTOR CHILDRENS ASPIRIN) 81 MG chewable tablet Take 81 mg by mouth. 1 Tablet Oral Every day    atorvastatin (LIPITOR) 10 MG tablet TAKE 1 TABLET (10 MG TOTAL) BY MOUTH ONCE DAILY.    carvediloL (COREG) 25 MG tablet TAKE 1 TABLET BY MOUTH TWICE A DAY    cephALEXin (KEFLEX) 500 MG capsule Take 1 capsule (500 mg total) by mouth every 12 (twelve) hours.    gabapentin (NEURONTIN) 800 MG tablet TAKE 1 TABLET (800 MG TOTAL) BY MOUTH 3 (THREE) TIMES DAILY.    metFORMIN (GLUCOPHAGE-XR) 500 MG ER 24hr tablet Take 1 tablet (500 mg total) by mouth daily with breakfast.    ONETOUCH ULTRA BLUE TEST STRIP Strp TEST ONCE DAILY    ONETOUCH ULTRASOFT LANCETS lancets USE TO TEST BLOOD SUGAR DAILY     Family History       Problem Relation (Age of Onset)    Diabetes Mother, Sister, Maternal Uncle, Maternal Grandmother    Hypertension Mother, Sister          Tobacco Use    Smoking status: Never    Smokeless tobacco: Never   Substance and Sexual Activity    Alcohol use: No    Drug use: No    Sexual activity: Yes     Partners: Male     Review of  Systems   Constitutional:  Negative for activity change, chills and fever.   HENT:  Negative for trouble swallowing.    Eyes:  Negative for photophobia and visual disturbance.   Respiratory:  Negative for cough, chest tightness and shortness of breath.    Cardiovascular:  Negative for chest pain, palpitations and leg swelling.   Gastrointestinal:  Negative for abdominal pain, constipation, diarrhea, nausea and vomiting.   Genitourinary:  Positive for frequency (chronic) and urgency (chronic). Negative for dysuria and hematuria.   Musculoskeletal:  Positive for arthralgias and gait problem. Negative for back pain and neck pain.   Skin:  Negative for rash and wound.   Neurological:  Positive for weakness. Negative for dizziness, syncope, speech difficulty and light-headedness.   Psychiatric/Behavioral:  Negative for agitation and confusion. The patient is not nervous/anxious.    Objective:     Vital Signs (Most Recent):  Temp: 98.3 °F (36.8 °C) (01/01/23 1356)  Pulse: 78 (01/01/23 1356)  Resp: 18 (01/01/23 1356)  BP: (!) 142/64 (01/01/23 1356)  SpO2: 97 % (01/01/23 1356)   Vital Signs (24h Range):  Temp:  [98.3 °F (36.8 °C)-98.6 °F (37 °C)] 98.3 °F (36.8 °C)  Pulse:  [78-86] 78  Resp:  [18] 18  SpO2:  [97 %-99 %] 97 %  BP: (129-142)/(64-83) 142/64     Weight: 61.2 kg (135 lb)  Body mass index is 20.53 kg/m².    Physical Exam  Vitals and nursing note reviewed.   Constitutional:       General: She is not in acute distress.     Appearance: She is well-developed.   HENT:      Head: Normocephalic and atraumatic.      Mouth/Throat:      Pharynx: No oropharyngeal exudate.   Eyes:      Conjunctiva/sclera: Conjunctivae normal.      Pupils: Pupils are equal, round, and reactive to light.   Cardiovascular:      Rate and Rhythm: Normal rate and regular rhythm.      Heart sounds: Normal heart sounds.   Pulmonary:      Effort: Pulmonary effort is normal. No respiratory distress.      Breath sounds: Normal breath sounds. No  wheezing.   Abdominal:      General: Bowel sounds are normal. There is no distension.      Palpations: Abdomen is soft.      Tenderness: There is no abdominal tenderness.   Musculoskeletal:         General: No tenderness. Normal range of motion.      Cervical back: Normal range of motion and neck supple.      Comments: L knee immobilizer in place   Lymphadenopathy:      Cervical: No cervical adenopathy.   Skin:     General: Skin is warm and dry.      Capillary Refill: Capillary refill takes less than 2 seconds.      Findings: No rash.   Neurological:      Mental Status: She is alert and oriented to person, place, and time.      Cranial Nerves: No cranial nerve deficit.      Sensory: No sensory deficit.      Coordination: Coordination normal.   Psychiatric:         Behavior: Behavior normal.         Thought Content: Thought content normal.         Judgment: Judgment normal.         CRANIAL NERVES     CN III, IV, VI   Pupils are equal, round, and reactive to light.     Significant Labs: All pertinent labs within the past 24 hours have been reviewed.  CBC:   Recent Labs   Lab 01/01/23  1011   WBC 13.02*   HGB 10.9*   HCT 32.5*        CMP:   Recent Labs   Lab 01/01/23  1011      K 4.1      CO2 27   *   BUN 21   CREATININE 1.1   CALCIUM 8.6*   PROT 7.1   ALBUMIN 2.6*   BILITOT 0.3   ALKPHOS 50*   AST 15   ALT 9*   ANIONGAP 9     Urine Studies:   Recent Labs   Lab 01/01/23  1255   COLORU Yellow   APPEARANCEUA Cloudy*   PHUR 5.0   SPECGRAV 1.020   PROTEINUA 1+*   GLUCUA Negative   KETONESU Negative   BILIRUBINUA Negative   OCCULTUA Negative   NITRITE Positive*   LEUKOCYTESUR 3+*   RBCUA 4   WBCUA >100*   BACTERIA Many*   SQUAMEPITHEL 1   HYALINECASTS 0       Significant Imaging: I have reviewed all pertinent imaging results/findings within the past 24 hours.    Imaging Results              X-Ray Chest PA And Lateral (Final result)  Result time 01/01/23 11:27:49      Final result by Balaji GARCIA  MD Jones (01/01/23 11:27:49)                   Impression:      No acute process in the thorax.    Linear metallic density overlying the inferior aortic arch, possibly secondary to prior surgery, external to the patient, or foreign body.  Clinical correlation is recommended.      Electronically signed by: Balaji Goldman MD  Date:    01/01/2023  Time:    11:27               Narrative:    EXAMINATION:  XR CHEST PA AND LATERAL    CLINICAL HISTORY:  Weakness    TECHNIQUE:  PA and lateral views of the chest were performed.    COMPARISON:  None    FINDINGS:  Cardiac silhouette is not enlarged.  Linear metallic density overlying the left inferior aortic arch.  This may be secondary to prior surgery, external to the patient, or foreign body.  Rounded high density focus inferior to the aortic arch likely a calcified granuloma.  Normal pulmonary vasculature.  Lungs are clear.  No focal consolidation.  No pleural effusion.  No pneumothorax.  No evidence of acute fracture.                                       X-Ray Knee 3 View Left (Final result)  Result time 01/01/23 11:27:42      Final result by Balaji Hanley MD (01/01/23 11:27:42)                   Impression:      As above.      Electronically signed by: Balaji Hanley  Date:    01/01/2023  Time:    11:27               Narrative:    EXAMINATION:  XR KNEE 3 VIEW LEFT    CLINICAL HISTORY:  Pain in left knee    TECHNIQUE:  AP, lateral, and Merchant views of the left knee were performed.    COMPARISON:  None    FINDINGS:  No convincing evidence of acute fracture or dislocation.    Status post total knee arthroplasty and patellar resurfacing.  Approximately 3.5-4 mm of lucency between the tibial component of the hardware adjoining cement.  Attention on follow-up recommended.    No definite radiopaque foreign body.  Question at least small suprapatellar knee joint effusion.  Atherosclerotic calcifications are noted.

## 2023-01-11 DIAGNOSIS — M79.671 FOOT PAIN, BILATERAL: ICD-10-CM

## 2023-01-11 DIAGNOSIS — M79.672 FOOT PAIN, BILATERAL: ICD-10-CM

## 2023-01-11 RX ORDER — GABAPENTIN 100 MG/1
200 CAPSULE ORAL NIGHTLY PRN
Qty: 60 CAPSULE | Refills: 0 | Status: SHIPPED | OUTPATIENT
Start: 2023-01-11

## 2023-03-14 RX ORDER — SODIUM CHLORIDE 1000 MG
TABLET, SOLUBLE MISCELLANEOUS
Qty: 41 TABLET | Refills: 0 | Status: SHIPPED | OUTPATIENT
Start: 2023-03-14

## 2023-03-14 NOTE — TELEPHONE ENCOUNTER
Rx Refill Note  Requested Prescriptions     Pending Prescriptions Disp Refills   • sodium chloride 1 g tablet [Pharmacy Med Name: Sodium Chloride 1 GM Oral Tablet] 41 tablet 0     Sig: Take 1 tablet by mouth once daily      Last office visit with prescribing clinician: 12/21/2022   Last telemedicine visit with prescribing clinician: 4/21/2023   Next office visit with prescribing clinician: 4/21/2023                         Would you like a call back once the refill request has been completed: [] Yes [] No    If the office needs to give you a call back, can they leave a voicemail: [] Yes [] No    Perry Peralta MA  03/14/23, 12:48 EDT

## 2023-03-17 RX ORDER — BISOPROLOL FUMARATE 5 MG/1
15 TABLET, FILM COATED ORAL DAILY
Status: CANCELLED | OUTPATIENT
Start: 2023-03-17

## 2023-03-20 RX ORDER — HYDROCORTISONE ACETATE 25 MG/1
25 SUPPOSITORY RECTAL 2 TIMES DAILY PRN
Start: 2023-03-20

## 2023-03-20 NOTE — TELEPHONE ENCOUNTER
Rx Refill Note  Requested Prescriptions     Pending Prescriptions Disp Refills   • hydrocortisone (ANUSOL-HC) 25 MG suppository       Sig: Insert 1 suppository into the rectum 2 (Two) Times a Day As Needed for Hemorrhoids.   • bisoprolol (ZEBeta) 5 MG tablet       Sig: Take 3 tablets by mouth Daily.      Last office visit with prescribing clinician: 12/21/2022   Last telemedicine visit with prescribing clinician: 4/21/2023   Next office visit with prescribing clinician: 4/21/2023                         Would you like a call back once the refill request has been completed: [] Yes [] No    If the office needs to give you a call back, can they leave a voicemail: [] Yes [] No    Dinah Clancy LPN  03/20/23, 07:20 EDT

## 2023-04-18 ENCOUNTER — LAB (OUTPATIENT)
Dept: LAB | Facility: HOSPITAL | Age: 88
End: 2023-04-18
Payer: MEDICARE

## 2023-04-18 DIAGNOSIS — I10 BENIGN ESSENTIAL HYPERTENSION: ICD-10-CM

## 2023-04-18 DIAGNOSIS — E78.2 MIXED HYPERLIPIDEMIA: ICD-10-CM

## 2023-04-18 DIAGNOSIS — R73.09 ABNORMAL GLUCOSE: ICD-10-CM

## 2023-04-18 LAB
ALBUMIN SERPL-MCNC: 3.9 G/DL (ref 3.5–5.2)
ALBUMIN/GLOB SERPL: 1.3 G/DL
ALP SERPL-CCNC: 110 U/L (ref 39–117)
ALT SERPL W P-5'-P-CCNC: 10 U/L (ref 1–33)
ANION GAP SERPL CALCULATED.3IONS-SCNC: 10.7 MMOL/L (ref 5–15)
AST SERPL-CCNC: 14 U/L (ref 1–32)
BASOPHILS # BLD AUTO: 0.05 10*3/MM3 (ref 0–0.2)
BASOPHILS NFR BLD AUTO: 1.1 % (ref 0–1.5)
BILIRUB SERPL-MCNC: 0.8 MG/DL (ref 0–1.2)
BUN SERPL-MCNC: 7 MG/DL (ref 8–23)
BUN/CREAT SERPL: 10 (ref 7–25)
CALCIUM SPEC-SCNC: 9.2 MG/DL (ref 8.6–10.5)
CHLORIDE SERPL-SCNC: 98 MMOL/L (ref 98–107)
CO2 SERPL-SCNC: 24.3 MMOL/L (ref 22–29)
CREAT SERPL-MCNC: 0.7 MG/DL (ref 0.57–1)
DEPRECATED RDW RBC AUTO: 46 FL (ref 37–54)
EGFRCR SERPLBLD CKD-EPI 2021: 83.8 ML/MIN/1.73
EOSINOPHIL # BLD AUTO: 0.22 10*3/MM3 (ref 0–0.4)
EOSINOPHIL NFR BLD AUTO: 4.8 % (ref 0.3–6.2)
ERYTHROCYTE [DISTWIDTH] IN BLOOD BY AUTOMATED COUNT: 15 % (ref 12.3–15.4)
GLOBULIN UR ELPH-MCNC: 2.9 GM/DL
GLUCOSE SERPL-MCNC: 103 MG/DL (ref 65–99)
HBA1C MFR BLD: 5.9 % (ref 4.8–5.6)
HCT VFR BLD AUTO: 33.5 % (ref 34–46.6)
HCYS SERPL-MCNC: 15.1 UMOL/L (ref 0–15)
HGB BLD-MCNC: 10.7 G/DL (ref 12–15.9)
IMM GRANULOCYTES # BLD AUTO: 0.02 10*3/MM3 (ref 0–0.05)
IMM GRANULOCYTES NFR BLD AUTO: 0.4 % (ref 0–0.5)
LYMPHOCYTES # BLD AUTO: 1.13 10*3/MM3 (ref 0.7–3.1)
LYMPHOCYTES NFR BLD AUTO: 24.5 % (ref 19.6–45.3)
MCH RBC QN AUTO: 26.8 PG (ref 26.6–33)
MCHC RBC AUTO-ENTMCNC: 31.9 G/DL (ref 31.5–35.7)
MCV RBC AUTO: 84 FL (ref 79–97)
MONOCYTES # BLD AUTO: 0.47 10*3/MM3 (ref 0.1–0.9)
MONOCYTES NFR BLD AUTO: 10.2 % (ref 5–12)
NEUTROPHILS NFR BLD AUTO: 2.73 10*3/MM3 (ref 1.7–7)
NEUTROPHILS NFR BLD AUTO: 59 % (ref 42.7–76)
NRBC BLD AUTO-RTO: 0.2 /100 WBC (ref 0–0.2)
PLATELET # BLD AUTO: 293 10*3/MM3 (ref 140–450)
PMV BLD AUTO: 9.7 FL (ref 6–12)
POTASSIUM SERPL-SCNC: 4.4 MMOL/L (ref 3.5–5.2)
PROT SERPL-MCNC: 6.8 G/DL (ref 6–8.5)
RBC # BLD AUTO: 3.99 10*6/MM3 (ref 3.77–5.28)
SODIUM SERPL-SCNC: 133 MMOL/L (ref 136–145)
WBC NRBC COR # BLD: 4.62 10*3/MM3 (ref 3.4–10.8)

## 2023-04-18 PROCEDURE — 83090 ASSAY OF HOMOCYSTEINE: CPT

## 2023-04-18 PROCEDURE — 83036 HEMOGLOBIN GLYCOSYLATED A1C: CPT

## 2023-04-18 PROCEDURE — 80053 COMPREHEN METABOLIC PANEL: CPT

## 2023-04-18 PROCEDURE — 85025 COMPLETE CBC W/AUTO DIFF WBC: CPT

## 2023-04-21 ENCOUNTER — OFFICE VISIT (OUTPATIENT)
Dept: INTERNAL MEDICINE | Facility: CLINIC | Age: 88
End: 2023-04-21
Payer: MEDICARE

## 2023-04-21 VITALS
BODY MASS INDEX: 23.41 KG/M2 | HEIGHT: 61 IN | DIASTOLIC BLOOD PRESSURE: 62 MMHG | HEART RATE: 88 BPM | WEIGHT: 124 LBS | OXYGEN SATURATION: 99 % | TEMPERATURE: 97.8 F | SYSTOLIC BLOOD PRESSURE: 112 MMHG

## 2023-04-21 DIAGNOSIS — G89.29 CHRONIC PAIN OF BOTH KNEES: Chronic | ICD-10-CM

## 2023-04-21 DIAGNOSIS — M25.561 CHRONIC PAIN OF BOTH KNEES: Chronic | ICD-10-CM

## 2023-04-21 DIAGNOSIS — R79.83 HOMOCYSTEINEMIA: ICD-10-CM

## 2023-04-21 DIAGNOSIS — I10 BENIGN ESSENTIAL HYPERTENSION: Primary | ICD-10-CM

## 2023-04-21 DIAGNOSIS — M25.562 CHRONIC PAIN OF BOTH KNEES: Chronic | ICD-10-CM

## 2023-04-21 DIAGNOSIS — R26.9 GAIT DISORDER: ICD-10-CM

## 2023-04-21 DIAGNOSIS — R60.0 BILATERAL EDEMA OF LOWER EXTREMITY: Chronic | ICD-10-CM

## 2023-04-21 DIAGNOSIS — I50.42 CHRONIC COMBINED SYSTOLIC AND DIASTOLIC HEART FAILURE: Chronic | ICD-10-CM

## 2023-04-21 DIAGNOSIS — K59.01 CONSTIPATION, SLOW TRANSIT: ICD-10-CM

## 2023-04-21 DIAGNOSIS — E44.1 MILD PROTEIN-CALORIE MALNUTRITION: ICD-10-CM

## 2023-04-21 DIAGNOSIS — Z79.01 CHRONIC ANTICOAGULATION: ICD-10-CM

## 2023-04-21 DIAGNOSIS — I48.21 PERMANENT ATRIAL FIBRILLATION: ICD-10-CM

## 2023-04-21 DIAGNOSIS — Z91.81 AT HIGH RISK FOR FALLS: Chronic | ICD-10-CM

## 2023-04-21 DIAGNOSIS — M81.0 SENILE OSTEOPOROSIS: ICD-10-CM

## 2023-04-21 DIAGNOSIS — R73.09 ABNORMAL GLUCOSE: ICD-10-CM

## 2023-04-21 DIAGNOSIS — E87.1 HYPONATREMIA: ICD-10-CM

## 2023-04-21 DIAGNOSIS — E78.2 MIXED HYPERLIPIDEMIA: ICD-10-CM

## 2023-04-21 DIAGNOSIS — M17.0 PRIMARY OSTEOARTHRITIS OF BOTH KNEES: Chronic | ICD-10-CM

## 2023-04-21 PROBLEM — H90.3 SENSORINEURAL HEARING LOSS (SNHL) OF BOTH EARS: Chronic | Status: ACTIVE | Noted: 2023-04-21

## 2023-04-21 RX ORDER — SACCHAROMYCES BOULARDII 250 MG
250 CAPSULE ORAL AS NEEDED
COMMUNITY

## 2023-04-21 RX ORDER — FOLIC ACID 1 MG/1
2 TABLET ORAL DAILY
Qty: 180 TABLET | Refills: 1 | Status: SHIPPED | OUTPATIENT
Start: 2023-04-21

## 2023-04-21 RX ORDER — LISINOPRIL 10 MG/1
10 TABLET ORAL NIGHTLY
Qty: 90 TABLET | Refills: 1 | Status: SHIPPED | OUTPATIENT
Start: 2023-04-21

## 2023-04-21 RX ORDER — HYDROCORTISONE ACETATE 25 MG/1
25 SUPPOSITORY RECTAL 2 TIMES DAILY PRN
Qty: 30 EACH | Refills: 5 | Status: SHIPPED | OUTPATIENT
Start: 2023-04-21

## 2023-04-21 RX ORDER — SODIUM CHLORIDE 1 G/1
1 TABLET ORAL DAILY
Qty: 90 TABLET | Refills: 1 | Status: SHIPPED | OUTPATIENT
Start: 2023-04-21

## 2023-04-21 RX ORDER — BISOPROLOL FUMARATE 5 MG/1
15 TABLET, FILM COATED ORAL DAILY
Qty: 270 TABLET | Refills: 1 | Status: SHIPPED | OUTPATIENT
Start: 2023-04-21

## 2023-04-21 NOTE — ASSESSMENT & PLAN NOTE
- Continue Colace and try the lactulose again at a very small dose. I recommend trying 0.25 teaspoon once per day to see how that goes.

## 2023-04-21 NOTE — ASSESSMENT & PLAN NOTE
- Continue Eliquis, bisoprolol, and furosemide.   - I have recommended trying one half tablet of the furosemide 4 days per week as she has only been taking it twice per week due to frequent urination with its use.

## 2023-04-21 NOTE — ASSESSMENT & PLAN NOTE
- Continue to avoid fats and sugars in the diet.   - Try to be as active around the house as possible.   - Do some arm and leg exercises while sitting.

## 2023-04-21 NOTE — PROGRESS NOTES
[facility]     Thuy Clark  1935   [id number]      Patient Care Team:  Isidra Eisenberg MD as PCP - General (Internal Medicine)  Isidra Eisenberg MD as PCP - Internal Medicine  Azam Marroquin MD as Consulting Physician (Urology)  Lili Hebert MD as Consulting Physician (Gastroenterology)  Isidra Eisenberg MD as Consulting Physician (Internal Medicine)    CC: knee pain,hyponatremia      HPI  Patient is a 87 y.o. female presents with bilateral knee pain, bilateral lower extremity edema, atrial fibrillation, hypertension, heart failure, hyponatremia, homocysteinemia, anemia, and constipation. She is accompanied by an adult female today.    Knee pain  Ms. Clark complains of bilateral knee pain and left knee giving way while standing making her at constant risk for falls. At this time, she follows with Dr. Valerio Vallejo and is awaiting Medicare approval for bilateral viscosupplementation. The patient has received bilateral corticosteroid injections to the knees as well and states they were ineffective for her. She confirms having tried to wear a Velcro knee brace but states it slides down on her. No tenderness to the knees at present. Currently, she uses Biofreeze for her knees, which helps for about 1 hour and she does not use Tylenol often due to taking a number of other medications.    Bilateral lower extremity swelling  For bilateral lower extremity swelling, she takes a diuretic about twice per week as daily use requires her to stay near a bathroom. The patient confirms improvement of lower extremity swelling when she does take the diuretic and denies having tried taking a half tablet. She also confirms having tried to prop her legs up when she is sitting at home, but the center of her posterior knees began to hurt due to the pressure behind the knees. However, she does report doing leg lifts while watching TV. Ms. Clark states she does have 40 mm support socks but  requires assistance to get them on.    Bilateral foot pain  Currently, she takes 2 gabapentin as needed, which she states is not often and only when her toes are painful at bedtime.    Constipation  She continues to struggle with constipation despite taking 1 Colace in the morning, 1 Colace at night, using Metamucil, drinking a lot of fluids including prune juice, and drinking 1 can of V8 per day. Of note, she recalls taking only 1 teaspoon of lactulose and experiencing quick onset stool that created a mess for her to clean.    Hearing loss  In 01/2023, she began seeing a new company for her hearing aids and she notes having received 2 pair that have been unsatisfactory. She notes dizziness with their use and plans to continue following up until they are reset or adjusted for her.    Homocysteinemia  For low folate levels, she takes 1 folic acid per day and the homocysteine level completed on 04/18/2023 remains elevated.    Hyponatremia  For hyponatremia, she takes 1 sodium bicarbonate 1 g tablet per day. Blood work completed on 04/18/2023 reveals her sodium level is still slightly low at 3 points below normal range but improved from 11/2022.     Anemia  Her CBC from 04/18/2023 shows the hematocrit and hemoglobin have decreased slightly despite eating vegetables daily and chicken or fish every other day. The adult female The patient confirms drinking milk daily and states she does have Ensure at home but did not feel she needed it.    Hypertension  Currently, she takes lisinopril and 3 bisoprolol daily for hypertension. Refills of lisinopril are needed.    Atrial fibrillation and heart failure  She also requests samples of Eliquis 2.5 mg at this time.    Hemorrhoids  The patient states Dr. Nikos Ornelas at [facility] has been wonderful about taking care of her hemorrhoids but she would like refills of hydrocortisone suppositories to have on hand.     Result review  Additional labs completed on 04/18/2023 include  the CMP, which shows normal kidney function, normal liver enzymes, and a blood glucose of 103 mg/dL. Her hemoglobin A1c has improved from 6.1 percent to 5.9 percent with an average blood glucose of about 125 mg/dL.     Health maintenance  Currently due for the COVID-19 bivalent booster vaccine.      CHRONIC CONDITIONS      Past Medical History:   Diagnosis Date   • Acute cystitis with hematuria 6/25/2021 6/25/2021 Isidra Eisenberg MD  Urine sent for culture.   • Atrial fibrillation 2019   • Atrial flutter with rapid ventricular response 11/2/2019    Patient presented to the ER 11/2/19 with lightheadedness and was found to be in a flutter with rapid ventricular response.  She received IV beta-blocker and converted.  Bisoprolol was increased to 10 mg and she was started on Eliquis 5 mg twice a day.   Echo showed EF 65% with borderline concentric hypertrophy grade 2 diastolic dysfunction with pseudonormalization.  Saline test are positive for ev   • Bleeding hemorrhoid 3/8/2019   • Diverticulosis    • External hemorrhoid 6/11/2019 12/6/2019 Isidra Eisenberg MD   Continue vasculera daily. Drink plenty of fluids.  Avoid constipation.  Follow-up with Dr. Hebert as planned.   • Hx of colonic polyps 2003   • Hypertension    • Intraductal papilloma     R intraductal patheloma   • Left upper extremity numbness     L upper extremity numbness-ER visit; neuropathy   • Overweight (BMI 25.0-29.9) 6/11/2019   • Self-catheterizes urinary bladder        Past Surgical History:   Procedure Laterality Date   • BREAST LUMPECTOMY  2006   • CHOLECYSTECTOMY     • DENTAL PROCEDURE  12/13/2021    tooth exteraction    • HYSTERECTOMY  1984   • VAGINECTOMY  2009       Family History   Problem Relation Age of Onset   • Hyperlipidemia Other    • Hypertension Other    • Coronary artery disease Other    • No Known Problems Mother    • No Known Problems Father        Social History     Socioeconomic History   • Marital status:   "  Tobacco Use   • Smoking status: Never   • Smokeless tobacco: Never   Substance and Sexual Activity   • Alcohol use: Never     Alcohol/week: 1.0 standard drink     Types: 1 Glasses of wine per week   • Drug use: Never   • Sexual activity: Defer       Allergies   Allergen Reactions   • Levofloxacin Other (See Comments)     itching  Other reaction(s): Other (See Comments)  itching   • Sulfamethoxazole-Trimethoprim Unknown - Low Severity   • Nitrofuran Derivatives Rash     primarily in b/l hands   • Nitrofurantoin Rash       Review of Systems:     Review of Systems  The appropriate review of systems is included in the HPI.     Vital Signs  Vitals:    04/21/23 1411   BP: 112/62   BP Location: Left arm   Patient Position: Sitting   Cuff Size: Adult   Pulse: 88   Temp: 97.8 °F (36.6 °C)   TempSrc: Temporal   SpO2: 99%   Weight: 56.2 kg (124 lb)   Height: 155.2 cm (61.1\")   PainSc:   8   PainLoc: Knee     Body mass index is 23.35 kg/m².  BMI is within normal parameters. No other follow-up for BMI required.        Current Outpatient Medications:   •  acetaminophen (TYLENOL) 325 MG tablet, Take 2 tablets by mouth Every 4 (Four) Hours As Needed for Mild Pain., Disp: , Rfl:   •  apixaban (ELIQUIS) 2.5 MG tablet tablet, Take 1 tablet by mouth 2 (Two) Times a Day., Disp: 60 tablet, Rfl: 5  •  Bacillus Coagulans-Inulin (Probiotic) 1-250 BILLION-MG capsule, Probiotic, Disp: , Rfl:   •  bisoprolol (ZEBeta) 5 MG tablet, Take 3 tablets by mouth Daily., Disp: 270 tablet, Rfl: 1  •  Cholecalciferol (VITAMIN D3) 1000 units capsule, Take 1 capsule by mouth Daily., Disp: , Rfl:   •  Dietary Management Product (Vasculera) tablet, Take 1 each by mouth 2 (Two) Times a Day. Take one tablet by mouth twice daily, Disp: , Rfl:   •  docusate sodium (COLACE) 100 MG capsule, Take 1 capsule by mouth 2 (Two) Times a Day., Disp: 180 capsule, Rfl: 1  •  folic acid (FOLVITE) 1 MG tablet, Take 2 tablets by mouth Daily., Disp: 180 tablet, Rfl: 1  •  " furosemide (LASIX) 40 MG tablet, Take 1 tablet by mouth Daily. Take one tablet by mouth once daily in the morning, Disp: , Rfl:   •  gabapentin (NEURONTIN) 100 MG capsule, Take 2 capsules by mouth At Night As Needed (leg pain)., Disp: 60 capsule, Rfl: 0  •  hydrocortisone (ANUSOL-HC) 25 MG suppository, Insert 1 suppository into the rectum 2 (Two) Times a Day As Needed for Hemorrhoids., Disp: 30 each, Rfl: 5  •  lactulose (CEPHULAC) 20 g packet, 1/4 tsp daily, Disp: 30 each, Rfl: 5  •  lisinopril (PRINIVIL,ZESTRIL) 10 MG tablet, Take 1 tablet by mouth Every Night. Take one tablet by mouth daily at bedtime, Disp: 90 tablet, Rfl: 1  •  Menthol, Topical Analgesic, (Biofreeze) 4 % gel, Apply  topically 2 (Two) Times a Day As Needed. To bilateral knees, Disp: , Rfl:   •  ondansetron ODT (ZOFRAN-ODT) 4 MG disintegrating tablet, Place 1 tablet on the tongue 4 (Four) Times a Day As Needed for Nausea or Vomiting., Disp: 15 tablet, Rfl: 0  •  Oral Electrolytes (PEDIALYTE ADVANCED CARE PO), Pedialyte, Disp: , Rfl:   •  psyllium (METAMUCIL MULTIHEALTH FIBER) 58.12 % packet, Take 1 packet by mouth Daily., Disp: , Rfl:   •  saccharomyces boulardii (FLORASTOR) 250 MG capsule, Take 1 capsule by mouth As Needed., Disp: , Rfl:   •  sodium chloride 1 g tablet, Take 1 tablet by mouth Daily., Disp: 90 tablet, Rfl: 1    Physical Exam:    Physical Exam  Vitals and nursing note reviewed.   Constitutional:       Appearance: She is well-developed.   HENT:      Head: Normocephalic.   Eyes:      Conjunctiva/sclera: Conjunctivae normal.      Pupils: Pupils are equal, round, and reactive to light.   Neck:      Thyroid: No thyromegaly.   Cardiovascular:      Rate and Rhythm: Normal rate and regular rhythm.      Heart sounds: Normal heart sounds.   Pulmonary:      Effort: Pulmonary effort is normal.      Breath sounds: Normal breath sounds. No wheezing.   Musculoskeletal:         General: Normal range of motion.      Cervical back: Normal range of  motion and neck supple.      Right knee: No erythema. No tenderness.      Left knee: No erythema. No tenderness.      Comments: Arthritic changes of the bilateral knees.   Lymphadenopathy:      Cervical: No cervical adenopathy.   Skin:     General: Skin is warm and dry.   Neurological:      Mental Status: She is alert and oriented to person, place, and time.      Motor: Weakness present.      Gait: Gait abnormal.   Psychiatric:         Thought Content: Thought content normal.          ACE III MINI        Results Review:    I reviewed the patient's new clinical results.    CMP:  Lab Results   Component Value Date    BUN 7 (L) 04/18/2023    CREATININE 0.70 04/18/2023    EGFRIFNONA 67 02/15/2022    BCR 10.0 04/18/2023     (L) 04/18/2023    K 4.4 04/18/2023    CO2 24.3 04/18/2023    CALCIUM 9.2 04/18/2023    ALBUMIN 3.9 04/18/2023    BILITOT 0.8 04/18/2023    ALKPHOS 110 04/18/2023    AST 14 04/18/2023    ALT 10 04/18/2023     HbA1c:  Lab Results   Component Value Date    HGBA1C 5.90 (H) 04/18/2023    HGBA1C 6.10 (H) 12/16/2022     Microalbumin:  No results found for: MICROALBUR, POCMALB, POCCREAT  Lipid Panel  Lab Results   Component Value Date    CHOL 117 12/16/2022    TRIG 52 12/16/2022    HDL 34 (L) 12/16/2022    LDL 71 12/16/2022    AST 14 04/18/2023    ALT 10 04/18/2023       Medication Review: Medications reviewed and noted  Patient Instructions   Problem List Items Addressed This Visit        Advance Directives and General Issues    At high risk for falls (Chronic)       Cardiac and Vasculature    Chronic combined systolic and diastolic heart failure (Chronic)    Overview     DX 7/2022 at [facility].         Current Assessment & Plan     - Continue Eliquis, bisoprolol, and furosemide.   - I have recommended trying one half tablet of the furosemide 4 days per week as she has only been taking it twice per week due to frequent urination with its use.         Relevant Medications    bisoprolol (ZEBeta) 5 MG  tablet    Benign essential hypertension - Primary    Overview      Taking bisoprolol 15mg every evening and furosemide.         Current Assessment & Plan     - Continue bisoprolol every evening.  - Continue furosemide.         Relevant Medications    furosemide (LASIX) 40 MG tablet    lisinopril (PRINIVIL,ZESTRIL) 10 MG tablet    bisoprolol (ZEBeta) 5 MG tablet    Other Relevant Orders    Comprehensive Metabolic Panel (Completed)    CBC & Differential (Completed)    Mixed hyperlipidemia    Current Assessment & Plan     - Continue to avoid fats and sugars in the diet.   - Try to be as active around the house as possible.   - Do some arm and leg exercises while sitting.         Relevant Orders    Homocysteine (Completed)    Chronic atrial fibrillation (CMS/HCC)    Overview     Taking Eliquis twice a day.  Taking bisoprolol every evening.         Current Assessment & Plan     - Continue Eliquis twice per day and bisoprolol every evening.         Relevant Medications    bisoprolol (ZEBeta) 5 MG tablet       Coag and Thromboembolic    Chronic anticoagulation (Eliquis)       Endocrine and Metabolic    Abnormal glucose    Current Assessment & Plan     - The hemoglobin A1c is now 5.8 percent.   - She will continue to avoid sugars in the diet.         Relevant Orders    Hemoglobin A1c (Completed)    Mild protein-calorie malnutrition (HCC)    Current Assessment & Plan     - Encouraged to get some protein three times per day.   - Eat more vegetables and fruits.            Gastrointestinal Abdominal     Constipation, slow transit    Overview     Colace twice a day.         Current Assessment & Plan     - Continue Colace and try the lactulose again at a very small dose. I recommend trying 0.25 teaspoon once per day to see how that goes.            Genitourinary and Reproductive     Hyponatremia    Overview     Oral fluid restriction at 1500 mL daily.  Taking one sodium chloride once per day.  Drink a Gatorade or other  electrolyte drink once a day.           Current Assessment & Plan     - Continue taking 1 sodium chloride tablet once per day.   - Continue drinking electrolyte drinks.            Musculoskeletal and Injuries    Chronic pain of both knees (Chronic)    Overview     Taking Tylenol Arthritis as needed for pain.  Taking gabapentin.  Sees Genevieve for synvisc injections.           Current Assessment & Plan     - She is encouraged to take Tylenol arthritis up to three times per day for the pain.   - She is also reminded that gabapentin can help the knee pain as well as foot pain. She could take the gabapentin in the evening on days when knee pain is bad.   - Using a cold pack on the knee can help.   - She will follow up with Dr. Vallejo for Synvisc injections.   - I also recommended that she might try a sleeve type knee brace on the left knee to see if that helps some with it buckling while working in the kitchen.         Primary osteoarthritis of both knees (Chronic)    Senile osteoporosis    Overview     Multiple pelvic fractures and rib fractures.  Patient declines DEXA and declines treatment for osteoporosis.                Symptoms and Signs    Bilateral edema of lower extremity (Chronic)    Overview     Taking furosemide.         Current Assessment & Plan     - I recommended that since she is only taking the furosemide tablet twice per week, she could try half of a tablet four times per week and that might actually work better for the swelling.  - Encouraged to use support socks every day and take them off at bedtime.  - Elevate the feet while sitting.          Gait disorder    Overview     6/25/2021 Isidra Eisenberg MD    Continue regular physical activity and exercise in the house. Use small handweights daily when watching TV.     Continue using cane, especially when going out.         Other Visit Diagnoses     Homocysteinemia                 Diagnosis Plan   1. Benign essential hypertension  Comprehensive  Metabolic Panel    CBC & Differential      2. Chronic atrial fibrillation (CMS/HCC)        3. Mixed hyperlipidemia  Homocysteine      4. Constipation, slow transit        5. Mild protein-calorie malnutrition (HCC)        6. Bilateral edema of lower extremity        7. Gait disorder        8. Hyponatremia        9. Homocysteinemia        10. Chronic pain of both knees        11. At high risk for falls        12. Chronic combined systolic and diastolic heart failure        13. Abnormal glucose  Hemoglobin A1c      14. Primary osteoarthritis of both knees        15. Senile osteoporosis        16. Chronic anticoagulation (Eliquis)                Plan of care reviewed with patient at the conclusion of today's visit. Education was provided regarding diagnosis, management, and any prescribed or recommended OTC medications.Patient verbalizes understanding of and agreement with management plan.         Transcribed from ambient dictation for Isidra Eisenberg MD by Lesly Estes.  04/21/23   17:08 EDT    Patient or patient representative verbalized consent to the visit recording.

## 2023-04-21 NOTE — ASSESSMENT & PLAN NOTE
- She is encouraged to take Tylenol arthritis up to three times per day for the pain.   - She is also reminded that gabapentin can help the knee pain as well as foot pain. She could take the gabapentin in the evening on days when knee pain is bad.   - Using a cold pack on the knee can help.   - She will follow up with Dr. Vallejo for Synvisc injections.   - I also recommended that she might try a sleeve type knee brace on the left knee to see if that helps some with it buckling while working in the kitchen.

## 2023-04-21 NOTE — ASSESSMENT & PLAN NOTE
- I recommended that since she is only taking the furosemide tablet twice per week, she could try half of a tablet four times per week and that might actually work better for the swelling.  - Encouraged to use support socks every day and take them off at bedtime.  - Elevate the feet while sitting.

## 2023-04-21 NOTE — ASSESSMENT & PLAN NOTE
- Continue taking 1 sodium chloride tablet once per day.   - Continue drinking electrolyte drinks.

## 2023-05-01 NOTE — PATIENT INSTRUCTIONS
Patient Instructions   Problem List Items Addressed This Visit          Advance Directives and General Issues    At high risk for falls (Chronic)       Cardiac and Vasculature    Chronic combined systolic and diastolic heart failure (Chronic)    Overview     DX 7/2022 at [facility].         Current Assessment & Plan     - Continue Eliquis, bisoprolol, and furosemide.   - I have recommended trying one half tablet of the furosemide 4 days per week as she has only been taking it twice per week due to frequent urination with its use.         Relevant Medications    bisoprolol (ZEBeta) 5 MG tablet    Benign essential hypertension - Primary    Overview      Taking bisoprolol 15mg every evening and furosemide.         Current Assessment & Plan     - Continue bisoprolol every evening.  - Continue furosemide.         Relevant Medications    furosemide (LASIX) 40 MG tablet    lisinopril (PRINIVIL,ZESTRIL) 10 MG tablet    bisoprolol (ZEBeta) 5 MG tablet    Other Relevant Orders    Comprehensive Metabolic Panel (Completed)    CBC & Differential (Completed)    Mixed hyperlipidemia    Current Assessment & Plan     - Continue to avoid fats and sugars in the diet.   - Try to be as active around the house as possible.   - Do some arm and leg exercises while sitting.         Relevant Orders    Homocysteine (Completed)    Chronic atrial fibrillation (CMS/HCC)    Overview     Taking Eliquis twice a day.  Taking bisoprolol every evening.         Current Assessment & Plan     - Continue Eliquis twice per day and bisoprolol every evening.         Relevant Medications    bisoprolol (ZEBeta) 5 MG tablet       Coag and Thromboembolic    Chronic anticoagulation (Eliquis)       Endocrine and Metabolic    Abnormal glucose    Current Assessment & Plan     - The hemoglobin A1c is now 5.8 percent.   - She will continue to avoid sugars in the diet.         Relevant Orders    Hemoglobin A1c (Completed)    Mild protein-calorie malnutrition (HCC)     Current Assessment & Plan     - Encouraged to get some protein three times per day.   - Eat more vegetables and fruits.            Gastrointestinal Abdominal     Constipation, slow transit    Overview     Colace twice a day.         Current Assessment & Plan     - Continue Colace and try the lactulose again at a very small dose. I recommend trying 0.25 teaspoon once per day to see how that goes.            Genitourinary and Reproductive     Hyponatremia    Overview     Oral fluid restriction at 1500 mL daily.  Taking one sodium chloride once per day.  Drink a Gatorade or other electrolyte drink once a day.           Current Assessment & Plan     - Continue taking 1 sodium chloride tablet once per day.   - Continue drinking electrolyte drinks.            Musculoskeletal and Injuries    Chronic pain of both knees (Chronic)    Overview     Taking Tylenol Arthritis as needed for pain.  Taking gabapentin.  Sees Genevieve for synvisc injections.           Current Assessment & Plan     - She is encouraged to take Tylenol arthritis up to three times per day for the pain.   - She is also reminded that gabapentin can help the knee pain as well as foot pain. She could take the gabapentin in the evening on days when knee pain is bad.   - Using a cold pack on the knee can help.   - She will follow up with Dr. Vallejo for Synvisc injections.   - I also recommended that she might try a sleeve type knee brace on the left knee to see if that helps some with it buckling while working in the kitchen.         Primary osteoarthritis of both knees (Chronic)    Senile osteoporosis    Overview     Multiple pelvic fractures and rib fractures.  Patient declines DEXA and declines treatment for osteoporosis.                Symptoms and Signs    Bilateral edema of lower extremity (Chronic)    Overview     Taking furosemide.         Current Assessment & Plan     - I recommended that since she is only taking the furosemide tablet twice per week,  she could try half of a tablet four times per week and that might actually work better for the swelling.  - Encouraged to use support socks every day and take them off at bedtime.  - Elevate the feet while sitting.          Gait disorder    Overview     6/25/2021 Isidra Eisenberg MD    Continue regular physical activity and exercise in the house. Use small handweights daily when watching TV.     Continue using cane, especially when going out.           Other Visit Diagnoses       Homocysteinemia                Fall Prevention in the Home, Adult  Falls can cause injuries and affect people of all ages. There are many simple things that you can do to make your home safe and to help prevent falls. Ask for help when making these changes, if needed.  What actions can I take to prevent falls?  General instructions  Use good lighting in all rooms. Replace any light bulbs that burn out, turn on lights if it is dark, and use night-lights.  Place frequently used items in easy-to-reach places. Lower the shelves around your home if necessary.  Set up furniture so that there are clear paths around it. Avoid moving your furniture around.  Remove throw rugs and other tripping hazards from the floor.  Avoid walking on wet floors.  Fix any uneven floor surfaces.  Add color or contrast paint or tape to grab bars and handrails in your home. Place contrasting color strips on the first and last steps of staircases.  When you use a stepladder, make sure that it is completely opened and that the sides and supports are firmly locked. Have someone hold the ladder while you are using it. Do not climb a closed stepladder.  Know where your pets are when moving through your home.  What can I do in the bathroom?     Keep the floor dry. Immediately clean up any water that is on the floor.  Remove soap buildup in the tub or shower regularly.  Use nonskid mats or decals on the floor of the tub or shower.  Attach bath mats securely with  double-sided, nonslip rug tape.  If you need to sit down while you are in the shower, use a plastic, nonslip stool.  Install grab bars by the toilet and in the tub and shower. Do not use towel bars as grab bars.  What can I do in the bedroom?  Make sure that a bedside light is easy to reach.  Do not use oversized bedding that reaches the floor.  Have a firm chair that has side arms to use for getting dressed.  What can I do in the kitchen?  Clean up any spills right away.  If you need to reach for something above you, use a sturdy step stool that has a grab bar.  Keep electrical cables out of the way.  Do not use floor polish or wax that makes floors slippery. If you must use wax, make sure that it is non-skid floor wax.  What can I do with my stairs?  Do not leave any items on the stairs.  Make sure that you have a light switch at the top and the bottom of the stairs. Have them installed if you do not have them.  Make sure that there are handrails on both sides of the stairs. Fix handrails that are broken or loose. Make sure that handrails are as long as the staircases.  Install non-slip stair treads on all stairs in your home.  Avoid having throw rugs at the top or bottom of stairs, or secure the rugs with carpet tape to prevent them from moving.  Choose a carpet design that does not hide the edge of steps on the stairs.  Check any carpeting to make sure that it is firmly attached to the stairs. Fix any carpet that is loose or worn.  What can I do on the outside of my home?  Use bright outdoor lighting.  Regularly repair the edges of walkways and driveways and fix any cracks.  Remove high doorway thresholds.  Trim any shrubbery on the main path into your home.  Regularly check that handrails are securely fastened and in good repair. Both sides of all steps should have handrails.  Install guardrails along the edges of any raised decks or porches.  Clear walkways of debris and clutter, including tools and  rocks.  Have leaves, snow, and ice cleared regularly.  Use sand or salt on walkways during winter months.  In the garage, clean up any spills right away, including grease or oil spills.  What other actions can I take?  Wear closed-toe shoes that fit well and support your feet. Wear shoes that have rubber soles or low heels.  Use mobility aids as needed, such as canes, walkers, scooters, and crutches.  Review your medicines with your health care provider. Some medicines can cause dizziness or changes in blood pressure, which increase your risk of falling.  Talk with your health care provider about other ways that you can decrease your risk of falls. This may include working with a physical therapist or  to improve your strength, balance, and endurance.  Where to find more information  Centers for Disease Control and PreventionMAGDA: www.cdc.gov  National [place] [employer]: www.[place].nih.gov  Contact a health care provider if:  You are afraid of falling at home.  You feel weak, drowsy, or dizzy at home.  You fall at home.  Summary  There are many simple things that you can do to make your home safe and to help prevent falls.  Ways to make your home safe include removing tripping hazards and installing grab bars in the bathroom.  Ask for help when making these changes in your home.  This information is not intended to replace advice given to you by your health care provider. Make sure you discuss any questions you have with your health care provider.  Document Revised: 09/19/2022 Document Reviewed: 07/21/2021  [employer] Patient Education © 2022 [employer]

## 2023-05-04 RX ORDER — AZELASTINE 1 MG/ML
2 SPRAY, METERED NASAL 2 TIMES DAILY
Qty: 1 EACH | Refills: 12 | Status: SHIPPED | OUTPATIENT
Start: 2023-05-04

## 2023-07-24 ENCOUNTER — TELEPHONE (OUTPATIENT)
Dept: INTERNAL MEDICINE | Facility: CLINIC | Age: 88
End: 2023-07-24
Payer: MEDICARE

## 2023-07-24 NOTE — TELEPHONE ENCOUNTER
Spoke with pt's daughter, Renetta. Went over the [facility] paperwork she needs to take care of the pt. Discussed questions, faxed paperwork

## 2023-09-25 ENCOUNTER — TELEPHONE (OUTPATIENT)
Dept: INTERNAL MEDICINE | Facility: CLINIC | Age: 88
End: 2023-09-25

## 2023-09-25 DIAGNOSIS — L67.8 DRY HAIR: Primary | ICD-10-CM

## 2023-09-25 DIAGNOSIS — R30.0 DYSURIA: Primary | ICD-10-CM

## 2023-09-25 DIAGNOSIS — E87.1 HYPONATREMIA: ICD-10-CM

## 2023-09-25 DIAGNOSIS — I10 BENIGN ESSENTIAL HYPERTENSION: ICD-10-CM

## 2023-09-25 DIAGNOSIS — R94.6 ABNORMAL RESULTS OF THYROID FUNCTION STUDIES: ICD-10-CM

## 2023-09-25 DIAGNOSIS — E44.1 MILD PROTEIN-CALORIE MALNUTRITION: ICD-10-CM

## 2023-09-25 NOTE — TELEPHONE ENCOUNTER
Caller: Thuy Clark    Relationship: Self    Best call back number:[phone]    What was the call regarding: PT STATES SHE HAS A UTI AND WANTS TO LEAVE A URINE SAMPLE AT THE LAB TOMORROW 9/26/23 WHEN SHE GETS THE REST OF HER LABS DONE. WOULD YOU ADD THIS TO THE LABS AND LET HER KNOW THROUGH MY CHART IF THIS HAS BEEN DONE?        Is it okay if the provider responds through MyChart: YES

## 2023-09-26 ENCOUNTER — LAB (OUTPATIENT)
Dept: LAB | Facility: HOSPITAL | Age: 88
End: 2023-09-26
Payer: MEDICARE

## 2023-09-26 DIAGNOSIS — E44.1 MILD PROTEIN-CALORIE MALNUTRITION: ICD-10-CM

## 2023-09-26 DIAGNOSIS — E87.1 HYPONATREMIA: ICD-10-CM

## 2023-09-26 DIAGNOSIS — L67.8 DRY HAIR: ICD-10-CM

## 2023-09-26 DIAGNOSIS — R94.6 ABNORMAL RESULTS OF THYROID FUNCTION STUDIES: ICD-10-CM

## 2023-09-26 LAB
ANION GAP SERPL CALCULATED.3IONS-SCNC: 10.9 MMOL/L (ref 5–15)
BUN SERPL-MCNC: 7 MG/DL (ref 8–23)
BUN/CREAT SERPL: 9.7 (ref 7–25)
CALCIUM SPEC-SCNC: 9.3 MG/DL (ref 8.6–10.5)
CHLORIDE SERPL-SCNC: 100 MMOL/L (ref 98–107)
CO2 SERPL-SCNC: 25.1 MMOL/L (ref 22–29)
CREAT SERPL-MCNC: 0.72 MG/DL (ref 0.57–1)
EGFRCR SERPLBLD CKD-EPI 2021: 81 ML/MIN/1.73
GLUCOSE SERPL-MCNC: 73 MG/DL (ref 65–99)
POTASSIUM SERPL-SCNC: 4.3 MMOL/L (ref 3.5–5.2)
SODIUM SERPL-SCNC: 136 MMOL/L (ref 136–145)
TSH SERPL DL<=0.05 MIU/L-ACNC: 3.46 UIU/ML (ref 0.27–4.2)
VIT B12 BLD-MCNC: 411 PG/ML (ref 211–946)

## 2023-09-26 PROCEDURE — 80048 BASIC METABOLIC PNL TOTAL CA: CPT

## 2023-09-26 PROCEDURE — 82607 VITAMIN B-12: CPT

## 2023-09-26 PROCEDURE — 84443 ASSAY THYROID STIM HORMONE: CPT

## 2023-09-27 RX ORDER — SODIUM CHLORIDE 1 G/1
1 TABLET ORAL EVERY OTHER DAY
Qty: 46 TABLET | Refills: 1 | Status: SHIPPED | OUTPATIENT
Start: 2023-09-27

## 2023-09-27 RX ORDER — CYANOCOBALAMIN (VITAMIN B-12) 500 MCG
500 TABLET ORAL DAILY
Qty: 90 TABLET | Refills: 3 | Status: SHIPPED | OUTPATIENT
Start: 2023-09-27

## 2023-10-02 ENCOUNTER — PATIENT MESSAGE (OUTPATIENT)
Dept: INTERNAL MEDICINE | Facility: CLINIC | Age: 88
End: 2023-10-02
Payer: MEDICARE

## 2023-10-04 NOTE — TELEPHONE ENCOUNTER
Ganga, Generic 10/3/2023 6:19 PM EDT    Today I took the 3rd Covid shot at [facility]. Now in a few days I will go back for the RSVirus shot. I would like to change my date for semi-annual check up. I will be making a phone call. Even if I needed to go to [facility] [place]. I have been temporarily located in the [place] area.

## 2023-10-19 ENCOUNTER — TELEPHONE (OUTPATIENT)
Dept: INTERNAL MEDICINE | Facility: CLINIC | Age: 88
End: 2023-10-19
Payer: MEDICARE

## 2023-10-19 NOTE — TELEPHONE ENCOUNTER
PATIENT REQUESTING A CALL BACK.  SHE STATES SHE WENT TO HER PHARMACY TO GET THE RSV VACCINE AND WAS TOLD THEY DON'T GIVE IT TO MEDICARE PATIENTS. SHE WANTS TO KNOW IF IT IS NECESSARY FOR HER TO GET IT AND WHERE SHE CAN GET IT. PLEASE CALL

## 2023-10-20 NOTE — TELEPHONE ENCOUNTER
Spoke with [facility]. The pt needs to have medicare part d coverage to have the rsv vaccine at the pharmacy.     She does not have it. Dr. Eisenberg suggested the health department to receive this vaccine. Patient understood and verbalized understanding.

## 2023-11-03 RX ORDER — LISINOPRIL 10 MG/1
10 TABLET ORAL NIGHTLY
Qty: 90 TABLET | Refills: 1 | Status: SHIPPED | OUTPATIENT
Start: 2023-11-03

## 2023-11-08 ENCOUNTER — TELEPHONE (OUTPATIENT)
Dept: INTERNAL MEDICINE | Facility: CLINIC | Age: 88
End: 2023-11-08

## 2023-11-08 RX ORDER — HYDROCORTISONE ACETATE 25 MG/1
25 SUPPOSITORY RECTAL 2 TIMES DAILY PRN
Qty: 30 EACH | Refills: 5 | Status: SHIPPED | OUTPATIENT
Start: 2023-11-08

## 2023-11-08 RX ORDER — ONDANSETRON 4 MG/1
4 TABLET, ORALLY DISINTEGRATING ORAL 4 TIMES DAILY PRN
Qty: 15 TABLET | Refills: 0 | Status: SHIPPED | OUTPATIENT
Start: 2023-11-08

## 2023-11-08 NOTE — TELEPHONE ENCOUNTER
Caller: Thuy Clark    Relationship: Self    Best call back number: [phone]     What is the medical concern/diagnosis: SWOLLEN HEMORRHOIDS, CONSTIPATION, PATIENT STATES NO HOME REMEDIES ARE WORKING.     What specialty or service is being requested: GASTRO    What is the provider, practice or medical service name: [facility] IF POSSIBLE    What is the office location:     What is the office phone number:     Any additional details:

## 2023-11-08 NOTE — TELEPHONE ENCOUNTER
I spoke to patient she stated she is out of the suppositories and would like for you to send in a refill. She also stated she has an appointment tomorrow with Dr. Ornelas who tied off her hemorrhoids before

## 2023-11-21 ENCOUNTER — TELEPHONE (OUTPATIENT)
Dept: INTERNAL MEDICINE | Facility: CLINIC | Age: 88
End: 2023-11-21

## 2023-11-21 NOTE — TELEPHONE ENCOUNTER
Caller: Thuy Clark    Relationship: Self    Best call back number: [phone]    What is the best time to reach you: ANYTIME     Who are you requesting to speak with (clinical staff, provider,  specific staff member): CLINICAL STAFF    What was the call regarding: PATIENT STATES THAT SHE HAS ADMITTED TO HERSELF TO [facility] FOR BLEEDING HEMORRHOIDS     Is it okay if the provider responds through MyChart: NO

## 2023-11-23 ENCOUNTER — READMISSION MANAGEMENT (OUTPATIENT)
Dept: CALL CENTER | Facility: HOSPITAL | Age: 88
End: 2023-11-23
Payer: MEDICARE

## 2023-11-23 NOTE — OUTREACH NOTE
Prep Survey      Flowsheet Row Responses   [facility] patient discharged from? Non-[facility]   Is LACE score < 7 ? Non-BH Discharge   Eligibility [facility]   Date of Discharge 11/23/23   Discharge diagnosis Heart failure, unspecified   Does the patient have one of the following disease processes/diagnoses(primary or secondary)? CHF   Does the patient have Home health ordered? No   Prep survey completed? Yes            WING KIM - Registered Nurse

## 2023-11-27 ENCOUNTER — TRANSITIONAL CARE MANAGEMENT TELEPHONE ENCOUNTER (OUTPATIENT)
Dept: CALL CENTER | Facility: HOSPITAL | Age: 88
End: 2023-11-27
Payer: MEDICARE

## 2023-11-27 NOTE — OUTREACH NOTE
Call Center TCM Note      Flowsheet Row Responses   [facility] patient discharged from? Non-  [[facility]]   Does the patient have one of the following disease processes/diagnoses(primary or secondary)? Other   TCM attempt successful? Yes   Call start time 1309   Call end time 1314   Person spoke with today (if not patient) and relationship Patient   Meds reviewed with patient/caregiver? Yes  [Patient reports that she has all meds and taking as prescribed.]   Does the patient have all medications ordered at discharge? Yes   Is the patient taking all medications as directed (includes completed medication regime)? Yes   Comments PCP Dr Eisenberg. Patient declines to schedule PCP appt with call today   Does the patient have an appointment with their PCP within 7-14 days of discharge? No   Nursing Interventions Patient declined scheduling/rescheduling appointment at this time, Patient desires to follow up with specialty only, Routed TCM call to PCP office   Has home health visited the patient within 72 hours of discharge? N/A   Psychosocial issues? No   Did the patient receive a copy of their discharge instructions? Yes   What is the patient's perception of their health status since discharge? Improving  [Patient reports that she is doing very well. She states that she is following up with Dr Ornelas this week. Denies any needs from primary care at this time.]   Is the patient/caregiver able to teach back signs and symptoms related to disease process for when to call PCP? Yes   Is the patient/caregiver able to teach back signs and symptoms related to disease process for when to call 911? Yes   Is the patient/caregiver able to teach back the hierarchy of who to call/visit for symptoms/problems? PCP, Specialist, Home health nurse, Urgent Care, ED, 911 Yes   If the patient is a current smoker, are they able to teach back resources for cessation? Not a smoker   TCM call completed? Yes   Call end time 1314    Would this patient benefit from a Referral to [facility] Social Work? No   Is the patient interested in additional calls from an ambulatory ? No            Sally Lugo RN    11/27/2023, 13:16 EST

## 2023-11-27 NOTE — OUTREACH NOTE
Call Center TCM Note      Flowsheet Row Responses   [facility] patient discharged from? Non-  [[facility]]   Does the patient have one of the following disease processes/diagnoses(primary or secondary)? Other   TCM attempt successful? No   Unsuccessful attempts Attempt 1  [Outdated PCP verbal release]            Sally Lugo RN    11/27/2023, 10:28 EST

## 2023-12-12 ENCOUNTER — TELEPHONE (OUTPATIENT)
Dept: INTERNAL MEDICINE | Facility: CLINIC | Age: 88
End: 2023-12-12
Payer: MEDICARE

## 2023-12-12 NOTE — TELEPHONE ENCOUNTER
Caller: CAMELIA PEREZ     Relationship:SELF     Callback number: [phone]    Is it ok to leave a message: [x] Yes [] No    Requested medication for samples:   apixaban (ELIQUIS) 2.5 MG tablet tablet     Who will be picking up the samples: PATIENT (SELF)     Do you need information about patient financial assistance for this medication: [] Yes [x] No    Additional details provided:   PATIENT WOULD LIKE A CALL BACK TO BE INFORMED IF THE OFFICE HAS ANY SAMPLES OF THE MEDICATION THAT SHE CAN COME BY THE OFFICE AND

## 2023-12-12 NOTE — TELEPHONE ENCOUNTER
I called patient informed her we do not have any samples of the 2.6 mg tablets of Eliquis but I have asked Emilie to call rep to get some.    I called patient unable to LVM to inform her that we have gotten some samples in for the Eliquis and I will have them ready and at  for .    Pended below to sign.

## 2023-12-18 DIAGNOSIS — M79.671 FOOT PAIN, BILATERAL: ICD-10-CM

## 2023-12-18 DIAGNOSIS — M79.672 FOOT PAIN, BILATERAL: ICD-10-CM

## 2023-12-18 RX ORDER — GABAPENTIN 100 MG/1
200 CAPSULE ORAL NIGHTLY PRN
Qty: 60 CAPSULE | Refills: 2 | Status: SHIPPED | OUTPATIENT
Start: 2023-12-18

## 2023-12-19 ENCOUNTER — LAB (OUTPATIENT)
Dept: LAB | Facility: HOSPITAL | Age: 88
End: 2023-12-19
Payer: MEDICARE

## 2023-12-19 ENCOUNTER — PATIENT MESSAGE (OUTPATIENT)
Dept: INTERNAL MEDICINE | Facility: CLINIC | Age: 88
End: 2023-12-19
Payer: MEDICARE

## 2023-12-19 ENCOUNTER — TELEPHONE (OUTPATIENT)
Dept: INTERNAL MEDICINE | Facility: CLINIC | Age: 88
End: 2023-12-19
Payer: MEDICARE

## 2023-12-19 DIAGNOSIS — R73.09 ABNORMAL GLUCOSE: ICD-10-CM

## 2023-12-19 DIAGNOSIS — R78.71 ABNORMAL LEAD LEVEL IN BLOOD: ICD-10-CM

## 2023-12-19 DIAGNOSIS — M81.0 SENILE OSTEOPOROSIS: ICD-10-CM

## 2023-12-19 DIAGNOSIS — R79.9 ABNORMAL FINDING OF BLOOD CHEMISTRY, UNSPECIFIED: ICD-10-CM

## 2023-12-19 DIAGNOSIS — E78.2 MIXED HYPERLIPIDEMIA: ICD-10-CM

## 2023-12-19 DIAGNOSIS — I10 BENIGN ESSENTIAL HYPERTENSION: ICD-10-CM

## 2023-12-19 DIAGNOSIS — K64.9 BLEEDING HEMORRHOID: ICD-10-CM

## 2023-12-19 LAB
25(OH)D3 SERPL-MCNC: 38.3 NG/ML (ref 30–100)
BASOPHILS # BLD AUTO: 0.05 10*3/MM3 (ref 0–0.2)
BASOPHILS NFR BLD AUTO: 1.1 % (ref 0–1.5)
DEPRECATED RDW RBC AUTO: 59.4 FL (ref 37–54)
EOSINOPHIL # BLD AUTO: 0.16 10*3/MM3 (ref 0–0.4)
EOSINOPHIL NFR BLD AUTO: 3.5 % (ref 0.3–6.2)
ERYTHROCYTE [DISTWIDTH] IN BLOOD BY AUTOMATED COUNT: 19.1 % (ref 12.3–15.4)
HCT VFR BLD AUTO: 32.5 % (ref 34–46.6)
HGB BLD-MCNC: 9.9 G/DL (ref 12–15.9)
IMM GRANULOCYTES # BLD AUTO: 0.01 10*3/MM3 (ref 0–0.05)
IMM GRANULOCYTES NFR BLD AUTO: 0.2 % (ref 0–0.5)
LYMPHOCYTES # BLD AUTO: 0.95 10*3/MM3 (ref 0.7–3.1)
LYMPHOCYTES NFR BLD AUTO: 21.1 % (ref 19.6–45.3)
MCH RBC QN AUTO: 26.5 PG (ref 26.6–33)
MCHC RBC AUTO-ENTMCNC: 30.5 G/DL (ref 31.5–35.7)
MCV RBC AUTO: 86.9 FL (ref 79–97)
MONOCYTES # BLD AUTO: 0.44 10*3/MM3 (ref 0.1–0.9)
MONOCYTES NFR BLD AUTO: 9.8 % (ref 5–12)
NEUTROPHILS NFR BLD AUTO: 2.9 10*3/MM3 (ref 1.7–7)
NEUTROPHILS NFR BLD AUTO: 64.3 % (ref 42.7–76)
NRBC BLD AUTO-RTO: 0 /100 WBC (ref 0–0.2)
PLATELET # BLD AUTO: 293 10*3/MM3 (ref 140–450)
PMV BLD AUTO: 9.3 FL (ref 6–12)
RBC # BLD AUTO: 3.74 10*6/MM3 (ref 3.77–5.28)
VIT B12 BLD-MCNC: 604 PG/ML (ref 211–946)
WBC NRBC COR # BLD AUTO: 4.51 10*3/MM3 (ref 3.4–10.8)

## 2023-12-19 PROCEDURE — 80053 COMPREHEN METABOLIC PANEL: CPT

## 2023-12-19 PROCEDURE — 84443 ASSAY THYROID STIM HORMONE: CPT

## 2023-12-19 PROCEDURE — 83090 ASSAY OF HOMOCYSTEINE: CPT

## 2023-12-19 PROCEDURE — 83036 HEMOGLOBIN GLYCOSYLATED A1C: CPT

## 2023-12-19 PROCEDURE — 82728 ASSAY OF FERRITIN: CPT

## 2023-12-19 PROCEDURE — 83540 ASSAY OF IRON: CPT

## 2023-12-19 PROCEDURE — 84466 ASSAY OF TRANSFERRIN: CPT

## 2023-12-19 PROCEDURE — 85025 COMPLETE CBC W/AUTO DIFF WBC: CPT

## 2023-12-19 PROCEDURE — 82607 VITAMIN B-12: CPT

## 2023-12-19 PROCEDURE — 82306 VITAMIN D 25 HYDROXY: CPT

## 2023-12-19 PROCEDURE — 80061 LIPID PANEL: CPT

## 2023-12-19 NOTE — TELEPHONE ENCOUNTER
Caller: Thuy Clark    Relationship: Self    Best call back number: [phone]     What orders are you requesting (i.e. lab or imaging): BLOOD WORK     In what timeframe would the patient need to come in: PATIENT IS WANTING TO COME IN TODAY 12/19/23     Where will you receive your lab/imaging services: [facility]     Additional notes: PATIENT HAS A MEDICARE WELLNESS VISIT ON 12/26/23 AND SHE WOULD LIKE TO COME IN EARLY TO GET THE BLOOD WORK.

## 2023-12-20 LAB
ALBUMIN SERPL-MCNC: 4.2 G/DL (ref 3.5–5.2)
ALBUMIN/GLOB SERPL: 1.7 G/DL
ALP SERPL-CCNC: 108 U/L (ref 39–117)
ALT SERPL W P-5'-P-CCNC: 11 U/L (ref 1–33)
ANION GAP SERPL CALCULATED.3IONS-SCNC: 11 MMOL/L (ref 5–15)
AST SERPL-CCNC: 18 U/L (ref 1–32)
BILIRUB SERPL-MCNC: 0.6 MG/DL (ref 0–1.2)
BUN SERPL-MCNC: 10 MG/DL (ref 8–23)
BUN/CREAT SERPL: 12.2 (ref 7–25)
CALCIUM SPEC-SCNC: 9.2 MG/DL (ref 8.6–10.5)
CHLORIDE SERPL-SCNC: 100 MMOL/L (ref 98–107)
CHOLEST SERPL-MCNC: 121 MG/DL (ref 0–200)
CO2 SERPL-SCNC: 23 MMOL/L (ref 22–29)
CREAT SERPL-MCNC: 0.82 MG/DL (ref 0.57–1)
EGFRCR SERPLBLD CKD-EPI 2021: 68.9 ML/MIN/1.73
FERRITIN SERPL-MCNC: 40 NG/ML (ref 13–150)
GLOBULIN UR ELPH-MCNC: 2.5 GM/DL
GLUCOSE SERPL-MCNC: 102 MG/DL (ref 65–99)
HBA1C MFR BLD: 5.2 % (ref 4.8–5.6)
HCYS SERPL-MCNC: 14.4 UMOL/L (ref 0–15)
HDLC SERPL-MCNC: 37 MG/DL (ref 40–60)
IRON 24H UR-MRATE: 163 MCG/DL (ref 37–145)
IRON SATN MFR SERPL: 35 % (ref 20–50)
LDLC SERPL CALC-MCNC: 75 MG/DL (ref 0–100)
LDLC/HDLC SERPL: 2.09 {RATIO}
POTASSIUM SERPL-SCNC: 4.4 MMOL/L (ref 3.5–5.2)
PROT SERPL-MCNC: 6.7 G/DL (ref 6–8.5)
SODIUM SERPL-SCNC: 134 MMOL/L (ref 136–145)
TIBC SERPL-MCNC: 469 MCG/DL (ref 298–536)
TRANSFERRIN SERPL-MCNC: 315 MG/DL (ref 200–360)
TRIGL SERPL-MCNC: 34 MG/DL (ref 0–150)
TSH SERPL DL<=0.05 MIU/L-ACNC: 3.2 UIU/ML (ref 0.27–4.2)
VLDLC SERPL-MCNC: 9 MG/DL (ref 5–40)

## 2023-12-21 NOTE — TELEPHONE ENCOUNTER
From: Thuy Clark  To: Isidra Eisenberg  Sent: 12/19/2023 4:17 PM EST  Subject: LABS    Dec 19 had blood drawn and took a urine sample to Dr Marroquin for culture. Will be keeping You informed.

## 2023-12-26 ENCOUNTER — OFFICE VISIT (OUTPATIENT)
Dept: INTERNAL MEDICINE | Facility: CLINIC | Age: 88
End: 2023-12-26
Payer: MEDICARE

## 2023-12-26 VITALS
WEIGHT: 122.6 LBS | DIASTOLIC BLOOD PRESSURE: 62 MMHG | BODY MASS INDEX: 23.15 KG/M2 | SYSTOLIC BLOOD PRESSURE: 122 MMHG | TEMPERATURE: 97.8 F | HEIGHT: 61 IN | OXYGEN SATURATION: 98 % | HEART RATE: 73 BPM

## 2023-12-26 DIAGNOSIS — G31.84 MILD COGNITIVE IMPAIRMENT: Chronic | ICD-10-CM

## 2023-12-26 DIAGNOSIS — K64.9 BLEEDING HEMORRHOID: ICD-10-CM

## 2023-12-26 DIAGNOSIS — M81.0 SENILE OSTEOPOROSIS: ICD-10-CM

## 2023-12-26 DIAGNOSIS — F41.1 GENERALIZED ANXIETY DISORDER: ICD-10-CM

## 2023-12-26 DIAGNOSIS — R73.09 ABNORMAL GLUCOSE: ICD-10-CM

## 2023-12-26 DIAGNOSIS — I10 BENIGN ESSENTIAL HYPERTENSION: ICD-10-CM

## 2023-12-26 DIAGNOSIS — I21.4 NSTEMI (NON-ST ELEVATED MYOCARDIAL INFARCTION): Chronic | ICD-10-CM

## 2023-12-26 DIAGNOSIS — I50.42 CHRONIC COMBINED SYSTOLIC AND DIASTOLIC HEART FAILURE: Chronic | ICD-10-CM

## 2023-12-26 DIAGNOSIS — R26.9 GAIT DISORDER: ICD-10-CM

## 2023-12-26 DIAGNOSIS — I38 VALVULAR HEART DISEASE: ICD-10-CM

## 2023-12-26 DIAGNOSIS — Z91.81 AT HIGH RISK FOR FALLS: Chronic | ICD-10-CM

## 2023-12-26 DIAGNOSIS — E78.2 MIXED HYPERLIPIDEMIA: ICD-10-CM

## 2023-12-26 DIAGNOSIS — R60.0 BILATERAL EDEMA OF LOWER EXTREMITY: Chronic | ICD-10-CM

## 2023-12-26 DIAGNOSIS — R79.9 ABNORMAL FINDING OF BLOOD CHEMISTRY, UNSPECIFIED: ICD-10-CM

## 2023-12-26 DIAGNOSIS — Z00.00 MEDICARE ANNUAL WELLNESS VISIT, SUBSEQUENT: Primary | ICD-10-CM

## 2023-12-26 DIAGNOSIS — E44.1 MILD PROTEIN-CALORIE MALNUTRITION: ICD-10-CM

## 2023-12-26 DIAGNOSIS — I48.21 PERMANENT ATRIAL FIBRILLATION: ICD-10-CM

## 2023-12-26 DIAGNOSIS — K59.01 CONSTIPATION, SLOW TRANSIT: ICD-10-CM

## 2023-12-26 RX ORDER — LANOLIN ALCOHOL/MO/W.PET/CERES
1000 CREAM (GRAM) TOPICAL DAILY
Start: 2023-12-26

## 2023-12-26 RX ORDER — ESCITALOPRAM OXALATE 5 MG/1
5 TABLET ORAL DAILY
Qty: 30 TABLET | Refills: 5 | Status: SHIPPED | OUTPATIENT
Start: 2023-12-26

## 2023-12-26 RX ORDER — POLYETHYLENE GLYCOL 3350 17 G/17G
17 POWDER, FOR SOLUTION ORAL DAILY PRN
COMMUNITY

## 2023-12-26 RX ORDER — ONDANSETRON 4 MG/1
4 TABLET, ORALLY DISINTEGRATING ORAL 4 TIMES DAILY PRN
Qty: 15 TABLET | Refills: 0 | Status: SHIPPED | OUTPATIENT
Start: 2023-12-26

## 2023-12-26 RX ORDER — ASCORBIC ACID 500 MG
500 TABLET ORAL DAILY
Start: 2023-12-26

## 2023-12-26 RX ORDER — FERROUS SULFATE 325(65) MG
325 TABLET ORAL DAILY
COMMUNITY

## 2023-12-26 RX ORDER — FUROSEMIDE 40 MG/1
40 TABLET ORAL DAILY PRN
Start: 2023-12-26

## 2023-12-26 NOTE — ASSESSMENT & PLAN NOTE
She was seen by Dr. Danish Ornelas and hemorrhoids were removed. She will follow up with him soon. I have advised her to take the MiraLAX every day, but a smaller dose at a time. She may try 1/3 of a capful daily or one-half capful daily.

## 2023-12-26 NOTE — ASSESSMENT & PLAN NOTE
Called patient, no answer, Left message for patient to return call.   Continue using the walker at all times. Do some arm and leg exercises while sitting every day at home. Walk around the home a lot.

## 2023-12-26 NOTE — ASSESSMENT & PLAN NOTE
Use the MiraLAX every day, but may use a smaller dose as needed. Continue drinking lots of fluids.

## 2023-12-26 NOTE — ASSESSMENT & PLAN NOTE
Weight is now fairly stable. Continue trying to get protein 3 times per day. Also eat lots of fruits and vegetables.

## 2023-12-26 NOTE — PROGRESS NOTES
The ABCs of the Annual Wellness Visit  Initial Medicare Wellness Visit    Chief Complaint   Patient presents with    Medicare Wellness-subsequent    Hypertension       Subjective   History of Present Illness:  Thuy Clark is a 88 y.o. female who presents for an Initial Medicare Wellness Visit.    Annual wellness visit  The patient denies any falls. She got 2 of her Mini-Cog words correct. She uses a pill box to help with scheduling her medications. She writes in her journal every day. She is still driving, but will not drive next year, 2024.    Health maintenance  The patient is up to date with her dental exams. She is requesting a recommendation for a dentist. She states that her upper dentures are not satisfactory. Her dentist is retiring. She has not had an eye exam this year. She wears a mask if she goes to a strange place. She is up to date on her mammogram.    Immunizations  The patient has received all of her COVID-19 vaccines. She has received her influenza vaccine.    Labs  Hemoglobin- 9.9 g/dL.  LDL- 75 mg/dL.  Triglycerides- WNL.  HDL- low.  Creatinine, BUN- WNL.  Blood glucose- 102 mg/dL.  Sodium- 134 mmol/L.  LFTs- WNL.  A1c- improved.  Thyroid- WNL.  B12- WNL.  Vitamin D- 38 ng/mL.  Ferritin- low.  Folate- WNL.  WBC- WNL.  RBC- WNL.  Platelets- WNL.    CHRONIC CONDITIONS:    Hypertension  The patient's blood pressure today is 122/62 mmHg. She monitors her blood pressure at home and states that it has been slightly low at home at around 110 mmHg systolic. She occasionally feels lightheaded or dizzy. She is drinking enough water. She is unsure if her systolic blood pressure is less than 120 mmHg at home most of the time. The highest she has seen at home is 145 mmHg systolic. Every Tuesday, a lady comes to her building and checks her blood pressure.    Hemorrhoids  The patient had some growths clipped from the top her colon clipped on 11/23/2023. Due to her hemorrhoids, she was prescribed iron. She  "is taking 1 iron pill a day. She would like to stop taking iron as it causes constipation. She has had constipation since she was in her early 40s. Currently, her bowels are too loose. She does not take vitamin C with the iron. She has an upcoming appointment with Dr. Nikos Ornelas on 01/09/2024. She took 1 dose of MiraLAX yesterday, 12/25/2023. She denies constipation yesterday. She takes MiraLAX as needed.    Vitamin B12 deficiency  The patient takes a B12 1000 mcg supplement daily.    Nausea  The patient had Zofran filled in 11/2023 for nausea. She had 4 pills left, but now they are gone. She does not need to take Zofran very often.    Dementia  The patient states that she has early dementia. She states that she is showing signs of memory lost.    Skin  The patient's skin shows signs of aging. She denies pain. She states that it feels like it is a \"shadow\". Her skin is thin She denies numbness. She states that her skin feels \"funny.\" She does not apply lotion to her lower legs very often.      Hair loss  The patient quit growing hair on her body about 2 years ago. She has also stopped growing hair under her arms.    Abnormal nails  The patient states that her fingernails are not normal. She states that they are too pink.    Tinnitus  The patient has tinnitus, which is currently bothering her. She has new hearing aids, but she does not like them.     Anxiety  The patient thinks that her tinnitus makes her slightly hyperactive. Her daughter told her that she is nervous. She does not enjoy reading anymore. She occasionally listens to books on tape. The patient does not want to take any opioids.      Atrial fibrillation  The patient is taking Eliquis 2.5 mg twice a day. She was previously taking Eliquis 5 mg, but discontinued due to excessive bleeding. She does not need a refill.    Knee pain  The patient is \"bone on bone\" in her knees. She was told that she could take Tylenol, but she only takes it if she feels like " her knees will hurt.     Edema of the lower extremities  The patient has swelling in her lower extremities every other day. She has furosemide, but she does not take it.       HEALTH RISK ASSESSMENT    Recent Hospitalizations:  She was admitted within the past 365 days at [facility].       Current Medical Providers:  Patient Care Team:  Isidra Eisenberg MD as PCP - General (Internal Medicine)  Isidra Eisenberg MD as PCP - Internal Medicine  Azam Marroquin MD as Consulting Physician (Urology)  Lili Hebert MD as Consulting Physician (Gastroenterology)  Isidra Eisenberg MD as Consulting Physician (Internal Medicine)  Valerio Moctezuma MD as Surgeon (Orthopedic Surgery)  Kaye Aleman MD as Consulting Physician (Cardiology)  Nikos Ornelas Jr., MD as Consulting Physician (General Surgery)    Smoking Status:  Social History     Tobacco Use   Smoking Status Never   Smokeless Tobacco Never       Alcohol Consumption:  Social History     Substance and Sexual Activity   Alcohol Use Never    Alcohol/week: 1.0 standard drink of alcohol    Types: 1 Glasses of wine per week       Depression Screen:       2023     1:13 PM   PHQ-2/PHQ-9 Depression Screening   Little Interest or Pleasure in Doing Things 0-->not at all   Feeling Down, Depressed or Hopeless 0-->not at all   PHQ-9: Brief Depression Severity Measure Score 0       Fall Risk Screen:  STEADI Fall Risk Assessment was completed, and patient is at MODERATE risk for falls. Assessment completed on:2023    Health Habits and Functional and Cognitive Screenin/26/2023     1:13 PM   Functional & Cognitive Status   Do you have difficulty preparing food and eating? Yes   Do you have difficulty bathing yourself, getting dressed or grooming yourself? No   Do you have difficulty using the toilet? Yes   Do you have difficulty moving around from place to place? Yes   Do you have trouble with steps or getting out of a bed  or a chair? No   Current Diet Well Balanced Diet   Dental Exam Up to date   Eye Exam Not up to date   Exercise (times per week) 4 times per week   Current Exercises Include Walking   Do you need help using the phone?  No   Are you deaf or do you have serious difficulty hearing?  Yes   Do you need help to go to places out of walking distance? No   Do you need help shopping? Yes   Do you need help preparing meals?  Yes   Do you need help with housework?  Yes   Do you need help with laundry? Yes   Do you need help taking your medications? No   Do you need help managing money? No   Do you ever drive or ride in a car without wearing a seat belt? No   Have you felt unusual stress, anger or loneliness in the last month? Yes   Who do you live with? Community   If you need help, do you have trouble finding someone available to you? Yes   Have you been bothered in the last four weeks by sexual problems? No   Do you have difficulty concentrating, remembering or making decisions? No         Age-appropriate Screening Schedule:  Refer to the list below for future screening recommendations based on patient's age, sex and/or medical conditions. Orders for these recommended tests are listed in the plan section. The patient has been provided with a written plan.    Health Maintenance   Topic Date Due    DXA SCAN  03/09/2019    TDAP/TD VACCINES (3 - Td or Tdap) 08/28/2022    COVID-19 Vaccine (7 - 2023-24 season) 11/28/2023    LIPID PANEL  12/19/2024    ANNUAL WELLNESS VISIT  12/26/2024    INFLUENZA VACCINE  Completed    Pneumococcal Vaccine 65+  Completed    ZOSTER VACCINE  Completed        The following portions of the patient's history were reviewed and updated as appropriate: allergies, current medications, past family history, past medical history, past social history, past surgical history, and problem list.    Does the patient have evidence of cognitive impairment? Yes    Aspirin is not on active medication list.  Aspirin use is  not indicated based on review of current medical condition/s. Risk of harm outweighs potential benefits.  .    Outpatient Medications Prior to Visit   Medication Sig Dispense Refill    acetaminophen (TYLENOL) 325 MG tablet Take 2 tablets by mouth Every 4 (Four) Hours As Needed for Mild Pain.      bisoprolol (ZEBeta) 5 MG tablet Take 3 tablets by mouth Daily. 270 tablet 1    Dietary Management Product (Vasculera) tablet Take 1 each by mouth 2 (Two) Times a Day. Take one tablet by mouth twice daily      docusate sodium (COLACE) 100 MG capsule Take 1 capsule by mouth 2 (Two) Times a Day. 180 capsule 1    ferrous sulfate 325 (65 FE) MG tablet Take 1 tablet by mouth Daily.      folic acid (FOLVITE) 1 MG tablet Take 2 tablets by mouth Daily. 180 tablet 1    gabapentin (NEURONTIN) 100 MG capsule Take 2 capsules by mouth At Night As Needed (leg pain). 60 capsule 2    lisinopril (PRINIVIL,ZESTRIL) 10 MG tablet Take 1 tablet by mouth Every Night. Take one tablet by mouth daily at bedtime 90 tablet 1    Menthol, Topical Analgesic, (Biofreeze) 4 % gel Apply  topically 2 (Two) Times a Day As Needed. To bilateral knees      polyethylene glycol (MiraLax) 17 g packet Take 17 g by mouth Daily As Needed.      psyllium (METAMUCIL MULTIHEALTH FIBER) 58.12 % packet Take 1 packet by mouth Daily.      saccharomyces boulardii (FLORASTOR) 250 MG capsule Take 1 capsule by mouth As Needed. As needed after a round of antibiotics      sodium chloride 1 g tablet Take 1 tablet by mouth Every Other Day. 46 tablet 1    apixaban (ELIQUIS) 2.5 MG tablet tablet Take 1 tablet by mouth 2 (Two) Times a Day. 84 tablet 0    Bacillus Coagulans-Inulin (Probiotic) 1-250 BILLION-MG capsule As Needed. Taken after a round of antibiotics      cyanocobalamin (VITAMIN B-12) 500 MCG tablet Take 1 tablet by mouth Daily. (Patient taking differently: Take 2 tablets by mouth Daily.) 90 tablet 3    ondansetron ODT (ZOFRAN-ODT) 4 MG disintegrating tablet Place 1 tablet on  the tongue 4 (Four) Times a Day As Needed for Nausea or Vomiting. 15 tablet 0    azelastine (ASTELIN) 0.1 % nasal spray 2 sprays into the nostril(s) as directed by provider 2 (Two) Times a Day. Use in each nostril as directed (Patient not taking: Reported on 12/26/2023) 1 each 12    Cholecalciferol (VITAMIN D3) 1000 units capsule Take 1 capsule by mouth Daily. (Patient not taking: Reported on 12/26/2023)      furosemide (LASIX) 40 MG tablet Take 1 tablet by mouth Daily. Take one tablet by mouth once daily in the morning (Patient not taking: Reported on 12/26/2023)      hydrocortisone (ANUSOL-HC) 25 MG suppository Insert 1 suppository into the rectum 2 (Two) Times a Day As Needed for Hemorrhoids. (Patient not taking: Reported on 12/26/2023) 30 each 5    lactulose (CEPHULAC) 20 g packet 1/4 tsp daily (Patient not taking: Reported on 12/26/2023) 30 each 5    Oral Electrolytes (PEDIALYTE ADVANCED CARE PO) Pedialyte (Patient not taking: Reported on 12/26/2023)       No facility-administered medications prior to visit.       Patient Active Problem List   Diagnosis    Pulmonary hypertension    Benign essential hypertension    Mixed hyperlipidemia    Bleeding hemorrhoid    Atrial septal defect determined by imaging    Abnormal glucose    Generalized anxiety disorder    Valvular heart disease    Senile osteoporosis    PVC's (premature ventricular contractions)    Calculus of gallbladder    Urinary retention    Chronic pain of both knees    Osteoarthritis    Quadriceps weakness    Constipation, slow transit    Gait disorder    Primary osteoarthritis of both knees    At high risk for falls    Medicare annual wellness visit, subsequent    Hyponatremia    Chronic anticoagulation (Eliquis)    Permanent atrial fibrillation    Altered mental status    Foot pain, bilateral    Mild protein-calorie malnutrition    Bilateral edema of lower extremity    Shortness of breath    Acute nonintractable headache    Tinnitus of both ears     "Poor nutrition    Chronic combined systolic and diastolic heart failure    Multiple closed fractures of pelvis without disruption of pelvic ring with routine healing    NSTEMI (non-ST elevated myocardial infarction)    Closed fracture of one rib of left side with routine healing    Mild cognitive impairment    Sensorineural hearing loss (SNHL) of both ears       Advanced Care Planning:  Advance Directive is not on file.  ACP discussion was held with the patient during this visit. Patient does not have an advance directive, information provided.    Review of Systems  The appropriate review of systems is included in the HPI.    Compared to one year ago, the patient feels her physical health is the same.  Compared to one year ago, the patient feels her mental health is the same.    Reviewed chart for potential of high risk medication in the elderly: yes  Reviewed chart for potential of harmful drug interactions in the elderly:yes    Objective         Vitals:    12/26/23 1304   BP: 122/62   BP Location: Left arm   Patient Position: Sitting   Cuff Size: Adult   Pulse: 73   Temp: 97.8 °F (36.6 °C)   TempSrc: Infrared   SpO2: 98%   Weight: 55.6 kg (122 lb 9.6 oz)   Height: 155.2 cm (61.1\")     BMI is within normal parameters. No other follow-up for BMI required.    Body mass index is 23.09 kg/m².  Discussed the patient's BMI with her. The BMI is in the acceptable range.    Physical Exam  Vitals and nursing note reviewed.   Constitutional:       Appearance: Normal appearance. She is well-developed.   HENT:      Head: Normocephalic.   Eyes:      Conjunctiva/sclera: Conjunctivae normal.      Pupils: Pupils are equal, round, and reactive to light.   Neck:      Thyroid: No thyromegaly.   Cardiovascular:      Rate and Rhythm: Normal rate and regular rhythm.      Heart sounds: Normal heart sounds.      Comments: Rhythm is irregularly irregular. Rate is normal and 2/6 systolic murmur at right upper sternal border and " apex.  Pulmonary:      Effort: Pulmonary effort is normal.      Breath sounds: Normal breath sounds. No wheezing.   Chest:   Breasts:     Right: No inverted nipple, mass, nipple discharge, skin change or tenderness.      Left: No inverted nipple, mass, nipple discharge, skin change or tenderness.   Abdominal:      General: Bowel sounds are normal.      Palpations: Abdomen is soft.      Tenderness: There is no abdominal tenderness.   Musculoskeletal:         General: No tenderness. Normal range of motion.      Cervical back: Normal range of motion and neck supple.   Lymphadenopathy:      Cervical: No cervical adenopathy.   Skin:     General: Skin is warm and dry.      Findings: No rash.   Neurological:      Mental Status: She is alert and oriented to person, place, and time.      Cranial Nerves: No cranial nerve deficit.      Sensory: No sensory deficit.      Coordination: Coordination normal.      Gait: Gait normal.      Comments: Abnormal gait due to generalized weakness, using a walker. Short term memory loss. Normal attention.   Psychiatric:         Mood and Affect: Mood normal.         Speech: Speech normal.         Behavior: Behavior normal.         Thought Content: Thought content normal.         Judgment: Judgment normal.         ECG 12 Lead    Date/Time: 12/28/2023 1:36 PM  Performed by: Isidra Eisenberg MD    Authorized by: Isidra Eisenberg MD  Comparison: compared with previous ECG   Similar to previous ECG  Rhythm: atrial fibrillation  Rate: normal  BPM: 89  Conduction: conduction normal  ST Segments: ST segments normal  T Waves: T waves normal  QRS axis: right    Clinical impression: abnormal EKG          Lab Results   Component Value Date    TRIG 34 12/19/2023    HDL 37 (L) 12/19/2023    LDL 75 12/19/2023    VLDL 9 12/19/2023    HGBA1C 5.20 12/19/2023        Assessment & Plan   Medicare Risks and Personalized Health Plan  CMS Preventative Services Quick Reference  Cardiovascular risk  Fall  Risk  Osteoporosis Risk    The above risks/problems have been discussed with the patient.  Pertinent information has been shared with the patient in the After Visit Summary.  Follow up plans and orders are seen below in the Assessment/Plan Section.  Patient Instructions   Problem List Items Addressed This Visit          Advance Directives and General Issues    At high risk for falls (Chronic)    Current Assessment & Plan     Continue using the walker at all times. Do some arm and leg exercises while sitting every day at home. Walk around the home a lot.            Cardiac and Vasculature    Chronic combined systolic and diastolic heart failure (Chronic)    Overview     DX 7/2022 at [facility].  Taking eliquis and bisoprolol and furosemide as needed.         Current Assessment & Plan     Continue Eliquis and bisoprolol. Continue to take furosemide (Lasix) as needed for increased swelling in the legs, shortness of breath, or unexplained weight gain.         Relevant Medications    bisoprolol (ZEBeta) 5 MG tablet    NSTEMI (non-ST elevated myocardial infarction) (Chronic)    Overview     Admitted at [facility] 7/21 thru 7/29/2022. DX with NSTEMI, acute and chronic systolic and diastolic CHF, severe MR, severe TR, pulmonary hypertension. Diuresed.   Started on entresto.  Latera entresto stopped.         Relevant Medications    bisoprolol (ZEBeta) 5 MG tablet    Valvular heart disease    Overview       6/20/2021 echocardiogram: mild AR and mild TR.  Elevated right ventricular systolic pressure of 35 to 45 mmHg.  Severely dilated right atrial cavity.  Left atrial volume severely increased.  Saline test positive for intra-atrial shunt.  Borderline left ventricular hypertrophy.  Ejection fraction 65%.     8/27/21 echo at [facility] by Dr. Kaye Aleman-EF 60%.  Moderate MR and moderate TR and mild AR. Stress test showed no ischemic changes.  Afib thru out study.    7/2022 echo at [facility] with ejection fraction 65%.   Severe MR and severe TR.  Pulmonary hypertension.  Severely dilated right atrial and right ventricular cavities.    Taking Eliquis twice a day and 10mg bisoprolol every evening.          Relevant Medications    bisoprolol (ZEBeta) 5 MG tablet    Benign essential hypertension    Overview      Taking bisoprolol 15mg and lisinopril 10mg every evening.         Current Assessment & Plan     Continue bisoprolol and lisinopril.         Relevant Medications    bisoprolol (ZEBeta) 5 MG tablet    lisinopril (PRINIVIL,ZESTRIL) 10 MG tablet    furosemide (LASIX) 40 MG tablet    Other Relevant Orders    CBC & Differential (Completed)    Comprehensive Metabolic Panel (Completed)    Microalbumin / Creatinine Urine Ratio - Urine, Clean Catch    Urinalysis With Microscopic - Urine, Clean Catch    Mixed hyperlipidemia    Current Assessment & Plan     Continue to avoid fats and sugars in the diet.         Relevant Orders    Lipid Panel (Completed)    TSH (Completed)    Vitamin B12 (Completed)    Homocysteine (Completed)    Permanent atrial fibrillation    Overview     Taking Eliquis twice a day.  Taking bisoprolol every evening.         Current Assessment & Plan     Continue Eliquis twice per day. Continue bisoprolol every evening.         Relevant Medications    bisoprolol (ZEBeta) 5 MG tablet       Endocrine and Metabolic    Abnormal glucose    Current Assessment & Plan     Continue to avoid sugar in the diet. A1c has improved. Continue to be active around the house every day.         Relevant Orders    Hemoglobin A1c (Completed)    Mild protein-calorie malnutrition    Current Assessment & Plan     Weight is now fairly stable. Continue trying to get protein 3 times per day. Also eat lots of fruits and vegetables.            Gastrointestinal Abdominal     Bleeding hemorrhoid    Current Assessment & Plan     She was seen by Dr. Danish Ornelas and hemorrhoids were removed. She will follow up with him soon. I have advised her to take the  MiraLAX every day, but a smaller dose at a time. She may try 1/3 of a capful daily or one-half capful daily.          Relevant Orders    Ferritin (Completed)    Iron Profile (Completed)    Constipation, slow transit    Current Assessment & Plan     Use the MiraLAX every day, but may use a smaller dose as needed. Continue drinking lots of fluids.            Health Encounters    Medicare annual wellness visit, subsequent - Primary       Mental Health    Generalized anxiety disorder    Current Assessment & Plan     We will start escitalopram (Lexapro) tablet daily. Physical activity also helps decrease symptoms of stress, anxiety, and mood.         Relevant Medications    escitalopram (LEXAPRO) 5 MG tablet       Musculoskeletal and Injuries    Senile osteoporosis    Overview     Multiple pelvic fractures and rib fractures.  Patient declines DEXA and declines treatment for osteoporosis.             Current Assessment & Plan     She will continue doing some weightbearing exercises every day. Take 1000 units of vitamin D3 daily. Continue getting some calcium in the diet.         Relevant Orders    Vitamin D,25-Hydroxy (Completed)       Neuro    Mild cognitive impairment (Chronic)    Current Assessment & Plan     Continue to read and do word puzzles every day. Physical activity also helps improve symptoms of impaired cognition. Take B12 every day.            Symptoms and Signs    Bilateral edema of lower extremity (Chronic)    Overview     Taking furosemide as needed.         Current Assessment & Plan     She will take the furosemide (Lasix) as needed. Wear support socks that come to the knee every day. Elevate feet when sitting at home.         Gait disorder    Overview     Using walker.          Other Visit Diagnoses       Abnormal finding of blood chemistry, unspecified        Relevant Orders    Iron Profile (Completed)             Follow Up:  Next Medicare Wellness visit to be scheduled in 1 year.    Return in about 6  months (around 6/26/2024) for recheck fasting.    An After Visit Summary and PPPS were given to the patient.          Follow Up   Return in about 6 months (around 6/26/2024) for recheck fasting.  Patient was given instructions and counseling regarding her condition or for health maintenance advice. Please see specific information pulled into the AVS if appropriate.       Transcribed from ambient dictation for Isidra Eisenberg MD by Alicja Smith.  12/26/23   17:33 EST    Patient or patient representative verbalized consent to the visit recording.  I have personally performed the services described in this document as transcribed by the above individual, and it is both accurate and complete.

## 2023-12-26 NOTE — ASSESSMENT & PLAN NOTE
We will start escitalopram (Lexapro) tablet daily. Physical activity also helps decrease symptoms of stress, anxiety, and mood.

## 2023-12-26 NOTE — ASSESSMENT & PLAN NOTE
Continue Eliquis and bisoprolol. Continue to take furosemide (Lasix) as needed for increased swelling in the legs, shortness of breath, or unexplained weight gain.

## 2023-12-26 NOTE — ASSESSMENT & PLAN NOTE
Continue to avoid sugar in the diet. A1c has improved. Continue to be active around the house every day.

## 2023-12-26 NOTE — ASSESSMENT & PLAN NOTE
Continue to read and do word puzzles every day. Physical activity also helps improve symptoms of impaired cognition. Take B12 every day.

## 2023-12-26 NOTE — ASSESSMENT & PLAN NOTE
She will continue doing some weightbearing exercises every day. Take 1000 units of vitamin D3 daily. Continue getting some calcium in the diet.

## 2023-12-26 NOTE — ASSESSMENT & PLAN NOTE
She will take the furosemide (Lasix) as needed. Wear support socks that come to the knee every day. Elevate feet when sitting at home.

## 2023-12-27 NOTE — PATIENT INSTRUCTIONS
Patient Instructions  Problem List Items Addressed This Visit          Advance Directives and General Issues    At high risk for falls (Chronic)    Current Assessment & Plan     Continue using the walker at all times. Do some arm and leg exercises while sitting every day at home. Walk around the home a lot.            Cardiac and Vasculature    Chronic combined systolic and diastolic heart failure (Chronic)    Overview     DX 7/2022 at [facility].  Taking eliquis and bisoprolol and furosemide as needed.         Current Assessment & Plan     Continue Eliquis and bisoprolol. Continue to take furosemide (Lasix) as needed for increased swelling in the legs, shortness of breath, or unexplained weight gain.         Relevant Medications    bisoprolol (ZEBeta) 5 MG tablet    NSTEMI (non-ST elevated myocardial infarction) (Chronic)    Overview     Admitted at [facility] 7/21 thru 7/29/2022. DX with NSTEMI, acute and chronic systolic and diastolic CHF, severe MR, severe TR, pulmonary hypertension. Diuresed.   Started on entresto.  Latera entresto stopped.         Relevant Medications    bisoprolol (ZEBeta) 5 MG tablet    Valvular heart disease    Overview       6/20/2021 echocardiogram: mild AR and mild TR.  Elevated right ventricular systolic pressure of 35 to 45 mmHg.  Severely dilated right atrial cavity.  Left atrial volume severely increased.  Saline test positive for intra-atrial shunt.  Borderline left ventricular hypertrophy.  Ejection fraction 65%.     [place] [facility] [place]-EF 60%.  Moderate MR and moderate TR and mild AR. Stress test showed no ischemic changes.  Afib thru out study.    7/2022 echo at [facility] with ejection fraction 65%.  Severe MR and severe TR.  Pulmonary hypertension.  Severely dilated right atrial and right ventricular cavities.    Taking Eliquis twice a day and 10mg bisoprolol every evening.          Relevant Medications    bisoprolol (ZEBeta) 5 MG tablet    Benign  essential hypertension    Overview      Taking bisoprolol 15mg and lisinopril 10mg every evening.         Current Assessment & Plan     Continue bisoprolol and lisinopril.         Relevant Medications    bisoprolol (ZEBeta) 5 MG tablet    lisinopril (PRINIVIL,ZESTRIL) 10 MG tablet    furosemide (LASIX) 40 MG tablet    Other Relevant Orders    CBC & Differential (Completed)    Comprehensive Metabolic Panel (Completed)    Microalbumin / Creatinine Urine Ratio - Urine, Clean Catch    Urinalysis With Microscopic - Urine, Clean Catch    Mixed hyperlipidemia    Current Assessment & Plan     Continue to avoid fats and sugars in the diet.         Relevant Orders    Lipid Panel (Completed)    TSH (Completed)    Vitamin B12 (Completed)    Homocysteine (Completed)    Permanent atrial fibrillation    Overview     Taking Eliquis twice a day.  Taking bisoprolol every evening.         Current Assessment & Plan     Continue Eliquis twice per day. Continue bisoprolol every evening.         Relevant Medications    bisoprolol (ZEBeta) 5 MG tablet       Endocrine and Metabolic    Abnormal glucose    Current Assessment & Plan     Continue to avoid sugar in the diet. A1c has improved. Continue to be active around the house every day.         Relevant Orders    Hemoglobin A1c (Completed)    Mild protein-calorie malnutrition    Current Assessment & Plan     Weight is now fairly stable. Continue trying to get protein 3 times per day. Also eat lots of fruits and vegetables.            Gastrointestinal Abdominal     Bleeding hemorrhoid    Current Assessment & Plan     She was seen by Dr. Danish Ornelas and hemorrhoids were removed. She will follow up with him soon. I have advised her to take the MiraLAX every day, but a smaller dose at a time. She may try 1/3 of a capful daily or one-half capful daily.          Relevant Orders    Ferritin (Completed)    Iron Profile (Completed)    Constipation, slow transit    Current Assessment & Plan     Use  the MiraLAX every day, but may use a smaller dose as needed. Continue drinking lots of fluids.            Health Encounters    Medicare annual wellness visit, subsequent - Primary       Mental Health    Generalized anxiety disorder    Current Assessment & Plan     We will start escitalopram (Lexapro) tablet daily. Physical activity also helps decrease symptoms of stress, anxiety, and mood.         Relevant Medications    escitalopram (LEXAPRO) 5 MG tablet       Musculoskeletal and Injuries    Senile osteoporosis    Overview     Multiple pelvic fractures and rib fractures.  Patient declines DEXA and declines treatment for osteoporosis.             Current Assessment & Plan     She will continue doing some weightbearing exercises every day. Take 1000 units of vitamin D3 daily. Continue getting some calcium in the diet.         Relevant Orders    Vitamin D,25-Hydroxy (Completed)       Neuro    Mild cognitive impairment (Chronic)    Current Assessment & Plan     Continue to read and do word puzzles every day. Physical activity also helps improve symptoms of impaired cognition. Take B12 every day.            Symptoms and Signs    Bilateral edema of lower extremity (Chronic)    Overview     Taking furosemide as needed.         Current Assessment & Plan     She will take the furosemide (Lasix) as needed. Wear support socks that come to the knee every day. Elevate feet when sitting at home.         Gait disorder    Overview     Using walker.          Other Visit Diagnoses       Abnormal finding of blood chemistry, unspecified        Relevant Orders    Iron Profile (Completed)           Fall Prevention in the Home, Adult  Falls can cause injuries and affect people of all ages. There are many simple things that you can do to make your home safe and to help prevent falls. Ask for help when making these changes, if needed.  What actions can I take to prevent falls?  General instructions  Use good lighting in all rooms. Replace  any light bulbs that burn out, turn on lights if it is dark, and use night-lights.  Place frequently used items in easy-to-reach places. Lower the shelves around your home if necessary.  Set up furniture so that there are clear paths around it. Avoid moving your furniture around.  Remove throw rugs and other tripping hazards from the floor.  Avoid walking on wet floors.  Fix any uneven floor surfaces.  Add color or contrast paint or tape to grab bars and handrails in your home. Place contrasting color strips on the first and last steps of staircases.  When you use a stepladder, make sure that it is completely opened and that the sides and supports are firmly locked. Have someone hold the ladder while you are using it. Do not climb a closed stepladder.  Know where your pets are when moving through your home.  What can I do in the bathroom?         Keep the floor dry. Immediately clean up any water that is on the floor.  Remove soap buildup in the tub or shower regularly.  Use nonskid mats or decals on the floor of the tub or shower.  Attach bath mats securely with double-sided, nonslip rug tape.  If you need to sit down while you are in the shower, use a plastic, nonslip stool.  Install grab bars by the toilet and in the tub and shower. Do not use towel bars as grab bars.  What can I do in the bedroom?  Make sure that a bedside light is easy to reach.  Do not use oversized bedding that reaches the floor.  Have a firm chair that has side arms to use for getting dressed.  What can I do in the kitchen?  Clean up any spills right away.  If you need to reach for something above you, use a sturdy step stool that has a grab bar.  Keep electrical cables out of the way.  Do not use floor polish or wax that makes floors slippery. If you must use wax, make sure that it is non-skid floor wax.  What can I do with my stairs?  Do not leave any items on the stairs.  Make sure that you have a light switch at the top and the bottom of  the stairs. Have them installed if you do not have them.  Make sure that there are handrails on both sides of the stairs. Fix handrails that are broken or loose. Make sure that handrails are as long as the staircases.  Install non-slip stair treads on all stairs in your home.  Avoid having throw rugs at the top or bottom of stairs, or secure the rugs with carpet tape to prevent them from moving.  Choose a carpet design that does not hide the edge of steps on the stairs.  Check any carpeting to make sure that it is firmly attached to the stairs. Fix any carpet that is loose or worn.  What can I do on the outside of my home?  Use bright outdoor lighting.  Regularly repair the edges of walkways and driveways and fix any cracks.  Remove high doorway thresholds.  Trim any shrubbery on the main path into your home.  Regularly check that handrails are securely fastened and in good repair. Both sides of all steps should have handrails.  Install guardrails along the edges of any raised decks or porches.  Clear walkways of debris and clutter, including tools and rocks.  Have leaves, snow, and ice cleared regularly.  Use sand or salt on walkways during winter months.  In the garage, clean up any spills right away, including grease or oil spills.  What other actions can I take?  Wear closed-toe shoes that fit well and support your feet. Wear shoes that have rubber soles or low heels.  Use mobility aids as needed, such as canes, walkers, scooters, and crutches.  Review your medicines with your health care provider. Some medicines can cause dizziness or changes in blood pressure, which increase your risk of falling.  Talk with your health care provider about other ways that you can decrease your risk of falls. This may include working with a physical therapist or  to improve your strength, balance, and endurance.  Where to find more information  Centers for Disease Control and PreventionMAGDA: www.cdc.gov  National  [place] on Aging: www.[place].nih.gov  Contact a health care provider if:  You are afraid of falling at home.  You feel weak, drowsy, or dizzy at home.  You fall at home.  Summary  There are many simple things that you can do to make your home safe and to help prevent falls.  Ways to make your home safe include removing tripping hazards and installing grab bars in the bathroom.  Ask for help when making these changes in your home.  This information is not intended to replace advice given to you by your health care provider. Make sure you discuss any questions you have with your health care provider.  Document Revised: 09/19/2022 Document Reviewed: 07/21/2021  Elsevier Patient Education © 2023 [employer]

## 2024-01-04 ENCOUNTER — PATIENT MESSAGE (OUTPATIENT)
Dept: INTERNAL MEDICINE | Facility: CLINIC | Age: 89
End: 2024-01-04
Payer: MEDICARE

## 2024-01-05 NOTE — TELEPHONE ENCOUNTER
From: Thuy Clark  To: Isidra Eisenberg  Sent: 1/4/2024 9:22 PM EST  Subject: Question regarding ECG 12-LEAD    Sorry, but this Web Site is over conducting itself. I actually read these tests results days ago. After giving it some thought, I may abdicate the [facility] position. Sorry. It is 9:22 on Thursday......

## 2024-01-22 ENCOUNTER — TELEPHONE (OUTPATIENT)
Dept: INTERNAL MEDICINE | Facility: CLINIC | Age: 89
End: 2024-01-22
Payer: MEDICARE

## 2024-01-22 NOTE — TELEPHONE ENCOUNTER
Called pt. Advised we do not have any eliquis 2.5mg. I recommended she callback in a few days to see if we have received anymore by then

## 2024-01-22 NOTE — TELEPHONE ENCOUNTER
Caller: CAMELIA PEREZ     Relationship:SELF     Callback number: [phone]    Is it ok to leave a message: [x] Yes [] No    Requested medication for samples: ELIQUIS 2.5MG     How much medication does the patient currently have left: PATIENT HAS 4 DAYS LEFT     Who will be picking up the samples: PATIENTS DAUGHTER      Do you need information about patient financial assistance for this medication: [] Yes [x] No    Additional details provided: PLEASE CALL PATIENT WHEN READY FOR .

## 2024-03-15 ENCOUNTER — TELEPHONE (OUTPATIENT)
Dept: INTERNAL MEDICINE | Facility: CLINIC | Age: 89
End: 2024-03-15
Payer: MEDICARE

## 2024-03-15 NOTE — TELEPHONE ENCOUNTER
Caller: Camelia Clark    Relationship: Self    Best call back number [phone]    What is the best time to reach you: ANYTIME    Who are you requesting to speak with (clinical staff, provider,  specific staff member): CLINICAL STAFF    Do you know the name of the person who called: PATIENT/ CAMELIA    What was the call regarding: ON MY CHART THERE IS A MESSAGE THAT PATIENT NEEDS TO SCHEDULE AN APPOINTMENT BY [id number].  SHE IS NOT SURE WHAT THIS IS FOR    Is it okay if the provider responds through MyChart:

## 2024-03-18 NOTE — TELEPHONE ENCOUNTER
Left pt a message to call the office back. Dr. Eisenberg is not aware of needing an appt with her by 3/18/24. Dr. Eisenberg would like for her to schedule a fasting follow up appt in June.

## 2024-03-20 DIAGNOSIS — E87.1 HYPONATREMIA: ICD-10-CM

## 2024-03-20 DIAGNOSIS — I10 BENIGN ESSENTIAL HYPERTENSION: Primary | ICD-10-CM

## 2024-03-20 NOTE — TELEPHONE ENCOUNTER
Called pt and scheduled an appointment for 5/3/24 at 1:15pm. She wanted to schedule in 2 months instead June. She also wants to get lab drawn to check her sodium levels and she wants to do this sometime soon.

## 2024-04-01 RX ORDER — FOLIC ACID 1 MG/1
2 TABLET ORAL DAILY
Qty: 180 TABLET | Refills: 1 | Status: SHIPPED | OUTPATIENT
Start: 2024-04-01

## 2024-04-12 DIAGNOSIS — M79.672 FOOT PAIN, BILATERAL: ICD-10-CM

## 2024-04-12 DIAGNOSIS — M79.671 FOOT PAIN, BILATERAL: ICD-10-CM

## 2024-04-12 RX ORDER — GABAPENTIN 100 MG/1
300 CAPSULE ORAL NIGHTLY PRN
Qty: 90 CAPSULE | Refills: 2 | Status: SHIPPED | OUTPATIENT
Start: 2024-04-12

## 2024-04-15 ENCOUNTER — LAB (OUTPATIENT)
Dept: LAB | Facility: HOSPITAL | Age: 89
End: 2024-04-15
Payer: MEDICARE

## 2024-04-15 DIAGNOSIS — E87.1 HYPONATREMIA: ICD-10-CM

## 2024-04-15 DIAGNOSIS — I10 BENIGN ESSENTIAL HYPERTENSION: ICD-10-CM

## 2024-04-15 PROCEDURE — 85025 COMPLETE CBC W/AUTO DIFF WBC: CPT

## 2024-04-15 PROCEDURE — 80048 BASIC METABOLIC PNL TOTAL CA: CPT

## 2024-04-16 LAB
ANION GAP SERPL CALCULATED.3IONS-SCNC: 10 MMOL/L (ref 5–15)
BASOPHILS # BLD AUTO: 0.06 10*3/MM3 (ref 0–0.2)
BASOPHILS NFR BLD AUTO: 1.2 % (ref 0–1.5)
BUN SERPL-MCNC: 10 MG/DL (ref 8–23)
BUN/CREAT SERPL: 11.4 (ref 7–25)
CALCIUM SPEC-SCNC: 9.4 MG/DL (ref 8.6–10.5)
CHLORIDE SERPL-SCNC: 100 MMOL/L (ref 98–107)
CO2 SERPL-SCNC: 25 MMOL/L (ref 22–29)
CREAT SERPL-MCNC: 0.88 MG/DL (ref 0.57–1)
DEPRECATED RDW RBC AUTO: 46.8 FL (ref 37–54)
EGFRCR SERPLBLD CKD-EPI 2021: 63.3 ML/MIN/1.73
EOSINOPHIL # BLD AUTO: 0.15 10*3/MM3 (ref 0–0.4)
EOSINOPHIL NFR BLD AUTO: 2.9 % (ref 0.3–6.2)
ERYTHROCYTE [DISTWIDTH] IN BLOOD BY AUTOMATED COUNT: 14.4 % (ref 12.3–15.4)
GLUCOSE SERPL-MCNC: 82 MG/DL (ref 65–99)
HCT VFR BLD AUTO: 37.6 % (ref 34–46.6)
HGB BLD-MCNC: 12.4 G/DL (ref 12–15.9)
IMM GRANULOCYTES # BLD AUTO: 0.02 10*3/MM3 (ref 0–0.05)
IMM GRANULOCYTES NFR BLD AUTO: 0.4 % (ref 0–0.5)
LYMPHOCYTES # BLD AUTO: 1.34 10*3/MM3 (ref 0.7–3.1)
LYMPHOCYTES NFR BLD AUTO: 26.2 % (ref 19.6–45.3)
MCH RBC QN AUTO: 30 PG (ref 26.6–33)
MCHC RBC AUTO-ENTMCNC: 33 G/DL (ref 31.5–35.7)
MCV RBC AUTO: 90.8 FL (ref 79–97)
MONOCYTES # BLD AUTO: 0.5 10*3/MM3 (ref 0.1–0.9)
MONOCYTES NFR BLD AUTO: 9.8 % (ref 5–12)
NEUTROPHILS NFR BLD AUTO: 3.05 10*3/MM3 (ref 1.7–7)
NEUTROPHILS NFR BLD AUTO: 59.5 % (ref 42.7–76)
NRBC BLD AUTO-RTO: 0 /100 WBC (ref 0–0.2)
PLATELET # BLD AUTO: 216 10*3/MM3 (ref 140–450)
PMV BLD AUTO: 10.5 FL (ref 6–12)
POTASSIUM SERPL-SCNC: 4.6 MMOL/L (ref 3.5–5.2)
RBC # BLD AUTO: 4.14 10*6/MM3 (ref 3.77–5.28)
SODIUM SERPL-SCNC: 135 MMOL/L (ref 136–145)
WBC NRBC COR # BLD AUTO: 5.12 10*3/MM3 (ref 3.4–10.8)

## 2024-04-16 RX ORDER — SODIUM CHLORIDE 1 G/1
1 TABLET ORAL DAILY
Qty: 90 TABLET | Refills: 1 | Status: SHIPPED | OUTPATIENT
Start: 2024-04-16

## 2024-05-02 ENCOUNTER — PATIENT MESSAGE (OUTPATIENT)
Dept: INTERNAL MEDICINE | Facility: CLINIC | Age: 89
End: 2024-05-02
Payer: MEDICARE

## 2024-05-02 NOTE — TELEPHONE ENCOUNTER
From: Thuy Clark  To: Isidra Eisenberg  Sent: 5/2/2024 9:42 AM EDT  Subject: Physical routine ex am. Areas of concern.     Please have my ears cleaned . My brain occasionally get stabbing pain in the back . Legs get fuzzy feeling. lastly, could you give me samples of 2.5 Eliquis.  . .

## 2024-05-03 ENCOUNTER — OFFICE VISIT (OUTPATIENT)
Dept: INTERNAL MEDICINE | Facility: CLINIC | Age: 89
End: 2024-05-03
Payer: MEDICARE

## 2024-05-03 VITALS
BODY MASS INDEX: 23.71 KG/M2 | SYSTOLIC BLOOD PRESSURE: 132 MMHG | WEIGHT: 125.6 LBS | OXYGEN SATURATION: 96 % | HEART RATE: 90 BPM | TEMPERATURE: 98 F | DIASTOLIC BLOOD PRESSURE: 70 MMHG | HEIGHT: 61 IN

## 2024-05-03 DIAGNOSIS — R26.89 POOR BALANCE: ICD-10-CM

## 2024-05-03 DIAGNOSIS — E78.2 MIXED HYPERLIPIDEMIA: ICD-10-CM

## 2024-05-03 DIAGNOSIS — H90.3 SENSORINEURAL HEARING LOSS (SNHL) OF BOTH EARS: Chronic | ICD-10-CM

## 2024-05-03 DIAGNOSIS — I10 BENIGN ESSENTIAL HYPERTENSION: Primary | ICD-10-CM

## 2024-05-03 DIAGNOSIS — I48.21 PERMANENT ATRIAL FIBRILLATION: ICD-10-CM

## 2024-05-03 DIAGNOSIS — G62.9 PERIPHERAL POLYNEUROPATHY: Chronic | ICD-10-CM

## 2024-05-03 DIAGNOSIS — R51.9 ACUTE NONINTRACTABLE HEADACHE, UNSPECIFIED HEADACHE TYPE: Chronic | ICD-10-CM

## 2024-05-03 DIAGNOSIS — R26.9 GAIT DISORDER: ICD-10-CM

## 2024-05-03 DIAGNOSIS — E87.1 HYPONATREMIA: ICD-10-CM

## 2024-05-03 PROBLEM — G44.89 OTHER HEADACHE SYNDROME: Chronic | Status: RESOLVED | Noted: 2024-05-03 | Resolved: 2024-05-03

## 2024-05-03 PROBLEM — G44.89 OTHER HEADACHE SYNDROME: Chronic | Status: ACTIVE | Noted: 2024-05-03

## 2024-05-03 PROCEDURE — 1159F MED LIST DOCD IN RCRD: CPT | Performed by: INTERNAL MEDICINE

## 2024-05-03 PROCEDURE — 1160F RVW MEDS BY RX/DR IN RCRD: CPT | Performed by: INTERNAL MEDICINE

## 2024-05-03 PROCEDURE — 99214 OFFICE O/P EST MOD 30 MIN: CPT | Performed by: INTERNAL MEDICINE

## 2024-05-03 RX ORDER — SODIUM CHLORIDE 1 G/1
1 TABLET ORAL DAILY
Qty: 90 TABLET | Refills: 1 | Status: SHIPPED | OUTPATIENT
Start: 2024-05-03

## 2024-05-03 RX ORDER — DIOSMIN COMPLEX NO.1 630 MG
630 TABLET ORAL 2 TIMES DAILY
Qty: 180 TABLET | Refills: 3 | Status: SHIPPED | OUTPATIENT
Start: 2024-05-03

## 2024-05-03 RX ORDER — GABAPENTIN 100 MG/1
200 CAPSULE ORAL NIGHTLY PRN
Qty: 60 CAPSULE | Refills: 2 | Status: SHIPPED | OUTPATIENT
Start: 2024-05-03 | End: 2024-05-03

## 2024-05-03 RX ORDER — GABAPENTIN 100 MG/1
200 CAPSULE ORAL NIGHTLY PRN
Start: 2024-05-03

## 2024-05-03 RX ORDER — CALCIUM CARBONATE 160(400)MG
1 TABLET,CHEWABLE ORAL DAILY
Qty: 1 EACH | Refills: 0 | Status: SHIPPED | OUTPATIENT
Start: 2024-05-03

## 2024-05-03 RX ORDER — ONDANSETRON 4 MG/1
4 TABLET, ORALLY DISINTEGRATING ORAL 4 TIMES DAILY PRN
Qty: 15 TABLET | Refills: 0 | Status: SHIPPED | OUTPATIENT
Start: 2024-05-03

## 2024-05-03 NOTE — PROGRESS NOTES
[facility]     Thuy Clark  1935   [id number]      Patient Care Team:  Isidra Eisenberg MD as PCP - General (Internal Medicine)  Isidra Eisenberg MD as PCP - Internal Medicine  Azam Marroquin MD as Consulting Physician (Urology)  Lili Hebert MD as Consulting Physician (Gastroenterology)  Isidra Eisenberg MD as Consulting Physician (Internal Medicine)  Valerio Moctezuma MD as Surgeon (Orthopedic Surgery)  Kaye Aleman MD as Consulting Physician (Cardiology)  Nikos Ornelas Jr., MD as Consulting Physician (General Surgery)    Chief Complaint   Patient presents with    Headache     Stabbing pain in back of head, about once a month    Ear Fullness     Bilateral, pt reports worsened hearing lately    Ear popping     When swallowing        Patient is a 88 y.o. female.   History of Present Illness  The patient is an 88-year-old female who is here for an office visit.    The patient reports a persistent stopped up sensation both ears akin to speaking in a drum, which has remained consistent over the past 8 months. She denies experiencing congestion, postnasal drip, or symptoms of a cold. She also denies ear pain, but reports a popping sensation on and off. A hearing test conducted last year indicated the need for hearing aids.  She is not wearing the hearing aids since her  is not working correctly.     The patient occasionally experiences a stabbing sensation in the back of her head, which she describes as akin to a piece of lightning struck. This pain, which occurs approximately once every 2 to 3 months, is momentary and leaves no residual headache.  No associated symptoms.  She has not experienced any one-sided weakness or numbness tingling or change in vision or slurred speech.  She denies experiencing neck pain or any specific movements triggering the pain. The most recent episode occurred 2 weeks ago, and she has not experienced another since.  "Approximately 3 years ago, she sought medical attention at [facility] for the  pain. x-rays and CT scans were benign.    She feels her balance is gradually worsening over the last few years.  She is aware of her fall risk and requests a prescription for a rollator. She denies any falls. [place] and has [employer] monitor which she can use to call for help.    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 She occasionally feels a clicking sensation.     Her blood pressure checks weekly at [facility] have been \"good\". She is currently taking bisoprolol and lisinopril. Her home blood pressure readings are typically around 122/70.    The patient maintains a diet low in fat, including chicken and fish. She is unable to chew beef due to new dentures, which fit well but gather food on the outside between the teeth.    The patient alternates between Pedialyte and her sodium tablet so she is only taking it every other day or maybe less.      Supplemental Information  Her bowels are sometimes loose since she had hemorrhoid surgery in 11/2023. She thinks it is due to increased water intake. She is not constipated and she does not have any more blood from her hemorrhoids.      She sleeps so well. She thinks the swelling in her legs has gone away. She puts a big pillow under her feet when she goes to bed at night.         CHRONIC CONDITIONS      Past Medical History:   Diagnosis Date    Acute cystitis with hematuria 6/25/2021 6/25/2021 Isidra Eisenberg MD  Urine sent for culture.    Atrial fibrillation 2019    Atrial flutter with rapid ventricular response 11/2/2019    Patient presented to the ER 11/2/19 with lightheadedness and was found to be in a flutter with rapid ventricular response.  She received IV beta-blocker and converted.  Bisoprolol was increased to 10 mg and she was started on Eliquis 5 mg twice a " "day.   Echo showed EF 65% with borderline concentric hypertrophy grade 2 diastolic dysfunction with pseudonormalization.  Saline test are positive for ev    Bleeding hemorrhoid 3/8/2019    Diverticulosis     External hemorrhoid 6/11/2019 12/6/2019 Isidra Eisenberg MD   Continue vasculera daily. Drink plenty of fluids.  Avoid constipation.  Follow-up with Dr. Hebert as planned.    Hx of colonic polyps 2003    Hypertension     Intraductal papilloma     R intraductal patheloma    Left upper extremity numbness     L upper extremity numbness-ER visit; neuropathy    Overweight (BMI 25.0-29.9) 6/11/2019    Self-catheterizes urinary bladder        Past Surgical History:   Procedure Laterality Date    BREAST LUMPECTOMY  2006    CHOLECYSTECTOMY      DENTAL PROCEDURE  12/13/2021    tooth exteraction     HYSTERECTOMY  1984    VAGINECTOMY  2009       Family History   Problem Relation Age of Onset    Hyperlipidemia Other     Hypertension Other     Coronary artery disease Other     No Known Problems Mother     No Known Problems Father        Social History     Socioeconomic History    Marital status:    Tobacco Use    Smoking status: Never    Smokeless tobacco: Never   Substance and Sexual Activity    Alcohol use: Never     Alcohol/week: 1.0 standard drink of alcohol     Types: 1 Glasses of wine per week    Drug use: Never    Sexual activity: Defer       Allergies   Allergen Reactions    Levofloxacin Other (See Comments)     itching  Other reaction(s): Other (See Comments)  itching    Sulfamethoxazole-Trimethoprim Unknown - Low Severity    Nitrofuran Derivatives Rash     primarily in b/l hands    Nitrofurantoin Rash       Review of Systems:     Review of Systems    Vital Signs  Vitals:    05/03/24 1313   BP: 132/70   BP Location: Left arm   Patient Position: Sitting   Cuff Size: Adult   Pulse: 90   Temp: 98 °F (36.7 °C)   TempSrc: Infrared   SpO2: 96%   Weight: 57 kg (125 lb 9.6 oz)   Height: 155.2 cm (61.1\")     Body " mass index is 23.65 kg/m².  BMI is within normal parameters. No other follow-up for BMI required.          Current Outpatient Medications:     acetaminophen (TYLENOL) 325 MG tablet, Take 2 tablets by mouth Every 4 (Four) Hours As Needed for Mild Pain., Disp: , Rfl:     apixaban (ELIQUIS) 2.5 MG tablet tablet, Take 1 tablet by mouth 2 (Two) Times a Day., Disp: 84 tablet, Rfl: 0    bisoprolol (ZEBeta) 5 MG tablet, Take 3 tablets by mouth Daily., Disp: 270 tablet, Rfl: 1    Cholecalciferol (Vitamin D3) 25 MCG (1000 UT) capsule, Take 1 capsule by mouth Daily., Disp: , Rfl:     Dietary Management Product (Vasculera) tablet, Take 1 each by mouth 2 (Two) Times a Day. Take one tablet by mouth twice daily, Disp: 180 tablet, Rfl: 3    docusate sodium (COLACE) 100 MG capsule, Take 1 capsule by mouth 2 (Two) Times a Day., Disp: 180 capsule, Rfl: 1    folic acid (FOLVITE) 1 MG tablet, Take 2 tablets by mouth Daily., Disp: 180 tablet, Rfl: 1    furosemide (LASIX) 40 MG tablet, Take 1 tablet by mouth Daily As Needed (swelling ankles). Take one tablet by mouth once daily in the morning, Disp: , Rfl:     gabapentin (NEURONTIN) 100 MG capsule, Take 2 capsules by mouth At Night As Needed (leg pain)., Disp: , Rfl:     lisinopril (PRINIVIL,ZESTRIL) 10 MG tablet, Take 1 tablet by mouth Every Night. Take one tablet by mouth daily at bedtime, Disp: 90 tablet, Rfl: 1    Menthol, Topical Analgesic, (Biofreeze) 4 % gel, Apply  topically 2 (Two) Times a Day As Needed. To bilateral knees, Disp: , Rfl:     ondansetron ODT (ZOFRAN-ODT) 4 MG disintegrating tablet, Place 1 tablet on the tongue 4 (Four) Times a Day As Needed for Nausea or Vomiting., Disp: 15 tablet, Rfl: 0    polyethylene glycol (MiraLax) 17 g packet, Take 17 g by mouth Every Other Day. prn, Disp: , Rfl:     psyllium (METAMUCIL MULTIHEALTH FIBER) 58.12 % packet, Take 1 packet by mouth Daily. (Patient taking differently: Take 1 packet by mouth Every Other Day. prn), Disp: , Rfl:      saccharomyces boulardii (FLORASTOR) 250 MG capsule, Take 1 capsule by mouth As Needed. As needed after a round of antibiotics, Disp: , Rfl:     sodium chloride 1 g tablet, Take 1 tablet by mouth Daily., Disp: 90 tablet, Rfl: 1    vitamin B-12 (CYANOCOBALAMIN) 1000 MCG tablet, Take 1 tablet by mouth Daily., Disp: , Rfl:     Misc. Devices (Rollator Ultra-Light) misc, Use 1 each Daily., Disp: 1 each, Rfl: 0    Physical Exam:    Physical Exam  Vitals and nursing note reviewed.   Constitutional:       Appearance: She is well-developed.   HENT:      Head: Normocephalic.      Right Ear: Tympanic membrane, ear canal and external ear normal. Tympanic membrane is scarred.      Left Ear: Ear canal and external ear normal. Tympanic membrane is scarred.   Eyes:      Conjunctiva/sclera: Conjunctivae normal.      Pupils: Pupils are equal, round, and reactive to light.   Neck:      Thyroid: No thyromegaly.   Cardiovascular:      Rate and Rhythm: Normal rate and regular rhythm.      Heart sounds: Normal heart sounds.   Pulmonary:      Effort: Pulmonary effort is normal.      Breath sounds: Normal breath sounds. No wheezing.   Musculoskeletal:         General: Normal range of motion.      Cervical back: Normal range of motion and neck supple.   Lymphadenopathy:      Cervical: No cervical adenopathy.   Skin:     General: Skin is warm and dry.   Neurological:      Mental Status: She is alert and oriented to person, place, and time.      Cranial Nerves: Cranial nerves 2-12 are intact.      Gait: Gait abnormal.   Psychiatric:         Attention and Perception: Attention normal.         Mood and Affect: Mood normal.         Thought Content: Thought content normal.          ACE III MINI        Results Review:    I reviewed the patient's new clinical results.  Results  Laboratory Studies  Blood sugar was normal at 82. Sodium is slightly low at 135. White blood cell count, red blood cell count, and platelets are normal.    Imaging  CT of the  "head in 2022 looked okay.       CMP:  Lab Results   Component Value Date    BUN 10 04/15/2024    CREATININE 0.88 04/15/2024    EGFRIFNONA 67 02/15/2022    BCR 11.4 04/15/2024     (L) 04/15/2024    K 4.6 04/15/2024    CO2 25.0 04/15/2024    CALCIUM 9.4 04/15/2024    ALBUMIN 4.2 12/19/2023    BILITOT 0.6 12/19/2023    ALKPHOS 108 12/19/2023    AST 18 12/19/2023    ALT 11 12/19/2023     HbA1c:  Lab Results   Component Value Date    HGBA1C 5.20 12/19/2023    HGBA1C 5.90 (H) 04/18/2023     Microalbumin:  No results found for: \"MICROALBUR\", \"POCMALB\", \"POCCREAT\"  Lipid Panel  Lab Results   Component Value Date    CHOL 121 12/19/2023    TRIG 34 12/19/2023    HDL 37 (L) 12/19/2023    LDL 75 12/19/2023    AST 18 12/19/2023    ALT 11 12/19/2023       Medication Review: Medications reviewed and noted  Patient Instructions   Problem List Items Addressed This Visit          Cardiac and Vasculature    Benign essential hypertension - Primary    Overview      Taking bisoprolol 15mg and lisinopril 10mg every evening.         Relevant Medications    bisoprolol (ZEBeta) 5 MG tablet    lisinopril (PRINIVIL,ZESTRIL) 10 MG tablet    furosemide (LASIX) 40 MG tablet    Mixed hyperlipidemia    Permanent atrial fibrillation    Overview     Taking Eliquis twice a day.  Taking bisoprolol every evening.         Relevant Medications    bisoprolol (ZEBeta) 5 MG tablet       ENT    Sensorineural hearing loss (SNHL) of both ears (Chronic)       Genitourinary and Reproductive     Hyponatremia    Overview     Oral fluid restriction at 1500 mL daily.  Taking one sodium chloride once per day.  Drink a Gatorade or other electrolyte drink once a day.              Neuro    Acute nonintractable headache (Chronic)    Overview     Patient was seen in the ER on 11/17/2021 and CT of the head was unremarkable.  Labs were acceptable.    CT of the head done at [facility] in 2022 was also unremarkable.         Peripheral polyneuropathy (Chronic)    Overview "     Taking gabapentin 200 mg nightly.         Relevant Medications    gabapentin (NEURONTIN) 100 MG capsule       Symptoms and Signs    Gait disorder    Overview     Using walker.         Poor balance          Diagnosis Plan   1. Benign essential hypertension        2. Mixed hyperlipidemia        3. Hyponatremia        4. Permanent atrial fibrillation        5. Sensorineural hearing loss (SNHL) of both ears        6. Acute nonintractable headache, unspecified headache type        7. Gait disorder        8. Poor balance        9. Peripheral polyneuropathy  gabapentin (NEURONTIN) 100 MG capsule        Assessment & Plan  1. Posterior Headache pain.  The patient experiences intermittent, rare, sharp, instantaneous pain in the back of the head in the occipital area, which is very fleeting. It is random and not connected to any certain movements or activities. There are no associated symptoms. The examination reveals no tight muscles in the neck and no tenderness of the occiput. Cranial nerves and motor are intact. The etiology is undetermined. The patient will inform us if it begins to occur more often, worsens, or becomes connected to any other associated symptoms. We will continue to monitor her condition.    2. Bilateral Hearing loss.  The patient is advised to wear her hearing aids at all times. She will get a new . I have recommended that she consult with an ENT doctor to evaluate why they feel stopped up. Exam today reveals no cerumen impaction. There is just a little wax in the right ear. She can put a few drops of peroxide daily in the right ear, and it should come out on its own. She did not want me to place an ENT referral today. She wants to look in the phonebook to find one in the [place] area. I do not know of any ENT offices in that area.    3. Hypertension.  The patient will continue with bisoprolol and lisinopril. She will also continue walking around the facility where she lives every day to get  exercise.    4. Mixed hyperlipidemia.  The patient will continue trying to eat a low-fat, healthy diet.    5. Atrial fibrillation.  The patient will continue taking Eliquis twice a day. She was given some samples today. She was advised that she can look online and get a coupon for Eliquis that is usable by Medicare patients. She will also continue taking bisoprolol every evening.    6. Gait disorder.  The patient was given a prescription for rollator walker, which will give her more stability than the cane. She will continue trying to do some exercises every day. She will join in exercise classes at [facility].    7. Poor balance.  The patient is advised to do some daily exercises and participate in exercise classes at [facility] and stay well hydrated.    8. Peripheral neuropathy.  The patient will continue using the rollator walker for ambulation. She will continue wearing good supportive shoes at all times and never go barefoot. She will check her feet in the evening to make sure there are no injuries or red spots.    Hyponatremia.  The patient is advised to take the sodium chloride tablet every day at the same time of day. She should also continue drinking Pedialyte, Gatorade Zero, or other electrolyte drink at least once a day.         Plan of care reviewed with patient at the conclusion of today's visit. Education was provided regarding diagnosis, management, and any prescribed or recommended OTC medications. Patient verbalizes understanding of and agreement with management plan.         05/03/24   13:26 EDT    Patient or patient representative verbalized consent for the use of Ambient Listening during the visit with  Isidra Eisenberg MD for chart documentation. 5/4/2024  14:15 EDT

## 2024-05-04 NOTE — PATIENT INSTRUCTIONS
Patient Instructions  Problem List Items Addressed This Visit          Cardiac and Vasculature    Benign essential hypertension - Primary    Overview      Taking bisoprolol 15mg and lisinopril 10mg every evening.         Relevant Medications    bisoprolol (ZEBeta) 5 MG tablet    lisinopril (PRINIVIL,ZESTRIL) 10 MG tablet    furosemide (LASIX) 40 MG tablet    Mixed hyperlipidemia    Permanent atrial fibrillation    Overview     Taking Eliquis twice a day.  Taking bisoprolol every evening.         Relevant Medications    bisoprolol (ZEBeta) 5 MG tablet       ENT    Sensorineural hearing loss (SNHL) of both ears (Chronic)       Genitourinary and Reproductive     Hyponatremia    Overview     Oral fluid restriction at 1500 mL daily.  Taking one sodium chloride once per day.  Drink a Gatorade or other electrolyte drink once a day.              Neuro    Acute nonintractable headache (Chronic)    Overview     Patient was seen in the ER on 11/17/2021 and CT of the head was unremarkable.  Labs were acceptable.    CT of the head done at [facility] in 2022 was also unremarkable.         Peripheral polyneuropathy (Chronic)    Overview     Taking gabapentin 200 mg nightly.         Relevant Medications    gabapentin (NEURONTIN) 100 MG capsule       Symptoms and Signs    Gait disorder    Overview     Using walker.         Poor balance       Fall Prevention in the Home, Adult  Falls can cause injuries and affect people of all ages. There are many simple things that you can do to make your home safe and to help prevent falls.  If you need it, ask for help making these changes.  What actions can I take to prevent falls?  General information  Use good lighting in all rooms. Make sure to:  Replace any light bulbs that burn out.  Turn on lights if it is dark and use night-lights.  Keep items that you use often in easy-to-reach places. Lower the shelves around your home if needed.  Move furniture so that there are clear paths around  it.  Do not keep throw rugs or other things on the floor that can make you trip.  If any of your floors are uneven, fix them.  Add color or contrast paint or tape to clearly marino and help you see:  Grab bars or handrails.  First and last steps of staircases.  Where the edge of each step is.  If you use a ladder or stepladder:  Make sure that it is fully opened. Do not climb a closed ladder.  Make sure the sides of the ladder are locked in place.  Have someone hold the ladder while you use it.  Know where your pets are as you move through your home.  What can I do in the bathroom?         Keep the floor dry. Clean up any water that is on the floor right away.  Remove soap buildup in the bathtub or shower. Buildup makes bathtubs and showers slippery.  Use non-skid mats or decals on the floor of the bathtub or shower.  Attach bath mats securely with double-sided, non-slip rug tape.  If you need to sit down while you are in the shower, use a non-slip stool.  Install grab bars by the toilet and in the bathtub and shower. Do not use towel bars as grab bars.  What can I do in the bedroom?  Make sure that you have a light by your bed that is easy to reach.  Do not use any sheets or blankets on your bed that hang to the floor.  Have a firm bench or chair with side arms that you can use for support when you get dressed.  What can I do in the kitchen?  Clean up any spills right away.  If you need to reach something above you, use a sturdy step stool that has a grab bar.  Keep electrical cables out of the way.  Do not use floor polish or wax that makes floors slippery.  What can I do with my stairs?  Do not leave anything on the stairs.  Make sure that you have a light switch at the top and the bottom of the stairs. Have them installed if you do not have them.  Make sure that there are handrails on both sides of the stairs. Fix handrails that are broken or loose. Make sure that handrails are as long as the staircases.  Install  non-slip stair treads on all stairs in your home if they do not have carpet.  Avoid having throw rugs at the top or bottom of stairs, or secure the rugs with carpet tape to prevent them from moving.  Choose a carpet design that does not hide the edge of steps on the stairs. Make sure that carpet is firmly attached to the stairs. Fix any carpet that is loose or worn.  What can I do on the outside of my home?  Use bright outdoor lighting.  Repair the edges of walkways and driveways and fix any cracks. Clear paths of anything that can make you trip, such as tools or rocks.  Add color or contrast paint or tape to clearly marino and help you see high doorway thresholds.  Trim any bushes or trees on the main path into your home.  Check that handrails are securely fastened and in good repair. Both sides of all steps should have handrails.  Install guardrails along the edges of any raised decks or porches.  Have leaves, snow, and ice cleared regularly. Use sand, salt, or ice melt on walkways during winter months if you live where there is ice and snow.  In the garage, clean up any spills right away, including grease or oil spills.  What other actions can I take?  Review your medicines with your health care provider. Some medicines can make you confused or feel dizzy. This can increase your chance of falling.  Wear closed-toe shoes that fit well and support your feet. Wear shoes that have rubber soles and low heels.  Use a cane, walker, scooter, or crutches that help you move around if needed.  Talk with your provider about other ways that you can decrease your risk of falls. This may include seeing a physical therapist to learn to do exercises to improve movement and strength.  Where to find more information  Centers for Disease Control and Prevention, [place]AD[place]: cdc.gov  National [place] on Aging: yesenia.nih.gov  National [place] on Aging: yesenia.nih.gov  Contact a health care provider if:  You are afraid of falling at home.  You  feel weak, drowsy, or dizzy at home.  You fall at home.  Get help right away if you:  Lose consciousness or have trouble moving after a fall.  Have a fall that causes a head injury.  These symptoms may be an emergency. Get help right away. Call 911.  Do not wait to see if the symptoms will go away.  Do not drive yourself to the hospital.  This information is not intended to replace advice given to you by your health care provider. Make sure you discuss any questions you have with your health care provider.  Document Revised: 08/21/2023 Document Reviewed: 08/21/2023  Elsevier Patient Education © 2024 [employer] Inc[employer]

## 2024-05-06 RX ORDER — LISINOPRIL 10 MG/1
TABLET ORAL
Qty: 90 TABLET | Refills: 0 | Status: SHIPPED | OUTPATIENT
Start: 2024-05-06

## 2024-05-20 RX ORDER — BISOPROLOL FUMARATE 5 MG/1
15 TABLET, FILM COATED ORAL DAILY
Qty: 270 TABLET | Refills: 0 | Status: SHIPPED | OUTPATIENT
Start: 2024-05-20

## 2024-06-03 ENCOUNTER — TELEPHONE (OUTPATIENT)
Dept: INTERNAL MEDICINE | Facility: CLINIC | Age: 89
End: 2024-06-03
Payer: MEDICARE

## 2024-06-03 NOTE — TELEPHONE ENCOUNTER
I spoke to patient informed her that we do not have any of the 2.5 mg eliquis. She said your going to have to change it to something else due to it costing $500.00 and she can't afford it.

## 2024-06-03 NOTE — TELEPHONE ENCOUNTER
Caller: CAMELIA PEREZ    Relationship:SELF    Callback number: [phone]    Is it ok to leave a message: [x] Yes [] No    Requested medication for samples: ELIQUIS 2.5    How much medication does the patient currently have left: 4 DAYS WORTH    Who will be picking up the samples: HOLLY OR MATI PEREZ    Do you need information about patient financial assistance for this medication: [] Yes [x] No

## 2024-06-04 NOTE — TELEPHONE ENCOUNTER
Spoke with pt. She states she did not want to undergo the process of having the discount given to her. She said she wanted to try a different therapy that is cheaper under her insurance

## 2024-06-04 NOTE — TELEPHONE ENCOUNTER
Called pt. Patient understood and verbalized understanding. She was adamant she did not want assistance from the online program, or for a family member to assist with it. She said she is aware to reach out to her cardiologist on an alternative

## 2024-06-07 NOTE — TELEPHONE ENCOUNTER
"Spoke with pt. She says she will reach out to the cardiologist when she \"wants\" to, and will make an appt with her cardiologist at that point. I advised we have some eliquis 5mg samples that she can cut into two pieces with a pill cutter. She states she does not like cutting pills and will only use the 2.5mg.    She states we have been calling her too much on this issue, and that she will take care of this when she is able to in her own time. I advised she can reach out to us at anytime if she needs anything  "

## 2024-06-28 RX ORDER — DOCUSATE SODIUM 100 MG/1
100 CAPSULE, LIQUID FILLED ORAL 2 TIMES DAILY
Qty: 180 CAPSULE | Refills: 1 | Status: SHIPPED | OUTPATIENT
Start: 2024-06-28

## 2024-06-28 RX ORDER — DOCUSATE SODIUM 100 MG/1
100 CAPSULE, LIQUID FILLED ORAL 2 TIMES DAILY
Qty: 180 CAPSULE | Refills: 0 | Status: SHIPPED | OUTPATIENT
Start: 2024-06-28

## 2024-06-28 NOTE — TELEPHONE ENCOUNTER
Caller: Thuy Clark    Relationship: Self    Best call back number: [phone]    Requested Prescriptions:   Requested Prescriptions     Pending Prescriptions Disp Refills    EQUATE STOOL SOFTENER 100 MG capsule [Pharmacy Med Name: EQ Stool Softener 100 MG Oral Capsule] 180 capsule 0     Sig: Take 1 capsule by mouth twice daily    apixaban (ELIQUIS) 2.5 MG tablet tablet 84 tablet 0     Sig: Take 1 tablet by mouth 2 (Two) Times a Day.        Pharmacy where request should be sent: [address] [phone] [facility] [phone]      Last office visit with prescribing clinician: 5/3/2024   Last telemedicine visit with prescribing clinician: Visit date not found   Next office visit with prescribing clinician: 8/28/2024     Additional details provided by patient:     Does the patient have less than a 3 day supply:  [] Yes  [x] No    Would you like a call back once the refill request has been completed: [] Yes [x] No    If the office needs to give you a call back, can they leave a voicemail: [] Yes [x] No    Terrance Good Rep   06/28/24 14:54 EDT

## 2024-06-28 NOTE — TELEPHONE ENCOUNTER
PATIENT HAS CALLED AND STATED SHE NEEDS PRESCRIPTION FILLED ASAP TODAY BECAUSE SHE WILL RUN OUT OF MEDICATION ON SATURDAY.  PATIENT IS REQUESTING A NEW PRESCRIPTION.    CALL BACK NUMBER [phone]

## 2024-06-28 NOTE — TELEPHONE ENCOUNTER
Rx Refill Note  Requested Prescriptions     Pending Prescriptions Disp Refills    EQUATE STOOL SOFTENER 100 MG capsule [Pharmacy Med Name: EQ Stool Softener 100 MG Oral Capsule] 180 capsule 0     Sig: Take 1 capsule by mouth twice daily      Last office visit with prescribing clinician: 5/3/2024   Last telemedicine visit with prescribing clinician: Visit date not found   Next office visit with prescribing clinician: 8/28/2024                         Would you like a call back once the refill request has been completed: [] Yes [] No    If the office needs to give you a call back, can they leave a voicemail: [] Yes [] No    Estrellita Chan MA  06/28/24, 13:29 EDT

## 2024-06-28 NOTE — TELEPHONE ENCOUNTER
Rx Refill Note  Requested Prescriptions     Pending Prescriptions Disp Refills    docusate sodium (COLACE) 100 MG capsule 180 capsule 1     Sig: Take 1 capsule by mouth 2 (Two) Times a Day.      Last office visit with prescribing clinician: 5/3/2024   Last telemedicine visit with prescribing clinician: Visit date not found   Next office visit with prescribing clinician: 8/28/2024                         Would you like a call back once the refill request has been completed: [] Yes [] No    If the office needs to give you a call back, can they leave a voicemail: [] Yes [] No    Estrellita Chan MA  06/28/24, 10:17 EDT

## 2024-07-22 ENCOUNTER — TELEPHONE (OUTPATIENT)
Dept: INTERNAL MEDICINE | Facility: CLINIC | Age: 89
End: 2024-07-22
Payer: MEDICARE

## 2024-07-22 NOTE — TELEPHONE ENCOUNTER
Spoke to pt. Pt wanted to just let Dr. Eisenberg know she still cannot hear well. She is using azelastine ear drops. She wants to see if these help her in the next few days. If not, she plans to call and shawna an appt with PCP. She did not need anything done on our side right now.    Pt also insisted we communicate through [employer] only, unless it is an emergency.

## 2024-07-26 ENCOUNTER — OFFICE VISIT (OUTPATIENT)
Dept: INTERNAL MEDICINE | Facility: CLINIC | Age: 89
End: 2024-07-26
Payer: MEDICARE

## 2024-07-26 ENCOUNTER — LAB (OUTPATIENT)
Dept: LAB | Facility: HOSPITAL | Age: 89
End: 2024-07-26
Payer: MEDICARE

## 2024-07-26 VITALS
SYSTOLIC BLOOD PRESSURE: 120 MMHG | DIASTOLIC BLOOD PRESSURE: 80 MMHG | TEMPERATURE: 97.5 F | OXYGEN SATURATION: 94 % | WEIGHT: 126.6 LBS | BODY MASS INDEX: 23.9 KG/M2 | HEART RATE: 97 BPM | HEIGHT: 61 IN

## 2024-07-26 DIAGNOSIS — I10 BENIGN ESSENTIAL HYPERTENSION: ICD-10-CM

## 2024-07-26 DIAGNOSIS — J30.89 NON-SEASONAL ALLERGIC RHINITIS, UNSPECIFIED TRIGGER: Primary | ICD-10-CM

## 2024-07-26 DIAGNOSIS — E78.2 MIXED HYPERLIPIDEMIA: ICD-10-CM

## 2024-07-26 LAB
ALBUMIN SERPL-MCNC: 4.3 G/DL (ref 3.5–5.2)
ALBUMIN/GLOB SERPL: 1.7 G/DL
ALP SERPL-CCNC: 107 U/L (ref 39–117)
ALT SERPL W P-5'-P-CCNC: 12 U/L (ref 1–33)
ANION GAP SERPL CALCULATED.3IONS-SCNC: 14.1 MMOL/L (ref 5–15)
AST SERPL-CCNC: 19 U/L (ref 1–32)
BASOPHILS # BLD AUTO: 0.06 10*3/MM3 (ref 0–0.2)
BASOPHILS NFR BLD AUTO: 1.1 % (ref 0–1.5)
BILIRUB SERPL-MCNC: 1.4 MG/DL (ref 0–1.2)
BUN SERPL-MCNC: 12 MG/DL (ref 8–23)
BUN/CREAT SERPL: 16 (ref 7–25)
CALCIUM SPEC-SCNC: 9.1 MG/DL (ref 8.6–10.5)
CHLORIDE SERPL-SCNC: 95 MMOL/L (ref 98–107)
CO2 SERPL-SCNC: 22.9 MMOL/L (ref 22–29)
CREAT SERPL-MCNC: 0.75 MG/DL (ref 0.57–1)
DEPRECATED RDW RBC AUTO: 43.7 FL (ref 37–54)
EGFRCR SERPLBLD CKD-EPI 2021: 76.7 ML/MIN/1.73
EOSINOPHIL # BLD AUTO: 0.04 10*3/MM3 (ref 0–0.4)
EOSINOPHIL NFR BLD AUTO: 0.7 % (ref 0.3–6.2)
ERYTHROCYTE [DISTWIDTH] IN BLOOD BY AUTOMATED COUNT: 13.1 % (ref 12.3–15.4)
GLOBULIN UR ELPH-MCNC: 2.6 GM/DL
GLUCOSE SERPL-MCNC: 97 MG/DL (ref 65–99)
HCT VFR BLD AUTO: 40.2 % (ref 34–46.6)
HGB BLD-MCNC: 13.4 G/DL (ref 12–15.9)
IMM GRANULOCYTES # BLD AUTO: 0.03 10*3/MM3 (ref 0–0.05)
IMM GRANULOCYTES NFR BLD AUTO: 0.6 % (ref 0–0.5)
LYMPHOCYTES # BLD AUTO: 1.27 10*3/MM3 (ref 0.7–3.1)
LYMPHOCYTES NFR BLD AUTO: 23.6 % (ref 19.6–45.3)
MCH RBC QN AUTO: 30.7 PG (ref 26.6–33)
MCHC RBC AUTO-ENTMCNC: 33.3 G/DL (ref 31.5–35.7)
MCV RBC AUTO: 92 FL (ref 79–97)
MONOCYTES # BLD AUTO: 0.63 10*3/MM3 (ref 0.1–0.9)
MONOCYTES NFR BLD AUTO: 11.7 % (ref 5–12)
NEUTROPHILS NFR BLD AUTO: 3.34 10*3/MM3 (ref 1.7–7)
NEUTROPHILS NFR BLD AUTO: 62.3 % (ref 42.7–76)
NRBC BLD AUTO-RTO: 0 /100 WBC (ref 0–0.2)
PLATELET # BLD AUTO: 206 10*3/MM3 (ref 140–450)
PMV BLD AUTO: 10.7 FL (ref 6–12)
POTASSIUM SERPL-SCNC: 4.4 MMOL/L (ref 3.5–5.2)
PROT SERPL-MCNC: 6.9 G/DL (ref 6–8.5)
RBC # BLD AUTO: 4.37 10*6/MM3 (ref 3.77–5.28)
SODIUM SERPL-SCNC: 132 MMOL/L (ref 136–145)
WBC NRBC COR # BLD AUTO: 5.37 10*3/MM3 (ref 3.4–10.8)

## 2024-07-26 PROCEDURE — 99213 OFFICE O/P EST LOW 20 MIN: CPT | Performed by: NURSE PRACTITIONER

## 2024-07-26 PROCEDURE — 1126F AMNT PAIN NOTED NONE PRSNT: CPT | Performed by: NURSE PRACTITIONER

## 2024-07-26 PROCEDURE — 1160F RVW MEDS BY RX/DR IN RCRD: CPT | Performed by: NURSE PRACTITIONER

## 2024-07-26 PROCEDURE — 80053 COMPREHEN METABOLIC PANEL: CPT

## 2024-07-26 PROCEDURE — 85025 COMPLETE CBC W/AUTO DIFF WBC: CPT

## 2024-07-26 PROCEDURE — 1159F MED LIST DOCD IN RCRD: CPT | Performed by: NURSE PRACTITIONER

## 2024-07-26 NOTE — PROGRESS NOTES
Office Note     Name: Thuy Clark    : 1935     MRN: [id number]     Chief Complaint  Dizziness    Subjective     History of Present Illness:  Thuy Clark is a 88 y.o. female who presents today for feeling like she is still talking into a barrel and vertigo one month ago.  She feels like she is on a rocking boat when this happens.  Denies any falls. She had an appointment with ENT  and was started on Flonase nasal spray. She has new hearing aids and says they have looked and do not believe this is the problem for her.     Review of Systems   Constitutional:  Negative for fatigue and fever.   HENT:  Positive for congestion and ear pain. Negative for rhinorrhea, sinus pressure and sore throat.    Eyes: Negative.    Respiratory:  Negative for cough and shortness of breath.    Cardiovascular:  Negative for chest pain and palpitations.   Gastrointestinal: Negative.    Endocrine: Negative.    Genitourinary: Negative.    Musculoskeletal: Negative.    Allergic/Immunologic: Negative.    Neurological: Negative.    Hematological: Negative.    Psychiatric/Behavioral: Negative.           Past Medical History:   Past Medical History:   Diagnosis Date    Acute cystitis with hematuria 2021 Isidra Eisenberg MD  Urine sent for culture.    Atrial fibrillation     Atrial flutter with rapid ventricular response 2019    Patient presented to the ER 19 with lightheadedness and was found to be in a flutter with rapid ventricular response.  She received IV beta-blocker and converted.  Bisoprolol was increased to 10 mg and she was started on Eliquis 5 mg twice a day.   Echo showed EF 65% with borderline concentric hypertrophy grade 2 diastolic dysfunction with pseudonormalization.  Saline test are positive for ev    Bleeding hemorrhoid 3/8/2019    Diverticulosis     External hemorrhoid 2019 Isidra Eisenberg MD   Continue vasculera daily. Drink plenty of fluids.   Avoid constipation.  Follow-up with Dr. Hebert as planned.    Hx of colonic polyps 2003    Hypertension     Intraductal papilloma     R intraductal patheloma    Left upper extremity numbness     L upper extremity numbness-ER visit; neuropathy    Overweight (BMI 25.0-29.9) 6/11/2019    Self-catheterizes urinary bladder        Past Surgical History:   Past Surgical History:   Procedure Laterality Date    BREAST LUMPECTOMY  2006    CHOLECYSTECTOMY      DENTAL PROCEDURE  12/13/2021    tooth exteraction     HYSTERECTOMY  1984    VAGINECTOMY  2009       Family History:   Family History   Problem Relation Age of Onset    Hyperlipidemia Other     Hypertension Other     Coronary artery disease Other     No Known Problems Mother     No Known Problems Father        Social History:   Social History     Socioeconomic History    Marital status:    Tobacco Use    Smoking status: Never    Smokeless tobacco: Never   Vaping Use    Vaping status: Never Used   Substance and Sexual Activity    Alcohol use: Never     Alcohol/week: 1.0 standard drink of alcohol     Types: 1 Glasses of wine per week    Drug use: Never    Sexual activity: Defer       Immunizations:   Immunization History   Administered Date(s) Administered    COVID-19 (PFIZER) Purple Cap Monovalent 01/27/2021, 02/23/2021, 10/16/2021    COVID-19 (UNSPECIFIED) 11/01/2021, 10/03/2023    Covid-19 (Pfizer) Gray Cap Monovalent 08/23/2022    FLUAD TRI 65YR+ 09/25/2019, 09/23/2021, 09/15/2023    Fluad Quad 65+ 09/17/2020, 09/23/2021, 08/23/2022    Fluzone (or Fluarix & Flulaval for VFC) >6mos 09/17/2020    Fluzone High Dose =>65 Years ([facility] ONLY) 10/09/2015, 09/30/2016, 09/22/2017, 09/18/2018    Fluzone High-Dose 65+yrs 08/23/2022, 09/15/2023    Hepatitis A 11/16/2018, 05/29/2019    INFLUENZA SPLIT TRI 10/03/2012, 09/11/2013    Influenza TIV (IM) 09/08/2010, 09/09/2011, 10/20/2014    Pneumococcal Conjugate 13-Valent (PCV13) 12/23/2014    Pneumococcal Polysaccharide  (PPSV23) 08/10/2012, 12/18/2020    Shingrix 10/05/2019, 11/04/2019    Td (TDVAX) 03/23/2001    Tdap 08/28/2012    Zostavax 05/06/2010        Medications:     Current Outpatient Medications:     acetaminophen (TYLENOL) 325 MG tablet, Take 2 tablets by mouth Every 4 (Four) Hours As Needed for Mild Pain., Disp: , Rfl:     apixaban (ELIQUIS) 2.5 MG tablet tablet, Take 1 tablet by mouth 2 (Two) Times a Day., Disp: 180 tablet, Rfl: 1    bisoprolol (ZEBeta) 5 MG tablet, Take 3 tablets by mouth once daily, Disp: 270 tablet, Rfl: 0    Cholecalciferol (Vitamin D3) 25 MCG (1000 UT) capsule, Take 1 capsule by mouth Daily., Disp: , Rfl:     Dietary Management Product (Vasculera) tablet, Take 1 each by mouth 2 (Two) Times a Day. Take one tablet by mouth twice daily, Disp: 180 tablet, Rfl: 3    docusate sodium (COLACE) 100 MG capsule, Take 1 capsule by mouth 2 (Two) Times a Day., Disp: 180 capsule, Rfl: 1    EQUATE STOOL SOFTENER 100 MG capsule, Take 1 capsule by mouth twice daily, Disp: 180 capsule, Rfl: 0    folic acid (FOLVITE) 1 MG tablet, Take 2 tablets by mouth Daily., Disp: 180 tablet, Rfl: 1    furosemide (LASIX) 40 MG tablet, Take 1 tablet by mouth Daily As Needed (swelling ankles). Take one tablet by mouth once daily in the morning, Disp: , Rfl:     gabapentin (NEURONTIN) 100 MG capsule, Take 2 capsules by mouth At Night As Needed (leg pain)., Disp: , Rfl:     lisinopril (PRINIVIL,ZESTRIL) 10 MG tablet, TAKE 1 TABLET BY MOUTH ONCE DAILY AT NIGHT AT BEDTIME, Disp: 90 tablet, Rfl: 0    Menthol, Topical Analgesic, (Biofreeze) 4 % gel, Apply  topically 2 (Two) Times a Day As Needed. To bilateral knees, Disp: , Rfl:     Misc. Devices (Rollator Ultra-Light) misc, Use 1 each Daily., Disp: 1 each, Rfl: 0    ondansetron ODT (ZOFRAN-ODT) 4 MG disintegrating tablet, Place 1 tablet on the tongue 4 (Four) Times a Day As Needed for Nausea or Vomiting., Disp: 15 tablet, Rfl: 0    polyethylene glycol (MiraLax) 17 g packet, Take 17 g by  "mouth Every Other Day. prn, Disp: , Rfl:     psyllium (METAMUCIL MULTIHEALTH FIBER) 58.12 % packet, Take 1 packet by mouth Daily. (Patient taking differently: Take 1 packet by mouth Every Other Day. prn), Disp: , Rfl:     saccharomyces boulardii (FLORASTOR) 250 MG capsule, Take 1 capsule by mouth As Needed. As needed after a round of antibiotics, Disp: , Rfl:     sodium chloride 1 g tablet, Take 1 tablet by mouth Daily., Disp: 90 tablet, Rfl: 1    vitamin B-12 (CYANOCOBALAMIN) 1000 MCG tablet, Take 1 tablet by mouth Daily., Disp: , Rfl:     Allergies:   Allergies   Allergen Reactions    Levofloxacin Other (See Comments)     itching  Other reaction(s): Other (See Comments)  itching    Sulfamethoxazole-Trimethoprim Unknown - Low Severity    Nitrofuran Derivatives Rash     primarily in b/l hands    Nitrofurantoin Rash       Objective     Vital Signs  /80 (BP Location: Right arm, Patient Position: Sitting, Cuff Size: Adult)   Pulse 97   Temp 97.5 °F (36.4 °C) (Infrared)   Ht 155.2 cm (61.1\")   Wt 57.4 kg (126 lb 9.6 oz)   SpO2 94%   BMI 23.84 kg/m²   Estimated body mass index is 23.84 kg/m² as calculated from the following:    Height as of this encounter: 155.2 cm (61.1\").    Weight as of this encounter: 57.4 kg (126 lb 9.6 oz).    BMI is within normal parameters. No other follow-up for BMI required.       Physical Exam  Vitals and nursing note reviewed.   Constitutional:       General: She is not in acute distress.     Appearance: Normal appearance. She is not ill-appearing.   HENT:      Head: Normocephalic.      Right Ear: Decreased hearing noted.      Left Ear: Decreased hearing noted. No tenderness. A middle ear effusion is present. Tympanic membrane is scarred.   Eyes:      General: Lids are normal.   Cardiovascular:      Rate and Rhythm: Rhythm irregular.   Pulmonary:      Effort: Pulmonary effort is normal.      Breath sounds: Normal air entry.   Abdominal:      General: Bowel sounds are normal.      " Palpations: Abdomen is soft.      Tenderness: There is no abdominal tenderness.   Lymphadenopathy:      Cervical: No cervical adenopathy.   Neurological:      General: No focal deficit present.      Mental Status: She is alert and oriented to person, place, and time.   Psychiatric:         Mood and Affect: Mood normal.         Behavior: Behavior normal.            Assessment and Plan     Assessment/Plan:  Diagnoses and all orders for this visit:    1. Non-seasonal allergic rhinitis, unspecified trigger (Primary)  Assessment & Plan:  -Encourage pt to start using Flonase daily as instructed by ENT.    -Schedule follow up in 4 weeks with ENT if does not improve          Follow Up  Return for Next scheduled follow up.    MEL Orellana   [facility] [address]

## 2024-07-29 ENCOUNTER — TELEPHONE (OUTPATIENT)
Dept: INTERNAL MEDICINE | Facility: CLINIC | Age: 89
End: 2024-07-29
Payer: MEDICARE

## 2024-07-29 DIAGNOSIS — R30.0 DYSURIA: Primary | ICD-10-CM

## 2024-07-29 PROBLEM — J30.89 NON-SEASONAL ALLERGIC RHINITIS: Status: ACTIVE | Noted: 2024-07-29

## 2024-07-29 RX ORDER — SODIUM CHLORIDE 1 G/1
1 TABLET ORAL 2 TIMES DAILY
Qty: 180 TABLET | Refills: 1 | Status: SHIPPED | OUTPATIENT
Start: 2024-07-29

## 2024-07-29 NOTE — ASSESSMENT & PLAN NOTE
-Encourage pt to start using Flonase daily as instructed by ENT.    -Schedule follow up in 4 weeks with ENT if does not improve

## 2024-07-29 NOTE — TELEPHONE ENCOUNTER
Patient wants orders to be put so she can drop her urine off to be test for an uti at the [facility] location if possible. She has blood in her urine when going to the bathroom. She would like for someone to call her back today.

## 2024-07-30 ENCOUNTER — HOSPITAL ENCOUNTER (OUTPATIENT)
Facility: HOSPITAL | Age: 89
Discharge: HOME | End: 2024-07-31
Attending: STUDENT IN AN ORGANIZED HEALTH CARE EDUCATION/TRAINING PROGRAM | Admitting: EMERGENCY MEDICINE
Payer: MEDICARE

## 2024-07-30 ENCOUNTER — APPOINTMENT (OUTPATIENT)
Facility: HOSPITAL | Age: 89
End: 2024-07-30
Payer: MEDICARE

## 2024-07-30 DIAGNOSIS — E87.1 HYPONATREMIA: ICD-10-CM

## 2024-07-30 DIAGNOSIS — J96.01 ACUTE HYPOXIC RESPIRATORY FAILURE: Primary | ICD-10-CM

## 2024-07-30 LAB
ALBUMIN SERPL-MCNC: 4.4 G/DL (ref 3.5–5.2)
ALBUMIN/GLOB SERPL: 1.6 G/DL
ALP SERPL-CCNC: 128 U/L (ref 39–117)
ALT SERPL W P-5'-P-CCNC: 7 U/L (ref 1–33)
ANION GAP SERPL CALCULATED.3IONS-SCNC: 12.2 MMOL/L (ref 5–15)
ANION GAP SERPL CALCULATED.3IONS-SCNC: 15.6 MMOL/L (ref 5–15)
ARTERIAL PATENCY WRIST A: ABNORMAL
AST SERPL-CCNC: 28 U/L (ref 1–32)
ATMOSPHERIC PRESS: ABNORMAL MM[HG]
BASE EXCESS BLDA CALC-SCNC: -2.7 MMOL/L (ref 0–2)
BASOPHILS # BLD AUTO: 0.07 10*3/MM3 (ref 0–0.2)
BASOPHILS NFR BLD AUTO: 1.2 % (ref 0–1.5)
BDY SITE: ABNORMAL
BILIRUB SERPL-MCNC: 2.1 MG/DL (ref 0–1.2)
BILIRUB UR QL STRIP: ABNORMAL
BODY TEMPERATURE: 37
BUN SERPL-MCNC: 13 MG/DL (ref 8–23)
BUN SERPL-MCNC: 14 MG/DL (ref 8–23)
BUN/CREAT SERPL: 17.3 (ref 7–25)
BUN/CREAT SERPL: 17.7 (ref 7–25)
CALCIUM SPEC-SCNC: 8.8 MG/DL (ref 8.6–10.5)
CALCIUM SPEC-SCNC: 8.9 MG/DL (ref 8.6–10.5)
CHLORIDE SERPL-SCNC: 87 MMOL/L (ref 98–107)
CHLORIDE SERPL-SCNC: 88 MMOL/L (ref 98–107)
CLARITY UR: ABNORMAL
CO2 BLDA-SCNC: 21.5 MMOL/L (ref 22–33)
CO2 SERPL-SCNC: 20.4 MMOL/L (ref 22–29)
CO2 SERPL-SCNC: 23.8 MMOL/L (ref 22–29)
COHGB MFR BLD: 0.9 % (ref 0–2)
COLOR UR: YELLOW
CREAT BLDA-MCNC: 0.8 MG/DL (ref 0.6–1.3)
CREAT SERPL-MCNC: 0.75 MG/DL (ref 0.57–1)
CREAT SERPL-MCNC: 0.79 MG/DL (ref 0.57–1)
D-LACTATE SERPL-SCNC: 1.2 MMOL/L (ref 0.5–2)
D-LACTATE SERPL-SCNC: 2.1 MMOL/L (ref 0.5–2)
DEPRECATED RDW RBC AUTO: 49 FL (ref 37–54)
EGFRCR SERPLBLD CKD-EPI 2021: 72.1 ML/MIN/1.73
EGFRCR SERPLBLD CKD-EPI 2021: 76.7 ML/MIN/1.73
EOSINOPHIL # BLD AUTO: 0.07 10*3/MM3 (ref 0–0.4)
EOSINOPHIL NFR BLD AUTO: 1.2 % (ref 0.3–6.2)
EPAP: 0
ERYTHROCYTE [DISTWIDTH] IN BLOOD BY AUTOMATED COUNT: 14.5 % (ref 12.3–15.4)
FLUAV RNA RESP QL NAA+PROBE: NOT DETECTED
FLUBV RNA RESP QL NAA+PROBE: NOT DETECTED
GLOBULIN UR ELPH-MCNC: 2.8 GM/DL
GLUCOSE SERPL-MCNC: 104 MG/DL (ref 65–99)
GLUCOSE SERPL-MCNC: 97 MG/DL (ref 65–99)
GLUCOSE UR STRIP-MCNC: NEGATIVE MG/DL
HCO3 BLDA-SCNC: 20.6 MMOL/L (ref 20–26)
HCT VFR BLD AUTO: 42.6 % (ref 34–46.6)
HCT VFR BLD CALC: 41.8 %
HGB BLD-MCNC: 14.3 G/DL (ref 12–15.9)
HGB BLDA-MCNC: 13.6 G/DL (ref 14–18)
HGB UR QL STRIP.AUTO: NEGATIVE
HOLD SPECIMEN: NORMAL
IMM GRANULOCYTES # BLD AUTO: 0.02 10*3/MM3 (ref 0–0.05)
IMM GRANULOCYTES NFR BLD AUTO: 0.3 % (ref 0–0.5)
INHALED O2 CONCENTRATION: 21 %
IPAP: 0
KETONES UR QL STRIP: NEGATIVE
LEUKOCYTE ESTERASE UR QL STRIP.AUTO: NEGATIVE
LYMPHOCYTES # BLD AUTO: 0.87 10*3/MM3 (ref 0.7–3.1)
LYMPHOCYTES NFR BLD AUTO: 14.5 % (ref 19.6–45.3)
MCH RBC QN AUTO: 30.6 PG (ref 26.6–33)
MCHC RBC AUTO-ENTMCNC: 33.6 G/DL (ref 31.5–35.7)
MCV RBC AUTO: 91.2 FL (ref 79–97)
METHGB BLD QL: 0.4 % (ref 0–1.5)
MODALITY: ABNORMAL
MONOCYTES # BLD AUTO: 0.63 10*3/MM3 (ref 0.1–0.9)
MONOCYTES NFR BLD AUTO: 10.5 % (ref 5–12)
NEUTROPHILS NFR BLD AUTO: 4.32 10*3/MM3 (ref 1.7–7)
NEUTROPHILS NFR BLD AUTO: 72.3 % (ref 42.7–76)
NITRITE UR QL STRIP: NEGATIVE
NT-PROBNP SERPL-MCNC: 3843 PG/ML (ref 0–1800)
OXYHGB MFR BLDV: 91 % (ref 94–99)
PAW @ PEAK INSP FLOW SETTING VENT: 0 CMH2O
PCO2 BLDA: 30.9 MM HG (ref 35–45)
PCO2 TEMP ADJ BLD: 30.9 MM HG (ref 35–45)
PH BLDA: 7.43 PH UNITS (ref 7.35–7.45)
PH UR STRIP.AUTO: 6 [PH] (ref 5–8)
PH, TEMP CORRECTED: 7.43 PH UNITS
PLATELET # BLD AUTO: 209 10*3/MM3 (ref 140–450)
PMV BLD AUTO: 10.4 FL (ref 6–12)
PO2 BLDA: 64.3 MM HG (ref 83–108)
PO2 TEMP ADJ BLD: 64.3 MM HG (ref 83–108)
POTASSIUM SERPL-SCNC: 3.9 MMOL/L (ref 3.5–5.2)
POTASSIUM SERPL-SCNC: 4 MMOL/L (ref 3.5–5.2)
PROCALCITONIN SERPL-MCNC: 0.06 NG/ML (ref 0–0.25)
PROT SERPL-MCNC: 7.2 G/DL (ref 6–8.5)
PROT UR QL STRIP: ABNORMAL
RBC # BLD AUTO: 4.67 10*6/MM3 (ref 3.77–5.28)
RSV RNA RESP QL NAA+PROBE: NOT DETECTED
SARS-COV-2 RNA RESP QL NAA+PROBE: NOT DETECTED
SODIUM SERPL-SCNC: 123 MMOL/L (ref 136–145)
SODIUM SERPL-SCNC: 124 MMOL/L (ref 136–145)
SP GR UR STRIP: >=1.03 (ref 1–1.03)
TOTAL RATE: 0 BREATHS/MINUTE
TROPONIN T SERPL HS-MCNC: 15 NG/L
TROPONIN T SERPL HS-MCNC: 19 NG/L
UROBILINOGEN UR QL STRIP: ABNORMAL
WBC NRBC COR # BLD AUTO: 5.98 10*3/MM3 (ref 3.4–10.8)
WHOLE BLOOD HOLD COAG: NORMAL
WHOLE BLOOD HOLD SPECIMEN: NORMAL

## 2024-07-30 PROCEDURE — 25010000002 ONDANSETRON PER 1 MG: Performed by: STUDENT IN AN ORGANIZED HEALTH CARE EDUCATION/TRAINING PROGRAM

## 2024-07-30 PROCEDURE — 83880 ASSAY OF NATRIURETIC PEPTIDE: CPT | Performed by: STUDENT IN AN ORGANIZED HEALTH CARE EDUCATION/TRAINING PROGRAM

## 2024-07-30 PROCEDURE — 85025 COMPLETE CBC W/AUTO DIFF WBC: CPT | Performed by: STUDENT IN AN ORGANIZED HEALTH CARE EDUCATION/TRAINING PROGRAM

## 2024-07-30 PROCEDURE — 84484 ASSAY OF TROPONIN QUANT: CPT

## 2024-07-30 PROCEDURE — 96375 TX/PRO/DX INJ NEW DRUG ADDON: CPT

## 2024-07-30 PROCEDURE — 84484 ASSAY OF TROPONIN QUANT: CPT | Performed by: STUDENT IN AN ORGANIZED HEALTH CARE EDUCATION/TRAINING PROGRAM

## 2024-07-30 PROCEDURE — 99291 CRITICAL CARE FIRST HOUR: CPT

## 2024-07-30 PROCEDURE — 80053 COMPREHEN METABOLIC PANEL: CPT | Performed by: STUDENT IN AN ORGANIZED HEALTH CARE EDUCATION/TRAINING PROGRAM

## 2024-07-30 PROCEDURE — 87040 BLOOD CULTURE FOR BACTERIA: CPT | Performed by: STUDENT IN AN ORGANIZED HEALTH CARE EDUCATION/TRAINING PROGRAM

## 2024-07-30 PROCEDURE — 81003 URINALYSIS AUTO W/O SCOPE: CPT | Performed by: STUDENT IN AN ORGANIZED HEALTH CARE EDUCATION/TRAINING PROGRAM

## 2024-07-30 PROCEDURE — 36415 COLL VENOUS BLD VENIPUNCTURE: CPT

## 2024-07-30 PROCEDURE — 36600 WITHDRAWAL OF ARTERIAL BLOOD: CPT

## 2024-07-30 PROCEDURE — 87637 SARSCOV2&INF A&B&RSV AMP PRB: CPT | Performed by: STUDENT IN AN ORGANIZED HEALTH CARE EDUCATION/TRAINING PROGRAM

## 2024-07-30 PROCEDURE — 83605 ASSAY OF LACTIC ACID: CPT | Performed by: STUDENT IN AN ORGANIZED HEALTH CARE EDUCATION/TRAINING PROGRAM

## 2024-07-30 PROCEDURE — 82375 ASSAY CARBOXYHB QUANT: CPT

## 2024-07-30 PROCEDURE — 25510000001 IOPAMIDOL PER 1 ML: Performed by: STUDENT IN AN ORGANIZED HEALTH CARE EDUCATION/TRAINING PROGRAM

## 2024-07-30 PROCEDURE — 71275 CT ANGIOGRAPHY CHEST: CPT

## 2024-07-30 PROCEDURE — 94799 UNLISTED PULMONARY SVC/PX: CPT

## 2024-07-30 PROCEDURE — 25810000003 SODIUM CHLORIDE 0.9 % SOLUTION: Performed by: STUDENT IN AN ORGANIZED HEALTH CARE EDUCATION/TRAINING PROGRAM

## 2024-07-30 PROCEDURE — 84145 PROCALCITONIN (PCT): CPT | Performed by: STUDENT IN AN ORGANIZED HEALTH CARE EDUCATION/TRAINING PROGRAM

## 2024-07-30 PROCEDURE — 82565 ASSAY OF CREATININE: CPT

## 2024-07-30 PROCEDURE — 93005 ELECTROCARDIOGRAM TRACING: CPT

## 2024-07-30 PROCEDURE — 96374 THER/PROPH/DIAG INJ IV PUSH: CPT

## 2024-07-30 PROCEDURE — 71045 X-RAY EXAM CHEST 1 VIEW: CPT

## 2024-07-30 PROCEDURE — 83050 HGB METHEMOGLOBIN QUAN: CPT

## 2024-07-30 PROCEDURE — 25010000002 FUROSEMIDE PER 20 MG: Performed by: STUDENT IN AN ORGANIZED HEALTH CARE EDUCATION/TRAINING PROGRAM

## 2024-07-30 PROCEDURE — 93005 ELECTROCARDIOGRAM TRACING: CPT | Performed by: STUDENT IN AN ORGANIZED HEALTH CARE EDUCATION/TRAINING PROGRAM

## 2024-07-30 PROCEDURE — 82805 BLOOD GASES W/O2 SATURATION: CPT

## 2024-07-30 PROCEDURE — 94660 CPAP INITIATION&MGMT: CPT

## 2024-07-30 RX ORDER — SODIUM CHLORIDE 9 MG/ML
40 INJECTION, SOLUTION INTRAVENOUS AS NEEDED
Status: DISCONTINUED | OUTPATIENT
Start: 2024-07-30 | End: 2024-07-31 | Stop reason: HOSPADM

## 2024-07-30 RX ORDER — LISINOPRIL 10 MG/1
10 TABLET ORAL ONCE
Status: COMPLETED | OUTPATIENT
Start: 2024-07-30 | End: 2024-07-30

## 2024-07-30 RX ORDER — ONDANSETRON 2 MG/ML
4 INJECTION INTRAMUSCULAR; INTRAVENOUS EVERY 6 HOURS PRN
Status: DISCONTINUED | OUTPATIENT
Start: 2024-07-30 | End: 2024-07-31 | Stop reason: HOSPADM

## 2024-07-30 RX ORDER — FUROSEMIDE 10 MG/ML
40 INJECTION INTRAMUSCULAR; INTRAVENOUS ONCE
Status: COMPLETED | OUTPATIENT
Start: 2024-07-30 | End: 2024-07-30

## 2024-07-30 RX ORDER — MECLIZINE HYDROCHLORIDE 25 MG/1
12.5 TABLET ORAL DAILY PRN
Qty: 15 TABLET | Refills: 1 | Status: SHIPPED | OUTPATIENT
Start: 2024-07-30

## 2024-07-30 RX ORDER — ONDANSETRON 2 MG/ML
4 INJECTION INTRAMUSCULAR; INTRAVENOUS ONCE
Status: COMPLETED | OUTPATIENT
Start: 2024-07-30 | End: 2024-07-30

## 2024-07-30 RX ORDER — SODIUM CHLORIDE 0.9 % (FLUSH) 0.9 %
10 SYRINGE (ML) INJECTION EVERY 12 HOURS SCHEDULED
Status: DISCONTINUED | OUTPATIENT
Start: 2024-07-30 | End: 2024-07-31 | Stop reason: HOSPADM

## 2024-07-30 RX ORDER — SODIUM CHLORIDE 1 G/1
1 TABLET ORAL 2 TIMES DAILY
Status: DISCONTINUED | OUTPATIENT
Start: 2024-07-30 | End: 2024-07-30 | Stop reason: SDUPTHER

## 2024-07-30 RX ORDER — SODIUM CHLORIDE 0.9 % (FLUSH) 0.9 %
10 SYRINGE (ML) INJECTION AS NEEDED
Status: DISCONTINUED | OUTPATIENT
Start: 2024-07-30 | End: 2024-07-31 | Stop reason: HOSPADM

## 2024-07-30 RX ORDER — SODIUM CHLORIDE 1000 MG
1 TABLET, SOLUBLE MISCELLANEOUS 2 TIMES DAILY
Status: DISCONTINUED | OUTPATIENT
Start: 2024-07-30 | End: 2024-07-31 | Stop reason: HOSPADM

## 2024-07-30 RX ORDER — ACETAMINOPHEN 325 MG/1
650 TABLET ORAL EVERY 4 HOURS PRN
Status: DISCONTINUED | OUTPATIENT
Start: 2024-07-30 | End: 2024-07-31 | Stop reason: HOSPADM

## 2024-07-30 RX ADMIN — SODIUM CHLORIDE 500 ML: 9 INJECTION, SOLUTION INTRAVENOUS at 16:20

## 2024-07-30 RX ADMIN — APIXABAN 2.5 MG: 5 TABLET, FILM COATED ORAL at 21:49

## 2024-07-30 RX ADMIN — LISINOPRIL 10 MG: 10 TABLET ORAL at 21:48

## 2024-07-30 RX ADMIN — Medication 1000 MG: at 21:49

## 2024-07-30 RX ADMIN — IOPAMIDOL 75 ML: 755 INJECTION, SOLUTION INTRAVENOUS at 15:57

## 2024-07-30 RX ADMIN — ONDANSETRON 4 MG: 2 INJECTION INTRAMUSCULAR; INTRAVENOUS at 16:21

## 2024-07-30 RX ADMIN — FUROSEMIDE 40 MG: 10 INJECTION, SOLUTION INTRAMUSCULAR; INTRAVENOUS at 18:00

## 2024-07-30 NOTE — TELEPHONE ENCOUNTER
Patient stated she is still having dizziness/vertigo. She was seen by Zoey on Thursday. She said her symptoms are unchanged. She has been using the flonase. Please advise.

## 2024-07-30 NOTE — ED NOTES
Thuy Clark    Nursing Report ED to Floor:  Mental status: AAOX4  Ambulatory status: STAND BY   Oxygen Therapy:  BI PAP  Cardiac Rhythm: AFIB  Admitted from: [place] [facility]  Safety Concerns:  N/A  Social Issues: N/A  ED Room #:  8    ED Nurse Phone Extension -  or may call [phone].      HPI:   Chief Complaint   Patient presents with    Dizziness       Past Medical History:  Past Medical History:   Diagnosis Date    Acute cystitis with hematuria 6/25/2021 6/25/2021 Isidra Eisenberg MD  Urine sent for culture.    Atrial fibrillation 2019    Atrial flutter with rapid ventricular response 11/2/2019    Patient presented to the ER 11/2/19 with lightheadedness and was found to be in a flutter with rapid ventricular response.  She received IV beta-blocker and converted.  Bisoprolol was increased to 10 mg and she was started on Eliquis 5 mg twice a day.   Echo showed EF 65% with borderline concentric hypertrophy grade 2 diastolic dysfunction with pseudonormalization.  Saline test are positive for ev    Bleeding hemorrhoid 3/8/2019    Diverticulosis     External hemorrhoid 6/11/2019 12/6/2019 Isidra Eisenberg MD   Continue vasculera daily. Drink plenty of fluids.  Avoid constipation.  Follow-up with Dr. Hebert as planned.    Hx of colonic polyps 2003    Hypertension     Intraductal papilloma     R intraductal patheloma    Left upper extremity numbness     L upper extremity numbness-ER visit; neuropathy    Overweight (BMI 25.0-29.9) 6/11/2019    Self-catheterizes urinary bladder         Past Surgical History:  Past Surgical History:   Procedure Laterality Date    BREAST LUMPECTOMY  2006    CHOLECYSTECTOMY      DENTAL PROCEDURE  12/13/2021    tooth exteraction     HYSTERECTOMY  1984    VAGINECTOMY  2009        Admitting Doctor:   No admitting provider for patient encounter.    Consulting Provider(s):  Consults       No orders found from 7/1/2024 to 7/31/2024.             Admitting Diagnosis:   The primary encounter  diagnosis was Acute hypoxic respiratory failure. A diagnosis of Hyponatremia was also pertinent to this visit.    Most Recent Vitals:   Vitals:    07/30/24 1510 07/30/24 1619 07/30/24 1722 07/30/24 1800   BP:    131/82   Pulse: 88 75 80 88   Resp:       Temp:       TempSrc:       SpO2: 100% 98% 99%    Weight:       Height:           Active LDAs/IV Access:   Lines, Drains & Airways       Active LDAs       Name Placement date Placement time Site Days    Peripheral IV 07/30/24 1507 Right Antecubital 07/30/24  1507  Antecubital  less than 1                    Labs (abnormal labs have a star):   Labs Reviewed   COMPREHENSIVE METABOLIC PANEL - Abnormal; Notable for the following components:       Result Value    Sodium 123 (*)     Chloride 87 (*)     CO2 20.4 (*)     Alkaline Phosphatase 128 (*)     Total Bilirubin 2.1 (*)     Anion Gap 15.6 (*)     All other components within normal limits    Narrative:     GFR Normal >60  Chronic Kidney Disease <60  Kidney Failure <15    The GFR formula is only valid for adults with stable renal function between ages 18 and 70.   BNP (IN-HOUSE) - Abnormal; Notable for the following components:    proBNP 3,843.0 (*)     All other components within normal limits    Narrative:     This assay is used as an aid in the diagnosis of individuals suspected of having heart failure. It can be used as an aid in the diagnosis of acute decompensated heart failure (ADHF) in patients presenting with signs and symptoms of ADHF to the emergency department (ED). In addition, NT-proBNP of <300 pg/mL indicates ADHF is not likely.    Age Range Result Interpretation  NT-proBNP Concentration (pg/mL:      <50             Positive            >450                   Gray                 300-450                    Negative             <300    50-75           Positive            >900                  Gray                300-900                  Negative            <300      >75             Positive             >1800                  Farah                300-1800                  Negative            <300   SINGLE HS TROPONIN T - Abnormal; Notable for the following components:    HS Troponin T 19 (*)     All other components within normal limits   CBC WITH AUTO DIFFERENTIAL - Abnormal; Notable for the following components:    Lymphocyte % 14.5 (*)     All other components within normal limits   URINALYSIS W/ CULTURE IF INDICATED - Abnormal; Notable for the following components:    Appearance, UA Slightly Cloudy (*)     Bilirubin, UA Small (1+) (*)     Protein, UA Trace (*)     All other components within normal limits    Narrative:     In absence of clinical symptoms, the presence of pyuria, bacteria, and/or nitrites on the urinalysis result does not correlate with infection.  Urine microscopic not indicated.   LACTIC ACID, PLASMA - Abnormal; Notable for the following components:    Lactate 2.1 (*)     All other components within normal limits   BLOOD GAS, ARTERIAL W/CO-OXIMETRY - Abnormal; Notable for the following components:    pCO2, Arterial 30.9 (*)     pO2, Arterial 64.3 (*)     Base Excess, Arterial -2.7 (*)     Hemoglobin, Blood Gas 13.6 (*)     Oxyhemoglobin 91.0 (*)     CO2 Content 21.5 (*)     pCO2, Temperature Corrected 30.9 (*)     pO2, Temperature Corrected 64.3 (*)     All other components within normal limits   COVID-19/FLUA&B/RSV, NP SWAB IN TRANSPORT MEDIA 1 HR TAT - Normal    Narrative:     Fact sheet for providers: https://www.fda.gov/media/653623/download    Fact sheet for patients: https://www.fda.gov/media/954516/download    Test performed by PCR.   PROCALCITONIN - Normal   POCT CREATININE - Normal   COVID PRE-OP / PRE-PROCEDURE SCREENING ORDER (NO ISOLATION)    Narrative:     The following orders were created for panel order COVID PRE-OP / PRE-PROCEDURE SCREENING ORDER (NO ISOLATION) - Swab, Nasopharynx.  Procedure                               Abnormality         Status                     ---------                                -----------         ------                     COVID-19, FLU A/B, RSV P...[[id number]]  Normal              Final result                 Please view results for these tests on the individual orders.   BLOOD CULTURE   BLOOD CULTURE   RAINBOW DRAW    Narrative:     The following orders were created for panel order [place] Draw.  Procedure                               Abnormality         Status                     ---------                               -----------         ------                     Green Top (Gel)[[id number]]                                  Final result               Lavender Top[[id number]]                                     Final result               Gold Top - SST[[id number]]                                   Final result               Gray Top[[id number]]                                         Final result               Light Blue Top[[id number]]                                   Final result                 Please view results for these tests on the individual orders.   BLOOD GAS, ARTERIAL   LACTIC ACID, REFLEX   CBC AND DIFFERENTIAL    Narrative:     The following orders were created for panel order CBC & Differential.  Procedure                               Abnormality         Status                     ---------                               -----------         ------                     CBC Auto Differential[[id number]]        Abnormal            Final result               Scan Slide[[id number]]                                                                    Please view results for these tests on the individual orders.   GREEN TOP   LAVENDER TOP   GOLD TOP - SST   GRAY TOP   LIGHT BLUE TOP       Meds Given in ED:   Medications   sodium chloride 0.9 % flush 10 mL (has no administration in time range)   sodium chloride 0.9 % bolus 500 mL (500 mL Intravenous New Bag 7/30/24 1620)   iopamidol (ISOVUE-370) 76 % injection 100 mL (75 mL Intravenous Given 7/30/24 1557)    ondansetron (ZOFRAN) injection 4 mg (4 mg Intravenous Given 7/30/24 1621)   furosemide (LASIX) injection 40 mg (40 mg Intravenous Given 7/30/24 1800)           Last NIH score:                                                          Dysphagia screening results:        [place] Coma Scale:  No data recorded     CIWA:        Restraint Type:            Isolation Status:  No active isolations

## 2024-07-30 NOTE — FSED PROVIDER NOTE
Subjective  History of Present Illness:    88 year old female hx of atrial fibrillation currently anticoagultaed who presentes with dypsnea and dizziness x 1 week. She denies chest pain, falls. History obtained from both patient and daughter. She states that when she tries to be active she gets very winded.       Nurses Notes reviewed and agree, including vitals, allergies, social history and prior medical history.     REVIEW OF SYSTEMS: All systems reviewed and not pertinent unless noted.  Review of Systems   All other systems reviewed and are negative.      Past Medical History:   Diagnosis Date    Acute cystitis with hematuria 6/25/2021 6/25/2021 Isidra Eisenberg MD  Urine sent for culture.    Atrial fibrillation 2019    Atrial flutter with rapid ventricular response 11/2/2019    Patient presented to the ER 11/2/19 with lightheadedness and was found to be in a flutter with rapid ventricular response.  She received IV beta-blocker and converted.  Bisoprolol was increased to 10 mg and she was started on Eliquis 5 mg twice a day.   Echo showed EF 65% with borderline concentric hypertrophy grade 2 diastolic dysfunction with pseudonormalization.  Saline test are positive for ev    Bleeding hemorrhoid 3/8/2019    Diverticulosis     External hemorrhoid 6/11/2019 12/6/2019 Isidra Eisenberg MD   Continue vasculera daily. Drink plenty of fluids.  Avoid constipation.  Follow-up with Dr. Hebert as planned.    Hx of colonic polyps 2003    Hypertension     Intraductal papilloma     R intraductal patheloma    Left upper extremity numbness     L upper extremity numbness-ER visit; neuropathy    Overweight (BMI 25.0-29.9) 6/11/2019    Self-catheterizes urinary bladder        Allergies:    Levofloxacin, Sulfamethoxazole-trimethoprim, Nitrofuran derivatives, and Nitrofurantoin      Past Surgical History:   Procedure Laterality Date    BREAST LUMPECTOMY  2006    CHOLECYSTECTOMY      DENTAL PROCEDURE  12/13/2021    tooth  "exteraction     HYSTERECTOMY  1984    VAGINECTOMY  2009         Social History     Socioeconomic History    Marital status:    Tobacco Use    Smoking status: Never    Smokeless tobacco: Never   Vaping Use    Vaping status: Never Used   Substance and Sexual Activity    Alcohol use: Never     Alcohol/week: 1.0 standard drink of alcohol     Types: 1 Glasses of wine per week    Drug use: Never    Sexual activity: Defer         Family History   Problem Relation Age of Onset    Hyperlipidemia Other     Hypertension Other     Coronary artery disease Other     No Known Problems Mother     No Known Problems Father        Objective  Physical Exam:  /82   Pulse 88   Temp 97.8 °F (36.6 °C) (Oral)   Resp 20   Ht 162.6 cm (64\")   Wt 60 kg (132 lb 4.8 oz)   SpO2 99%   BMI 22.71 kg/m²      Physical Exam  Constitutional:       Appearance: Normal appearance.   HENT:      Head: Normocephalic and atraumatic.      Nose: Nose normal.      Mouth/Throat:      Mouth: Mucous membranes are moist.   Eyes:      Extraocular Movements: Extraocular movements intact.      Conjunctiva/sclera: Conjunctivae normal.   Cardiovascular:      Rate and Rhythm: Normal rate and regular rhythm.   Pulmonary:      Effort: Pulmonary effort is normal. No respiratory distress.   Abdominal:      General: There is no distension.      Comments: Atraumatic    Musculoskeletal:         General: Normal range of motion.      Cervical back: Normal range of motion and neck supple.   Skin:     General: Skin is warm and dry.   Neurological:      General: No focal deficit present.      Mental Status: She is alert.      Comments: Moving all extremities spontaneously    Psychiatric:         Mood and Affect: Mood normal.         Behavior: Behavior normal.         Critical Care    Performed by: Maximiliano Rosario MD  Authorized by: Maximiliano Rosario MD    Critical care provider statement:     Critical care time (minutes):  33    Critical care time was " exclusive of:  Separately billable procedures and treating other patients    Critical care was necessary to treat or prevent imminent or life-threatening deterioration of the following conditions:  Respiratory failure, shock and metabolic crisis    Critical care was time spent personally by me on the following activities:  Ordering and performing treatments and interventions, ordering and review of laboratory studies, ordering and review of radiographic studies, pulse oximetry, re-evaluation of patient's condition, review of old charts, obtaining history from patient or surrogate, examination of patient, evaluation of patient's response to treatment and development of treatment plan with patient or surrogate      ED Course:         Lab Results (last 24 hours)       Procedure Component Value Units Date/Time    CBC & Differential [[id number]]  (Abnormal) Collected: 07/30/24 1502    Specimen: Blood Updated: 07/30/24 8981    Narrative:      The following orders were created for panel order CBC & Differential.  Procedure                               Abnormality         Status                     ---------                               -----------         ------                     CBC Auto Differential[[id number]]        Abnormal            Final result               Scan Slide[[id number]]                                                                    Please view results for these tests on the individual orders.    Comprehensive Metabolic Panel [[id number]]  (Abnormal) Collected: 07/30/24 1502    Specimen: Blood Updated: 07/30/24 5619     Glucose 97 mg/dL      BUN 14 mg/dL      Creatinine 0.79 mg/dL      Sodium 123 mmol/L      Potassium 4.0 mmol/L      Comment: Specimen hemolyzed.  Result may be falsely elevated.        Chloride 87 mmol/L      CO2 20.4 mmol/L      Calcium 8.9 mg/dL      Total Protein 7.2 g/dL      Albumin 4.4 g/dL      ALT (SGPT) 7 U/L      AST (SGOT) 28 U/L      Alkaline Phosphatase 128 U/L      Total  Bilirubin 2.1 mg/dL      Globulin 2.8 gm/dL      A/G Ratio 1.6 g/dL      BUN/Creatinine Ratio 17.7     Anion Gap 15.6 mmol/L      eGFR 72.1 mL/min/1.73     Narrative:      GFR Normal >60  Chronic Kidney Disease <60  Kidney Failure <15    The GFR formula is only valid for adults with stable renal function between ages 18 and 70.    BNP [[id number]]  (Abnormal) Collected: 07/30/24 1502    Specimen: Blood Updated: 07/30/24 1538     proBNP 3,843.0 pg/mL     Narrative:      This assay is used as an aid in the diagnosis of individuals suspected of having heart failure. It can be used as an aid in the diagnosis of acute decompensated heart failure (ADHF) in patients presenting with signs and symptoms of ADHF to the emergency department (ED). In addition, NT-proBNP of <300 pg/mL indicates ADHF is not likely.    Age Range Result Interpretation  NT-proBNP Concentration (pg/mL:      <50             Positive            >450                   Gray                 300-450                    Negative             <300    50-75           Positive            >900                  Gray                300-900                  Negative            <300      >75             Positive            >1800                  Gray                300-1800                  Negative            <300    Single High Sensitivity Troponin T [[id number]]  (Abnormal) Collected: 07/30/24 1502    Specimen: Blood Updated: 07/30/24 1538     HS Troponin T 19 ng/L     CBC Auto Differential [[id number]]  (Abnormal) Collected: 07/30/24 1502    Specimen: Blood Updated: 07/30/24 1539     WBC 5.98 10*3/mm3      RBC 4.67 10*6/mm3      Hemoglobin 14.3 g/dL      Hematocrit 42.6 %      MCV 91.2 fL      MCH 30.6 pg      MCHC 33.6 g/dL      RDW 14.5 %      RDW-SD 49.0 fl      MPV 10.4 fL      Platelets 209 10*3/mm3      Neutrophil % 72.3 %      Lymphocyte % 14.5 %      Monocyte % 10.5 %      Eosinophil % 1.2 %      Basophil % 1.2 %      Immature Grans % 0.3 %      Neutrophils,  Absolute 4.32 10*3/mm3      Lymphocytes, Absolute 0.87 10*3/mm3      Monocytes, Absolute 0.63 10*3/mm3      Eosinophils, Absolute 0.07 10*3/mm3      Basophils, Absolute 0.07 10*3/mm3      Immature Grans, Absolute 0.02 10*3/mm3     Lactic Acid, Plasma [[id number]]  (Abnormal) Collected: 07/30/24 1502    Specimen: Blood Updated: 07/30/24 1541     Lactate 2.1 mmol/L     Procalcitonin [[id number]]  (Normal) Collected: 07/30/24 1502    Specimen: Blood Updated: 07/30/24 1533     Procalcitonin 0.06 ng/mL     POC Creatinine [[id number]]  (Normal) Collected: 07/30/24 1503    Specimen: Blood Updated: 07/30/24 1521     Creatinine 0.80 mg/dL      Comment: Serial Number: [id number]:  [id number]       COVID PRE-OP / PRE-PROCEDURE SCREENING ORDER (NO ISOLATION) - Swab, Nasopharynx [[id number]]  (Normal) Collected: 07/30/24 1508    Specimen: Swab from Nasopharynx Updated: 07/30/24 1552    Narrative:      The following orders were created for panel order COVID PRE-OP / PRE-PROCEDURE SCREENING ORDER (NO ISOLATION) - Swab, Nasopharynx.  Procedure                               Abnormality         Status                     ---------                               -----------         ------                     COVID-19, FLU A/B, RSV P...[[id number]]  Normal              Final result                 Please view results for these tests on the individual orders.    COVID-19, FLU A/B, RSV PCR 1 HR TAT - Swab, Nasopharynx [[id number]]  (Normal) Collected: 07/30/24 1508    Specimen: Swab from Nasopharynx Updated: 07/30/24 1552     COVID19 Not Detected     Influenza A PCR Not Detected     Influenza B PCR Not Detected     RSV, PCR Not Detected    Narrative:      Fact sheet for providers: https://www.fda.gov/media/044891/download    Fact sheet for patients: https://www.fda.gov/media/076249/download    Test performed by PCR.    Blood Gas, Arterial With Co-Ox [[id number]]  (Abnormal) Collected: 07/30/24 1535    Specimen: Arterial Blood Updated: 07/30/24  1535     Site Right Radial     Jeison's Test N/A     pH, Arterial 7.433 pH units      pCO2, Arterial 30.9 mm Hg      Comment: 84 Value below reference range        pO2, Arterial 64.3 mm Hg      Comment: 84 Value below reference range        HCO3, Arterial 20.6 mmol/L      Base Excess, Arterial -2.7 mmol/L      Hemoglobin, Blood Gas 13.6 g/dL      Hematocrit, Blood Gas 41.8 %      Oxyhemoglobin 91.0 %      Comment: 84 Value below reference range        Methemoglobin 0.40 %      Carboxyhemoglobin 0.9 %      CO2 Content 21.5 mmol/L      Temperature 37.0     Barometric Pressure for Blood Gas --     Comment: N/A        Modality Room Air     FIO2 21 %      Rate 0 Breaths/minute      PIP 0 cmH2O      Comment: Meter: [id number]     :  [id number]        IPAP 0     EPAP 0     pH, Temp Corrected 7.433 pH Units      pCO2, Temperature Corrected 30.9 mm Hg      pO2, Temperature Corrected 64.3 mm Hg     Urinalysis With Culture If Indicated - Urine, Catheter [[id number]]  (Abnormal) Collected: 07/30/24 1622    Specimen: Urine, Catheter Updated: 07/30/24 1635     Color, UA Yellow     Appearance, UA Slightly Cloudy     pH, UA 6.0     Specific Gravity, UA >=1.030     Glucose, UA Negative     Ketones, UA Negative     Bilirubin, UA Small (1+)     Blood, UA Negative     Protein, UA Trace     Leuk Esterase, UA Negative     Nitrite, UA Negative     Urobilinogen, UA 1.0 E.U./dL    Narrative:      In absence of clinical symptoms, the presence of pyuria, bacteria, and/or nitrites on the urinalysis result does not correlate with infection.  Urine microscopic not indicated.             CT Angiogram Chest Pulmonary Embolism    Result Date: 7/30/2024  CT ANGIOGRAM CHEST PULMONARY EMBOLISM Date of Exam: 7/30/2024 3:48 PM EDT Indication: hypoxia. Comparison: Chest radiograph performed the same day. Chest CT July 30, 2024. CT abdomen pelvis November 3, 2020 Technique: Axial CT images were obtained of the chest after the uneventful  intravenous administration of 75 mL Isovue-370 utilizing pulmonary embolism protocol.  Reconstructed coronal and sagittal images were also obtained. Automated exposure control and  iterative construction methods were used. Findings: There is prominent cardiomegaly with marked enlargement of the right atrium. There is significant reflux of contrast into the IVC and hepatic veins consistent with right heart failure. There is a trace amount of pericardial fluid. There is minimal calcific atherosclerosis of the coronary arteries. There is somewhat limited opacification of the distal segmental and subsegmental pulmonary arteries has the majority of contrast is in the right heart and systemic circulation, presumed to be secondary to heart failure. This does limit assessment of the distal pulmonary arteries. No definite filling defect is seen in the well-opacified central pulmonary arteries. There is a moderate right pleural effusion, primarily in the dependent right thorax. Small amount of nondependent pleural fluid is seen at the right base. There is a trace amount of left pleural fluid. There also appears to be a small amount of free fluid in the upper abdomen. Mild body wall edema. No pneumothorax. Mild consolidation is seen in the lung bases, right greater than left, with mild linear opacities. There is also some mild septal thickening. These findings suggest atelectasis and mild edema. A component of pneumonia is not completely excluded. Calcified mediastinal lymph nodes are likely related to remote granulomatous disease. No definite lymphadenopathy. There is calcific atherosclerosis of the aorta. Limited opacification of the aorta. No definite acute aortic abnormality is seen. Status post cholecystectomy. 4 cm left renal cyst. Small amount of ascites. Osseous demineralization. No definite acute displaced fracture is identified.     Impression: Impression: 1.Prominent cardiomegaly with marked enlargement of the right  atrium and findings of right heart failure. 2.Evaluation of distal pulmonary arteries is limited due to contrast bolus secondary to heart failure. No definite central pulmonary artery filling defect is visualized at this time. 3.Moderate right and trace left pleural effusions. Small amount of ascites and body wall edema. 4.Mild consolidation in the lung bases, right greater than left, with mild septal thickening. Findings suggest atelectasis and mild edema. A component of pneumonia is not completely excluded. Electronically Signed: Ken Greer  7/30/2024 4:44 PM EDT  Workstation ID: [id number]    XR Chest 1 View    Result Date: 7/30/2024  XR CHEST 1 VW Date of Exam: 7/30/2024 2:44 PM EDT Indication: SOA triage protocol Comparison: November 16, 2021 Findings: The heart is enlarged. There is calcific atherosclerosis of the aorta. No pneumothorax. There appears to be a small to moderate right pleural effusion. There are bibasilar opacities, right greater than left. There appear to be mild diffuse interstitial opacities. There is mild prominence of central pulmonary vasculature. There is calcific atherosclerosis of mediastinal lymph nodes.     Impression: Impression: 1.Cardiomegaly with small to moderate right pleural effusion and bibasilar opacities, right greater than left. Findings could be related to pneumonia, edema, or atelectasis. 2.Mild diffuse interstitial opacities which could indicate mild edema. Electronically Signed: Ken Greer  7/30/2024 3:12 PM EDT  Workstation ID: [id number]        MDM     Amount and/or Complexity of Data Reviewed  Decide to obtain previous medical records or to obtain history from someone other than the patient: yes        DDX: includes but is not limited to: sepsis, anemia, PE, ACS    Pertinent features from physical exam: hypoxic but does not appear in resp distress.    Initial diagnostic plan: EKG, labs, ct PE    Results from initial plan were reviewed and interpreted by me revealing  hyponatremia (patient reports hx of this-takes salt tablets) patient also noted to have pulm edema. Per chart review she is on lasix. She states she takes it on PRN basis and has not used it in awhile. She had US done six months ago that showed EF ~ 55%. Patient is now requiring oxygen and will need another echo    Diagnostic information from other sources: chart review    Interventions / Re-evaluation: BIPAP    Medications   sodium chloride 0.9 % flush 10 mL (has no administration in time range)   sodium chloride 0.9 % bolus 500 mL (500 mL Intravenous New Bag 7/30/24 1620)   iopamidol (ISOVUE-370) 76 % injection 100 mL (75 mL Intravenous Given 7/30/24 1557)   ondansetron (ZOFRAN) injection 4 mg (4 mg Intravenous Given 7/30/24 1621)   furosemide (LASIX) injection 40 mg (40 mg Intravenous Given 7/30/24 1800)       Results/clinical rationale were discussed with patient/daugther    Consultations/Discussion of results with other physicians: none    Data interpreted: Nursing notes reviewed, vital signs reviewed.  Labs independently interpreted by me .  Imaging independently interpreted by me.  EKG independently interpreted by me.      Counseling: Discussed the results above with the patient regarding need for admission or discharge.  Patient understands and agrees plan of care.      -----  ED Disposition       ED Disposition   Send to Specialty Department    Condition   --    Comment   Level of Care: Telemetry [5]   Diagnosis: Hypoxic respiratory failure [[id number]]   Bed Request Comments: 9   Patient Class: Outpatient [102]               Final diagnoses:   Acute hypoxic respiratory failure   Hyponatremia     Your Follow-Up Providers    Follow-up information has not been specified.       Contact information for after-discharge care    Follow-up information has not been specified.          Your medication list        CHANGE how you take these medications        Instructions Last Dose Given Next Dose Due   psyllium 58.12 %  packet  Commonly known as: METAMUCIL MULTIHEALTH FIBER  What changed:   when to take this  additional instructions      Take 1 packet by mouth Daily.              CONTINUE taking these medications        Instructions Last Dose Given Next Dose Due   acetaminophen 325 MG tablet  Commonly known as: TYLENOL      Take 2 tablets by mouth Every 4 (Four) Hours As Needed for Mild Pain.       apixaban 2.5 MG tablet tablet  Commonly known as: ELIQUIS      Take 1 tablet by mouth 2 (Two) Times a Day.       Biofreeze 4 % gel  Generic drug: Menthol (Topical Analgesic)      Apply  topically 2 (Two) Times a Day As Needed. To bilateral knees       bisoprolol 5 MG tablet  Commonly known as: ZEBeta      Take 3 tablets by mouth once daily       docusate sodium 100 MG capsule  Commonly known as: COLACE      Take 1 capsule by mouth 2 (Two) Times a Day.       EQUATE STOOL SOFTENER 100 MG capsule  Generic drug: docusate sodium      Take 1 capsule by mouth twice daily       folic acid 1 MG tablet  Commonly known as: FOLVITE      Take 2 tablets by mouth Daily.       furosemide 40 MG tablet  Commonly known as: LASIX      Take 1 tablet by mouth Daily As Needed (swelling ankles). Take one tablet by mouth once daily in the morning       gabapentin 100 MG capsule  Commonly known as: NEURONTIN      Take 2 capsules by mouth At Night As Needed (leg pain).       lisinopril 10 MG tablet  Commonly known as: PRINIVIL,ZESTRIL      TAKE 1 TABLET BY MOUTH ONCE DAILY AT NIGHT AT BEDTIME       meclizine 25 MG tablet  Commonly known as: ANTIVERT      Take 0.5 tablets by mouth Daily As Needed for Dizziness.       MiraLax 17 g packet  Generic drug: polyethylene glycol      Take 17 g by mouth Every Other Day. prn       ondansetron ODT 4 MG disintegrating tablet  Commonly known as: ZOFRAN-ODT      Place 1 tablet on the tongue 4 (Four) Times a Day As Needed for Nausea or Vomiting.       Rollator Ultra-Light misc      Use 1 each Daily.       saccharomyces boulardii  250 MG capsule  Commonly known as: FLORASTOR      Take 1 capsule by mouth As Needed. As needed after a round of antibiotics       sodium chloride 1 g tablet      Take 1 tablet by mouth 2 (Two) Times a Day.       Vasculera tablet      Take 1 each by mouth 2 (Two) Times a Day. Take one tablet by mouth twice daily       vitamin B-12 1000 MCG tablet  Commonly known as: CYANOCOBALAMIN      Take 1 tablet by mouth Daily.       Vitamin D3 25 MCG (1000 UT) capsule      Take 1 capsule by mouth Daily.

## 2024-07-30 NOTE — TELEPHONE ENCOUNTER
Spoke to pt. She asked for message to be sent on [employer] since she is hard of hearing. Message sent.

## 2024-07-31 ENCOUNTER — APPOINTMENT (OUTPATIENT)
Facility: HOSPITAL | Age: 89
End: 2024-07-31
Payer: MEDICARE

## 2024-07-31 ENCOUNTER — TELEPHONE (OUTPATIENT)
Dept: INTERNAL MEDICINE | Facility: CLINIC | Age: 89
End: 2024-07-31

## 2024-07-31 VITALS
TEMPERATURE: 97.3 F | OXYGEN SATURATION: 95 % | WEIGHT: 132.3 LBS | RESPIRATION RATE: 20 BRPM | HEART RATE: 79 BPM | DIASTOLIC BLOOD PRESSURE: 63 MMHG | SYSTOLIC BLOOD PRESSURE: 105 MMHG | BODY MASS INDEX: 22.59 KG/M2 | HEIGHT: 64 IN

## 2024-07-31 PROBLEM — R06.00 DYSPNEA: Status: ACTIVE | Noted: 2021-08-10

## 2024-07-31 PROBLEM — R79.89 ELEVATED BRAIN NATRIURETIC PEPTIDE (BNP) LEVEL: Status: ACTIVE | Noted: 2024-07-31

## 2024-07-31 PROBLEM — R42 DIZZINESS: Status: ACTIVE | Noted: 2024-07-31

## 2024-07-31 PROBLEM — R06.00 DYSPNEA: Status: RESOLVED | Noted: 2021-08-10 | Resolved: 2024-07-31

## 2024-07-31 PROBLEM — R79.89 ELEVATED TROPONIN: Status: ACTIVE | Noted: 2024-07-31

## 2024-07-31 PROBLEM — R42 DIZZINESS: Status: RESOLVED | Noted: 2024-07-31 | Resolved: 2024-07-31

## 2024-07-31 PROBLEM — R79.89 ELEVATED TROPONIN: Status: RESOLVED | Noted: 2024-07-31 | Resolved: 2024-07-31

## 2024-07-31 PROBLEM — J96.01 ACUTE HYPOXIC RESPIRATORY FAILURE: Status: ACTIVE | Noted: 2024-07-31

## 2024-07-31 PROBLEM — J96.01 ACUTE HYPOXIC RESPIRATORY FAILURE: Status: RESOLVED | Noted: 2024-07-31 | Resolved: 2024-07-31

## 2024-07-31 LAB
ALBUMIN SERPL-MCNC: 3.7 G/DL (ref 3.5–5.2)
ALBUMIN SERPL-MCNC: 3.9 G/DL (ref 3.5–5.2)
ALBUMIN/GLOB SERPL: 1.7 G/DL
ALBUMIN/GLOB SERPL: 1.7 G/DL
ALP SERPL-CCNC: 105 U/L (ref 39–117)
ALP SERPL-CCNC: 113 U/L (ref 39–117)
ALT SERPL W P-5'-P-CCNC: 5 U/L (ref 1–33)
ALT SERPL W P-5'-P-CCNC: 8 U/L (ref 1–33)
ANION GAP SERPL CALCULATED.3IONS-SCNC: 10 MMOL/L (ref 5–15)
ANION GAP SERPL CALCULATED.3IONS-SCNC: 12.1 MMOL/L (ref 5–15)
ANION GAP SERPL CALCULATED.3IONS-SCNC: 9.8 MMOL/L (ref 5–15)
AST SERPL-CCNC: 22 U/L (ref 1–32)
AST SERPL-CCNC: 23 U/L (ref 1–32)
BASOPHILS # BLD AUTO: 0.07 10*3/MM3 (ref 0–0.2)
BASOPHILS # BLD AUTO: 0.07 10*3/MM3 (ref 0–0.2)
BASOPHILS NFR BLD AUTO: 1.2 % (ref 0–1.5)
BASOPHILS NFR BLD AUTO: 1.2 % (ref 0–1.5)
BH CV ECHO MEAS - AI P1/2T: 473.9 MSEC
BH CV ECHO MEAS - AO MAX PG: 4 MMHG
BH CV ECHO MEAS - AO MEAN PG: 2.5 MMHG
BH CV ECHO MEAS - AO ROOT DIAM: 2.6 CM
BH CV ECHO MEAS - AO V2 MAX: 99.4 CM/SEC
BH CV ECHO MEAS - AO V2 VTI: 20.9 CM
BH CV ECHO MEAS - AVA(I,D): 1.36 CM2
BH CV ECHO MEAS - EDV(CUBED): 68.9 ML
BH CV ECHO MEAS - EDV(MOD-SP2): 53.2 ML
BH CV ECHO MEAS - EDV(MOD-SP4): 48.6 ML
BH CV ECHO MEAS - EF(MOD-BP): 60.2 %
BH CV ECHO MEAS - EF(MOD-SP2): 55.5 %
BH CV ECHO MEAS - EF(MOD-SP4): 67.9 %
BH CV ECHO MEAS - ESV(CUBED): 19.7 ML
BH CV ECHO MEAS - ESV(MOD-SP2): 23.7 ML
BH CV ECHO MEAS - ESV(MOD-SP4): 15.6 ML
BH CV ECHO MEAS - FS: 34.1 %
BH CV ECHO MEAS - IVS/LVPW: 1 CM
BH CV ECHO MEAS - IVSD: 0.9 CM
BH CV ECHO MEAS - LA DIMENSION: 5.3 CM
BH CV ECHO MEAS - LAT PEAK E' VEL: 8.7 CM/SEC
BH CV ECHO MEAS - LV MASS(C)D: 114.1 GRAMS
BH CV ECHO MEAS - LV MAX PG: 1.09 MMHG
BH CV ECHO MEAS - LV MEAN PG: 0.5 MMHG
BH CV ECHO MEAS - LV V1 MAX: 52.1 CM/SEC
BH CV ECHO MEAS - LV V1 VTI: 10.1 CM
BH CV ECHO MEAS - LVIDD: 4.1 CM
BH CV ECHO MEAS - LVIDS: 2.7 CM
BH CV ECHO MEAS - LVOT AREA: 2.8 CM2
BH CV ECHO MEAS - LVOT DIAM: 1.9 CM
BH CV ECHO MEAS - LVPWD: 0.9 CM
BH CV ECHO MEAS - MED PEAK E' VEL: 7.5 CM/SEC
BH CV ECHO MEAS - MR MAX PG: 87.4 MMHG
BH CV ECHO MEAS - MR MAX VEL: 467.5 CM/SEC
BH CV ECHO MEAS - MR MEAN PG: 52 MMHG
BH CV ECHO MEAS - MR MEAN VEL: 335 CM/SEC
BH CV ECHO MEAS - MR VTI: 144 CM
BH CV ECHO MEAS - MV A MAX VEL: 23.3 CM/SEC
BH CV ECHO MEAS - MV DEC TIME: 0.18 SEC
BH CV ECHO MEAS - MV E MAX VEL: 135 CM/SEC
BH CV ECHO MEAS - MV E/A: 5.8
BH CV ECHO MEAS - MV MAX PG: 7.7 MMHG
BH CV ECHO MEAS - MV MEAN PG: 3 MMHG
BH CV ECHO MEAS - MV V2 VTI: 29.7 CM
BH CV ECHO MEAS - MVA(VTI): 0.96 CM2
BH CV ECHO MEAS - PA ACC TIME: 0.14 SEC
BH CV ECHO MEAS - PA V2 MAX: 84.7 CM/SEC
BH CV ECHO MEAS - PI END-D VEL: 101 CM/SEC
BH CV ECHO MEAS - RAP SYSTOLE: 8 MMHG
BH CV ECHO MEAS - RF(MV,LVOT)(1DIAM): 0.83 CM
BH CV ECHO MEAS - RVSP: 49 MMHG
BH CV ECHO MEAS - SV(LVOT): 28.5 ML
BH CV ECHO MEAS - SV(MOD-SP2): 29.5 ML
BH CV ECHO MEAS - SV(MOD-SP4): 33 ML
BH CV ECHO MEAS - TAPSE (>1.6): 1.99 CM
BH CV ECHO MEAS - TR MAX PG: 41 MMHG
BH CV ECHO MEAS - TR MAX VEL: 319 CM/SEC
BH CV ECHO MEASUREMENTS AVERAGE E/E' RATIO: 16.67
BH CV VAS BP RIGHT ARM: NORMAL MMHG
BH CV XLRA - RV BASE: 4.3 CM
BH CV XLRA - RV LENGTH: 6.6 CM
BH CV XLRA - RV MID: 4.2 CM
BH CV XLRA - TDI S': 9.8 CM/SEC
BILIRUB SERPL-MCNC: 1.9 MG/DL (ref 0–1.2)
BILIRUB SERPL-MCNC: 2.1 MG/DL (ref 0–1.2)
BUN SERPL-MCNC: 12 MG/DL (ref 8–23)
BUN/CREAT SERPL: 15 (ref 7–25)
BUN/CREAT SERPL: 15.8 (ref 7–25)
BUN/CREAT SERPL: 17.6 (ref 7–25)
CALCIUM SPEC-SCNC: 8.4 MG/DL (ref 8.6–10.5)
CALCIUM SPEC-SCNC: 8.5 MG/DL (ref 8.6–10.5)
CALCIUM SPEC-SCNC: 8.6 MG/DL (ref 8.6–10.5)
CHLORIDE SERPL-SCNC: 91 MMOL/L (ref 98–107)
CHLORIDE SERPL-SCNC: 92 MMOL/L (ref 98–107)
CHLORIDE SERPL-SCNC: 93 MMOL/L (ref 98–107)
CO2 SERPL-SCNC: 22.9 MMOL/L (ref 22–29)
CO2 SERPL-SCNC: 23.2 MMOL/L (ref 22–29)
CO2 SERPL-SCNC: 26 MMOL/L (ref 22–29)
CREAT SERPL-MCNC: 0.68 MG/DL (ref 0.57–1)
CREAT SERPL-MCNC: 0.76 MG/DL (ref 0.57–1)
CREAT SERPL-MCNC: 0.8 MG/DL (ref 0.57–1)
D-LACTATE SERPL-SCNC: 1 MMOL/L (ref 0.5–2)
DEPRECATED RDW RBC AUTO: 47.3 FL (ref 37–54)
DEPRECATED RDW RBC AUTO: 47.9 FL (ref 37–54)
EGFRCR SERPLBLD CKD-EPI 2021: 71 ML/MIN/1.73
EGFRCR SERPLBLD CKD-EPI 2021: 75.5 ML/MIN/1.73
EGFRCR SERPLBLD CKD-EPI 2021: 83.9 ML/MIN/1.73
EOSINOPHIL # BLD AUTO: 0.06 10*3/MM3 (ref 0–0.4)
EOSINOPHIL # BLD AUTO: 0.09 10*3/MM3 (ref 0–0.4)
EOSINOPHIL NFR BLD AUTO: 1 % (ref 0.3–6.2)
EOSINOPHIL NFR BLD AUTO: 1.6 % (ref 0.3–6.2)
ERYTHROCYTE [DISTWIDTH] IN BLOOD BY AUTOMATED COUNT: 14.4 % (ref 12.3–15.4)
ERYTHROCYTE [DISTWIDTH] IN BLOOD BY AUTOMATED COUNT: 14.4 % (ref 12.3–15.4)
GLOBULIN UR ELPH-MCNC: 2.2 GM/DL
GLOBULIN UR ELPH-MCNC: 2.3 GM/DL
GLUCOSE SERPL-MCNC: 101 MG/DL (ref 65–99)
GLUCOSE SERPL-MCNC: 103 MG/DL (ref 65–99)
GLUCOSE SERPL-MCNC: 123 MG/DL (ref 65–99)
HCT VFR BLD AUTO: 36.5 % (ref 34–46.6)
HCT VFR BLD AUTO: 37.5 % (ref 34–46.6)
HGB BLD-MCNC: 12.5 G/DL (ref 12–15.9)
HGB BLD-MCNC: 13 G/DL (ref 12–15.9)
IMM GRANULOCYTES # BLD AUTO: 0.01 10*3/MM3 (ref 0–0.05)
IMM GRANULOCYTES # BLD AUTO: 0.02 10*3/MM3 (ref 0–0.05)
IMM GRANULOCYTES NFR BLD AUTO: 0.2 % (ref 0–0.5)
IMM GRANULOCYTES NFR BLD AUTO: 0.3 % (ref 0–0.5)
LEFT ATRIUM VOLUME INDEX: 44.1 ML/M2
LYMPHOCYTES # BLD AUTO: 0.98 10*3/MM3 (ref 0.7–3.1)
LYMPHOCYTES # BLD AUTO: 1.19 10*3/MM3 (ref 0.7–3.1)
LYMPHOCYTES NFR BLD AUTO: 17.1 % (ref 19.6–45.3)
LYMPHOCYTES NFR BLD AUTO: 19.8 % (ref 19.6–45.3)
MCH RBC QN AUTO: 30.8 PG (ref 26.6–33)
MCH RBC QN AUTO: 31 PG (ref 26.6–33)
MCHC RBC AUTO-ENTMCNC: 34.2 G/DL (ref 31.5–35.7)
MCHC RBC AUTO-ENTMCNC: 34.7 G/DL (ref 31.5–35.7)
MCV RBC AUTO: 89.3 FL (ref 79–97)
MCV RBC AUTO: 89.9 FL (ref 79–97)
MONOCYTES # BLD AUTO: 0.63 10*3/MM3 (ref 0.1–0.9)
MONOCYTES # BLD AUTO: 0.68 10*3/MM3 (ref 0.1–0.9)
MONOCYTES NFR BLD AUTO: 10.5 % (ref 5–12)
MONOCYTES NFR BLD AUTO: 11.8 % (ref 5–12)
NEUTROPHILS NFR BLD AUTO: 3.9 10*3/MM3 (ref 1.7–7)
NEUTROPHILS NFR BLD AUTO: 4.04 10*3/MM3 (ref 1.7–7)
NEUTROPHILS NFR BLD AUTO: 67.3 % (ref 42.7–76)
NEUTROPHILS NFR BLD AUTO: 68 % (ref 42.7–76)
NT-PROBNP SERPL-MCNC: 3161 PG/ML (ref 0–1800)
NT-PROBNP SERPL-MCNC: 3293 PG/ML (ref 0–1800)
PLATELET # BLD AUTO: 181 10*3/MM3 (ref 140–450)
PLATELET # BLD AUTO: 186 10*3/MM3 (ref 140–450)
PMV BLD AUTO: 10.2 FL (ref 6–12)
PMV BLD AUTO: 10.4 FL (ref 6–12)
POTASSIUM SERPL-SCNC: 3.6 MMOL/L (ref 3.5–5.2)
POTASSIUM SERPL-SCNC: 4.2 MMOL/L (ref 3.5–5.2)
POTASSIUM SERPL-SCNC: 4.6 MMOL/L (ref 3.5–5.2)
PROT SERPL-MCNC: 5.9 G/DL (ref 6–8.5)
PROT SERPL-MCNC: 6.2 G/DL (ref 6–8.5)
QT INTERVAL: 406 MS
QTC INTERVAL: 471 MS
RBC # BLD AUTO: 4.06 10*6/MM3 (ref 3.77–5.28)
RBC # BLD AUTO: 4.2 10*6/MM3 (ref 3.77–5.28)
SODIUM SERPL-SCNC: 126 MMOL/L (ref 136–145)
SODIUM SERPL-SCNC: 127 MMOL/L (ref 136–145)
SODIUM SERPL-SCNC: 127 MMOL/L (ref 136–145)
TROPONIN T SERPL HS-MCNC: 19 NG/L
TROPONIN T SERPL HS-MCNC: 21 NG/L
WBC NRBC COR # BLD AUTO: 5.74 10*3/MM3 (ref 3.4–10.8)
WBC NRBC COR # BLD AUTO: 6 10*3/MM3 (ref 3.4–10.8)

## 2024-07-31 PROCEDURE — 25810000003 SODIUM CHLORIDE 0.9 % SOLUTION: Performed by: NURSE PRACTITIONER

## 2024-07-31 PROCEDURE — 93306 TTE W/DOPPLER COMPLETE: CPT | Performed by: INTERNAL MEDICINE

## 2024-07-31 PROCEDURE — 83880 ASSAY OF NATRIURETIC PEPTIDE: CPT

## 2024-07-31 PROCEDURE — 85025 COMPLETE CBC W/AUTO DIFF WBC: CPT

## 2024-07-31 PROCEDURE — 84484 ASSAY OF TROPONIN QUANT: CPT

## 2024-07-31 PROCEDURE — 93306 TTE W/DOPPLER COMPLETE: CPT

## 2024-07-31 PROCEDURE — 96376 TX/PRO/DX INJ SAME DRUG ADON: CPT

## 2024-07-31 PROCEDURE — 83605 ASSAY OF LACTIC ACID: CPT

## 2024-07-31 PROCEDURE — 25010000002 FUROSEMIDE PER 20 MG: Performed by: NURSE PRACTITIONER

## 2024-07-31 PROCEDURE — 80053 COMPREHEN METABOLIC PANEL: CPT

## 2024-07-31 RX ORDER — SODIUM CHLORIDE 9 MG/ML
50 INJECTION, SOLUTION INTRAVENOUS CONTINUOUS
Status: DISCONTINUED | OUTPATIENT
Start: 2024-07-31 | End: 2024-07-31 | Stop reason: HOSPADM

## 2024-07-31 RX ORDER — POTASSIUM CHLORIDE 1.5 G/1.58G
40 POWDER, FOR SOLUTION ORAL EVERY 4 HOURS
Status: DISPENSED | OUTPATIENT
Start: 2024-07-31 | End: 2024-07-31

## 2024-07-31 RX ORDER — ASPIRIN 81 MG/1
324 TABLET, CHEWABLE ORAL ONCE
Status: COMPLETED | OUTPATIENT
Start: 2024-07-31 | End: 2024-07-31

## 2024-07-31 RX ORDER — FUROSEMIDE 10 MG/ML
80 INJECTION INTRAMUSCULAR; INTRAVENOUS ONCE
Status: COMPLETED | OUTPATIENT
Start: 2024-07-31 | End: 2024-07-31

## 2024-07-31 RX ADMIN — POTASSIUM CHLORIDE 40 MEQ: 1.5 FOR SOLUTION ORAL at 04:08

## 2024-07-31 RX ADMIN — APIXABAN 2.5 MG: 5 TABLET, FILM COATED ORAL at 08:22

## 2024-07-31 RX ADMIN — ASPIRIN 81 MG CHEWABLE TABLET 324 MG: 81 TABLET CHEWABLE at 04:08

## 2024-07-31 RX ADMIN — SODIUM CHLORIDE 50 ML/HR: 9 INJECTION, SOLUTION INTRAVENOUS at 06:33

## 2024-07-31 RX ADMIN — FUROSEMIDE 80 MG: 10 INJECTION, SOLUTION INTRAMUSCULAR; INTRAVENOUS at 09:17

## 2024-07-31 RX ADMIN — Medication 1000 MG: at 08:23

## 2024-07-31 NOTE — DISCHARGE SUMMARY
Patient Name: Thuy Clark  : 1935  MRN: [id number]    Date of Admission: 2024  Date of Discharge:  24   Primary Care Physician: Isidra Eisenberg MD      Presenting Problem:   Hypoxic respiratory failure [J96.91]    Active and Resolved Hospital Problems:  Active Hospital Problems    Diagnosis POA    Elevated brain natriuretic peptide (BNP) level [R79.89] Yes    Hyponatremia [E87.1] Yes      Resolved Hospital Problems    Diagnosis POA    **Acute hypoxic respiratory failure [J96.01] Yes    Dizziness [R42] Yes    Elevated troponin [R79.89] Yes    Dyspnea [R06.00] Yes         Hospital Course:     Patient is a 88 y.o. female with history of A-fib and hyponatremia who presented to the ED yesterday with complaints of dizziness and shortness of breath past week.  Patient states that she has dyspnea upon exertion that is worsened over the past week.  Patient was brought to the ED for further evaluation.  Upon arrival, patient's SpO2 was 88% and then 77% room air.  Patient was placed on BiPAP at that time.  Patient's labs showed an elevated troponin of 19, a proBNP of 3843, a lactate of 2.1, and a sodium of 123.  Patient's CXR showed cardiomegaly and mild edema.  Patient had CT PE which was not of concern.  Patient was admitted to the CDU due to acute hypoxic respiratory failure and hyponatremia.  Was admitted for further evaluation, trending of labs, and further cardiac testing with echocardiogram in the morning.     Patient to be discharged home with family after ECHO resulted.  Findings discussed at length with family and recommended patient follow up with her Cardiologist.  Patient is on room air, SpO2 95% and edema on bilateral ankles has disappeared after aggressive diuresis.  Patient and family understand discharge plan and agree.    Nurses Notes reviewed and agree, including vitals, allergies, social history and prior medical history.     REVIEW OF SYSTEMS: All systems reviewed and not  pertinent unless noted.  Review of Systems   Constitutional:  Positive for activity change and fatigue.   HENT: Negative.     Respiratory: Negative.     Cardiovascular: Negative.    Gastrointestinal: Negative.    Genitourinary: Negative.    Musculoskeletal: Negative.    Neurological: Negative.    Psychiatric/Behavioral: Negative.         Past Medical History:   Diagnosis Date    Acute cystitis with hematuria 6/25/2021 6/25/2021 Isidra Eisenberg MD  Urine sent for culture.    Atrial fibrillation 2019    Atrial flutter with rapid ventricular response 11/2/2019    Patient presented to the ER 11/2/19 with lightheadedness and was found to be in a flutter with rapid ventricular response.  She received IV beta-blocker and converted.  Bisoprolol was increased to 10 mg and she was started on Eliquis 5 mg twice a day.   Echo showed EF 65% with borderline concentric hypertrophy grade 2 diastolic dysfunction with pseudonormalization.  Saline test are positive for ev    Bleeding hemorrhoid 3/8/2019    Diverticulosis     External hemorrhoid 6/11/2019 12/6/2019 Isidra Eisenberg MD   Continue vasculera daily. Drink plenty of fluids.  Avoid constipation.  Follow-up with Dr. Hebert as planned.    Hx of colonic polyps 2003    Hypertension     Intraductal papilloma     R intraductal patheloma    Left upper extremity numbness     L upper extremity numbness-ER visit; neuropathy    Overweight (BMI 25.0-29.9) 6/11/2019    Self-catheterizes urinary bladder        Allergies:    Levofloxacin, Sulfamethoxazole-trimethoprim, Nitrofuran derivatives, and Nitrofurantoin      Past Surgical History:   Procedure Laterality Date    BREAST LUMPECTOMY  2006    CHOLECYSTECTOMY      DENTAL PROCEDURE  12/13/2021    tooth exteraction     HYSTERECTOMY  1984    VAGINECTOMY  2009         Social History     Socioeconomic History    Marital status:    Tobacco Use    Smoking status: Never    Smokeless tobacco: Never   Vaping Use    Vaping status:  "Never Used   Substance and Sexual Activity    Alcohol use: Never     Alcohol/week: 1.0 standard drink of alcohol     Types: 1 Glasses of wine per week    Drug use: Never    Sexual activity: Defer         Family History   Problem Relation Age of Onset    Hyperlipidemia Other     Hypertension Other     Coronary artery disease Other     No Known Problems Mother     No Known Problems Father        Objective  Physical Exam:  /63 (BP Location: Left arm, Patient Position: Lying)   Pulse 79   Temp 97.3 °F (36.3 °C) (Axillary)   Resp 20   Ht 162.6 cm (64\")   Wt 60 kg (132 lb 4.8 oz)   SpO2 95%   BMI 22.71 kg/m²      Physical Exam  Vitals and nursing note reviewed.   Constitutional:       Appearance: Normal appearance.   HENT:      Head: Normocephalic and atraumatic.   Cardiovascular:      Rate and Rhythm: Normal rate. Rhythm irregular.   Pulmonary:      Effort: Pulmonary effort is normal.      Breath sounds: Normal breath sounds.   Abdominal:      General: Abdomen is flat.      Palpations: Abdomen is soft.   Musculoskeletal:         General: Normal range of motion.   Skin:     General: Skin is warm and dry.   Neurological:      General: No focal deficit present.      Mental Status: She is alert and oriented to person, place, and time.   Psychiatric:         Mood and Affect: Mood normal.         Behavior: Behavior normal.         Procedures    Adult Transthoracic Echo Complete w/ Color, Spectral and Contrast (If Necessary Per Protocol)    Result Date: 7/31/2024    Left ventricular systolic function is normal. Left ventricular ejection fraction appears to be 56 - 60%.   Left ventricular diastolic function is consistent with (grade III w/high LAP) reversible restrictive pattern.   Mildly reduced right ventricular systolic function noted.   The right ventricular cavity is moderately dilated.   The left atrial cavity is severely dilated.   Patent foramen ovale present with left to right shunting indicated by color " flow Doppler.   The right atrial cavity is severely  dilated.   There is moderate calcification of the aortic valve.   Moderate aortic valve regurgitation is present.   Moderate to severe mitral valve regurgitation is present with an eccentric jet noted.   Moderate tricuspid valve regurgitation is present.   Estimated right ventricular systolic pressure from tricuspid regurgitation is moderately elevated (45-55 mmHg).   The main pulmonary artery is mildly dilated.   There is a left pleural effusion. There is a right pleural effusion. Moderate to severe Mitral valve regurgitation now present compared to 2019 echo.     CT Angiogram Chest Pulmonary Embolism    Result Date: 7/30/2024  CT ANGIOGRAM CHEST PULMONARY EMBOLISM Date of Exam: 7/30/2024 3:48 PM EDT Indication: hypoxia. Comparison: Chest radiograph performed the same day. Chest CT July 30, 2024. CT abdomen pelvis November 3, 2020 Technique: Axial CT images were obtained of the chest after the uneventful intravenous administration of 75 mL Isovue-370 utilizing pulmonary embolism protocol.  Reconstructed coronal and sagittal images were also obtained. Automated exposure control and  iterative construction methods were used. Findings: There is prominent cardiomegaly with marked enlargement of the right atrium. There is significant reflux of contrast into the IVC and hepatic veins consistent with right heart failure. There is a trace amount of pericardial fluid. There is minimal calcific atherosclerosis of the coronary arteries. There is somewhat limited opacification of the distal segmental and subsegmental pulmonary arteries has the majority of contrast is in the right heart and systemic circulation, presumed to be secondary to heart failure. This does limit assessment of the distal pulmonary arteries. No definite filling defect is seen in the well-opacified central pulmonary arteries. There is a moderate right pleural effusion, primarily in the dependent right  thorax. Small amount of nondependent pleural fluid is seen at the right base. There is a trace amount of left pleural fluid. There also appears to be a small amount of free fluid in the upper abdomen. Mild body wall edema. No pneumothorax. Mild consolidation is seen in the lung bases, right greater than left, with mild linear opacities. There is also some mild septal thickening. These findings suggest atelectasis and mild edema. A component of pneumonia is not completely excluded. Calcified mediastinal lymph nodes are likely related to remote granulomatous disease. No definite lymphadenopathy. There is calcific atherosclerosis of the aorta. Limited opacification of the aorta. No definite acute aortic abnormality is seen. Status post cholecystectomy. 4 cm left renal cyst. Small amount of ascites. Osseous demineralization. No definite acute displaced fracture is identified.     Impression: Impression: 1.Prominent cardiomegaly with marked enlargement of the right atrium and findings of right heart failure. 2.Evaluation of distal pulmonary arteries is limited due to contrast bolus secondary to heart failure. No definite central pulmonary artery filling defect is visualized at this time. 3.Moderate right and trace left pleural effusions. Small amount of ascites and body wall edema. 4.Mild consolidation in the lung bases, right greater than left, with mild septal thickening. Findings suggest atelectasis and mild edema. A component of pneumonia is not completely excluded. Electronically Signed: Ken Greer  7/30/2024 4:44 PM EDT  Workstation ID: [id number]    XR Chest 1 View    Result Date: 7/30/2024  XR CHEST 1 VW Date of Exam: 7/30/2024 2:44 PM EDT Indication: SOA triage protocol Comparison: November 16, 2021 Findings: The heart is enlarged. There is calcific atherosclerosis of the aorta. No pneumothorax. There appears to be a small to moderate right pleural effusion. There are bibasilar opacities, right greater than  left. There appear to be mild diffuse interstitial opacities. There is mild prominence of central pulmonary vasculature. There is calcific atherosclerosis of mediastinal lymph nodes.     Impression: Impression: 1.Cardiomegaly with small to moderate right pleural effusion and bibasilar opacities, right greater than left. Findings could be related to pneumonia, edema, or atelectasis. 2.Mild diffuse interstitial opacities which could indicate mild edema. Electronically Signed: Ken Greer  7/30/2024 3:12 PM EDT  Workstation ID: [id number]          Lab 07/31/24  0231 07/30/24  1854 07/30/24  1502   LACTATE 1.0 1.2 2.1*       Site   Date Value Ref Range Status   07/30/2024 Right Radial  Final     Jeison's Test   Date Value Ref Range Status   07/30/2024 N/A  Final     pH, Arterial   Date Value Ref Range Status   07/30/2024 7.433 7.350 - 7.450 pH units Final     pCO2, Arterial   Date Value Ref Range Status   07/30/2024 30.9 (L) 35.0 - 45.0 mm Hg Final     Comment:     84 Value below reference range     pO2, Arterial   Date Value Ref Range Status   07/30/2024 64.3 (L) 83.0 - 108.0 mm Hg Final     Comment:     84 Value below reference range     HCO3, Arterial   Date Value Ref Range Status   07/30/2024 20.6 20.0 - 26.0 mmol/L Final     Base Excess, Arterial   Date Value Ref Range Status   07/30/2024 -2.7 (L) 0.0 - 2.0 mmol/L Final     Hemoglobin, Blood Gas   Date Value Ref Range Status   07/30/2024 13.6 (L) 14 - 18 g/dL Final     Hematocrit, Blood Gas   Date Value Ref Range Status   07/30/2024 41.8 % Final     Oxyhemoglobin   Date Value Ref Range Status   07/30/2024 91.0 (L) 94 - 99 % Final     Comment:     84 Value below reference range     Methemoglobin   Date Value Ref Range Status   07/30/2024 0.40 0.00 - 1.50 % Final     Carboxyhemoglobin   Date Value Ref Range Status   07/30/2024 0.9 0 - 2 % Final     CO2 Content   Date Value Ref Range Status   07/30/2024 21.5 (L) 22 - 33 mmol/L Final     Barometric Pressure for Blood Gas    Date Value Ref Range Status   07/30/2024   Final     Comment:     N/A     Modality   Date Value Ref Range Status   07/30/2024 Room Air  Final     FIO2   Date Value Ref Range Status   07/30/2024 21 % Final       Results from last 7 days   Lab Units 07/31/24  0620 07/31/24  0231 07/30/24  2109   HSTROP T ng/L 19* 21* 15*       Results from last 7 days   Lab Units 07/31/24  0620 07/31/24  0231 07/30/24  1502   WBC 10*3/mm3 5.74 6.00 5.98   HEMOGLOBIN g/dL 12.5 13.0 14.3   HEMATOCRIT % 36.5 37.5 42.6   PLATELETS 10*3/mm3 181 186 209       Results from last 7 days   Lab Units 07/31/24  1235 07/31/24  0620 07/31/24  0231 07/30/24  1503 07/30/24  1502   SODIUM mmol/L 127* 126* 127*   < > 123*   POTASSIUM mmol/L 4.2 4.6 3.6   < > 4.0   CHLORIDE mmol/L 91* 93* 92*   < > 87*   CO2 mmol/L 26.0 23.2 22.9   < > 20.4*   BUN mg/dL 12 12 12   < > 14   CREATININE mg/dL 0.80 0.76 0.68   < > 0.79   CALCIUM mg/dL 8.6 8.4* 8.5*   < > 8.9   BILIRUBIN mg/dL  --  1.9* 2.1*  --  2.1*   ALK PHOS U/L  --  105 113  --  128*   ALT (SGPT) U/L  --  5 8  --  7   AST (SGOT) U/L  --  22 23  --  28   GLUCOSE mg/dL 123* 103* 101*   < > 97    < > = values in this interval not displayed.       Lab Results   Component Value Date    CHOL 121 12/19/2023    TRIG 34 12/19/2023    HDL 37 (L) 12/19/2023    LDL 75 12/19/2023             ED Disposition       ED Disposition   Send to Specialty Department    Condition   --    Comment   Level of Care: Telemetry [5]   Diagnosis: Hypoxic respiratory failure [1975148]   Bed Request Comments: 9   Patient Class: Outpatient [102]                      Discharge Medication List:      Your medication list        CHANGE how you take these medications        Instructions Last Dose Given Next Dose Due   psyllium 58.12 % packet  Commonly known as: METAMUCIL MULTIHEALTH FIBER  What changed:   when to take this  additional instructions      Take 1 packet by mouth Daily.              CONTINUE taking these medications         Instructions Last Dose Given Next Dose Due   acetaminophen 325 MG tablet  Commonly known as: TYLENOL      Take 2 tablets by mouth Every 4 (Four) Hours As Needed for Mild Pain.       apixaban 2.5 MG tablet tablet  Commonly known as: ELIQUIS      Take 1 tablet by mouth 2 (Two) Times a Day.       Biofreeze 4 % gel  Generic drug: Menthol (Topical Analgesic)      Apply  topically 2 (Two) Times a Day As Needed. To bilateral knees       bisoprolol 5 MG tablet  Commonly known as: ZEBeta      Take 3 tablets by mouth once daily       docusate sodium 100 MG capsule  Commonly known as: COLACE      Take 1 capsule by mouth 2 (Two) Times a Day.       EQUATE STOOL SOFTENER 100 MG capsule  Generic drug: docusate sodium      Take 1 capsule by mouth twice daily       folic acid 1 MG tablet  Commonly known as: FOLVITE      Take 2 tablets by mouth Daily.       furosemide 40 MG tablet  Commonly known as: LASIX      Take 1 tablet by mouth Daily As Needed (swelling ankles). Take one tablet by mouth once daily in the morning       gabapentin 100 MG capsule  Commonly known as: NEURONTIN      Take 2 capsules by mouth At Night As Needed (leg pain).       lisinopril 10 MG tablet  Commonly known as: PRINIVIL,ZESTRIL      TAKE 1 TABLET BY MOUTH ONCE DAILY AT NIGHT AT BEDTIME       meclizine 25 MG tablet  Commonly known as: ANTIVERT      Take 0.5 tablets by mouth Daily As Needed for Dizziness.       MiraLax 17 g packet  Generic drug: polyethylene glycol      Take 17 g by mouth Every Other Day. prn       ondansetron ODT 4 MG disintegrating tablet  Commonly known as: ZOFRAN-ODT      Place 1 tablet on the tongue 4 (Four) Times a Day As Needed for Nausea or Vomiting.       Rollator Ultra-Light misc      Use 1 each Daily.       saccharomyces boulardii 250 MG capsule  Commonly known as: FLORASTOR      Take 1 capsule by mouth As Needed. As needed after a round of antibiotics       sodium chloride 1 g tablet      Take 1 tablet by mouth 2 (Two) Times a  Day.       Vasculera tablet      Take 1 each by mouth 2 (Two) Times a Day. Take one tablet by mouth twice daily       vitamin B-12 1000 MCG tablet  Commonly known as: CYANOCOBALAMIN      Take 1 tablet by mouth Daily.       Vitamin D3 25 MCG (1000 UT) capsule      Take 1 capsule by mouth Daily.                 Vinayak Law, APRN   07/31/24   14:27 EDT       Time Spent on Discharge:  I spent  90  minutes on this discharge activity which included: face-to-face encounter with the patient, reviewing the data in the system, coordination of the care with the nursing staff as well as consultants, documentation, and entering orders. Patient was in the CDU 18 hours.

## 2024-07-31 NOTE — TELEPHONE ENCOUNTER
Caller: Thuy Clark    Relationship: Self    Best call back number: [phone]     What form or medical record are you requesting: LETTER STATING THAT PATIENT IS NOT WELL ENOUGH TO GO DOWN TO THE [employer] TO GET HER FOOD. NEED PERMISSION TO HAVE THEM BRING IT TO HER.    Who is requesting this form or medical record from you: DAVID STEWART    How would you like to receive the form or medical records (pick-up, mail, fax): MAIL    If mail, what is the address: [address] [place], [place] [id number]       Timeframe paperwork needed: AS SOON AS POSSIBLE

## 2024-07-31 NOTE — PLAN OF CARE
Goal Outcome Evaluation:      Vital signs stable. Patient weaned off oxygen with O2 sats remaining >92% on room air. Patient ambulated to bathroom with standby assist-tolerated well. Patient states that she I/O catheterizes herself at home. [place] catheter removed without difficulties.   Provided discharge instructions to both patient and her son. Both verbalized understanding/readback.Discharged to home.                                         Dupixent Pregnancy And Lactation Text: This medication likely crosses the placenta but the risk for the fetus is uncertain. This medication is excreted in breast milk.

## 2024-07-31 NOTE — H&P
[facility]     Progress Note    Patient Name: Thuy Clark  : 1935  MRN: [id number]  Primary Care Physician:  Isidra Eisenberg MD  Date of admission: 2024    Subjective   Subjective     Chief Complaint: Dizziness, shortness of breath    History of Present Illness  I was available for consultation with the midlevel I agree with the assessment and plan      Dizziness    Patient is a 88 y.o. female with history of A-fib and hyponatremia who presented to the ED yesterday with complaints of dizziness and shortness of breath past week.  Patient states that she has dyspnea upon exertion that is worsened over the past week.  Patient was brought to the ED for further evaluation.  Upon arrival, patient's SpO2 was 88% and then 77% room air.  Patient was placed on BiPAP at that time.  Patient's labs showed an elevated troponin of 19, a proBNP of 3843, a lactate of 2.1, and a sodium of 123.  Patient's CXR showed cardiomegaly and mild edema.  Patient had CT PE which was not of concern.  Patient was admitted to the CDU due to acute hypoxic respiratory failure and hyponatremia.  Was admitted for further evaluation, trending of labs, and further cardiac testing with echocardiogram in the morning.    Review of Systems   Neurological:  Positive for dizziness.   All other systems reviewed and are negative.    Objective   Objective     Vitals:   Temp:  [97.6 °F (36.4 °C)-97.9 °F (36.6 °C)] 97.6 °F (36.4 °C)  Heart Rate:  [75-94] 78  Resp:  [18-22] 21  BP: (106-131)/(69-89) 108/76  Flow (L/min):  [2-45] 2    Physical Exam  Constitutional:       General: She is not in acute distress.     Appearance: Normal appearance. She is not ill-appearing.   HENT:      Head: Normocephalic and atraumatic.   Eyes:      Extraocular Movements: Extraocular movements intact.      Pupils: Pupils are equal, round, and reactive to light.   Cardiovascular:      Rate and Rhythm: Normal rate. Rhythm irregular.   Pulmonary:      Effort:  Pulmonary effort is normal. No respiratory distress.      Breath sounds: Normal breath sounds.   Abdominal:      General: Abdomen is flat. Bowel sounds are normal.      Palpations: Abdomen is soft.   Musculoskeletal:         General: Swelling (mild in bilateral ankles) present.      Cervical back: Normal range of motion and neck supple.   Skin:     General: Skin is warm and dry.   Neurological:      General: No focal deficit present.      Mental Status: She is alert and oriented to person, place, and time.   Psychiatric:         Mood and Affect: Mood normal.         Behavior: Behavior normal.        Result Review    Result Review:  I have personally reviewed the results from the time of this admission to 7/31/2024 03:39 EDT and agree with these findings:  [x]  Laboratory list / accordion  [x]  Microbiology  [x]  Radiology  [x]  EKG/Telemetry   []  Cardiology/Vascular   []  Pathology  []  Old records  []  Other:  Most notable findings include:   CT PE: Prominent cardiomegaly, mild consolidation of the lung bases  EKG: Atrial fibrillation, RBBB, bifascicular block  Troponin: 19, 15, 21  proBNP: 3843, 3293  Sodium: 123, 124, 127    Assessment & Plan   Assessment / Plan     Brief Patient Summary:  Thuy Clark is a 88 y.o. female with history of A-fib and hyponatremia who presented to the ED yesterday with complaints of shortness of breath and dizziness over the past week.  Patient was placed on BiPAP on arrival to ED due to SpO2 in the 70s-80s.  Patient had elevated troponin, proBNP, lactate, and low sodium with a CXR showing cardiomegaly and mild edema.  Patient was admitted to the CDU for further evaluation.  Upon arrival to the CDU, patient was weaned off BiPAP and placed on 2 L NC.  Patient's SpO2 remained in upper 90s.  Was given sodium chloride tablets and was advised to eat sodium filled food.  BMP was rechecked and showed sodium increased to 127.  Patient's troponin went from 19, to 15, to 21.  Was given  324 mg aspirin.  Will recheck troponin with a.m. labs.  Patient's proBNP also decreased from 3843 to 3293.  Patient will have labs drawn and echocardiogram in the morning.  Pending lab results and cardiac results, patient will be discharged with follow-up to cardiology.    Active Hospital Problems:  Active Hospital Problems    Diagnosis     **Acute hypoxic respiratory failure     Dizziness     Elevated troponin     Elevated brain natriuretic peptide (BNP) level     Dyspnea     Hyponatremia      Plan:   #Dyspnea #elevated BNP  - proBNP: 3843, 3293  - Given 40 mg Lasix  - Continue trending proBNP  - Echo scheduled for morning  - Follow-up with cardiology    #Acute hypoxic respiratory failure  - SpO2 88% and 77% on RA upon arrival  - Placed on BiPAP, weaned to 2L NC in CDU with SpO2 upper 90s  - Wean patient off O2 in AM    #Hyponatremia  - Sodium: 123, 124, 127  - Given sodium chloride 1000 mg  - Continue trending sodium    #A-fib  - EKG: Consistent with A-fib  - Continue Eliquis    #HTN  - Continue lisinopril  - Continue bisoprolol    #PPx  - Not clinically indicated for this patient    Disposition:  I expect patient to be discharged 07/31/2024, pending a.m. lab results, echocardiogram results, and clinical assessment.    Cole Brown PA-C

## 2024-08-02 ENCOUNTER — TELEPHONE (OUTPATIENT)
Dept: INTERNAL MEDICINE | Facility: CLINIC | Age: 89
End: 2024-08-02
Payer: MEDICARE

## 2024-08-02 NOTE — TELEPHONE ENCOUNTER
PT CALLED AND STATED WHEN SHE SAW DR. ORDAZ LAST SHE WANTED HER CARDIOLOGY RECORDS FROM [facility].  PT WANTS TO LET DR. ORDAZ SHE SAW THE CARDIOLOGIST AND SHE CAN FIND THOSE RECORDS IN HER EPIC CHART.

## 2024-08-04 LAB
BACTERIA SPEC AEROBE CULT: NORMAL
BACTERIA SPEC AEROBE CULT: NORMAL

## 2024-08-09 RX ORDER — BISOPROLOL FUMARATE 5 MG/1
15 TABLET, FILM COATED ORAL DAILY
Qty: 270 TABLET | Refills: 0 | Status: SHIPPED | OUTPATIENT
Start: 2024-08-09

## 2024-08-09 NOTE — TELEPHONE ENCOUNTER
Rx Refill Note  Requested Prescriptions     Pending Prescriptions Disp Refills    bisoprolol (ZEBeta) 5 MG tablet [Pharmacy Med Name: Bisoprolol Fumarate 5 MG Oral Tablet] 270 tablet 0     Sig: Take 3 tablets by mouth once daily      Last office visit with prescribing clinician: 5/3/2024   Last telemedicine visit with prescribing clinician: Visit date not found   Next office visit with prescribing clinician: 8/28/2024       Beta-Blockers Protocol [id number]/09/2024 01:02 PM   Protocol Details No active pregnancy on record    BP under 140/90 in past year    No positive pregnancy test in past 12 months    Recent or future visit with authorizing provider        Refills sent to pharmacy.    Sonia Kim MA  08/09/24, 13:10 EDT

## 2024-08-19 ENCOUNTER — TELEPHONE (OUTPATIENT)
Dept: INTERNAL MEDICINE | Facility: CLINIC | Age: 89
End: 2024-08-19
Payer: MEDICARE

## 2024-10-03 ENCOUNTER — TELEPHONE (OUTPATIENT)
Dept: INTERNAL MEDICINE | Facility: CLINIC | Age: 89
End: 2024-10-03

## 2024-10-03 NOTE — TELEPHONE ENCOUNTER
Caller: Thuy Clark    Relationship: Self    Best call back number: [phone]    What was the call regarding: PATIENT WILL HAVE 5 TEETH REMOVED. SHE HAS A SUPPLY OF GABAPENTIN. SHE WOULD LIKE TO KNOW IF THIS WOULD HELP WITH THE REMOVAL PAIN AND IF THIS WOULD BE SAFE FOR HER TO TAKE.    Is it okay if the provider responds through MyChart: NO

## 2024-11-11 RX ORDER — FOLIC ACID 1 MG/1
2000 TABLET ORAL DAILY
Qty: 180 TABLET | Refills: 0 | Status: SHIPPED | OUTPATIENT
Start: 2024-11-11

## 2024-11-16 DIAGNOSIS — G62.9 PERIPHERAL POLYNEUROPATHY: Chronic | ICD-10-CM

## 2024-11-18 RX ORDER — GABAPENTIN 100 MG/1
CAPSULE ORAL
Qty: 90 CAPSULE | Refills: 0 | Status: SHIPPED | OUTPATIENT
Start: 2024-11-18

## 2024-11-25 RX ORDER — BISOPROLOL FUMARATE 5 MG/1
15 TABLET, FILM COATED ORAL DAILY
Qty: 270 TABLET | Refills: 0 | Status: SHIPPED | OUTPATIENT
Start: 2024-11-25

## 2024-12-18 RX ORDER — SACCHAROMYCES BOULARDII 250 MG
250 CAPSULE ORAL 2 TIMES DAILY
Qty: 180 CAPSULE | Refills: 3 | Status: SHIPPED | OUTPATIENT
Start: 2024-12-18

## 2025-01-11 DIAGNOSIS — G62.9 PERIPHERAL POLYNEUROPATHY: Chronic | ICD-10-CM

## 2025-01-13 RX ORDER — GABAPENTIN 100 MG/1
CAPSULE ORAL
Qty: 90 CAPSULE | Refills: 0 | Status: SHIPPED | OUTPATIENT
Start: 2025-01-13

## 2025-01-31 ENCOUNTER — OFFICE VISIT (OUTPATIENT)
Dept: INTERNAL MEDICINE | Facility: CLINIC | Age: OVER 89
End: 2025-01-31
Payer: MEDICARE

## 2025-01-31 VITALS
HEIGHT: 64 IN | BODY MASS INDEX: 22.77 KG/M2 | TEMPERATURE: 98 F | SYSTOLIC BLOOD PRESSURE: 114 MMHG | HEART RATE: 106 BPM | DIASTOLIC BLOOD PRESSURE: 64 MMHG | OXYGEN SATURATION: 94 % | WEIGHT: 133.4 LBS

## 2025-01-31 DIAGNOSIS — R05.1 ACUTE COUGH: ICD-10-CM

## 2025-01-31 DIAGNOSIS — J10.1 INFLUENZA A: Primary | ICD-10-CM

## 2025-01-31 LAB
EXPIRATION DATE: ABNORMAL
FLUAV AG UPPER RESP QL IA.RAPID: DETECTED
FLUBV AG UPPER RESP QL IA.RAPID: NOT DETECTED
INTERNAL CONTROL: ABNORMAL
Lab: ABNORMAL
SARS-COV-2 AG UPPER RESP QL IA.RAPID: NOT DETECTED

## 2025-01-31 PROCEDURE — 87428 SARSCOV & INF VIR A&B AG IA: CPT | Performed by: INTERNAL MEDICINE

## 2025-01-31 PROCEDURE — 1160F RVW MEDS BY RX/DR IN RCRD: CPT | Performed by: INTERNAL MEDICINE

## 2025-01-31 PROCEDURE — 1159F MED LIST DOCD IN RCRD: CPT | Performed by: INTERNAL MEDICINE

## 2025-01-31 PROCEDURE — 99213 OFFICE O/P EST LOW 20 MIN: CPT | Performed by: INTERNAL MEDICINE

## 2025-01-31 PROCEDURE — 1126F AMNT PAIN NOTED NONE PRSNT: CPT | Performed by: INTERNAL MEDICINE

## 2025-01-31 RX ORDER — OSELTAMIVIR PHOSPHATE 75 MG/1
75 CAPSULE ORAL 2 TIMES DAILY
Qty: 10 CAPSULE | Refills: 0 | Status: SHIPPED | OUTPATIENT
Start: 2025-01-31

## 2025-01-31 NOTE — PROGRESS NOTES
[facility]     Thuy Clark  1935   [id number]      Patient Care Team:  Isidra Eisenberg MD as PCP - General (Internal Medicine)  Isidra Eisenberg MD as PCP - Internal Medicine  Azam Marroquin MD as Consulting Physician (Urology)  Lili Hebert MD as Consulting Physician (Gastroenterology)  Isidra Eisenberg MD as Consulting Physician (Internal Medicine)  Valerio Moctezuma MD as Surgeon (Orthopedic Surgery)  Kaye Aleman MD as Consulting Physician (Cardiology)  Nikos Ornelas Jr., MD as Consulting Physician (General Surgery)    Chief Complaint::   Chief Complaint   Patient presents with    Wheezing    Cough    Joint Swelling        HPI  History of Present Illness  The patient is an 89-year-old female who presents with complaints of cough and wheezing.    She reports a persistent cough that has been present for approximately one week, which she suspects may have been contracted from her son. Initially, the cough was productive, but it has since become non-productive. She also describes a sensation of tingling in her chest during respiration. She reports no associated myalgia.    She has atrial fibrillation and is on a diuretic for her ankles.    Supplemental Information  She is scheduled for dental surgery on 02/15/2025.      Chronic Conditions:      Patient Active Problem List   Diagnosis    Pulmonary hypertension    Benign essential hypertension    Mixed hyperlipidemia    Bleeding hemorrhoid    Atrial septal defect determined by imaging    Abnormal glucose    Generalized anxiety disorder    Valvular heart disease    Senile osteoporosis    PVC's (premature ventricular contractions)    Calculus of gallbladder    Urinary retention    Chronic pain of both knees    Osteoarthritis    Quadriceps weakness    Constipation, slow transit    Gait disorder    Primary osteoarthritis of both knees    At high risk for falls    Medicare annual wellness visit, subsequent     Hyponatremia    Chronic anticoagulation (Eliquis)    Permanent atrial fibrillation    Altered mental status    Foot pain, bilateral    Mild protein-calorie malnutrition    Bilateral edema of lower extremity    Acute nonintractable headache    Tinnitus of both ears    Poor nutrition    Chronic combined systolic and diastolic heart failure    Multiple closed fractures of pelvis without disruption of pelvic ring with routine healing    NSTEMI (non-ST elevated myocardial infarction)    Closed fracture of one rib of left side with routine healing    Mild cognitive impairment    Sensorineural hearing loss (SNHL) of both ears    Poor balance    Peripheral polyneuropathy    Non-seasonal allergic rhinitis    Elevated brain natriuretic peptide (BNP) level        Past Medical History:   Diagnosis Date    Acute cystitis with hematuria 6/25/2021 6/25/2021 Isidra Eisenberg MD  Urine sent for culture.    Atrial fibrillation 2019    Atrial flutter with rapid ventricular response 11/2/2019    Patient presented to the ER 11/2/19 with lightheadedness and was found to be in a flutter with rapid ventricular response.  She received IV beta-blocker and converted.  Bisoprolol was increased to 10 mg and she was started on Eliquis 5 mg twice a day.   Echo showed EF 65% with borderline concentric hypertrophy grade 2 diastolic dysfunction with pseudonormalization.  Saline test are positive for ev    Bleeding hemorrhoid 3/8/2019    Diverticulosis     External hemorrhoid 6/11/2019 12/6/2019 Isidra Eisenberg MD   Continue vasculera daily. Drink plenty of fluids.  Avoid constipation.  Follow-up with Dr. Hebert as planned.    Hx of colonic polyps 2003    Hypertension     Intraductal papilloma     R intraductal patheloma    Left upper extremity numbness     L upper extremity numbness-ER visit; neuropathy    Overweight (BMI 25.0-29.9) 6/11/2019    Self-catheterizes urinary bladder        Past Surgical History:   Procedure Laterality Date     BREAST LUMPECTOMY  2006    CHOLECYSTECTOMY      DENTAL PROCEDURE  12/13/2021    tooth exteraction     HYSTERECTOMY  1984    VAGINECTOMY  2009       Family History   Problem Relation Age of Onset    Hyperlipidemia Other     Hypertension Other     Coronary artery disease Other     No Known Problems Mother     No Known Problems Father        Social History     Socioeconomic History    Marital status:    Tobacco Use    Smoking status: Never    Smokeless tobacco: Never   Vaping Use    Vaping status: Never Used   Substance and Sexual Activity    Alcohol use: Never     Alcohol/week: 1.0 standard drink of alcohol     Types: 1 Glasses of wine per week    Drug use: Never    Sexual activity: Defer       Allergies   Allergen Reactions    Levofloxacin Other (See Comments)     itching  Other reaction(s): Other (See Comments)  itching    Sulfamethoxazole-Trimethoprim Unknown - Low Severity    Nitrofuran Derivatives Rash     primarily in b/l hands    Nitrofurantoin Rash         Current Outpatient Medications:     acetaminophen (TYLENOL) 325 MG tablet, Take 2 tablets by mouth Every 4 (Four) Hours As Needed for Mild Pain., Disp: , Rfl:     apixaban (ELIQUIS) 2.5 MG tablet tablet, Take 1 tablet by mouth 2 (Two) Times a Day., Disp: 180 tablet, Rfl: 1    bisoprolol (ZEBeta) 5 MG tablet, Take 3 tablets by mouth once daily, Disp: 270 tablet, Rfl: 0    Cholecalciferol (Vitamin D3) 25 MCG (1000 UT) capsule, Take 1 capsule by mouth Daily., Disp: , Rfl:     Dietary Management Product (Vasculera) tablet, Take 1 each by mouth 2 (Two) Times a Day. Take one tablet by mouth twice daily, Disp: 180 tablet, Rfl: 3    docusate sodium (COLACE) 100 MG capsule, Take 1 capsule by mouth 2 (Two) Times a Day., Disp: 180 capsule, Rfl: 1    EQUATE STOOL SOFTENER 100 MG capsule, Take 1 capsule by mouth twice daily, Disp: 180 capsule, Rfl: 0    folic acid (FOLVITE) 1 MG tablet, Take 2 tablets by mouth once daily, Disp: 180 tablet, Rfl: 0    furosemide  (LASIX) 40 MG tablet, Take 1 tablet by mouth Daily As Needed (swelling ankles). Take one tablet by mouth once daily in the morning, Disp: , Rfl:     gabapentin (NEURONTIN) 100 MG capsule, TAKE 3 CAPSULES BY MOUTH AT NIGHT AS NEEDED FOR  LEG  PAIN, Disp: 90 capsule, Rfl: 0    lisinopril (PRINIVIL,ZESTRIL) 10 MG tablet, TAKE 1 TABLET BY MOUTH ONCE DAILY AT NIGHT AT BEDTIME, Disp: 90 tablet, Rfl: 0    meclizine (ANTIVERT) 25 MG tablet, Take 0.5 tablets by mouth Daily As Needed for Dizziness., Disp: 15 tablet, Rfl: 1    Menthol, Topical Analgesic, (Biofreeze) 4 % gel, Apply  topically 2 (Two) Times a Day As Needed. To bilateral knees, Disp: , Rfl:     Misc. Devices (Rollator Ultra-Light) misc, Use 1 each Daily., Disp: 1 each, Rfl: 0    ondansetron ODT (ZOFRAN-ODT) 4 MG disintegrating tablet, Place 1 tablet on the tongue 4 (Four) Times a Day As Needed for Nausea or Vomiting., Disp: 15 tablet, Rfl: 0    polyethylene glycol (MiraLax) 17 g packet, Take 17 g by mouth Every Other Day. prn, Disp: , Rfl:     psyllium (METAMUCIL MULTIHEALTH FIBER) 58.12 % packet, Take 1 packet by mouth Daily. (Patient taking differently: Take 1 packet by mouth Every Other Day. prn), Disp: , Rfl:     saccharomyces boulardii (FLORASTOR) 250 MG capsule, Take 1 capsule by mouth 2 (Two) Times a Day. As needed after a round of antibiotics, Disp: 180 capsule, Rfl: 3    sodium chloride 1 g tablet, Take 1 tablet by mouth 2 (Two) Times a Day., Disp: 180 tablet, Rfl: 1    vitamin B-12 (CYANOCOBALAMIN) 1000 MCG tablet, Take 1 tablet by mouth Daily., Disp: , Rfl:     oseltamivir (Tamiflu) 75 MG capsule, Take 1 capsule by mouth 2 (Two) Times a Day., Disp: 10 capsule, Rfl: 0    Review of Systems   Constitutional:  Negative for chills and fever.   Respiratory:  Positive for cough.         Vital Signs  Vitals:    01/31/25 1406   BP: 114/64   BP Location: Left arm   Patient Position: Sitting   Cuff Size: Adult   Pulse: 106   Temp: 98 °F (36.7 °C)   TempSrc:  "Infrared   SpO2: 94%   Weight: 60.5 kg (133 lb 6.4 oz)   Height: 162.6 cm (64.02\")   PainSc: 0-No pain       Physical Exam  Vitals reviewed.   Constitutional:       Appearance: Normal appearance. She is well-developed.   HENT:      Head: Normocephalic and atraumatic.      Right Ear: Tympanic membrane and ear canal normal.      Left Ear: Tympanic membrane and ear canal normal.      Mouth/Throat:      Pharynx: Oropharynx is clear. Posterior oropharyngeal erythema present.   Neck:      Vascular: No carotid bruit.   Cardiovascular:      Rate and Rhythm: Normal rate and regular rhythm.      Heart sounds: Normal heart sounds. No murmur heard.  Pulmonary:      Effort: Pulmonary effort is normal.      Comments: Scattered expiratory wheezing  Musculoskeletal:      Cervical back: Normal range of motion.      Right lower leg: Edema present.      Left lower leg: Edema present.   Lymphadenopathy:      Cervical: No cervical adenopathy.   Neurological:      Mental Status: She is alert and oriented to person, place, and time.        Physical Exam  Lungs were auscultated.    Procedures    ACE III MINI        Results             Assessment/Plan:    Diagnoses and all orders for this visit:    1. Influenza A (Primary)    2. Acute cough  -     POCT SARS-CoV-2 + Flu Antigen KLEBER    Other orders  -     oseltamivir (Tamiflu) 75 MG capsule; Take 1 capsule by mouth 2 (Two) Times a Day.  Dispense: 10 capsule; Refill: 0      Assessment & Plan  1. Influenza A.  She is well outside the treatment window for Tamiflu, but given her age and comorbidities, a 5-day course of Tamiflu will be initiated. She is advised to continue using her current nasal treatment to alleviate symptoms. A follow-up appointment with Dr. Eisenberg for a routine checkup later this year has been suggested.    2. Atrial Fibrillation.  She is currently on heart medication and a diuretic for her condition. She is advised to continue her current medications as prescribed by her " cardiologist.      Plan of care reviewed with patient at the conclusion of today's visit. Education was provided regarding diagnosis, management, and any prescribed or recommended OTC medications.Patient verbalizes understanding of and agreement with management plan.     Patient or patient representative verbalized consent for the use of Ambient Listening during the visit with  Usama Reyes MD for chart documentation. 1/31/2025  14:36 EST        Usama Reyes MD

## 2025-02-12 ENCOUNTER — TELEPHONE (OUTPATIENT)
Dept: INTERNAL MEDICINE | Facility: CLINIC | Age: OVER 89
End: 2025-02-12
Payer: MEDICARE

## 2025-02-17 RX ORDER — FOLIC ACID 1 MG/1
2000 TABLET ORAL DAILY
Qty: 180 TABLET | Refills: 0 | Status: SHIPPED | OUTPATIENT
Start: 2025-02-17

## 2025-02-21 RX ORDER — BISOPROLOL FUMARATE 5 MG/1
15 TABLET, FILM COATED ORAL DAILY
Qty: 270 TABLET | Refills: 0 | Status: SHIPPED | OUTPATIENT
Start: 2025-02-21

## 2025-02-24 RX ORDER — ONDANSETRON 4 MG/1
TABLET, ORALLY DISINTEGRATING ORAL
Qty: 15 TABLET | Refills: 0 | Status: SHIPPED | OUTPATIENT
Start: 2025-02-24

## 2025-02-25 ENCOUNTER — TELEPHONE (OUTPATIENT)
Dept: INTERNAL MEDICINE | Facility: CLINIC | Age: OVER 89
End: 2025-02-25

## 2025-02-25 NOTE — TELEPHONE ENCOUNTER
Caller: Thuy Clark    Relationship: Self    Best call back number:      What form or medical record are you requesting: LETTER FROM DR ORDAZ     Who is requesting this form or medical record from you  [facility]    How would you like to receive the form or medical records (pick-up, mail, fax):   If fax, what is the fax number: [phone]   [place]      Timeframe paperwork needed: DR ORDAZ WROTE PATIENT A LETTER LAST  YEAR, AUGUST 31 2024 FOR HER TO HAVE HER MEALS [facility] T TO HER ROOM; THE FACILITY IS NEEDING A COPY OF THAT LETTER FAXED TO THEM;  SHE NEEDS THIS AS SHE IS UNABLE TO WALK TO THE DINING AREA

## 2025-03-04 ENCOUNTER — TELEPHONE (OUTPATIENT)
Dept: INTERNAL MEDICINE | Facility: CLINIC | Age: OVER 89
End: 2025-03-04
Payer: MEDICARE

## 2025-03-04 NOTE — TELEPHONE ENCOUNTER
living facility called as the pt just gave them a letter dated 7/31/2024 stating that she is unable to walk to the dining room & needs meal delivery.  Kaye states the pt walks all over the facility, however, she is having a procedure this coming Monday (possibly dental) that may make this needed.  They would like an updated note that gives a timeframe for meal delivery.  Please fax to [phone].

## 2025-03-07 DIAGNOSIS — G62.9 PERIPHERAL POLYNEUROPATHY: Chronic | ICD-10-CM

## 2025-03-07 RX ORDER — GABAPENTIN 100 MG/1
CAPSULE ORAL
Qty: 90 CAPSULE | Refills: 0 | Status: SHIPPED | OUTPATIENT
Start: 2025-03-07

## 2025-03-07 NOTE — TELEPHONE ENCOUNTER
Rx Refill Note  Requested Prescriptions     Pending Prescriptions Disp Refills    gabapentin (NEURONTIN) 100 MG capsule [Pharmacy Med Name: Gabapentin 100 MG Oral Capsule] 90 capsule 0     Sig: TAKE 3 CAPSULES BY MOUTH AT NIGHT AS NEEDED FOR  LEG  PAIN      Last office visit with prescribing clinician: 5/3/2024   Last telemedicine visit with prescribing clinician: Visit date not found   Next office visit with prescribing clinician: 4/22/2025                         Would you like a call back once the refill request has been completed: [] Yes [] No    If the office needs to give you a call back, can they leave a voicemail: [] Yes [] No    Dinah Clancy LPN  03/07/25, 13:46 EST

## 2025-03-10 NOTE — TELEPHONE ENCOUNTER
I called Urvashi Walsh at [phone] and KELLY Restrepo to send us the time frame needed per Dr. Eisenberg

## 2025-03-12 NOTE — TELEPHONE ENCOUNTER
Mrs Clark called back as she has been looking for the letter.  I informed her that we had made several attempts to contact Kaye as Dr Eisenberg needs a time frame for meal delivery.  Pt states that she is having oral surgery in April, however, she only has 1 tooth on the bottom & it takes her 2 hrs to consume a meal (thus needs to take it in her room).  She is requesting the letter state to have her meals delivered from now until the end of June.  Please fax ASAP as they are charging her for delivery.

## 2025-03-12 NOTE — TELEPHONE ENCOUNTER
PATIENT REQUESTING CALL BACK REGARDING LETTER FOR MEAL DELIVERY TO HER ROOM. PLEASE CALL [phone]

## 2025-03-14 ENCOUNTER — TELEPHONE (OUTPATIENT)
Dept: INTERNAL MEDICINE | Facility: CLINIC | Age: OVER 89
End: 2025-03-14
Payer: MEDICARE

## 2025-03-14 NOTE — TELEPHONE ENCOUNTER
Caller: Thuy Clark    Relationship: Self    Best call back number: [phone]    What is the best time to reach you: ANYTIME     Who are you requesting to speak with (clinical staff, provider,  specific staff member): CLINICAL STAFF      What was the call regarding: THE PATIENT STATES THAT SHE GOT A [employer] MESSAGE ON WEDNESDAY OR THURSDAY THE PATIENT IS NOT ABLE TO ACCESS [employer] SHE SHE WOULD LIKE TO KNOW WHAT THAT MESSAGE IS

## 2025-04-03 ENCOUNTER — READMISSION MANAGEMENT (OUTPATIENT)
Dept: CALL CENTER | Facility: HOSPITAL | Age: OVER 89
End: 2025-04-03
Payer: MEDICARE

## 2025-04-03 NOTE — OUTREACH NOTE
Prep Survey      Flowsheet Row Responses   [facility] facility patient discharged from? Non-[facility]   Is LACE score < 7 ? Non-BH Discharge   Eligibility [facility]   H[facility]   Date of Admission 04/01/25   Date of Discharge 04/03/25   Discharge diagnosis Acute on chronic congestive heart failure   Does the patient have one of the following disease processes/diagnoses(primary or secondary)? CHF   Prep survey completed? Yes            Christa ENCISO - Registered Nurse

## 2025-04-04 ENCOUNTER — TRANSITIONAL CARE MANAGEMENT TELEPHONE ENCOUNTER (OUTPATIENT)
Dept: CALL CENTER | Facility: HOSPITAL | Age: OVER 89
End: 2025-04-04
Payer: MEDICARE

## 2025-04-04 NOTE — OUTREACH NOTE
Call Center TCM Note      Flowsheet Row Responses   [facility] patient discharged from? Non-  [[facility]]   Does the patient have one of the following disease processes/diagnoses(primary or secondary)? CHF   TCM attempt successful? Yes   Call start time 1033   Call end time 1036   Discharge diagnosis Acute on chronic congestive heart failure   Person spoke with today (if not patient) and relationship Patient   Meds reviewed with patient/caregiver? Yes   Is the patient having any side effects they believe may be caused by any medication additions or changes? No   Does the patient have all medications ordered at discharge? Yes   Is the patient taking all medications as directed (includes completed medication regime)? Yes   Comments Patient is requesting an appt for a hospital followup with Dr Eisenberg at 3 pm or after. I do not see any availability at that time. She reports she will likely need lab work done at that time also. She has a wellness appt scheduled on 4/22 but she needs sooner appt . Message sent.   Does the patient have an appointment with their PCP within 7-14 days of discharge? No   Nursing Interventions Routed TCM call to PCP office, PCP office requested to make appointment - message sent   Psychosocial issues? No   Did the patient receive a copy of their discharge instructions? Yes   Nursing interventions Reviewed instructions with patient   What is the patient's perception of their health status since discharge? Improving   Is the patient/caregiver able to teach back signs and symptoms related to disease process for when to call PCP? Yes   Is the patient/caregiver able to teach back signs and symptoms related to disease process for when to call 911? Yes   Is the patient/caregiver able to teach back the hierarchy of who to call/visit for symptoms/problems? PCP, Specialist, Home health nurse, Urgent Care, ED, 911 Yes   If the patient is a current smoker, are they able to teach back resources for  cessation? Not a smoker   TCM call completed? Yes   Wrap up additional comments Patient reports she is feeling better. Her diuretic was adjusted and she is much improved.   Call end time 1036   Would this patient benefit from a Referral to [facility] Social Work? No   Is the patient interested in additional calls from an ambulatory ? No            Varinder BUTT - Registered Nurse    4/4/2025, 10:37 EDT

## 2025-04-04 NOTE — PROGRESS NOTES
I got patient scheduled with [facility] for Wednesday April 9th at 2:15 pm. Patient is requesting lab order to be placed before she comes in next Wednesday.

## 2025-04-09 ENCOUNTER — LAB (OUTPATIENT)
Dept: LAB | Facility: HOSPITAL | Age: OVER 89
End: 2025-04-09
Payer: MEDICARE

## 2025-04-09 ENCOUNTER — OFFICE VISIT (OUTPATIENT)
Dept: INTERNAL MEDICINE | Facility: CLINIC | Age: OVER 89
End: 2025-04-09
Payer: MEDICARE

## 2025-04-09 VITALS
OXYGEN SATURATION: 94 % | SYSTOLIC BLOOD PRESSURE: 104 MMHG | HEART RATE: 96 BPM | DIASTOLIC BLOOD PRESSURE: 62 MMHG | TEMPERATURE: 98.4 F | HEIGHT: 64 IN | WEIGHT: 139.4 LBS | BODY MASS INDEX: 23.8 KG/M2

## 2025-04-09 DIAGNOSIS — I50.42 CHRONIC COMBINED SYSTOLIC AND DIASTOLIC HEART FAILURE: Primary | Chronic | ICD-10-CM

## 2025-04-09 DIAGNOSIS — I38 VALVULAR HEART DISEASE: ICD-10-CM

## 2025-04-09 DIAGNOSIS — I50.42 CHRONIC COMBINED SYSTOLIC AND DIASTOLIC HEART FAILURE: ICD-10-CM

## 2025-04-09 DIAGNOSIS — I10 BENIGN ESSENTIAL HYPERTENSION: ICD-10-CM

## 2025-04-09 DIAGNOSIS — R26.89 POOR BALANCE: ICD-10-CM

## 2025-04-09 DIAGNOSIS — E78.2 MIXED HYPERLIPIDEMIA: ICD-10-CM

## 2025-04-09 DIAGNOSIS — R73.09 ABNORMAL GLUCOSE: ICD-10-CM

## 2025-04-09 DIAGNOSIS — E55.9 VITAMIN D DEFICIENCY: ICD-10-CM

## 2025-04-09 DIAGNOSIS — Z91.81 AT HIGH RISK FOR FALLS: Chronic | ICD-10-CM

## 2025-04-09 DIAGNOSIS — I27.20 PULMONARY HYPERTENSION: ICD-10-CM

## 2025-04-09 DIAGNOSIS — R60.0 BILATERAL EDEMA OF LOWER EXTREMITY: Chronic | ICD-10-CM

## 2025-04-09 DIAGNOSIS — I48.21 PERMANENT ATRIAL FIBRILLATION: ICD-10-CM

## 2025-04-09 LAB
ALBUMIN SERPL-MCNC: 4 G/DL (ref 3.5–5.2)
ALBUMIN/GLOB SERPL: 1.4 G/DL
ALP SERPL-CCNC: 125 U/L (ref 39–117)
ALT SERPL W P-5'-P-CCNC: 8 U/L (ref 1–33)
ANION GAP SERPL CALCULATED.3IONS-SCNC: 9.7 MMOL/L (ref 5–15)
AST SERPL-CCNC: 20 U/L (ref 1–32)
BASOPHILS # BLD AUTO: 0.06 10*3/MM3 (ref 0–0.2)
BASOPHILS NFR BLD AUTO: 1.3 % (ref 0–1.5)
BILIRUB SERPL-MCNC: 1.2 MG/DL (ref 0–1.2)
BUN SERPL-MCNC: 19 MG/DL (ref 8–23)
BUN/CREAT SERPL: 18.1 (ref 7–25)
CALCIUM SPEC-SCNC: 9 MG/DL (ref 8.6–10.5)
CHLORIDE SERPL-SCNC: 99 MMOL/L (ref 98–107)
CHOLEST SERPL-MCNC: 104 MG/DL (ref 0–200)
CO2 SERPL-SCNC: 29.3 MMOL/L (ref 22–29)
CREAT SERPL-MCNC: 1.05 MG/DL (ref 0.57–1)
DEPRECATED RDW RBC AUTO: 44.5 FL (ref 37–54)
EGFRCR SERPLBLD CKD-EPI 2021: 50.9 ML/MIN/1.73
EOSINOPHIL # BLD AUTO: 0.17 10*3/MM3 (ref 0–0.4)
EOSINOPHIL NFR BLD AUTO: 3.8 % (ref 0.3–6.2)
ERYTHROCYTE [DISTWIDTH] IN BLOOD BY AUTOMATED COUNT: 12.8 % (ref 12.3–15.4)
GLOBULIN UR ELPH-MCNC: 2.9 GM/DL
GLUCOSE SERPL-MCNC: 94 MG/DL (ref 65–99)
HBA1C MFR BLD: 5.6 % (ref 4.8–5.6)
HCT VFR BLD AUTO: 35.6 % (ref 34–46.6)
HDLC SERPL-MCNC: 34 MG/DL (ref 40–60)
HGB BLD-MCNC: 12 G/DL (ref 12–15.9)
IMM GRANULOCYTES # BLD AUTO: 0.02 10*3/MM3 (ref 0–0.05)
IMM GRANULOCYTES NFR BLD AUTO: 0.4 % (ref 0–0.5)
LDLC SERPL CALC-MCNC: 56 MG/DL (ref 0–100)
LDLC/HDLC SERPL: 1.68 {RATIO}
LYMPHOCYTES # BLD AUTO: 1.11 10*3/MM3 (ref 0.7–3.1)
LYMPHOCYTES NFR BLD AUTO: 24.7 % (ref 19.6–45.3)
MAGNESIUM SERPL-MCNC: 2.1 MG/DL (ref 1.6–2.4)
MCH RBC QN AUTO: 32.3 PG (ref 26.6–33)
MCHC RBC AUTO-ENTMCNC: 33.7 G/DL (ref 31.5–35.7)
MCV RBC AUTO: 95.7 FL (ref 79–97)
MONOCYTES # BLD AUTO: 0.42 10*3/MM3 (ref 0.1–0.9)
MONOCYTES NFR BLD AUTO: 9.4 % (ref 5–12)
NEUTROPHILS NFR BLD AUTO: 2.71 10*3/MM3 (ref 1.7–7)
NEUTROPHILS NFR BLD AUTO: 60.4 % (ref 42.7–76)
NRBC BLD AUTO-RTO: 0 /100 WBC (ref 0–0.2)
NT-PROBNP SERPL-MCNC: 3757 PG/ML (ref 0–1800)
PLATELET # BLD AUTO: 185 10*3/MM3 (ref 140–450)
PMV BLD AUTO: 11 FL (ref 6–12)
POTASSIUM SERPL-SCNC: 4 MMOL/L (ref 3.5–5.2)
PROT SERPL-MCNC: 6.9 G/DL (ref 6–8.5)
RBC # BLD AUTO: 3.72 10*6/MM3 (ref 3.77–5.28)
SODIUM SERPL-SCNC: 138 MMOL/L (ref 136–145)
TRIGL SERPL-MCNC: 65 MG/DL (ref 0–150)
TSH SERPL DL<=0.05 MIU/L-ACNC: 3.97 UIU/ML (ref 0.27–4.2)
VLDLC SERPL-MCNC: 14 MG/DL (ref 5–40)
WBC NRBC COR # BLD AUTO: 4.49 10*3/MM3 (ref 3.4–10.8)

## 2025-04-09 PROCEDURE — 83880 ASSAY OF NATRIURETIC PEPTIDE: CPT

## 2025-04-09 PROCEDURE — 85025 COMPLETE CBC W/AUTO DIFF WBC: CPT

## 2025-04-09 PROCEDURE — 82306 VITAMIN D 25 HYDROXY: CPT

## 2025-04-09 PROCEDURE — 82607 VITAMIN B-12: CPT

## 2025-04-09 PROCEDURE — 83036 HEMOGLOBIN GLYCOSYLATED A1C: CPT

## 2025-04-09 PROCEDURE — 84443 ASSAY THYROID STIM HORMONE: CPT

## 2025-04-09 PROCEDURE — 80053 COMPREHEN METABOLIC PANEL: CPT

## 2025-04-09 PROCEDURE — 80061 LIPID PANEL: CPT

## 2025-04-09 PROCEDURE — 83735 ASSAY OF MAGNESIUM: CPT

## 2025-04-09 RX ORDER — POTASSIUM CHLORIDE 750 MG/1
10 TABLET, EXTENDED RELEASE ORAL DAILY
Start: 2025-04-09

## 2025-04-09 RX ORDER — ASCORBIC ACID 500 MG
500 TABLET ORAL DAILY
Start: 2025-04-09

## 2025-04-09 RX ORDER — BUMETANIDE 1 MG/1
TABLET ORAL
Qty: 75 TABLET | Refills: 3 | Status: SHIPPED | OUTPATIENT
Start: 2025-04-09

## 2025-04-09 RX ORDER — FLUTICASONE PROPIONATE 50 MCG
1 SPRAY, SUSPENSION (ML) NASAL 2 TIMES DAILY
Start: 2025-04-09

## 2025-04-09 RX ORDER — SACUBITRIL AND VALSARTAN 24; 26 MG/1; MG/1
1 TABLET, FILM COATED ORAL 2 TIMES DAILY
Qty: 60 TABLET | Refills: 5 | Status: SHIPPED | OUTPATIENT
Start: 2025-04-09

## 2025-04-09 NOTE — PROGRESS NOTES
[facility]     Thuy Clark  1935   [id number]      Patient Care Team:  Isidra Eisenberg MD as PCP - General (Internal Medicine)  Isidra Eisenberg MD as PCP - Internal Medicine  Azam Marroquin MD as Consulting Physician (Urology)  Lili Hebert MD as Consulting Physician (Gastroenterology)  Isidra Eisenberg MD as Consulting Physician (Internal Medicine)  Valerio Moctezuma MD as Surgeon (Orthopedic Surgery)  Kaye Aleman MD as Consulting Physician (Cardiology)  Nikos Ornelas Jr., MD as Consulting Physician (General Surgery)    Chief Complaint   Patient presents with    hospital f/u    Hypertension    Allergies        Patient is a 89 y.o. female.   History of Present Illness           CHRONIC CONDITIONS  ***    Past Medical History:   Diagnosis Date    Acute cystitis with hematuria 6/25/2021 6/25/2021 Isidra Eisenberg MD  Urine sent for culture.    Atrial fibrillation 2019    Atrial flutter with rapid ventricular response 11/2/2019    Patient presented to the ER 11/2/19 with lightheadedness and was found to be in a flutter with rapid ventricular response.  She received IV beta-blocker and converted.  Bisoprolol was increased to 10 mg and she was started on Eliquis 5 mg twice a day.   Echo showed EF 65% with borderline concentric hypertrophy grade 2 diastolic dysfunction with pseudonormalization.  Saline test are positive for ev    Bleeding hemorrhoid 3/8/2019    Diverticulosis     External hemorrhoid 6/11/2019 12/6/2019 Isidra Eisenberg MD   Continue vasculera daily. Drink plenty of fluids.  Avoid constipation.  Follow-up with Dr. Hebert as planned.    Hx of colonic polyps 2003    Hypertension     Intraductal papilloma     R intraductal patheloma    Left upper extremity numbness     L upper extremity numbness-ER visit; neuropathy    Overweight (BMI 25.0-29.9) 6/11/2019    Self-catheterizes urinary bladder        Past Surgical History:   Procedure  "Laterality Date    BREAST LUMPECTOMY  2006    CHOLECYSTECTOMY      DENTAL PROCEDURE  12/13/2021    tooth exteraction     HYSTERECTOMY  1984    VAGINECTOMY  2009       Family History   Problem Relation Age of Onset    Hyperlipidemia Other     Hypertension Other     Coronary artery disease Other     No Known Problems Mother     No Known Problems Father        Social History     Socioeconomic History    Marital status:    Tobacco Use    Smoking status: Never    Smokeless tobacco: Never   Vaping Use    Vaping status: Never Used   Substance and Sexual Activity    Alcohol use: Never     Alcohol/week: 1.0 standard drink of alcohol     Types: 1 Glasses of wine per week    Drug use: Never    Sexual activity: Defer       Allergies   Allergen Reactions    Levofloxacin Other (See Comments)     itching  Other reaction(s): Other (See Comments)  itching    Sulfamethoxazole-Trimethoprim Unknown - Low Severity    Nitrofuran Derivatives Rash     primarily in b/l hands    Nitrofurantoin Rash       Review of Systems:     Review of Systems    Vital Signs  Vitals:    04/09/25 1409   BP: 104/62   BP Location: Left arm   Patient Position: Sitting   Cuff Size: Adult   Pulse: 96   Temp: 98.4 °F (36.9 °C)   TempSrc: Infrared   SpO2: 94%   Weight: 63.2 kg (139 lb 6.4 oz)   Height: 162.6 cm (64.02\")   PainSc: 0-No pain     Body mass index is 23.92 kg/m².  BMI is within normal parameters. No other follow-up for BMI required.          Current Outpatient Medications:     acetaminophen (TYLENOL) 325 MG tablet, Take 2 tablets by mouth Every 4 (Four) Hours As Needed for Mild Pain., Disp: , Rfl:     apixaban (ELIQUIS) 2.5 MG tablet tablet, Take 1 tablet by mouth 2 (Two) Times a Day., Disp: 180 tablet, Rfl: 1    bisoprolol (ZEBeta) 5 MG tablet, Take 3 tablets by mouth once daily, Disp: 270 tablet, Rfl: 0    Cholecalciferol (Vitamin D3) 25 MCG (1000 UT) capsule, Take 1 capsule by mouth Daily., Disp: , Rfl:     Dietary Management Product " (Vasculera) tablet, Take 1 each by mouth 2 (Two) Times a Day. Take one tablet by mouth twice daily, Disp: 180 tablet, Rfl: 3    docusate sodium (COLACE) 100 MG capsule, Take 1 capsule by mouth 2 (Two) Times a Day., Disp: 180 capsule, Rfl: 1    EQUATE STOOL SOFTENER 100 MG capsule, Take 1 capsule by mouth twice daily, Disp: 180 capsule, Rfl: 0    folic acid (FOLVITE) 1 MG tablet, Take 2 tablets by mouth once daily, Disp: 180 tablet, Rfl: 0    furosemide (LASIX) 40 MG tablet, Take 1 tablet by mouth Daily As Needed (swelling ankles). Take one tablet by mouth once daily in the morning, Disp: , Rfl:     gabapentin (NEURONTIN) 100 MG capsule, TAKE 3 CAPSULES BY MOUTH AT NIGHT AS NEEDED FOR  LEG  PAIN, Disp: 90 capsule, Rfl: 0    lisinopril (PRINIVIL,ZESTRIL) 10 MG tablet, TAKE 1 TABLET BY MOUTH ONCE DAILY AT NIGHT AT BEDTIME, Disp: 90 tablet, Rfl: 0    meclizine (ANTIVERT) 25 MG tablet, Take 0.5 tablets by mouth Daily As Needed for Dizziness., Disp: 15 tablet, Rfl: 1    Menthol, Topical Analgesic, (Biofreeze) 4 % gel, Apply  topically 2 (Two) Times a Day As Needed. To bilateral knees, Disp: , Rfl:     Misc. Devices (Rollator Ultra-Light) misc, Use 1 each Daily., Disp: 1 each, Rfl: 0    ondansetron ODT (ZOFRAN-ODT) 4 MG disintegrating tablet, DISSOLVE 1 TABLET IN MOUTH 4 TIMES DAILY AS NEEDED FOR NAUSEA AND VOMITING, Disp: 15 tablet, Rfl: 0    oseltamivir (Tamiflu) 75 MG capsule, Take 1 capsule by mouth 2 (Two) Times a Day., Disp: 10 capsule, Rfl: 0    polyethylene glycol (MiraLax) 17 g packet, Take 17 g by mouth Every Other Day. prn, Disp: , Rfl:     psyllium (METAMUCIL MULTIHEALTH FIBER) 58.12 % packet, Take 1 packet by mouth Daily. (Patient taking differently: Take 1 packet by mouth Every Other Day. prn), Disp: , Rfl:     saccharomyces boulardii (FLORASTOR) 250 MG capsule, Take 1 capsule by mouth 2 (Two) Times a Day. As needed after a round of antibiotics, Disp: 180 capsule, Rfl: 3    sodium chloride 1 g tablet, Take 1  "tablet by mouth 2 (Two) Times a Day., Disp: 180 tablet, Rfl: 1    vitamin B-12 (CYANOCOBALAMIN) 1000 MCG tablet, Take 1 tablet by mouth Daily., Disp: , Rfl:     Physical Exam:    Physical Exam     ACE III MINI        Results Review:    {Results Review:49020::\"I reviewed the patient's new clinical results.\"}  Results         CMP:  Lab Results   Component Value Date    Glucose 123 (H) 07/31/2024    Glucose, UA Negative 07/30/2024    BUN 12 07/31/2024    BUN/Creatinine Ratio 15.0 07/31/2024    Creatinine 0.80 07/31/2024    Creatinine 0.80 07/30/2024    Ketones, UA Negative 07/30/2024    CO2 26.0 07/31/2024    CO2 Content 21.5 (L) 07/30/2024    Calcium 8.6 07/31/2024    Albumin 3.7 07/31/2024    AST (SGOT) 22 07/31/2024    ALT (SGPT) 5 07/31/2024     HbA1c:  Lab Results   Component Value Date    HGBA1C 5.20 12/19/2023    HGBA1C 5.90 (H) 04/18/2023     Microalbumin:  No results found for: \"MICROALBUR\", \"POCMALB\", \"POCCREAT\"  Lipid Panel  Lab Results   Component Value Date    CHOL 121 12/19/2023    TRIG 34 12/19/2023    HDL 37 (L) 12/19/2023    LDL 75 12/19/2023    AST 22 07/31/2024    ALT 5 07/31/2024       Medication Review: Medications reviewed and noted  Patient Instructions   Problem List Items Addressed This Visit       Pulmonary hypertension    Overview   Mild Pulmonary hypertension noted on echocardiogram 11/2019.  Progressed on latest echocardiogram 7/2022.         Benign essential hypertension    Overview    Taking bisoprolol 15mg and lisinopril 10mg every evening.         Relevant Medications    furosemide (LASIX) 40 MG tablet    lisinopril (PRINIVIL,ZESTRIL) 10 MG tablet    bisoprolol (ZEBeta) 5 MG tablet    Other Relevant Orders    Microalbumin / Creatinine Urine Ratio - Urine, Clean Catch    Urinalysis With Microscopic - Urine, Clean Catch    Mixed hyperlipidemia    Relevant Orders    Lipid Panel    TSH    Vitamin B12    Magnesium    Abnormal glucose    Relevant Orders    Hemoglobin A1c    Valvular heart " disease    Overview     6/20/2021 echocardiogram: mild AR and mild TR.  Elevated right ventricular systolic pressure of 35 to 45 mmHg.  Severely dilated right atrial cavity.  Left atrial volume severely increased.  Saline test positive for intra-atrial shunt.  Borderline left ventricular hypertrophy.  Ejection fraction 65%.     8/27/21 echo at [facility] by Dr. Kaye Aleman-EF 60%.  Moderate MR and moderate TR and mild AR. Stress test showed no ischemic changes.  Afib thru out study.    7/2022 echo at [facility] with ejection fraction 65%.  Severe MR and severe TR.  Pulmonary hypertension.  Severely dilated right atrial and right ventricular cavities.    7/2024 echo Dr. Fuller. Severe MR and TR and moderate AR. Elevated RSVP.    Taking Eliquis twice a day and 15mg bisoprolol every evening.          Relevant Medications    bisoprolol (ZEBeta) 5 MG tablet    At high risk for falls (Chronic)    Permanent atrial fibrillation    Overview   Taking Eliquis twice a day.  Taking bisoprolol every evening.         Relevant Medications    bisoprolol (ZEBeta) 5 MG tablet    Bilateral edema of lower extremity (Chronic)    Overview   Taking furosemide as needed.         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  Taking eliquis and bisoprolol and furosemide as needed.  >>Date of Admission: 4/1/2025  Date of Discharge: 4/3/2025  Discharge Diagnosis:   Acute on chronic HFpEF (stage C/NYHA III)  Valvular heart disease  Atrial septal defect  Chronic atrial fibrillation  Chronically anticoagulated with Eliquis  Pulmonary hypertension  Hypertension  DDD Lumbar Spine  Advanced age  Diminished hearing capacity    Reason for Hospitalization  Thuy Clark is a 89 y.o. female that presents to [facility] emergency department with concerns of lower extremity swelling and shortness of air over 2 weeks. She is accompanied by her son Jack who assists with the  history. She reports outpatient follow-up with a local cardiologist and is currently on a regimen for heart failure. She has identified 2 weeks of increasing lower extremity edema and shortness of air on her home loop diuretic therapy. She describes associated shortness of air made worse with exertion but no associated retrosternal chest pain, palpitations or confusion. She denies any syncopal episodes or falls. She reports some orthopnea. In the ED her chest x-ray is concerning for cardiomegaly with pulmonary edema. Her ED proBNP is 4720. Previous EKG identifies chronic atrial fibrillation. Her echo (11/22/2023) identified an EF of 55% with valve disease and RVSP 55 mmHg consistent with pulmonary hypertension. She does not use NIPPV at home. Her ED VBG is noted for a pCO2=56.     Hospital Course  Thuy Clark is a 89 y.o. female who is admitted to the telemetry floor with cardiology consultation. Imaging, labs and inflammatory markers are assessed and trended. Problems addressed as follows:    Acute on chronic HFpEF  Valvular heart disease  Telemetry monitoring  Cardiology consultation reviewed  Echo (11/22/2023): EF 55%, 3+ TR, 2+ MR, CVP >15 mmHg, RVSP 55 mmHg  Chest x-ray with cardiomegaly and pulmonary edema  Echo: EF 55%, ASD noted, MR, TR, CVP >15 mmHg, RVSP 65 mmHg, PHTN  Admit proBNP 4720  Accurate I's and O's  Sodium and fluid restriction  Routine weights  IV loop diuretic therapy transitioned to oral  Routine electrolyte, creatinine and mineral evaluations  Beta-blocker therapy  SGLT2 inhibitor therapy  ARNI therapy  Potassium supplementation           Relevant Medications    bisoprolol (ZEBeta) 5 MG tablet    Other Relevant Orders    Comprehensive Metabolic Panel    CBC & Differential    BNP    Mild cognitive impairment (Chronic)    Poor balance     Other Visit Diagnoses         Vitamin D deficiency        Relevant Orders    Vitamin D,25-Hydroxy               Diagnosis Plan   1. Chronic combined  systolic and diastolic heart failure  Comprehensive Metabolic Panel    CBC & Differential    BNP      2. Bilateral edema of lower extremity        3. Benign essential hypertension  Microalbumin / Creatinine Urine Ratio - Urine, Clean Catch    Urinalysis With Microscopic - Urine, Clean Catch      4. At high risk for falls        5. Permanent atrial fibrillation        6. Mixed hyperlipidemia  Lipid Panel    TSH    Vitamin B12    Magnesium      7. Abnormal glucose  Hemoglobin A1c      8. Vitamin D deficiency  Vitamin D,25-Hydroxy      9. Mild cognitive impairment        10. Poor balance        11. Pulmonary hypertension        12. Valvular heart disease          Assessment & Plan       {Time Spent (Optional):94947}    Plan of care reviewed with patient at the conclusion of today's visit. Education was provided regarding diagnosis, management, and any prescribed or recommended OTC medications. Patient verbalizes understanding of and agreement with management plan.         04/09/25   14:13 EDT    {[employer] Provider Statement:44519}

## 2025-04-09 NOTE — PROGRESS NOTES
Transitional Care Follow Up Visit  Subjective     Thuy Clark is a 89 y.o. female who presents for a transitional care management visit.    Within 48 business hours after discharge our office contacted her via telephone to coordinate her care and needs.      I reviewed and discussed the details of that call along with the discharge summary, hospital problems, inpatient lab results, inpatient diagnostic studies, and consultation reports with Thuy.     Current outpatient and discharge medications have been reconciled for the patient.  Reviewed by: Isidra Eisenberg MD    Current outpatient and discharge medications have been reconciled for the patient.  Reviewed by: Isidra Eisenberg MD    MED LIST from [place] summary [place]  empagliflozin 10 mg tablet  Commonly known as: JARDIANCE  Take 1 tablet (10 mg total) by mouth daily for 30 days.    bisoprolol 5 MG tablet  Commonly known as: ZEBETA  Take 3 tablets (15 mg total) by mouth daily.    bumetanide 1 MG tablet  Commonly known as: BUMEX  Take 2 tablets (2 mg total) by mouth daily.    Eliquis 2.5 MG tablet  Generic drug: apixaban  Take 1 tablet by mouth twice daily    fluticasone propionate 50 mcg/actuation nasal spray  Commonly known as: FLONASE  2 sprays daily.    folic acid 1 MG tablet  Commonly known as: FOLVITE  Take 2 tablets (2,000 mcg total) by mouth daily.    gabapentin 100 MG capsule  Commonly known as: NEURONTIN  Take 2 capsules (200 mg total) by mouth every night as needed (leg/foot Pain).    potassium chloride 10 MEQ tablet  Commonly known as: KLOR-CON  Take 1 tablet (10 mEq total) by mouth daily.    sacubitriL-valsartan 24-26 mg tablet  Commonly known as: ENTRESTO  Take 1 tablet by mouth 2 (two) times daily.  180 tablet            6/24/2021     3:11 PM 8/18/2022     1:02 PM 9/16/2022    12:50 PM 11/23/2023     3:35 PM 4/3/2025     5:23 PM   Date of TCM Phone Call   [facility]  [facility]   Date of Admission 6/9/2021 4/1/2025   Date of Discharge 6/24/2021 8/18/2022 9/16/2022 11/23/2023 4/3/2025   Discharge Disposition   Home or Self Care       Risk for Readmission (LACE) No data recorded  TCM attempt successful? Yes   Call start time 1033   Call end time 1036   Discharge diagnosis Acute on chronic congestive heart failure   Person spoke with today (if not patient) and relationship Patient   Meds reviewed with patient/caregiver? Yes   Is the patient having any side effects they believe may be caused by any medication additions or changes? No   Does the patient have all medications ordered at discharge? Yes   Is the patient taking all medications as directed (includes completed medication regime)? Yes   Comments Patient is requesting an appt for a hospital followup with Dr Eisenberg at 3 pm or after.        History of Present Illness   Course During Hospital Stay: Admitted at [facility] on 4/1/2025 with acute on chronic congestive heart failure and bilateral leg edema and hypokalemia.  She was treated with IV diuretics.  Sodium tablet was stopped.  She was given potassium supplement.  ECHO 4/2/25  Small Atrial septal defect with a left to right shunt  Normal sized left ventricle.  Normal left ventricular wall thickness.  Normal left ventricular systolic function.  Estimated ejection fraction 55% +/- 5%.  Unable to obtain diastolic function due to atrial fibrillation .  Severely dilated right ventricle.  Severe right ventricular systolic dysfunction.  Thick tricuspid valve leaflets.  Moderately (3+) tricuspid regurgitation.  Dilated IVC with no inspiratory collapse.  Elevated central venous pressure (>15mmHg)  Severe pulmonary hypertension. RVSP 65 mmHg.  Calcified mitral valve leaflets without evidence of stenosis.  Moderate to Severe mitral regurgitation.  Mitral regurgitation radius 0.7 cm.  PISA ERO: .28  cm2.  Regurgitant volume = 38 ml.  Severely abnormal left atrial volume index 64 ml/m2.  Severely dilated right atrium.   History of Present Illness  The patient is here for a follow-up visit after hospitalization.  Her son is with her today.    She reports persistent leg swelling, which she perceives as more pronounced than when she was discharged from the hospital. She experiences numbness in her legs but does not report any associated pain. She has been receiving knee injections and has had approximately 4 of them. She does not experience significant knee pain and manages any discomfort with Tylenol. She has support socks but does not wear them. She attempts to walk frequently, which reduces her sitting time. She has been advised to discontinue her blood pressure medication due to low blood pressure readings. She has not yet started Jardiance. She has not monitored her blood pressure at home since her hospital discharge. She does not experience dizziness after taking her diuretic in the morning. She does not monitor her weight daily but estimates it to be around 198 pounds. She maintains a regular diet of 3 meals per day. She is currently on a regimen of 2 diuretics every morning, which she reports as ineffective in reducing the leg swelling. She elevates her feet at night but not during the day.    She experiences shortness of breath when walking with her rollator but reports no chest pain. She also reports palpitations when lifting her legs, which resolve upon lying down.    She is preparing for dental surgery and wants to ensure her health is optimal. She has been advised to discontinue Eliquis 2 days prior to her oral surgery.    She reports no nausea or chest pain. She experiences numbness in her toes and increased shortness of breath compared to a month ago. She is currently on Entresto twice daily, potassium daily, gabapentin twice nightly for leg pain, folic acid twice daily, Flonase nasal spray,  "bisoprolol 3 times daily in the morning, Eliquis twice daily, and vitamin C once daily. She was previously on B12 but has discontinued it. She was taken off sodium chloride tablets 6 months ago due to congestive heart failure. She takes a probiotic as needed for stomach discomfort and uses Metamucil powder daily for regular bowel movements. She consumes about 3 glasses of fluid daily and has not been given a fluid restriction.    MEDICATIONS  Current: bumetanide, Entresto, potassium, gabapentin, folic acid, Flonase nasal spray, bisoprolol, Eliquis, Vitamin C, Metamucil  Discontinued: sodium chloride tablets      The following portions of the patient's history were reviewed and updated as appropriate: allergies, current medications, past family history, past medical history, past social history, past surgical history, and problem list.    Vitals:    04/09/25 1409   BP: 104/62   BP Location: Left arm   Patient Position: Sitting   Cuff Size: Adult   Pulse: 96   Temp: 98.4 °F (36.9 °C)   TempSrc: Infrared   SpO2: 94%   Weight: 63.2 kg (139 lb 6.4 oz)   Height: 162.6 cm (64.02\")   PainSc: 0-No pain     Body mass index is 23.92 kg/m².  BMI is within normal parameters. No other follow-up for BMI required.    Review of Systems    Objective   Physical Exam  Vitals and nursing note reviewed.   Constitutional:       Appearance: She is well-developed.   HENT:      Head: Normocephalic.   Eyes:      Conjunctiva/sclera: Conjunctivae normal.      Pupils: Pupils are equal, round, and reactive to light.   Neck:      Thyroid: No thyromegaly.   Cardiovascular:      Rate and Rhythm: Normal rate. Rhythm irregularly irregular.      Heart sounds: Normal heart sounds.   Pulmonary:      Effort: Pulmonary effort is normal.      Breath sounds: Normal breath sounds. No wheezing.   Musculoskeletal:         General: Normal range of motion.      Cervical back: Normal range of motion and neck supple.      Right lower leg: 3+ Pitting      Left lower " leg: 3+ Pitting   Lymphadenopathy:      Cervical: No cervical adenopathy.   Skin:     General: Skin is warm and dry.   Neurological:      Mental Status: She is alert and oriented to person, place, and time.      Motor: Weakness present.      Gait: Gait abnormal.      Comments: Poor balance.  Generalized weakness.   Psychiatric:         Attention and Perception: Attention normal.         Mood and Affect: Mood normal.         Speech: Speech normal.         Thought Content: Thought content normal.         Assessment & Plan     Patient Instructions   Problem List Items Addressed This Visit          Advance Directives and General Issues    At high risk for falls (Chronic)       Cardiac and Vasculature    Chronic combined systolic and diastolic heart failure - Primary (Chronic)    Overview   DX 7/2022 at [facility].  Taking eliquis and bisoprolol and furosemide as needed.  >>Date of Admission: 4/1/2025  Date of Discharge: 4/3/2025  Discharge Diagnosis:   Acute on chronic HFpEF (stage C/NYHA III)  Valvular heart disease  Atrial septal defect  Chronic atrial fibrillation  Chronically anticoagulated with Eliquis  Pulmonary hypertension  Hypertension  DDD Lumbar Spine  Advanced age  Diminished hearing capacity    Reason for Hospitalization  Thuy Clark is a 89 y.o. female that presents to [facility] emergency department with concerns of lower extremity swelling and shortness of air over 2 weeks. She is accompanied by her son Jack who assists with the history. She reports outpatient follow-up with a local cardiologist and is currently on a regimen for heart failure. She has identified 2 weeks of increasing lower extremity edema and shortness of air on her home loop diuretic therapy. She describes associated shortness of air made worse with exertion but no associated retrosternal chest pain, palpitations or confusion. She denies any syncopal episodes or falls. She reports some orthopnea. In the ED her chest  x-ray is concerning for cardiomegaly with pulmonary edema. Her ED proBNP is 4720. Previous EKG identifies chronic atrial fibrillation. Her echo (11/22/2023) identified an EF of 55% with valve disease and RVSP 55 mmHg consistent with pulmonary hypertension. She does not use NIPPV at home. Her ED VBG is noted for a pCO2=56.     Hospital Course  Thuy Clark is a 89 y.o. female who is admitted to the telemetry floor with cardiology consultation. Imaging, labs and inflammatory markers are assessed and trended. Problems addressed as follows:    Acute on chronic HFpEF  Valvular heart disease  Telemetry monitoring  Cardiology consultation reviewed  Echo (11/22/2023): EF 55%, 3+ TR, 2+ MR, CVP >15 mmHg, RVSP 55 mmHg  Chest x-ray with cardiomegaly and pulmonary edema  Echo: EF 55%, ASD noted, MR, TR, CVP >15 mmHg, RVSP 65 mmHg, PHTN  Admit proBNP 4720  Accurate I's and O's  Sodium and fluid restriction  Routine weights  IV loop diuretic therapy transitioned to oral  Routine electrolyte, creatinine and mineral evaluations  Beta-blocker therapy  SGLT2 inhibitor therapy  ARNI therapy  Potassium supplementation           Relevant Medications    bisoprolol (ZEBeta) 5 MG tablet    sacubitril-valsartan (Entresto) 24-26 MG tablet    Other Relevant Orders    Comprehensive Metabolic Panel (Completed)    CBC & Differential (Completed)    BNP (Completed)    Valvular heart disease    Overview     6/20/2021 echocardiogram: mild AR and mild TR.  Elevated right ventricular systolic pressure of 35 to 45 mmHg.  Severely dilated right atrial cavity.  Left atrial volume severely increased.  Saline test positive for intra-atrial shunt.  Borderline left ventricular hypertrophy.  Ejection fraction 65%.     8/27/21 echo at [place] by Dr. Kaye Aleman-EF 60%.  Moderate MR and moderate TR and mild AR. Stress test showed no ischemic changes.  Afib thru out study.    7/2022 echo at [facility] with ejection fraction 65%.  Severe MR and severe TR.   Pulmonary hypertension.  Severely dilated right atrial and right ventricular cavities.    7/2024 echo Dr. Fuller. Severe MR and TR and moderate AR. Elevated RSVP.    ECHO 4/2/25 at [place]  Small Atrial septal defect with a left to right shunt  Normal sized left ventricle.  Normal left ventricular wall thickness.  Normal left ventricular systolic function.  Estimated ejection fraction 55% +/- 5%.  Unable to obtain diastolic function due to atrial fibrillation .  Severely dilated right ventricle.  Severe right ventricular systolic dysfunction.  Thick tricuspid valve leaflets.  Moderately (3+) tricuspid regurgitation.  Dilated IVC with no inspiratory collapse.  Elevated central venous pressure (>15mmHg)  Severe pulmonary hypertension. RVSP 65 mmHg.  Calcified mitral valve leaflets without evidence of stenosis.  Moderate to Severe mitral regurgitation.  Mitral regurgitation radius 0.7 cm.  PISA ERO: .28 cm2.  Regurgitant volume = 38 ml.  Severely abnormal left atrial volume index 64 ml/m2.  Severely dilated right atrium.     Taking Eliquis twice a day and 15mg bisoprolol every evening.          Relevant Medications    bisoprolol (ZEBeta) 5 MG tablet    sacubitril-valsartan (Entresto) 24-26 MG tablet    Benign essential hypertension    Overview    Taking bisoprolol 15mg and entresto twice a day.         Relevant Medications    bisoprolol (ZEBeta) 5 MG tablet    bumetanide (BUMEX) 1 MG tablet    Other Relevant Orders    Microalbumin / Creatinine Urine Ratio - Urine, Clean Catch    Urinalysis With Microscopic - Urine, Clean Catch    Mixed hyperlipidemia    Relevant Orders    Lipid Panel (Completed)    TSH (Completed)    Vitamin B12 (Completed)    Magnesium (Completed)    Permanent atrial fibrillation    Overview   Taking Eliquis twice a day.  Taking bisoprolol every evening.         Relevant Medications    bisoprolol (ZEBeta) 5 MG tablet    sacubitril-valsartan (Entresto) 24-26 MG tablet       Endocrine and Metabolic     Abnormal glucose    Relevant Orders    Hemoglobin A1c (Completed)       Pulmonary and Pneumonias    Pulmonary hypertension    Overview   Mild Pulmonary hypertension noted on echocardiogram 11/2019.  Progressed on latest echocardiogram 7/2022.            Symptoms and Signs    Bilateral edema of lower extremity (Chronic)    Overview   Taking furosemide as needed.         Poor balance     Other Visit Diagnoses         Vitamin D deficiency        Relevant Orders    Vitamin D,25-Hydroxy (Completed)               Diagnosis Plan   1. Chronic combined systolic and diastolic heart failure  Comprehensive Metabolic Panel    CBC & Differential    BNP      2. Bilateral edema of lower extremity        3. Benign essential hypertension  Microalbumin / Creatinine Urine Ratio - Urine, Clean Catch    Urinalysis With Microscopic - Urine, Clean Catch      4. Permanent atrial fibrillation        5. Valvular heart disease        6. Pulmonary hypertension        7. Mixed hyperlipidemia  Lipid Panel    TSH    Vitamin B12    Magnesium      8. Abnormal glucose  Hemoglobin A1c      9. At high risk for falls        10. Poor balance        11. Vitamin D deficiency  Vitamin D,25-Hydroxy        Assessment & Plan  Congestive heart Failure.  She sees Dr. Kaye Aleman, she is currently on Entresto and bumetanide.  She is advised to increase the Bumex up to 2 tablets every morning and an extra 1 at lunchtime for 3 days then resume taking 2 tablets/day.  She was given Jardiance at discharge from the hospital but has not started it yet.  I advised to start Jardiance 10 mg daily to help manage her heart failure.  The potential benefits of adding Jardiance were discussed, and she is encouraged to start the medication.  She will also continue bisoprolol.  She will monitor her weight daily and let us know if there is a sudden weight gain of 3 pounds or more.  She should monitor her blood pressure and and heart rate and report any significant changes.  She  should specifically look for blood pressures that are too low and make her feel lightheaded and dizzy.     Bilateral lower extremity edema.  She reports persistent leg swelling and numbness despite taking bumetanide 2 tablets daily. She is advised to elevate her feet during the day whenever sitting.  She is encouraged to use support socks or hose that come up to the knee to help reduce swelling. An increase in bumetanide dosage to 3 tablets daily for 3 days is recommended, after which she should revert to the original dosage of 2 tablets daily.  See above.      Benign essential hypertension  Continue bisoprolol and Entresto.  She is encouraged to check the blood pressures once a day and to record the values to bring to the next appointment.    Permanent atrial fibrillation  Continue Eliquis twice a day.  Continue bisoprolol nightly.    Valvular heart disease  Continue Eliquis and metoprolol.    Pulmonary hypertension  We will continue to monitor oxygen saturation and symptoms.    Mixed hyperlipidemia  Continue low-fat healthy diet.      Abnormal glucose  Continue to avoid sugars and snack foods in the diet.  Try to eat some protein 3 times a day.    High risk for falls.  Poor balance.  Staying well-hydrated does help prevent falls.  Having some protein in the diet 3 times a day keeps her muscles stronger and helps prevent falls.  Continue using the walker at all times.      Medication Management.  She is taking multiple medications, including gabapentin for leg pain, folic acid, Flonase nasal spray, bisoprolol, Eliquis, and vitamin C. She should continue her current medication regimen. Refills for gabapentin were provided in March.     Health Maintenance.  She is scheduled for dental surgery and has been cleared to proceed. She should stop Eliquis 2 days before the surgery and resume it immediately after, provided there is no bleeding.    Follow-up  The patient will follow up in 3 months.        Patient or patient  representative verbalized consent for the use of Ambient Listening during the visit with  Isidra Eisenberg MD for chart documentation. 4/11/2025  15:55 EDT

## 2025-04-10 LAB
25(OH)D3 SERPL-MCNC: 21.9 NG/ML (ref 30–100)
VIT B12 BLD-MCNC: 648 PG/ML (ref 211–946)

## 2025-04-11 NOTE — PATIENT INSTRUCTIONS
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  Taking eliquis and bisoprolol and furosemide as needed.  >>Date of Admission: 4/1/2025  Date of Discharge: 4/3/2025  Discharge Diagnosis:   Acute on chronic HFpEF (stage C/NYHA III)  Valvular heart disease  Atrial septal defect  Chronic atrial fibrillation  Chronically anticoagulated with Eliquis  Pulmonary hypertension  Hypertension  DDD Lumbar Spine  Advanced age  Diminished hearing capacity    Reason for Hospitalization  Thuy Clark is a 89 y.o. female that presents to [facility] emergency department with concerns of lower extremity swelling and shortness of air over 2 weeks. She is accompanied by her son Jack who assists with the history. She reports outpatient follow-up with a local cardiologist and is currently on a regimen for heart failure. She has identified 2 weeks of increasing lower extremity edema and shortness of air on her home loop diuretic therapy. She describes associated shortness of air made worse with exertion but no associated retrosternal chest pain, palpitations or confusion. She denies any syncopal episodes or falls. She reports some orthopnea. In the ED her chest x-ray is concerning for cardiomegaly with pulmonary edema. Her ED proBNP is 4720. Previous EKG identifies chronic atrial fibrillation. Her echo (11/22/2023) identified an EF of 55% with valve disease and RVSP 55 mmHg consistent with pulmonary hypertension. She does not use NIPPV at home. Her ED VBG is noted for a pCO2=56.     Hospital Course  Thuy Clark is a 89 y.o. female who is admitted to the telemetry floor with cardiology consultation. Imaging, labs and inflammatory markers are assessed and trended. Problems  addressed as follows:    Acute on chronic HFpEF  Valvular heart disease  Telemetry monitoring  Cardiology consultation reviewed  Echo (11/22/2023): EF 55%, 3+ TR, 2+ MR, CVP >15 mmHg, RVSP 55 mmHg  Chest x-ray with cardiomegaly and pulmonary edema  Echo: EF 55%, ASD noted, MR, TR, CVP >15 mmHg, RVSP 65 mmHg, PHTN  Admit proBNP 4720  Accurate I's and O's  Sodium and fluid restriction  Routine weights  IV loop diuretic therapy transitioned to oral  Routine electrolyte, creatinine and mineral evaluations  Beta-blocker therapy  SGLT2 inhibitor therapy  ARNI therapy  Potassium supplementation           Relevant Medications    bisoprolol (ZEBeta) 5 MG tablet    sacubitril-valsartan (Entresto) 24-26 MG tablet    Other Relevant Orders    Comprehensive Metabolic Panel (Completed)    CBC & Differential (Completed)    BNP (Completed)    Valvular heart disease    Overview     6/20/2021 echocardiogram: mild AR and mild TR.  Elevated right ventricular systolic pressure of 35 to 45 mmHg.  Severely dilated right atrial cavity.  Left atrial volume severely increased.  Saline test positive for intra-atrial shunt.  Borderline left ventricular hypertrophy.  Ejection fraction 65%.     [place] [facility] [place] Kay[place]man-EF 60%.  Moderate MR and moderate TR and mild AR. Stress test showed no ischemic changes.  Afib thru out study.    7/2022 echo at [facility] with ejection fraction 65%.  Severe MR and severe TR.  Pulmonary hypertension.  Severely dilated right atrial and right ventricular cavities.    7/2024 echo Dr. Fuller. Severe MR and TR and moderate AR. Elevated RSVP.    ECHO 4/2/25 at [place]  Small Atrial septal defect with a left to right shunt  Normal sized left ventricle.  Normal left ventricular wall thickness.  Normal left ventricular systolic function.  Estimated ejection fraction 55% +/- 5%.  Unable to obtain diastolic function due to atrial fibrillation .  Severely dilated right ventricle.  Severe right  ventricular systolic dysfunction.  Thick tricuspid valve leaflets.  Moderately (3+) tricuspid regurgitation.  Dilated IVC with no inspiratory collapse.  Elevated central venous pressure (>15mmHg)  Severe pulmonary hypertension. RVSP 65 mmHg.  Calcified mitral valve leaflets without evidence of stenosis.  Moderate to Severe mitral regurgitation.  Mitral regurgitation radius 0.7 cm.  PISA ERO: .28 cm2.  Regurgitant volume = 38 ml.  Severely abnormal left atrial volume index 64 ml/m2.  Severely dilated right atrium.     Taking Eliquis twice a day and 15mg bisoprolol every evening.          Relevant Medications    bisoprolol (ZEBeta) 5 MG tablet    sacubitril-valsartan (Entresto) 24-26 MG tablet    Benign essential hypertension    Overview    Taking bisoprolol 15mg and entresto twice a day.         Relevant Medications    bisoprolol (ZEBeta) 5 MG tablet    bumetanide (BUMEX) 1 MG tablet    Other Relevant Orders    Microalbumin / Creatinine Urine Ratio - Urine, Clean Catch    Urinalysis With Microscopic - Urine, Clean Catch    Mixed hyperlipidemia    Relevant Orders    Lipid Panel (Completed)    TSH (Completed)    Vitamin B12 (Completed)    Magnesium (Completed)    Permanent atrial fibrillation    Overview   Taking Eliquis twice a day.  Taking bisoprolol every evening.         Relevant Medications    bisoprolol (ZEBeta) 5 MG tablet    sacubitril-valsartan (Entresto) 24-26 MG tablet       Endocrine and Metabolic    Abnormal glucose    Relevant Orders    Hemoglobin A1c (Completed)       Pulmonary and Pneumonias    Pulmonary hypertension    Overview   Mild Pulmonary hypertension noted on echocardiogram 11/2019.  Progressed on latest echocardiogram 7/2022.            Symptoms and Signs    Bilateral edema of lower extremity (Chronic)    Overview   Taking furosemide as needed.         Poor balance     Other Visit Diagnoses         Vitamin D deficiency        Relevant Orders    Vitamin D,25-Hydroxy (Completed)             Heart-Healthy Eating Plan  Many factors influence your heart (coronary) health, including eating and exercise habits. Coronary risk increases with abnormal blood fat (lipid) levels. Heart-healthy meal planning includes limiting unhealthy fats, increasing healthy fats, and making other diet and lifestyle changes.  What is my plan?  Your health care provider may recommend that you:  Limit your fat intake to _________% or less of your total calories each day.  Limit your saturated fat intake to _________% or less of your total calories each day.  Limit the amount of cholesterol in your diet to less than _________ mg per day.  What are tips for following this plan?  Cooking  Cook foods using methods other than frying. Baking, boiling, grilling, and broiling are all good options. Other ways to reduce fat include:  Removing the skin from poultry.  Removing all visible fats from meats.  Steaming vegetables in water or broth.  Meal planning  A plate with examples of foods in a healthy diet.      At meals, imagine dividing your plate into fourths:  Fill one-half of your plate with vegetables and green salads.  Fill one-fourth of your plate with whole grains.  Fill one-fourth of your plate with lean protein foods.  Eat 4-5 servings of vegetables per day. One serving equals 1 cup raw or cooked vegetable, or 2 cups raw leafy greens.  Eat 4-5 servings of fruit per day. One serving equals 1 medium whole fruit, ¼ cup dried fruit, ½ cup fresh, frozen, or canned fruit, or ½ cup 100% fruit juice.  Eat more foods that contain soluble fiber. Examples include apples, broccoli, carrots, beans, peas, and barley. Aim to get 25-30 g of fiber per day.  Increase your consumption of legumes, nuts, and seeds to 4-5 servings per week. One serving of dried beans or legumes equals ½ cup cooked, 1 serving of nuts is ¼ cup, and 1 serving of seeds equals 1 tablespoon.  Fats  Choose healthy fats more often. Choose monounsaturated and polyunsaturated  fats, such as olive and canola oils, flaxseeds, walnuts, almonds, and seeds.  Eat more omega-3 fats. Choose salmon, mackerel, sardines, tuna, flaxseed oil, and ground flaxseeds. Aim to eat fish at least 2 times each week.  Check food labels carefully to identify foods with trans fats or high amounts of saturated fat.  Limit saturated fats. These are found in animal products, such as meats, butter, and cream. Plant sources of saturated fats include palm oil, palm kernel oil, and coconut oil.  Avoid foods with partially hydrogenated oils in them. These contain trans fats. Examples are stick margarine, some tub margarines, cookies, crackers, and other baked goods.  Avoid fried foods.  General information  Eat more home-cooked food and less restaurant, buffet, and fast food.  Limit or avoid alcohol.  Limit foods that are high in starch and sugar.  Lose weight if you are overweight. Losing just 5-10% of your body weight can help your overall health and prevent diseases such as diabetes and heart disease.  Monitor your salt (sodium) intake, especially if you have high blood pressure. Talk with your health care provider about your sodium intake.  Try to incorporate more vegetarian meals weekly.  What foods can I eat?  Fruits  All fresh, canned (in natural juice), or frozen fruits.  Vegetables  Fresh or frozen vegetables (raw, steamed, roasted, or grilled). Green salads.  Grains  Most grains. Choose whole wheat and whole grains most of the time. Rice and pasta, including brown rice and pastas made with whole wheat.  Meats and other proteins  Lean, well-trimmed beef, veal, pork, and lamb. Chicken and turkey without skin. All fish and shellfish. Wild duck, rabbit, pheasant, and venison. Egg whites or low-cholesterol egg substitutes. Dried beans, peas, lentils, and tofu. Seeds and most nuts.  Dairy  Low-fat or nonfat cheeses, including ricotta and mozzarella. Skim or 1% milk (liquid, powdered, or evaporated). Buttermilk made  with low-fat milk. Nonfat or low-fat yogurt.  Fats and oils  Non-hydrogenated (trans-free) margarines. Vegetable oils, including soybean, sesame, sunflower, olive, peanut, safflower, corn, canola, and cottonseed. Salad dressings or mayonnaise made with a vegetable oil.  Beverages  Water (mineral or sparkling). Coffee and tea. Diet carbonated beverages.  Sweets and desserts  Sherbet, gelatin, and fruit ice. Small amounts of dark chocolate.  Limit all sweets and desserts.  Seasonings and condiments  All seasonings and condiments.  The items listed above may not be a complete list of foods and beverages you can eat. Contact a dietitian for more options.  What foods are not recommended?  Fruits  Canned fruit in heavy syrup. Fruit in cream or butter sauce. Fried fruit. Limit coconut.  Vegetables  Vegetables cooked in cheese, cream, or butter sauce. Fried vegetables.  Grains  Breads made with saturated or trans fats, oils, or whole milk. Croissants. Sweet rolls. Donuts. High-fat crackers, such as cheese crackers.  Meats and other proteins  Fatty meats, such as hot dogs, ribs, sausage, croft, rib-eye roast or steak. High-fat deli meats, such as salami and bologna. Caviar. Domestic duck and goose. Organ meats, such as liver.  Dairy  Cream, sour cream, cream cheese, and creamed cottage cheese. Whole-milk cheeses. Whole or 2% milk (liquid, evaporated, or condensed). Whole buttermilk. Cream sauce or high-fat cheese sauce. Whole-milk yogurt.  Fats and oils  Meat fat, or shortening. Cocoa butter, hydrogenated oils, palm oil, coconut oil, palm kernel oil. Solid fats and shortenings, including croft fat, salt pork, lard, and butter. Nondairy cream substitutes. Salad dressings with cheese or sour cream.  Beverages  Regular sodas and any drinks with added sugar.  Sweets and desserts  Frosting. Pudding. Cookies. Cakes. Pies. Milk chocolate or white chocolate. Buttered syrups. Full-fat ice cream or ice cream drinks.  The items listed  above may not be a complete list of foods and beverages to avoid. Contact a dietitian for more information.  Summary  Heart-healthy meal planning includes limiting unhealthy fats, increasing healthy fats, and making other diet and lifestyle changes.  Lose weight if you are overweight. Losing just 5-10% of your body weight can help your overall health and prevent diseases such as diabetes and heart disease.  Focus on eating a balance of foods, including fruits and vegetables, low-fat or nonfat dairy, lean protein, nuts and legumes, whole grains, and heart-healthy oils and fats.  This information is not intended to replace advice given to you by your health care provider. Make sure you discuss any questions you have with your health care provider.  Document Revised: 04/28/2022 Document Reviewed: 04/28/2022  [employer] Patient Education © 2022 [employer]    Fall Prevention in the Home, Adult  Falls can cause injuries and affect people of all ages. There are many simple things that you can do to make your home safe and to help prevent falls.  If you need it, ask for help making these changes.  What actions can I take to prevent falls?  General information  Use good lighting in all rooms. Make sure to:  Replace any light bulbs that burn out.  Turn on lights if it is dark and use night-lights.  Keep items that you use often in easy-to-reach places. Lower the shelves around your home if needed.  Move furniture so that there are clear paths around it.  Do not keep throw rugs or other things on the floor that can make you trip.  If any of your floors are uneven, fix them.  Add color or contrast paint or tape to clearly marino and help you see:  Grab bars or handrails.  First and last steps of staircases.  Where the edge of each step is.  If you use a ladder or stepladder:  Make sure that it is fully opened. Do not climb a closed ladder.  Make sure the sides of the ladder are locked in place.  Have someone hold the ladder  while you use it.  Know where your pets are as you move through your home.  What can I do in the bathroom?         Keep the floor dry. Clean up any water that is on the floor right away.  Remove soap buildup in the bathtub or shower. Buildup makes bathtubs and showers slippery.  Use non-skid mats or decals on the floor of the bathtub or shower.  Attach bath mats securely with double-sided, non-slip rug tape.  If you need to sit down while you are in the shower, use a non-slip stool.  Install grab bars by the toilet and in the bathtub and shower. Do not use towel bars as grab bars.  What can I do in the bedroom?  Make sure that you have a light by your bed that is easy to reach.  Do not use any sheets or blankets on your bed that hang to the floor.  Have a firm bench or chair with side arms that you can use for support when you get dressed.  What can I do in the kitchen?  Clean up any spills right away.  If you need to reach something above you, use a sturdy step stool that has a grab bar.  Keep electrical cables out of the way.  Do not use floor polish or wax that makes floors slippery.  What can I do with my stairs?  Do not leave anything on the stairs.  Make sure that you have a light switch at the top and the bottom of the stairs. Have them installed if you do not have them.  Make sure that there are handrails on both sides of the stairs. Fix handrails that are broken or loose. Make sure that handrails are as long as the staircases.  Install non-slip stair treads on all stairs in your home if they do not have carpet.  Avoid having throw rugs at the top or bottom of stairs, or secure the rugs with carpet tape to prevent them from moving.  Choose a carpet design that does not hide the edge of steps on the stairs. Make sure that carpet is firmly attached to the stairs. Fix any carpet that is loose or worn.  What can I do on the outside of my home?  Use bright outdoor lighting.  Repair the edges of walkways and  driveways and fix any cracks. Clear paths of anything that can make you trip, such as tools or rocks.  Add color or contrast paint or tape to clearly marino and help you see high doorway thresholds.  Trim any bushes or trees on the main path into your home.  Check that handrails are securely fastened and in good repair. Both sides of all steps should have handrails.  Install guardrails along the edges of any raised decks or porches.  Have leaves, snow, and ice cleared regularly. Use sand, salt, or ice melt on walkways during winter months if you live where there is ice and snow.  In the garage, clean up any spills right away, including grease or oil spills.  What other actions can I take?  Review your medicines with your health care provider. Some medicines can make you confused or feel dizzy. This can increase your chance of falling.  Wear closed-toe shoes that fit well and support your feet. Wear shoes that have rubber soles and low heels.  Use a cane, walker, scooter, or crutches that help you move around if needed.  Talk with your provider about other ways that you can decrease your risk of falls. This may include seeing a physical therapist to learn to do exercises to improve movement and strength.  Where to find more information  Centers for Disease Control and Prevention, [place]: cdc.gov  [employer] [place] on Aging: yesenia.nih.gov  [employer] [place] on Aging: yesenia.nih.gov  Contact a health care provider if:  You are afraid of falling at home.  You feel weak, drowsy, or dizzy at home.  You fall at home.  Get help right away if you:  Lose consciousness or have trouble moving after a fall.  Have a fall that causes a head injury.  These symptoms may be an emergency. Get help right away. Call 911.  Do not wait to see if the symptoms will go away.  Do not drive yourself to the hospital.  This information is not intended to replace advice given to you by your health care provider. Make sure you discuss any questions you  have with your health care provider.  Document Revised: 08/21/2023 Document Reviewed: 08/21/2023  [employer] Patient Education © 2024 [employer]

## 2025-04-16 DIAGNOSIS — G62.9 PERIPHERAL POLYNEUROPATHY: Chronic | ICD-10-CM

## 2025-04-16 RX ORDER — GABAPENTIN 100 MG/1
100 CAPSULE ORAL NIGHTLY
Qty: 90 CAPSULE | Refills: 2 | Status: SHIPPED | OUTPATIENT
Start: 2025-04-16

## 2025-04-16 NOTE — TELEPHONE ENCOUNTER
Rx Refill Note  Requested Prescriptions     Pending Prescriptions Disp Refills    gabapentin (NEURONTIN) 100 MG capsule 90 capsule 0     Sig: Take 1 capsule by mouth.      Last office visit with prescribing clinician: 4/9/2025   Last telemedicine visit with prescribing clinician: Visit date not found   Next office visit with prescribing clinician: 7/9/2025                         Would you like a call back once the refill request has been completed: [] Yes [] No    If the office needs to give you a call back, can they leave a voicemail: [] Yes [] No    Nancy Niño MA  04/16/25, 11:02 EDT

## 2025-04-28 ENCOUNTER — TELEPHONE (OUTPATIENT)
Dept: INTERNAL MEDICINE | Facility: CLINIC | Age: OVER 89
End: 2025-04-28
Payer: MEDICARE

## 2025-04-28 NOTE — TELEPHONE ENCOUNTER
Caller: HOLLY PEREZ DAUGHTER     Relationship: Emergency Contact    Best call back number:  [phone] (Mobile)     Who are you requesting to speak with (clinical staff, provider,  specific staff member): CLINICAL       What was the call regarding  PATIENT IS AT [facility] AND THEY NEED A COPY OF HER ADVANCED LIVING WILL AND DIRECTIVES     PLEASE CALL HOLLY

## 2025-05-12 RX ORDER — FOLIC ACID 1 MG/1
2000 TABLET ORAL DAILY
Qty: 180 TABLET | Refills: 0 | Status: SHIPPED | OUTPATIENT
Start: 2025-05-12

## 2025-05-12 NOTE — TELEPHONE ENCOUNTER
Caller: Thuy Clark    Relationship: Self    Best call back number: [phone]    Requested Prescriptions:   Requested Prescriptions     Pending Prescriptions Disp Refills    folic acid (FOLVITE) 1 MG tablet 180 tablet 0     Sig: Take 2 tablets by mouth Daily.        Pharmacy where request should be sent: [facility] [address] [place] [address] [phone] [facility] [phone]      Last office visit with prescribing clinician: 4/9/2025   Last telemedicine visit with prescribing clinician: Visit date not found   Next office visit with prescribing clinician: 7/9/2025     Additional details provided by patient: THE PATIENT HAS 5 PILLS LEFT     Does the patient have less than a 3 day supply:  [] Yes  [x] No    Would you like a call back once the refill request has been completed: [] Yes [x] No    If the office needs to give you a call back, can they leave a voicemail: [] Yes [x] No    Terrance Larson Rep   05/12/25 12:42 EDT

## 2025-05-19 RX ORDER — FOLIC ACID 1 MG/1
2000 TABLET ORAL DAILY
Qty: 180 TABLET | Refills: 0 | OUTPATIENT
Start: 2025-05-19

## 2025-06-20 DIAGNOSIS — G62.9 PERIPHERAL POLYNEUROPATHY: Chronic | ICD-10-CM

## 2025-06-20 RX ORDER — GABAPENTIN 100 MG/1
100 CAPSULE ORAL NIGHTLY
Qty: 90 CAPSULE | Refills: 2 | Status: SHIPPED | OUTPATIENT
Start: 2025-06-20

## 2025-06-20 NOTE — TELEPHONE ENCOUNTER
Rx Refill Note  Requested Prescriptions     Pending Prescriptions Disp Refills    gabapentin (NEURONTIN) 100 MG capsule 90 capsule 2     Sig: Take 1 capsule by mouth Every Night.      Last office visit with prescribing clinician: 4/9/2025   Last telemedicine visit with prescribing clinician: Visit date not found   Next office visit with prescribing clinician: 7/9/2025                         Would you like a call back once the refill request has been completed: [] Yes [] No    If the office needs to give you a call back, can they leave a voicemail: [] Yes [] No    Nancy Niño MA  06/20/25, 09:51 EDT

## 2025-06-20 NOTE — TELEPHONE ENCOUNTER
Caller: Thuy Clark    Relationship: Self    Best call back number: [phone]     Requested Prescriptions:   Requested Prescriptions     Pending Prescriptions Disp Refills    gabapentin (NEURONTIN) 100 MG capsule 90 capsule 2     Sig: Take 1 capsule by mouth Every Night.        Pharmacy where request should be sent: [facility] [address] [place] [address] [phone] [facility] [phone]     Last office visit with prescribing clinician: 4/9/2025   Last telemedicine visit with prescribing clinician: Visit date not found   Next office visit with prescribing clinician: 7/9/2025

## 2025-06-25 ENCOUNTER — TELEPHONE (OUTPATIENT)
Dept: INTERNAL MEDICINE | Facility: CLINIC | Age: OVER 89
End: 2025-06-25
Payer: MEDICARE

## 2025-06-25 DIAGNOSIS — G62.9 PERIPHERAL POLYNEUROPATHY: Chronic | ICD-10-CM

## 2025-06-25 RX ORDER — GABAPENTIN 100 MG/1
100 CAPSULE ORAL 3 TIMES DAILY
Qty: 90 CAPSULE | Refills: 2 | Status: SHIPPED | OUTPATIENT
Start: 2025-06-25

## 2025-06-25 NOTE — TELEPHONE ENCOUNTER
Spoke to pt and she verbalized understanding. She said the pharmacy will not refill though.    I spoke to [facility]. Pt given 90 day supply of gabapentin on 4/19. Refill not due till 7/17.    Can we authorize an early refill?

## 2025-06-25 NOTE — TELEPHONE ENCOUNTER
Caller: Thuy Clark    Relationship: Self    Best call back number: [phone]     Which medication are you concerned about: gabapentin (NEURONTIN) 100 MG capsule     Who prescribed you this medication: Isidra Eisenberg MD     What are your concerns: PATIENT STATES SHE HAS BEEN OUT FOR TWO DAYS. PATIENT STATES SHE IS UNABLE TO SLEEP BECAUSE OF HER TOE PAIN. PATIENT STATES SHE WILL HAVE HER SON BRING HER OLD PRESCRIPTION BOTTLE THAT STATES SHE IS SUPPOSED TO TAKE THE PRESCRIPTION 3 TIMES A DAY AS NEEDED. PATIENT WOULD LIKE A CALL BACK.

## 2025-06-25 NOTE — TELEPHONE ENCOUNTER
Lvm, asking for call back.    Hub to relay:    Spoke to pharmacy and authorized early refill on gabapentin. They said rx is ready to .   [FreeTextEntry1] : Chronic thromboembolic disease with DVT\par \par The patient is on oral anticoagulation indefinitely.  There is no clinical evidence of failure of therapy.  There is no evidence of bleeding.  The patient is to continue on anticoagulation\par \par Multiple pulmonary nodules\par \par The at this point there is no indication for any further work-up regarding the pulmonary nodules.  Observe.  That could be related to his asbestosis or granulomatous disease\par \par Pleural effusion\par \par The patient had thoracentesis twice.  At this point the patient is on increased diuretics.  I reviewed the lab and the pleural effusion is exudative with negative cytology and cultures.  The pleural effusion need to be observed and if increases in size despite adequate diuresis then he will require further thoracentesis.  I explained that he might be a candidate for Clorox but that my complicated his course.  Polyp patient will follow-up with his local pulmonologist regarding if he requires further drainage.  \par \par Asbestosis\par \par The patient was exposed to asbestos in the past and known to have asbestosis.  The chest x-ray is consistent with pleural plaques calcification evidenced on the chest x-ray and pleural effusion.  The pleural effusion could be related to the asbestosis.  At this patient at this time the patient is to be follow-up no further intervention regarding his asbestosis\par \par Pulmonary hypertension\par \par The patient is heart failure class I with dyspnea on exertion at rest.  It is related to group 2,3 and 4.  The patient is on oxygen supplementation, anticoagulation, and diuretics.  There is CT scan of the chest revealed consistent nonocclusive thrombus in the left lower pulmonary artery branch.  It that filling defects is persistence on the repeat a CT scan of the chest patient might be a candidate for Adempas.  I will discussed the case with Dr. Dowd.\par \par Chronic hypoxic respiratory failure\par \par Patient is on supplemental oxygen for about 6 L/min.  The patient is into compressors to to evaluate higher flow.  The chronic hypoxic respiratory failure is multifactorial.  His general wellbeing with the fatigue and loss of appetite is also consistent with the all the above.  At this point informed the family that he will benefit from noninvasive ventilation.  I discussed with them the situation at home.  Patient would benefit sleeping on a recliner.  The noninvasive ventilation bother of the wires which she has dementia and is uncomfortable with the patient being on the noninvasive ventilation.  I discussed with them that it is important that the patient can be maintained noninvasive ventilation at the circumstances allow..\par

## 2025-07-07 ENCOUNTER — TELEPHONE (OUTPATIENT)
Dept: INTERNAL MEDICINE | Facility: CLINIC | Age: OVER 89
End: 2025-07-07

## 2025-07-09 ENCOUNTER — TELEPHONE (OUTPATIENT)
Dept: INTERNAL MEDICINE | Facility: CLINIC | Age: OVER 89
End: 2025-07-09

## 2025-07-09 ENCOUNTER — LAB (OUTPATIENT)
Dept: LAB | Facility: HOSPITAL | Age: OVER 89
End: 2025-07-09
Payer: MEDICARE

## 2025-07-09 ENCOUNTER — OFFICE VISIT (OUTPATIENT)
Dept: INTERNAL MEDICINE | Facility: CLINIC | Age: OVER 89
End: 2025-07-09
Payer: MEDICARE

## 2025-07-09 VITALS
OXYGEN SATURATION: 95 % | BODY MASS INDEX: 23.29 KG/M2 | SYSTOLIC BLOOD PRESSURE: 100 MMHG | TEMPERATURE: 98.4 F | HEART RATE: 96 BPM | WEIGHT: 136.4 LBS | DIASTOLIC BLOOD PRESSURE: 60 MMHG | HEIGHT: 64 IN

## 2025-07-09 DIAGNOSIS — Z00.00 MEDICARE ANNUAL WELLNESS VISIT, SUBSEQUENT: Primary | ICD-10-CM

## 2025-07-09 DIAGNOSIS — I50.42 CHRONIC COMBINED SYSTOLIC AND DIASTOLIC HEART FAILURE: Chronic | ICD-10-CM

## 2025-07-09 DIAGNOSIS — E87.1 HYPONATREMIA: ICD-10-CM

## 2025-07-09 DIAGNOSIS — G62.9 PERIPHERAL POLYNEUROPATHY: Chronic | ICD-10-CM

## 2025-07-09 DIAGNOSIS — Z91.81 AT HIGH RISK FOR FALLS: Chronic | ICD-10-CM

## 2025-07-09 DIAGNOSIS — I38 VALVULAR HEART DISEASE: ICD-10-CM

## 2025-07-09 DIAGNOSIS — I10 BENIGN ESSENTIAL HYPERTENSION: ICD-10-CM

## 2025-07-09 DIAGNOSIS — M81.0 SENILE OSTEOPOROSIS: ICD-10-CM

## 2025-07-09 DIAGNOSIS — E78.2 MIXED HYPERLIPIDEMIA: ICD-10-CM

## 2025-07-09 DIAGNOSIS — I48.21 PERMANENT ATRIAL FIBRILLATION: ICD-10-CM

## 2025-07-09 DIAGNOSIS — R60.0 BILATERAL EDEMA OF LOWER EXTREMITY: Chronic | ICD-10-CM

## 2025-07-09 PROCEDURE — 80048 BASIC METABOLIC PNL TOTAL CA: CPT

## 2025-07-09 PROCEDURE — G0439 PPPS, SUBSEQ VISIT: HCPCS | Performed by: INTERNAL MEDICINE

## 2025-07-09 PROCEDURE — 1170F FXNL STATUS ASSESSED: CPT | Performed by: INTERNAL MEDICINE

## 2025-07-09 PROCEDURE — 1125F AMNT PAIN NOTED PAIN PRSNT: CPT | Performed by: INTERNAL MEDICINE

## 2025-07-09 RX ORDER — GABAPENTIN 100 MG/1
300 CAPSULE ORAL NIGHTLY PRN
Start: 2025-07-09

## 2025-07-09 RX ORDER — DOCUSATE SODIUM 100 MG/1
100 CAPSULE, LIQUID FILLED ORAL 2 TIMES DAILY
COMMUNITY
End: 2025-07-09 | Stop reason: SDUPTHER

## 2025-07-09 RX ORDER — DIOSMIN COMPLEX NO.1 630 MG
TABLET ORAL
Qty: 180 TABLET | Refills: 3 | Status: SHIPPED | OUTPATIENT
Start: 2025-07-09 | End: 2025-07-09 | Stop reason: SDUPTHER

## 2025-07-09 RX ORDER — DIOSMIN COMPLEX NO.1 630 MG
TABLET ORAL
Qty: 180 TABLET | Refills: 3 | Status: SHIPPED | OUTPATIENT
Start: 2025-07-09

## 2025-07-09 RX ORDER — BUMETANIDE 1 MG/1
TABLET ORAL
Start: 2025-07-09

## 2025-07-09 RX ORDER — BISOPROLOL FUMARATE 5 MG/1
5 TABLET, FILM COATED ORAL DAILY
Start: 2025-07-09

## 2025-07-09 RX ORDER — SACUBITRIL AND VALSARTAN 24; 26 MG/1; MG/1
1 TABLET, FILM COATED ORAL DAILY
Start: 2025-07-09 | End: 2025-07-10

## 2025-07-09 RX ORDER — DOCUSATE SODIUM 100 MG/1
100 CAPSULE, LIQUID FILLED ORAL 2 TIMES DAILY PRN
Qty: 180 CAPSULE | Refills: 3 | Status: SHIPPED | OUTPATIENT
Start: 2025-07-09
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
## 2025-07-09 NOTE — TELEPHONE ENCOUNTER
Caller: Thuy Clark    Relationship: Self    Best call back number: [phone]     What orders are you requesting (i.e. lab or imaging): BLOOD WORK    In what timeframe would the patient need to come in: BEFORE APPOINTMENT TODAY    Where will you receive your lab/imaging services: IN OFFICE    Additional notes: PATIENT WANTS A CALL BACK WHEN ORDERS ARE PLACED AND WANTS THE STAFF TO KNOW SHE IS NO LONGER USING MYCHART

## 2025-07-09 NOTE — PATIENT INSTRUCTIONS
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  Taking eliquis and bisoprolol and furosemide as needed.  >>Date of Admission: 4/1/2025  Date of Discharge: 4/3/2025  Discharge Diagnosis:   Acute on chronic HFpEF (stage C/NYHA III)  Valvular heart disease  Atrial septal defect  Chronic atrial fibrillation  Chronically anticoagulated with Eliquis  Pulmonary hypertension  Hypertension  DDD Lumbar Spine  Advanced age  Diminished hearing capacity    Reason for Hospitalization  Thuy Clark is a 89 y.o. female that presents to [facility] emergency department with concerns of lower extremity swelling and shortness of air over 2 weeks. She is accompanied by her son Jack who assists with the history. She reports outpatient follow-up with a local cardiologist and is currently on a regimen for heart failure. She has identified 2 weeks of increasing lower extremity edema and shortness of air on her home loop diuretic therapy. She describes associated shortness of air made worse with exertion but no associated retrosternal chest pain, palpitations or confusion. She denies any syncopal episodes or falls. She reports some orthopnea. In the ED her chest x-ray is concerning for cardiomegaly with pulmonary edema. Her ED proBNP is 4720. Previous EKG identifies chronic atrial fibrillation. Her echo (11/22/2023) identified an EF of 55% with valve disease and RVSP 55 mmHg consistent with pulmonary hypertension. She does not use NIPPV at home. Her ED VBG is noted for a pCO2=56.     Hospital Course  Thuy Clark is a 89 y.o. female who is admitted to the telemetry floor with cardiology consultation. Imaging, labs and inflammatory markers are assessed and trended. Problems addressed as follows:    Acute on  chronic HFpEF  Valvular heart disease  Telemetry monitoring  Cardiology consultation reviewed  Echo (11/22/2023): EF 55%, 3+ TR, 2+ MR, CVP >15 mmHg, RVSP 55 mmHg  Chest x-ray with cardiomegaly and pulmonary edema  Echo: EF 55%, ASD noted, MR, TR, CVP >15 mmHg, RVSP 65 mmHg, PHTN  Admit proBNP 4720  Accurate I's and O's  Sodium and fluid restriction  Routine weights  IV loop diuretic therapy transitioned to oral  Routine electrolyte, creatinine and mineral evaluations  Beta-blocker therapy  SGLT2 inhibitor therapy  ARNI therapy  Potassium supplementation           Relevant Medications    bisoprolol (ZEBeta) 5 MG tablet    sacubitril-valsartan (Entresto) 24-26 MG tablet    Valvular heart disease    Overview     6/20/2021 echocardiogram: mild AR and mild TR.  Elevated right ventricular systolic pressure of 35 to 45 mmHg.  Severely dilated right atrial cavity.  Left atrial volume severely increased.  Saline test positive for intra-atrial shunt.  Borderline left ventricular hypertrophy.  Ejection fraction 65%.     8/27/21 echo at [facility] by Dr. Kaye Aleman-EF 60%.  Moderate MR and moderate TR and mild AR. Stress test showed no ischemic changes.  Afib thru out study.    7/2022 echo at [facility] with ejection fraction 65%.  Severe MR and severe TR.  Pulmonary hypertension.  Severely dilated right atrial and right ventricular cavities.    7/2024 echo Dr. Fuller. Severe MR and TR and moderate AR. Elevated RSVP.    ECHO 4/2/25 at [facility]  Small Atrial septal defect with a left to right shunt  Normal sized left ventricle.  Normal left ventricular wall thickness.  Normal left ventricular systolic function.  Estimated ejection fraction 55% +/- 5%.  Unable to obtain diastolic function due to atrial fibrillation .  Severely dilated right ventricle.  Severe right ventricular systolic dysfunction.  Thick tricuspid valve leaflets.  Moderately (3+) tricuspid regurgitation.  Dilated IVC with no inspiratory collapse.  Elevated  central venous pressure (>15mmHg)  Severe pulmonary hypertension. RVSP 65 mmHg.  Calcified mitral valve leaflets without evidence of stenosis.  Moderate to Severe mitral regurgitation.  Mitral regurgitation radius 0.7 cm.  PISA ERO: .28 cm2.  Regurgitant volume = 38 ml.  Severely abnormal left atrial volume index 64 ml/m2.  Severely dilated right atrium.     Taking Eliquis twice a day and 15mg bisoprolol every evening.          Relevant Medications    bisoprolol (ZEBeta) 5 MG tablet    sacubitril-valsartan (Entresto) 24-26 MG tablet    Benign essential hypertension    Overview    Taking bisoprolol 5 mg and entresto once a day.         Relevant Medications    bisoprolol (ZEBeta) 5 MG tablet    bumetanide (BUMEX) 1 MG tablet    Other Relevant Orders    Basic Metabolic Panel    Microalbumin / Creatinine Urine Ratio - Urine, Clean Catch    Urinalysis With Microscopic - Urine, Clean Catch    Mixed hyperlipidemia    Permanent atrial fibrillation    Overview   Taking Eliquis twice a day.  Taking bisoprolol every evening.         Relevant Medications    bisoprolol (ZEBeta) 5 MG tablet    sacubitril-valsartan (Entresto) 24-26 MG tablet       Genitourinary and Reproductive     Hyponatremia    Overview   Oral fluid restriction at 1500 mL daily.  No longer taking  sodium chloride tablets.  Drink a Gatorade or other electrolyte drink once a day.              Health Encounters    Medicare annual wellness visit, subsequent - Primary       Musculoskeletal and Injuries    Senile osteoporosis    Overview   Multiple pelvic fractures and rib fractures.  Patient declines DEXA and declines treatment for osteoporosis.                Neuro    Peripheral polyneuropathy (Chronic)    Overview   Taking gabapentin 200 mg nightly.         Relevant Medications    gabapentin (NEURONTIN) 100 MG capsule       Symptoms and Signs    Bilateral edema of lower extremity (Chronic)    Overview   Taking furosemide as needed.          Heart-Healthy Eating  Plan  Many factors influence your heart (coronary) health, including eating and exercise habits. Coronary risk increases with abnormal blood fat (lipid) levels. Heart-healthy meal planning includes limiting unhealthy fats, increasing healthy fats, and making other diet and lifestyle changes.  What is my plan?  Your health care provider may recommend that you:  Limit your fat intake to _________% or less of your total calories each day.  Limit your saturated fat intake to _________% or less of your total calories each day.  Limit the amount of cholesterol in your diet to less than _________ mg per day.  What are tips for following this plan?  Cooking  Cook foods using methods other than frying. Baking, boiling, grilling, and broiling are all good options. Other ways to reduce fat include:  Removing the skin from poultry.  Removing all visible fats from meats.  Steaming vegetables in water or broth.  Meal planning  A plate with examples of foods in a healthy diet.      At meals, imagine dividing your plate into fourths:  Fill one-half of your plate with vegetables and green salads.  Fill one-fourth of your plate with whole grains.  Fill one-fourth of your plate with lean protein foods.  Eat 4-5 servings of vegetables per day. One serving equals 1 cup raw or cooked vegetable, or 2 cups raw leafy greens.  Eat 4-5 servings of fruit per day. One serving equals 1 medium whole fruit, ¼ cup dried fruit, ½ cup fresh, frozen, or canned fruit, or ½ cup 100% fruit juice.  Eat more foods that contain soluble fiber. Examples include apples, broccoli, carrots, beans, peas, and barley. Aim to get 25-30 g of fiber per day.  Increase your consumption of legumes, nuts, and seeds to 4-5 servings per week. One serving of dried beans or legumes equals ½ cup cooked, 1 serving of nuts is ¼ cup, and 1 serving of seeds equals 1 tablespoon.  Fats  Choose healthy fats more often. Choose monounsaturated and polyunsaturated fats, such as olive  and canola oils, flaxseeds, walnuts, almonds, and seeds.  Eat more omega-3 fats. Choose salmon, mackerel, sardines, tuna, flaxseed oil, and ground flaxseeds. Aim to eat fish at least 2 times each week.  Check food labels carefully to identify foods with trans fats or high amounts of saturated fat.  Limit saturated fats. These are found in animal products, such as meats, butter, and cream. Plant sources of saturated fats include palm oil, palm kernel oil, and coconut oil.  Avoid foods with partially hydrogenated oils in them. These contain trans fats. Examples are stick margarine, some tub margarines, cookies, crackers, and other baked goods.  Avoid fried foods.  General information  Eat more home-cooked food and less restaurant, buffet, and fast food.  Limit or avoid alcohol.  Limit foods that are high in starch and sugar.  Lose weight if you are overweight. Losing just 5-10% of your body weight can help your overall health and prevent diseases such as diabetes and heart disease.  Monitor your salt (sodium) intake, especially if you have high blood pressure. Talk with your health care provider about your sodium intake.  Try to incorporate more vegetarian meals weekly.  What foods can I eat?  Fruits  All fresh, canned (in natural juice), or frozen fruits.  Vegetables  Fresh or frozen vegetables (raw, steamed, roasted, or grilled). Green salads.  Grains  Most grains. Choose whole wheat and whole grains most of the time. Rice and pasta, including brown rice and pastas made with whole wheat.  Meats and other proteins  Lean, well-trimmed beef, veal, pork, and lamb. Chicken and turkey without skin. All fish and shellfish. Wild duck, rabbit, pheasant, and venison. Egg whites or low-cholesterol egg substitutes. Dried beans, peas, lentils, and tofu. Seeds and most nuts.  Dairy  Low-fat or nonfat cheeses, including ricotta and mozzarella. Skim or 1% milk (liquid, powdered, or evaporated). Buttermilk made with low-fat milk.  Nonfat or low-fat yogurt.  Fats and oils  Non-hydrogenated (trans-free) margarines. Vegetable oils, including soybean, sesame, sunflower, olive, peanut, safflower, corn, canola, and cottonseed. Salad dressings or mayonnaise made with a vegetable oil.  Beverages  Water (mineral or sparkling). Coffee and tea. Diet carbonated beverages.  Sweets and desserts  Sherbet, gelatin, and fruit ice. Small amounts of dark chocolate.  Limit all sweets and desserts.  Seasonings and condiments  All seasonings and condiments.  The items listed above may not be a complete list of foods and beverages you can eat. Contact a dietitian for more options.  What foods are not recommended?  Fruits  Canned fruit in heavy syrup. Fruit in cream or butter sauce. Fried fruit. Limit coconut.  Vegetables  Vegetables cooked in cheese, cream, or butter sauce. Fried vegetables.  Grains  Breads made with saturated or trans fats, oils, or whole milk. Croissants. Sweet rolls. Donuts. High-fat crackers, such as cheese crackers.  Meats and other proteins  Fatty meats, such as hot dogs, ribs, sausage, croft, rib-eye roast or steak. High-fat deli meats, such as salami and bologna. Caviar. Domestic duck and goose. Organ meats, such as liver.  Dairy  Cream, sour cream, cream cheese, and creamed cottage cheese. Whole-milk cheeses. Whole or 2% milk (liquid, evaporated, or condensed). Whole buttermilk. Cream sauce or high-fat cheese sauce. Whole-milk yogurt.  Fats and oils  Meat fat, or shortening. Cocoa butter, hydrogenated oils, palm oil, coconut oil, palm kernel oil. Solid fats and shortenings, including croft fat, salt pork, lard, and butter. Nondairy cream substitutes. Salad dressings with cheese or sour cream.  Beverages  Regular sodas and any drinks with added sugar.  Sweets and desserts  Frosting. Pudding. Cookies. Cakes. Pies. Milk chocolate or white chocolate. Buttered syrups. Full-fat ice cream or ice cream drinks.  The items listed above may not be a  complete list of foods and beverages to avoid. Contact a dietitian for more information.  Summary  Heart-healthy meal planning includes limiting unhealthy fats, increasing healthy fats, and making other diet and lifestyle changes.  Lose weight if you are overweight. Losing just 5-10% of your body weight can help your overall health and prevent diseases such as diabetes and heart disease.  Focus on eating a balance of foods, including fruits and vegetables, low-fat or nonfat dairy, lean protein, nuts and legumes, whole grains, and heart-healthy oils and fats.  This information is not intended to replace advice given to you by your health care provider. Make sure you discuss any questions you have with your health care provider.  Document Revised: 04/28/2022 Document Reviewed: 04/28/2022  Elsevier Patient Education © 2022 [employer]

## 2025-07-10 ENCOUNTER — TELEPHONE (OUTPATIENT)
Dept: INTERNAL MEDICINE | Facility: CLINIC | Age: OVER 89
End: 2025-07-10
Payer: MEDICARE

## 2025-07-10 LAB
ANION GAP SERPL CALCULATED.3IONS-SCNC: 11 MMOL/L (ref 5–15)
BUN SERPL-MCNC: 16 MG/DL (ref 8–23)
BUN/CREAT SERPL: 14 (ref 7–25)
CALCIUM SPEC-SCNC: 9.3 MG/DL (ref 8.6–10.5)
CHLORIDE SERPL-SCNC: 99 MMOL/L (ref 98–107)
CO2 SERPL-SCNC: 28 MMOL/L (ref 22–29)
CREAT SERPL-MCNC: 1.14 MG/DL (ref 0.57–1)
EGFRCR SERPLBLD CKD-EPI 2021: 46.1 ML/MIN/1.73
GLUCOSE SERPL-MCNC: 85 MG/DL (ref 65–99)
POTASSIUM SERPL-SCNC: 3.6 MMOL/L (ref 3.5–5.2)
SODIUM SERPL-SCNC: 138 MMOL/L (ref 136–145)

## 2025-07-10 NOTE — TELEPHONE ENCOUNTER
[facility] CALLED AND SAID THE VASCULERA TABLET THAT WAS SENT IN YESTERDAY SAYS NON FORMULARY AND IS VERY EXPENSIVE. HE IS NOT SURE IF IT WOULD BE ABLE TO BE FILLED AND MAY NEED A PRIOR AUTH. NOT SURE HOW YOU ALL WOULD WANT TO PROCEED.

## 2025-07-17 ENCOUNTER — TELEPHONE (OUTPATIENT)
Dept: INTERNAL MEDICINE | Facility: CLINIC | Age: OVER 89
End: 2025-07-17
Payer: MEDICARE

## 2025-07-17 DIAGNOSIS — R26.89 POOR BALANCE: ICD-10-CM

## 2025-07-17 DIAGNOSIS — Z91.81 AT HIGH RISK FOR FALLS: Primary | ICD-10-CM

## 2025-07-17 NOTE — TELEPHONE ENCOUNTER
Josy Daughter of Thuy asked if a new prescription for a Rolator walker be re-written, they've may miss placed it recently.     [phone]

## 2025-08-04 ENCOUNTER — TELEPHONE (OUTPATIENT)
Dept: INTERNAL MEDICINE | Facility: CLINIC | Age: OVER 89
End: 2025-08-04
Payer: MEDICARE

## 2025-08-11 ENCOUNTER — TELEPHONE (OUTPATIENT)
Dept: INTERNAL MEDICINE | Facility: CLINIC | Age: OVER 89
End: 2025-08-11
Payer: MEDICARE

## 2025-08-12 RX ORDER — BUMETANIDE 1 MG/1
TABLET ORAL
Qty: 75 TABLET | Refills: 0 | Status: SHIPPED | OUTPATIENT
Start: 2025-08-12